# Patient Record
Sex: FEMALE | Race: WHITE | Employment: OTHER | URBAN - METROPOLITAN AREA
[De-identification: names, ages, dates, MRNs, and addresses within clinical notes are randomized per-mention and may not be internally consistent; named-entity substitution may affect disease eponyms.]

---

## 2018-02-27 ENCOUNTER — HOSPITAL ENCOUNTER (INPATIENT)
Age: 68
LOS: 8 days | Discharge: NURSING, REHAB OR LONG-TERM CARE | DRG: 909 | End: 2018-03-07
Attending: EMERGENCY MEDICINE | Admitting: SPECIALIST
Payer: MEDICARE

## 2018-02-27 ENCOUNTER — APPOINTMENT (OUTPATIENT)
Dept: MRI IMAGING | Age: 68
DRG: 909 | End: 2018-02-27
Attending: EMERGENCY MEDICINE
Payer: MEDICARE

## 2018-02-27 DIAGNOSIS — M54.31 BILATERAL SCIATICA: Primary | ICD-10-CM

## 2018-02-27 DIAGNOSIS — M54.42 ACUTE LEFT-SIDED LOW BACK PAIN WITH LEFT-SIDED SCIATICA: ICD-10-CM

## 2018-02-27 DIAGNOSIS — G89.29 CHRONIC BILATERAL LOW BACK PAIN WITH BILATERAL SCIATICA: ICD-10-CM

## 2018-02-27 DIAGNOSIS — L24.A9 WOUND DRAINAGE: ICD-10-CM

## 2018-02-27 DIAGNOSIS — K68.12 PSOAS ABSCESS, LEFT (HCC): ICD-10-CM

## 2018-02-27 DIAGNOSIS — M54.41 CHRONIC BILATERAL LOW BACK PAIN WITH BILATERAL SCIATICA: ICD-10-CM

## 2018-02-27 DIAGNOSIS — M54.32 BILATERAL SCIATICA: Primary | ICD-10-CM

## 2018-02-27 DIAGNOSIS — M54.42 CHRONIC BILATERAL LOW BACK PAIN WITH BILATERAL SCIATICA: ICD-10-CM

## 2018-02-27 DIAGNOSIS — T81.89XS LUMBAR SURGICAL WOUND FLUID COLLECTION, SEQUELA: ICD-10-CM

## 2018-02-27 PROBLEM — M54.50 LUMBAR BACK PAIN: Status: ACTIVE | Noted: 2018-02-27

## 2018-02-27 PROBLEM — T81.89XA LUMBAR SURGICAL WOUND FLUID COLLECTION: Status: ACTIVE | Noted: 2018-02-27

## 2018-02-27 PROBLEM — T81.9XXA POST-OPERATIVE COMPLICATION: Status: ACTIVE | Noted: 2018-02-27

## 2018-02-27 LAB
ALBUMIN SERPL-MCNC: 2.6 G/DL (ref 3.4–5)
ALBUMIN/GLOB SERPL: 0.6 {RATIO} (ref 0.8–1.7)
ALP SERPL-CCNC: 133 U/L (ref 45–117)
ALT SERPL-CCNC: 23 U/L (ref 13–56)
ANION GAP SERPL CALC-SCNC: 6 MMOL/L (ref 3–18)
AST SERPL-CCNC: 17 U/L (ref 15–37)
BASOPHILS # BLD: 0 K/UL (ref 0–0.06)
BASOPHILS NFR BLD: 0 % (ref 0–2)
BILIRUB SERPL-MCNC: 0.9 MG/DL (ref 0.2–1)
BUN SERPL-MCNC: 24 MG/DL (ref 7–18)
BUN/CREAT SERPL: 27 (ref 12–20)
CALCIUM SERPL-MCNC: 10.1 MG/DL (ref 8.5–10.1)
CHLORIDE SERPL-SCNC: 100 MMOL/L (ref 100–108)
CO2 SERPL-SCNC: 34 MMOL/L (ref 21–32)
CREAT SERPL-MCNC: 0.88 MG/DL (ref 0.6–1.3)
CRP SERPL-MCNC: 21.7 MG/DL (ref 0–0.3)
DIFFERENTIAL METHOD BLD: ABNORMAL
EOSINOPHIL # BLD: 0.1 K/UL (ref 0–0.4)
EOSINOPHIL NFR BLD: 1 % (ref 0–5)
ERYTHROCYTE [DISTWIDTH] IN BLOOD BY AUTOMATED COUNT: 14.8 % (ref 11.6–14.5)
ERYTHROCYTE [SEDIMENTATION RATE] IN BLOOD: 11 MM/HR (ref 0–30)
GLOBULIN SER CALC-MCNC: 4.4 G/DL (ref 2–4)
GLUCOSE SERPL-MCNC: 111 MG/DL (ref 74–99)
HCT VFR BLD AUTO: 42.9 % (ref 35–45)
HGB BLD-MCNC: 13.5 G/DL (ref 12–16)
INR PPP: 1 (ref 0.8–1.2)
LACTATE SERPL-SCNC: 1.9 MMOL/L (ref 0.4–2)
LYMPHOCYTES # BLD: 2.8 K/UL (ref 0.9–3.6)
LYMPHOCYTES NFR BLD: 24 % (ref 21–52)
MAGNESIUM SERPL-MCNC: 2.3 MG/DL (ref 1.6–2.6)
MCH RBC QN AUTO: 26.4 PG (ref 24–34)
MCHC RBC AUTO-ENTMCNC: 31.5 G/DL (ref 31–37)
MCV RBC AUTO: 83.8 FL (ref 74–97)
MONOCYTES # BLD: 0.8 K/UL (ref 0.05–1.2)
MONOCYTES NFR BLD: 7 % (ref 3–10)
NEUTS BAND NFR BLD MANUAL: 1 %
NEUTS SEG # BLD: 7.8 K/UL (ref 1.8–8)
NEUTS SEG NFR BLD: 67 % (ref 40–73)
PLATELET # BLD AUTO: 241 K/UL (ref 135–420)
PMV BLD AUTO: 9.3 FL (ref 9.2–11.8)
POTASSIUM SERPL-SCNC: 3.6 MMOL/L (ref 3.5–5.5)
PROT SERPL-MCNC: 7 G/DL (ref 6.4–8.2)
PROTHROMBIN TIME: 13 SEC (ref 11.5–15.2)
RBC # BLD AUTO: 5.12 M/UL (ref 4.2–5.3)
RBC MORPH BLD: ABNORMAL
SODIUM SERPL-SCNC: 140 MMOL/L (ref 136–145)
WBC # BLD AUTO: 11.7 K/UL (ref 4.6–13.2)

## 2018-02-27 PROCEDURE — 87070 CULTURE OTHR SPECIMN AEROBIC: CPT | Performed by: SPECIALIST

## 2018-02-27 PROCEDURE — 87040 BLOOD CULTURE FOR BACTERIA: CPT | Performed by: EMERGENCY MEDICINE

## 2018-02-27 PROCEDURE — 85652 RBC SED RATE AUTOMATED: CPT | Performed by: EMERGENCY MEDICINE

## 2018-02-27 PROCEDURE — 74011250637 HC RX REV CODE- 250/637: Performed by: EMERGENCY MEDICINE

## 2018-02-27 PROCEDURE — 99284 EMERGENCY DEPT VISIT MOD MDM: CPT

## 2018-02-27 PROCEDURE — 96361 HYDRATE IV INFUSION ADD-ON: CPT

## 2018-02-27 PROCEDURE — 83605 ASSAY OF LACTIC ACID: CPT | Performed by: EMERGENCY MEDICINE

## 2018-02-27 PROCEDURE — 72158 MRI LUMBAR SPINE W/O & W/DYE: CPT

## 2018-02-27 PROCEDURE — 74011250636 HC RX REV CODE- 250/636: Performed by: EMERGENCY MEDICINE

## 2018-02-27 PROCEDURE — 74011250637 HC RX REV CODE- 250/637: Performed by: SPECIALIST

## 2018-02-27 PROCEDURE — 85610 PROTHROMBIN TIME: CPT | Performed by: EMERGENCY MEDICINE

## 2018-02-27 PROCEDURE — 85025 COMPLETE CBC W/AUTO DIFF WBC: CPT | Performed by: EMERGENCY MEDICINE

## 2018-02-27 PROCEDURE — 74011250636 HC RX REV CODE- 250/636: Performed by: SPECIALIST

## 2018-02-27 PROCEDURE — 87077 CULTURE AEROBIC IDENTIFY: CPT | Performed by: SPECIALIST

## 2018-02-27 PROCEDURE — 86140 C-REACTIVE PROTEIN: CPT | Performed by: EMERGENCY MEDICINE

## 2018-02-27 PROCEDURE — 80053 COMPREHEN METABOLIC PANEL: CPT | Performed by: EMERGENCY MEDICINE

## 2018-02-27 PROCEDURE — A9577 INJ MULTIHANCE: HCPCS | Performed by: EMERGENCY MEDICINE

## 2018-02-27 PROCEDURE — 65270000029 HC RM PRIVATE

## 2018-02-27 PROCEDURE — 83735 ASSAY OF MAGNESIUM: CPT | Performed by: EMERGENCY MEDICINE

## 2018-02-27 PROCEDURE — 96374 THER/PROPH/DIAG INJ IV PUSH: CPT

## 2018-02-27 PROCEDURE — 74011000250 HC RX REV CODE- 250: Performed by: EMERGENCY MEDICINE

## 2018-02-27 PROCEDURE — 96375 TX/PRO/DX INJ NEW DRUG ADDON: CPT

## 2018-02-27 RX ORDER — MORPHINE SULFATE 20 MG/1
12 CAPSULE, EXTENDED RELEASE ORAL EVERY 12 HOURS
COMMUNITY
End: 2018-03-07

## 2018-02-27 RX ORDER — HYDROCHLOROTHIAZIDE 12.5 MG/1
12.5 CAPSULE ORAL AS NEEDED
COMMUNITY
End: 2021-01-15 | Stop reason: CLARIF

## 2018-02-27 RX ORDER — ONDANSETRON 2 MG/ML
4 INJECTION INTRAMUSCULAR; INTRAVENOUS
Status: DISCONTINUED | OUTPATIENT
Start: 2018-02-27 | End: 2018-03-07 | Stop reason: HOSPADM

## 2018-02-27 RX ORDER — CYCLOBENZAPRINE HCL 10 MG
10 TABLET ORAL
COMMUNITY
End: 2018-03-07

## 2018-02-27 RX ORDER — SODIUM CHLORIDE 0.9 % (FLUSH) 0.9 %
5-10 SYRINGE (ML) INJECTION EVERY 8 HOURS
Status: DISCONTINUED | OUTPATIENT
Start: 2018-02-27 | End: 2018-03-07 | Stop reason: HOSPADM

## 2018-02-27 RX ORDER — NAPROXEN 500 MG/1
500 TABLET ORAL 2 TIMES DAILY WITH MEALS
COMMUNITY
End: 2018-05-07

## 2018-02-27 RX ORDER — DIPHENHYDRAMINE HCL 25 MG
25 CAPSULE ORAL
Status: DISCONTINUED | OUTPATIENT
Start: 2018-02-27 | End: 2018-03-07 | Stop reason: HOSPADM

## 2018-02-27 RX ORDER — HYDROCODONE BITARTRATE AND ACETAMINOPHEN 7.5; 325 MG/1; MG/1
1 TABLET ORAL
Status: DISCONTINUED | OUTPATIENT
Start: 2018-02-27 | End: 2018-03-07 | Stop reason: HOSPADM

## 2018-02-27 RX ORDER — DOCUSATE SODIUM 100 MG/1
100 CAPSULE, LIQUID FILLED ORAL 2 TIMES DAILY
Status: DISCONTINUED | OUTPATIENT
Start: 2018-02-27 | End: 2018-03-07 | Stop reason: HOSPADM

## 2018-02-27 RX ORDER — ENOXAPARIN SODIUM 100 MG/ML
40 INJECTION SUBCUTANEOUS EVERY 24 HOURS
Status: DISCONTINUED | OUTPATIENT
Start: 2018-02-27 | End: 2018-03-07 | Stop reason: HOSPADM

## 2018-02-27 RX ORDER — MORPHINE SULFATE 4 MG/ML
4 INJECTION INTRAVENOUS
Status: COMPLETED | OUTPATIENT
Start: 2018-02-27 | End: 2018-02-27

## 2018-02-27 RX ORDER — ONDANSETRON 2 MG/ML
4 INJECTION INTRAMUSCULAR; INTRAVENOUS
Status: COMPLETED | OUTPATIENT
Start: 2018-02-27 | End: 2018-02-27

## 2018-02-27 RX ORDER — SODIUM CHLORIDE 0.9 % (FLUSH) 0.9 %
5-10 SYRINGE (ML) INJECTION AS NEEDED
Status: DISCONTINUED | OUTPATIENT
Start: 2018-02-27 | End: 2018-03-07 | Stop reason: HOSPADM

## 2018-02-27 RX ORDER — ZOLPIDEM TARTRATE 5 MG/1
5 TABLET ORAL
Status: DISCONTINUED | OUTPATIENT
Start: 2018-02-27 | End: 2018-03-07 | Stop reason: HOSPADM

## 2018-02-27 RX ORDER — SODIUM CHLORIDE, SODIUM LACTATE, POTASSIUM CHLORIDE, CALCIUM CHLORIDE 600; 310; 30; 20 MG/100ML; MG/100ML; MG/100ML; MG/100ML
75 INJECTION, SOLUTION INTRAVENOUS CONTINUOUS
Status: DISCONTINUED | OUTPATIENT
Start: 2018-02-27 | End: 2018-03-07 | Stop reason: HOSPADM

## 2018-02-27 RX ORDER — DEXAMETHASONE SODIUM PHOSPHATE 4 MG/ML
4 INJECTION, SOLUTION INTRA-ARTICULAR; INTRALESIONAL; INTRAMUSCULAR; INTRAVENOUS; SOFT TISSUE EVERY 8 HOURS
Status: COMPLETED | OUTPATIENT
Start: 2018-02-27 | End: 2018-02-28

## 2018-02-27 RX ORDER — DIAZEPAM 5 MG/1
5 TABLET ORAL ONCE
Status: COMPLETED | OUTPATIENT
Start: 2018-02-27 | End: 2018-02-27

## 2018-02-27 RX ORDER — MORPHINE SULFATE 4 MG/ML
8 INJECTION INTRAVENOUS
Status: DISCONTINUED | OUTPATIENT
Start: 2018-02-27 | End: 2018-02-27

## 2018-02-27 RX ORDER — NALOXONE HYDROCHLORIDE 0.4 MG/ML
0.4 INJECTION, SOLUTION INTRAMUSCULAR; INTRAVENOUS; SUBCUTANEOUS AS NEEDED
Status: DISCONTINUED | OUTPATIENT
Start: 2018-02-27 | End: 2018-03-07 | Stop reason: HOSPADM

## 2018-02-27 RX ORDER — OXYCODONE AND ACETAMINOPHEN 10; 325 MG/1; MG/1
1 TABLET ORAL
COMMUNITY
End: 2018-03-07

## 2018-02-27 RX ADMIN — HYDROMORPHONE HYDROCHLORIDE: 10 INJECTION, SOLUTION INTRAMUSCULAR; INTRAVENOUS; SUBCUTANEOUS at 18:35

## 2018-02-27 RX ADMIN — MORPHINE SULFATE 4 MG: 4 INJECTION INTRAVENOUS at 13:41

## 2018-02-27 RX ADMIN — Medication 5 ML: at 22:00

## 2018-02-27 RX ADMIN — ENOXAPARIN SODIUM 40 MG: 40 INJECTION SUBCUTANEOUS at 20:01

## 2018-02-27 RX ADMIN — SODIUM CHLORIDE, SODIUM LACTATE, POTASSIUM CHLORIDE, AND CALCIUM CHLORIDE 75 ML/HR: 600; 310; 30; 20 INJECTION, SOLUTION INTRAVENOUS at 18:32

## 2018-02-27 RX ADMIN — ONDANSETRON 4 MG: 2 INJECTION INTRAMUSCULAR; INTRAVENOUS at 13:25

## 2018-02-27 RX ADMIN — DOCUSATE SODIUM 100 MG: 100 CAPSULE, LIQUID FILLED ORAL at 20:20

## 2018-02-27 RX ADMIN — CEFTRIAXONE 1 G: 1 INJECTION, POWDER, FOR SOLUTION INTRAMUSCULAR; INTRAVENOUS at 16:30

## 2018-02-27 RX ADMIN — DIAZEPAM 5 MG: 5 TABLET ORAL at 13:25

## 2018-02-27 RX ADMIN — GADOBENATE DIMEGLUMINE 20 ML: 529 INJECTION, SOLUTION INTRAVENOUS at 14:21

## 2018-02-27 RX ADMIN — DEXAMETHASONE SODIUM PHOSPHATE 4 MG: 4 INJECTION, SOLUTION INTRAMUSCULAR; INTRAVENOUS at 19:59

## 2018-02-27 RX ADMIN — SODIUM CHLORIDE 1000 ML: 900 INJECTION, SOLUTION INTRAVENOUS at 13:23

## 2018-02-27 NOTE — ED TRIAGE NOTES
PT transferred to ER from home for c/o of lower back and bilateral leg pain/weakness that began Sunday evening. Pt denies recent injury, s/p lumbar laminectomy surgery approx 1 month ago. Pt reports this pain is similar to the pain she experienced previous to lower back surgery but is more intense. Pt took 25 mg of delayed release morphine PO approx 1 hr PTA.

## 2018-02-27 NOTE — ED NOTES
Sepsis Screening completed    (  )Patient meets SIRS criteria. (xx)Patient does not meet SIRS criteria.       SIRS Criteria is achieved when two or more of the following are present   Temperature < 96.8°F (36°C) or > 100.9°F (38.3°C)   Heart Rate > 90 beats per minute   Respiratory Rate > 20 breaths per minute   WBC count > 12,000 or <4,000 or > 10% bands

## 2018-02-27 NOTE — ED NOTES
TRANSFER - OUT REPORT:    Verbal report given to Maryhelen Favre RN*(name) on Fang Dickens  being transferred to [facility](unit) for routine progression of care       Report consisted of patients Situation, Background, Assessment and   Recommendations(SBAR). Information from the following report(s) SBAR, Kardex, ED Summary, Procedure Summary, MAR and Recent Results was reviewed with the receiving nurse. Lines:   Peripheral IV 02/27/18 Right Antecubital (Active)   Dressing Status Clean, dry, & intact 2/27/2018 12:49 PM   Dressing Type Transparent 2/27/2018 12:49 PM   Hub Color/Line Status Pink;Flushed 2/27/2018 12:49 PM   Action Taken Blood drawn 2/27/2018 12:49 PM        Opportunity for questions and clarification was provided.       Patient transported with:   Recorded Future

## 2018-02-27 NOTE — Clinical Note
Status[de-identified] Inpatient [101] Type of Bed: Surgical [18] Inpatient Hospitalization Certified Necessary for the Following Reasons: 3. Patient receiving treatment that can only be provided in an inpatient setting (further clarification in H&P documentation) Admitting Diagnosis: Lumbar back pain [4908402] Admitting Diagnosis: Post-operative complication [2669807] Admitting Diagnosis[facility] [[id number]] Admitting Physician: Mckenna Jimenez Attending Physician: Mckenna Jimenez Estimated Length of Stay: 5-7 Midnights Discharge Plan[de-identified] Extended Care Facility (e.g. Adult Home, Nursing Home, etc.)

## 2018-02-27 NOTE — ED PROVIDER NOTES
EMERGENCY DEPARTMENT HISTORY AND PHYSICAL EXAM    Date: 2/27/2018  Patient Name: Romi Hancock    History of Presenting Illness     Chief Complaint   Patient presents with    Leg Pain    Back Pain         History Provided By: Patient    Chief Complaint: Back pain  Duration: 1 Months  Timing:  Progressive  Location: Lower back  Severity: 5 out of 10  Modifying Factors: Draining of the fluid around the surgical site did not alleviate pain  Associated Symptoms:  weakness, constipation, and leg pain. Additional History (Context):   12:45 PM  Romi Hancock is a 74 y.o. female with PSHX of lumbar laminectomy by Dr. Stephanie Roman MD who presents to the emergency department C/O progressive 5/10 low back x~ 1 month. Associated sxs include constipation, muscle spasms and leg pain. Pt states that she has had three back surgeries in the past. After the first one there was no relief, but after the second one there was \"instant relief. \" Afterwards, however, pt reports episodes of pain in the back and legs to the point where she would be unable to walk up stairs or move. Recounts episodes of being unable to enter her home, get off the couch, or use the restroom without assistance. Reports that Dr. Jasmeet casey suspected a spinal tear and found fluid in and around the surgical site on the back. States that Dr. Jasmeet casey drained the fluid earlier this month, gave her a drain, and sent her to rehab. Since then, the drain has been removed but episodes have continued without alleviation. Currently, pt states that the only way she is comfortable is flatly on the right side. Notes that she can't use her legs due to pain. Pt denies urinary incontinence, difficulty urinating, stool incontinence, perineal paresthesias, and any other sxs or complaints. PCP: Lance Lujan MD        Past History     Past Medical History:  History reviewed. No pertinent past medical history.     Past Surgical History:  Past Surgical History:   Procedure Laterality Date    HX CHOLECYSTECTOMY      HX GYN      HX ORTHOPAEDIC  1996    c5-6 fusion     HX ORTHOPAEDIC  01/2018    lumbar laminectomy        Family History:  History reviewed. No pertinent family history. Social History:  Social History   Substance Use Topics    Smoking status: Former Smoker    Smokeless tobacco: Never Used    Alcohol use No       Allergies:  No Known Allergies      Review of Systems   Review of Systems   HENT: Positive for congestion. Gastrointestinal: Positive for constipation. Genitourinary: Negative for difficulty urinating. Musculoskeletal: Positive for back pain and myalgias (legs and buttocks biltaerally). Neurological: Positive for tremors and weakness (legs bilaterally ). All other systems reviewed and are negative. Physical Exam     Vitals:    02/27/18 1219 02/27/18 1330 02/27/18 1446 02/27/18 1634   BP: 102/83 128/50 112/66 128/72   Pulse: (!) 115 (!) 103 100 97   Resp: 16 19 18 17   Temp: 98 °F (36.7 °C)      SpO2: 100% 99% 91% 97%   Weight: 105.7 kg (233 lb)      Height: 5' 4\" (1.626 m)        Physical Exam   Nursing note and vitals reviewed. Constitutional: Alert. Well appearing, no acute distress  Head: Normocephalic, Atraumatic  Eyes: Pupils are equal, round, and reactive to light, EOMI  ENT: Moist mucous membranes, oropharynx clear. Neck: Supple, non-tender  Cardiovascular: Regular rate and rhythm, no murmurs, rubs, or gallops  Chest: Normal work of breathing and chest excursion bilaterally. No reproducible chest tenderness. Lungs: Clear to ausculation bilaterally. Abdomen: Soft, non tender, non distended, normoactive bowel sounds  Back: No evidence of trauma or deformity. No CVA Tenderness. Healing surgical incision over the midline lumbar spine. There is a small opening at the inferior portion with expressible thin purulent drainage that is foul smelling. Non tender over the spine. Bilateral gluteal muscle tenderness. Positive straight leg raises bilaterally with minimal elevation off the bed. Extremities: No evidence of trauma or deformity, no LE edema  Skin: Warm and dry  Neuro: Alert and appropriate, facial movement symmetric, normal speech, strength and sensation full and symmetric bilaterally  Psychiatric: Normal mood and affect      Diagnostic Study Results     Labs -     Recent Results (from the past 12 hour(s))   CBC WITH AUTOMATED DIFF    Collection Time: 02/27/18 12:44 PM   Result Value Ref Range    WBC 11.7 4.6 - 13.2 K/uL    RBC 5.12 4.20 - 5.30 M/uL    HGB 13.5 12.0 - 16.0 g/dL    HCT 42.9 35.0 - 45.0 %    MCV 83.8 74.0 - 97.0 FL    MCH 26.4 24.0 - 34.0 PG    MCHC 31.5 31.0 - 37.0 g/dL    RDW 14.8 (H) 11.6 - 14.5 %    PLATELET 455 397 - 587 K/uL    MPV 9.3 9.2 - 11.8 FL    NEUTROPHILS 67 40 - 73 %    BAND NEUTROPHILS 1 %    LYMPHOCYTES 24 21 - 52 %    MONOCYTES 7 3 - 10 %    EOSINOPHILS 1 0 - 5 %    BASOPHILS 0 0 - 2 %    ABS. NEUTROPHILS 7.8 1.8 - 8.0 K/UL    ABS. LYMPHOCYTES 2.8 0.9 - 3.6 K/UL    ABS. MONOCYTES 0.8 0.05 - 1.2 K/UL    ABS. EOSINOPHILS 0.1 0.0 - 0.4 K/UL    ABS.  BASOPHILS 0.0 0.0 - 0.06 K/UL    RBC COMMENTS NORMOCYTIC, NORMOCHROMIC      DF MANUAL     PROTHROMBIN TIME + INR    Collection Time: 02/27/18 12:44 PM   Result Value Ref Range    Prothrombin time 13.0 11.5 - 15.2 sec    INR 1.0 0.8 - 1.2     MAGNESIUM    Collection Time: 02/27/18 12:44 PM   Result Value Ref Range    Magnesium 2.3 1.6 - 2.6 mg/dL   METABOLIC PANEL, COMPREHENSIVE    Collection Time: 02/27/18 12:44 PM   Result Value Ref Range    Sodium 140 136 - 145 mmol/L    Potassium 3.6 3.5 - 5.5 mmol/L    Chloride 100 100 - 108 mmol/L    CO2 34 (H) 21 - 32 mmol/L    Anion gap 6 3.0 - 18 mmol/L    Glucose 111 (H) 74 - 99 mg/dL    BUN 24 (H) 7.0 - 18 MG/DL    Creatinine 0.88 0.6 - 1.3 MG/DL    BUN/Creatinine ratio 27 (H) 12 - 20      GFR est AA >60 >60 ml/min/1.73m2    GFR est non-AA >60 >60 ml/min/1.73m2    Calcium 10.1 8.5 - 10.1 MG/DL Bilirubin, total 0.9 0.2 - 1.0 MG/DL    ALT (SGPT) 23 13 - 56 U/L    AST (SGOT) 17 15 - 37 U/L    Alk. phosphatase 133 (H) 45 - 117 U/L    Protein, total 7.0 6.4 - 8.2 g/dL    Albumin 2.6 (L) 3.4 - 5.0 g/dL    Globulin 4.4 (H) 2.0 - 4.0 g/dL    A-G Ratio 0.6 (L) 0.8 - 1.7     SED RATE (ESR)    Collection Time: 02/27/18 12:44 PM   Result Value Ref Range    Sed rate, automated 11 0 - 30 mm/hr   LACTIC ACID    Collection Time: 02/27/18 12:49 PM   Result Value Ref Range    Lactic acid 1.9 0.4 - 2.0 MMOL/L       Radiologic Studies -   MRI LUMB SPINE W WO CONT   Final Result   IMPRESSION:     1. Postsurgical changes from prior left-sided L4-5 and L5-S1 laminotomies. Complex fluid collections along the laminotomy bed seen within the subcutaneous  fat with peripheral enhancement, nonspecific but potentially representing  abscess given reported history of purulent drainage from the surgical incision. Peripherally enhancing postoperative hematomas or seromas can have a similar  appearance. No prior comparison studies.     2. Extensive abnormal fluid signal with peripheral enhancement involving L4-5  and L5-S1 disc spaces including prominent endplate marrow signal changes and  enhancement as well as cortical signal loss towards the left, suspicious for  discitis/osteomyelitis especially given history. Prominent postoperative changes  with underlying extensive inflammatory degenerative changes is a consideration  but felt to be less likely, especially given the extent of the relative disc  fluid signal as well as the adjacent left sided psoas intramuscular fluid  collection which is very suspicious for psoas abscess.     3. Moderate to severe central and lateral recess stenosis at the L4-5 level with  distorted thecal sac. Moderate central and lateral recess stenosis at the L3-L4  level. Thecal sac is essentially tapered into nerve roots at L5-S1 without  central stenosis.     4. No definite epidural abscess.  Posterior midline L5-S1 disc extrusion versus  extension of discitis into the spinal canal, without evidence of central  stenosis. CT Results  (Last 48 hours)    None        CXR Results  (Last 48 hours)    None          Medications given in the ED-  Medications   sodium chloride 0.9 % bolus infusion 1,000 mL (0 mL IntraVENous IV Completed 2/27/18 1610)   ondansetron (ZOFRAN) injection 4 mg (4 mg IntraVENous Given 2/27/18 1325)   diazePAM (VALIUM) tablet 5 mg (5 mg Oral Given 2/27/18 1325)   morphine 4 mg (4 mg IntraVENous Given 2/27/18 1341)   Gadobenate Dimeglumine (MULTIHANCE) 529 mg/mL (0.1mmol/0.2mL) contrast injection 20 mL (20 mL IntraVENous Given 2/27/18 1421)   cefTRIAXone (ROCEPHIN) 1 g in sterile water (preservative free) 10 mL IV syringe (1 g IntraVENous Given 2/27/18 1630)         Medical Decision Making   I am the first provider for this patient. I reviewed the vital signs, available nursing notes, past medical history, past surgical history, family history and social history. Vital Signs-Reviewed the patient's vital signs. Pulse Oximetry Analysis - 100% on room air     Records Reviewed: Nursing Notes    Provider Notes (Medical Decision Making):     Discussion: 73 y/o female presents with back pain and bilateral sciatica in the setting of multiple recent spine surgeies by Dr. Jaqueline Lawler. Her vital signs are stable and vital signs are reassuring for no acute process. However she does have some significant expressible wound drainage on exam. MRI scan shows fluid collection near the surgical site as well in the left psoas muscle with some inflammatory changes consistent with possible discitis or osteomyelitis. There results were discussed with Dr. Jaqueline Lawler who agrees with plan for admission to his service, IV antibiotics, continued symptomatic management. He will evaluate the pt for any further treatment or procedures. Procedures:  Procedures    ED Course:   12:45 PM Initial assessment performed.  The patients presenting problems have been discussed, and they are in agreement with the care plan formulated and outlined with them. I have encouraged them to ask questions as they arise throughout their visit. 4:00 PM Discussed patient's history, exam, and available diagnostics results with Dr. Aleida Fregoso MD, orthopedic surgeon, who requests getting a good wound culture started and administering IV Ceftriaxone. States that he will admit the pt to his service for management. Diagnosis and Disposition       Core Measures:  For Hospitalized Patients:    1. Hospitalization Decision Time:  The decision to hospitalize the patient was made by Lois Mckeon MD at 3:22 PM on 2/27/2018    2. Aspirin: Aspirin was not given because the patient did not present with a stroke at the time of their Emergency Department evaluation    4:00 PM  Patient is being admitted to the hospital by Dr. Aleida Fregoso. The results of their tests and reasons for their admission have been discussed with them and/or available family. They convey agreement and understanding for the need to be admitted and for their admission diagnosis. CONDITIONS ON ADMISSION:  Sepsis is not present at the time of admission. Deep Vein Thrombosis is not present at the time of admission. Thrombosis is not present at the time of admission. Urinary Tract Infection is not present at the time of admission. Pneumonia is not present at the time of admission. MRSA is not present at the time of admission. Wound infection is present at the time of admission. Pressure Ulcer is not present at the time of admission. CLINICAL IMPRESSION:    1. Bilateral sciatica    2. Chronic bilateral low back pain with bilateral sciatica    3. Wound drainage      _______________________________    Attestations:   This note is prepared by Eh Bailey, acting as Scribe for Lois Mckeon MD.    Lois Mckeon MD:  The scribe's documentation has been prepared under my direction and personally reviewed by me in its entirety.   I confirm that the note above accurately reflects all work, treatment, procedures, and medical decision making performed by me.  _______________________________

## 2018-02-28 LAB
BASOPHILS # BLD: 0 K/UL (ref 0–0.06)
BASOPHILS NFR BLD: 0 % (ref 0–2)
DIFFERENTIAL METHOD BLD: ABNORMAL
EOSINOPHIL # BLD: 0 K/UL (ref 0–0.4)
EOSINOPHIL NFR BLD: 0 % (ref 0–5)
ERYTHROCYTE [DISTWIDTH] IN BLOOD BY AUTOMATED COUNT: 14.8 % (ref 11.6–14.5)
HCT VFR BLD AUTO: 38.3 % (ref 35–45)
HGB BLD-MCNC: 11.7 G/DL (ref 12–16)
LYMPHOCYTES # BLD: 1.3 K/UL (ref 0.9–3.6)
LYMPHOCYTES NFR BLD: 13 % (ref 21–52)
MCH RBC QN AUTO: 25.9 PG (ref 24–34)
MCHC RBC AUTO-ENTMCNC: 30.5 G/DL (ref 31–37)
MCV RBC AUTO: 84.9 FL (ref 74–97)
MONOCYTES # BLD: 0.3 K/UL (ref 0.05–1.2)
MONOCYTES NFR BLD: 3 % (ref 3–10)
NEUTS SEG # BLD: 8.6 K/UL (ref 1.8–8)
NEUTS SEG NFR BLD: 84 % (ref 40–73)
PLATELET # BLD AUTO: 257 K/UL (ref 135–420)
PMV BLD AUTO: 9.3 FL (ref 9.2–11.8)
RBC # BLD AUTO: 4.51 M/UL (ref 4.2–5.3)
WBC # BLD AUTO: 10.3 K/UL (ref 4.6–13.2)

## 2018-02-28 PROCEDURE — 74011000250 HC RX REV CODE- 250: Performed by: SPECIALIST

## 2018-02-28 PROCEDURE — 97161 PT EVAL LOW COMPLEX 20 MIN: CPT

## 2018-02-28 PROCEDURE — 65270000029 HC RM PRIVATE

## 2018-02-28 PROCEDURE — 74011250636 HC RX REV CODE- 250/636: Performed by: SPECIALIST

## 2018-02-28 PROCEDURE — 85025 COMPLETE CBC W/AUTO DIFF WBC: CPT | Performed by: SPECIALIST

## 2018-02-28 PROCEDURE — 74011250637 HC RX REV CODE- 250/637: Performed by: SPECIALIST

## 2018-02-28 PROCEDURE — 36415 COLL VENOUS BLD VENIPUNCTURE: CPT | Performed by: SPECIALIST

## 2018-02-28 RX ADMIN — DOCUSATE SODIUM 100 MG: 100 CAPSULE, LIQUID FILLED ORAL at 20:23

## 2018-02-28 RX ADMIN — SODIUM CHLORIDE, SODIUM LACTATE, POTASSIUM CHLORIDE, AND CALCIUM CHLORIDE 75 ML/HR: 600; 310; 30; 20 INJECTION, SOLUTION INTRAVENOUS at 08:52

## 2018-02-28 RX ADMIN — SODIUM CHLORIDE, SODIUM LACTATE, POTASSIUM CHLORIDE, AND CALCIUM CHLORIDE 75 ML/HR: 600; 310; 30; 20 INJECTION, SOLUTION INTRAVENOUS at 22:49

## 2018-02-28 RX ADMIN — DOCUSATE SODIUM 100 MG: 100 CAPSULE, LIQUID FILLED ORAL at 08:52

## 2018-02-28 RX ADMIN — ENOXAPARIN SODIUM 40 MG: 40 INJECTION SUBCUTANEOUS at 17:40

## 2018-02-28 RX ADMIN — DEXAMETHASONE SODIUM PHOSPHATE 4 MG: 4 INJECTION, SOLUTION INTRAMUSCULAR; INTRAVENOUS at 04:10

## 2018-02-28 RX ADMIN — WATER 1 G: 1 INJECTION INTRAMUSCULAR; INTRAVENOUS; SUBCUTANEOUS at 13:52

## 2018-02-28 RX ADMIN — Medication 10 ML: at 23:09

## 2018-02-28 RX ADMIN — DEXAMETHASONE SODIUM PHOSPHATE 4 MG: 4 INJECTION, SOLUTION INTRAMUSCULAR; INTRAVENOUS at 11:29

## 2018-02-28 NOTE — PROGRESS NOTES
1830-  Received pt via ED. Use slid board to transfer from [facility] to bed. Pt stated she is only in pain when she moves. Pain stated 7/10. Lower Lumbar has small opening with drainage and AASHISH. Family at side. Position for comfort apply TEDs and SCDs bilaterally. PCA dilaudid infusing- see MAR. [place] to room call bell at side. MD will round tomorrow. Friends at side. TV on.    2030- assist pt onto bedpan. Pt voided 300 cc of dark urine with foul odor. Replace bed pads and apply ABD open under wound opening. Assist with cleaning and position on left side with pillows for comfort. Call bell at side. Family at side. Pain 5/10 stated by pt.

## 2018-02-28 NOTE — PROGRESS NOTES
Chart reviewed, spoke with RN, met with pt at bedside. MD will see pt later today to determine treatment course. Pt admitted through ED, lives with room mate, has DME and was being seen by [facility]. Pt unsure if she will want [facility] on discharge, CM left [facility] list with pt and gave pt number to [employer] company to possibly rent hospital bed for home. CM will follow for needs at discharge. Care Management Interventions  PCP Verified by CM: Yes  Transition of Care Consult (CM Consult): Discharge Planning  Discharge Durable Medical Equipment: No  Physical Therapy Consult: Yes  Current Support Network: Other (lives with roommate)  Confirm Follow Up Transport: Friends  Plan discussed with Pt/Family/Caregiver: Yes  Freedom of Choice Offered:  Yes

## 2018-02-28 NOTE — H&P
[facility] [place]  HISTORY AND PHYSICAL      Name:LAZARA AMBRIZ [facility]  MR#: [id number]  : 1950  ACCOUNT #: [de-identified]   ADMIT DATE: 2018    HISTORY OF PRESENT ILLNESS:  Patient admitted with leakage from the lumbar wound. This lady had a lumbar diskectomy in 2017 and repeat diskectomy in 2018. She developed a leak from the wound, was readmitted and had exploration of the wound. It was strictly fluid and the culture was sterile, no growth, so antibiotics were not continued for that reason. She did well recently, but recently developed severe pain, unable to get out of the bed, was brought to [facility] emergency room. She had an MRI scan on the day of this admission that revealed a large fluid collection that goes down into the psoas muscle, but not an epidural abscess. We admitted her for that reason. Cultures were taken in the emergency room and they are pending. She has been started on antibiotics prophylactically. PAST HISTORY:  This lady has excellent health. She has had minor things in the past.     MEDICATIONS:  She takes no chronic longstanding medications. REVIEW OF SYSTEMS:  Otherwise not really contributory. PHYSICAL EXAMINATION:  GENERAL:  Reveals an alert lady. HEAD, EYES, EARS, NOSE AND THROAT: Negative. NECK:  No mass or bruits. CHEST:  Clear. HEART:  NSR without murmur, rub or gallop. ABDOMEN:  Not remarkable. GENITALIA:  Not examined. NEUROLOGIC:  Detailed neurologic examination:  Mental status, cranial nerves, motor, sensory, and reflex exam is normal.  SKIN:  Lumbar wound is open. There is a small area and draining. ASSESSMENT:  Persistent wound drainage, development of psoas muscle fluid-filled collection postoperatively, the latter being of unclarified etiology, not present on prior studies.     PLAN:  The patient is being admitted for preoperative evaluation, cultures, will start prophylactic antibiotics and will have infectious disease see if necessary. As noted, the previous wound culture in the operating room was sterile. All the above has been fully discussed with the patient and family and we will proceed with this outline of treatment management. We will consult infectious disease as necessary and the hospitalist as necessary.       MD NEREYDA Wilson/HN  D: 02/28/2018 13:22     T: 02/28/2018 13:47  JOB #: [id number]

## 2018-02-28 NOTE — PROGRESS NOTES
PT note: consult received. No H&P in chart. Will notify nurse Wanda and f/u as appropriate. Thank you.   Pedrito Lacey, PT

## 2018-02-28 NOTE — PROGRESS NOTES
Problem: Mobility Impaired (Adult and Pediatric)  Goal: *Acute Goals and Plan of Care (Insert Text)  Physical Therapy Goals  Initiated 2/28/2018  to be met within 2-3 days  STG's:  1. Bed mobility:  Supine to/from sit with S in prep for ADL activity. 2. Transfers:  Sit to/from stand with S in prep for gait. 3. Standing/Ambulation Balance:  Good with LRAD for safe transfers and gait. LTG's  1. Ambulation:  Ambulate 150 ft.with S with LRAD for home mobility at discharge. 2. Patient Education:  S/Independent with HEP for home performance accurately at discharge. 3. Step Negotiation: Ascend/Descend 3-5 steps with CGA with HR for home navigation as indicated. Outcome: Progressing Towards Goal  physical Therapy EVALUATION    Patient: Anna Cullen [de-identified]74 y.o. female)  Date: 2/28/2018  Primary Diagnosis: Lumbar back pain  Post-operative complication  Psoas abscess, left (HCC)  Lumbar surgical wound fluid collection  Precautions:Fall    ASSESSMENT :  Based on the objective data described below, the patient presents with decreased independence in functional mobility with regard to transfers, and gait. Pt found seated EOB on PT arrival in NAD. Reports pain 2-3/10 and much improved since the am.  Pt with PCA in place. Nurse Daiana in to dress lumbar spine incision prior to gt training. Able to participate in GT with RW/CGA using slow reciprocal gt pattern, mildly antalgic. Pt with good balance; increased base of support. No c/o headache, nausea or light headedness. Pt returned to room and left up in chair at bedside with all needs in reach, and nurse Daiana notified. Patient will benefit from skilled intervention to address the above impairments. Recommend HHPT for follow up physical therapy upon discharge to reach maximal level of independence/safety with functional mobility.      Pt Education: pt educated in safety/technique during functional mobility tasks       Patients rehabilitation potential is considered to be Good  Factors which may influence rehabilitation potential include:   []         None noted  []         Mental ability/status  [x]         Medical condition  []         Home/family situation and support systems  []         Safety awareness  []         Pain tolerance/management  []         Other:      PLAN :  Recommendations and Planned Interventions:  [x]           Bed Mobility Training             []    Neuromuscular Re-Education  [x]           Transfer Training                   []    Orthotic/Prosthetic Training  [x]           Gait Training                          []    Modalities  []           Therapeutic Exercises          []    Edema Management/Control  [x]           Therapeutic Activities            [x]    Patient and Family Training/Education  []           Other (comment):    Frequency/Duration: Patient will be followed by physical therapy 1-2 times per day to address goals. Discharge Recommendations: Home Health vs None  Further Equipment Recommendations for Discharge: N/A     SUBJECTIVE:   Patient stated Oh, I'm much better than this morning. I couldn't have done this then.     OBJECTIVE DATA SUMMARY:   History reviewed. No pertinent past medical history.   Past Surgical History:   Procedure Laterality Date    HX CHOLECYSTECTOMY      HX GYN      HX ORTHOPAEDIC  1996    c5-6 fusion     HX ORTHOPAEDIC  01/2018    lumbar laminectomy      Barriers to Learning/Limitations: None  Compensate with: N/A  Prior Level of Function/Home Situation: ambulates with RW PTA  Home Situation  Home Environment: Private residence  # Steps to Enter: 3  Rails to Enter: Yes  Hand Rails : Right  One/Two Story Residence: Two story  # of Interior Steps: 16  Interior Rails: Left (except for 6 steps midway)  Living Alone: No  Support Systems: Other (comments) (roommate)  Patient Expects to be Discharged to[de-identified] Private residence  Current DME Used/Available at Home: Hemal Scarce, straight, Walker, rolling  Critical Behavior:  Neurologic State: Alert; Appropriate for age  Orientation Level: Oriented X4  Cognition: Follows commands; Appropriate safety awareness; Appropriate for age attention/concentration; Appropriate decision making  Safety/Judgement: Awareness of environment; Fall prevention  Psychosocial  Patient Behaviors: Calm; Cooperative  Purposeful Interaction: Yes  Pt Identified Daily Priority: Clinical issues (comment)  Caritas Process: Nurture loving kindness;Establish trust;Teaching/learning; Attend basic human needs;Create healing environment  Caring Interventions: Reassure  Reassure: Therapeutic listening; Informing; Acceptance  Skin Condition/Temp: Warm;Dry  Skin Integrity: Incision (comment) (nurse in to dress prior to Gait training)  Skin Integumentary  Skin Color: Appropriate for ethnicity  Skin Condition/Temp: Warm;Dry  Skin Integrity: Incision (comment) (nurse in to dress prior to Gait training)  Turgor: Non-tenting  Hair Growth: Present  Varicosities: Absent  Strength:    Strength: Within functional limits  Tone & Sensation:   Tone: Normal  Sensation: Intact  Range Of Motion:  AROM: Within functional limits  Functional Mobility:  Bed Mobility: pt seated EOB on PT arrival  Transfers:  Sit to Stand: Contact guard assistance  Stand to Sit: Contact guard assistance  Bed to Chair: Contact guard assistance  Balance:   Sitting: Intact  Standing: Intact; With support  Ambulation/Gait Training:  Distance (ft): 40 Feet (ft)  Assistive Device: Gait belt;Walker, rolling (GB pectorally placed)  Ambulation - Level of Assistance: Contact guard assistance  Gait Description (WDL): Exceptions to WDL  Gait Abnormalities: Antalgic (mild)  Base of Support: Widened  Speed/Donna: Pace decreased (<100 feet/min)  Step Length: Right shortened;Left shortened  Interventions: Safety awareness training  Pain:  Pain Scale 1: Numeric (0 - 10)  Pain Intensity 1: 3  Pain Location 1: Back;Buttocks; Foot  Pain Orientation 1: Posterior;Right;Left  Pain Description 1: Aching  Pain Intervention(s) 1: Encouraged PCA  Activity Tolerance:   Fair+  Please refer to the flowsheet for vital signs taken during this treatment. After treatment:   [x]         Patient left in no apparent distress sitting up in chair  []         Patient left in no apparent distress in bed  [x]         Call bell left within reach  [x]         Nursing notified-Daiana  []         Caregiver present  []         Bed alarm activated    COMMUNICATION/EDUCATION:   [x]         Fall prevention education was provided and the patient/caregiver indicated understanding. [x]         Patient/family have participated as able in goal setting and plan of care. [x]         Patient/family agree to work toward stated goals and plan of care. []         Patient understands intent and goals of therapy, but is neutral about his/her participation. []         Patient is unable to participate in goal setting and plan of care. Eval Complexity: History: HIGH Complexity :3+ comorbidities / personal factors will impact the outcome/ POC Exam:LOW Complexity : 1-2 Standardized tests and measures addressing body structure, function, activity limitation and / or participation in recreation  Presentation: MEDIUM Complexity : Evolving with changing characteristics  Clinical Decision Making:Low Complexity amb >30ft Overall Complexity:LOW     G-Codes (GP)  Mobility  [id number] Current  CI= 1-19%   Goal  CI= 1-19%  The severity rating is based on the functional mobility assessment.     Thank you for this referral.  Melissa Alonzo, PT   Time Calculation: 20 mins

## 2018-02-28 NOTE — PROGRESS NOTES
INITIAL NUTRITION ASSESSMENT     RECOMMENDATIONS/PLAN:   Continue w/ POC  Monitor labs/lytes, PO intakes, skin integrity, wt, fluid status, BM  REASON FOR ASSESSMENT:     []  RN Referral:    [x] MST score >/=2  Malnutrition Screening Tool (MST):  Recently Lost Weight Without Trying: Yes  If Yes, How Much Weight Loss: 14 - 23 lbs  Eating Poorly Due to Decreased Appetite: No  MST Score: 2      NUTRITION ASSESSMENT:   Client History: 74 yrs old Female admitted with leg and back pain. PMHx: no pertinent past medical hx   Cultural/[employer] Food Preferences: None Identified    FOOD/NUTRITION HISTORY  Diet History: unable to obtain at this time nurse in room w/ pt when nutrition went by    Food Allergies:  [x] NKFA   Pertinent PTA Medications:  [x] Reviewed     NUTRITION INTAKE   Diet Order:  Regular     Average PO Intake:       Patient Vitals for the past 100 hrs:   % Diet Eaten   02/28/18 0613 0 %   02/28/18 0414 0 %   02/27/18 2322 0 %   02/27/18 1954 75 %      Pertinent Medications:  [x] Reviewed; Electrolyte Replacement Protocol: []K  []Mg  []PO4    Insulin:  [] SSI  [] Pre-meal   []  Basal   [] Drip  [] None  Pt expected to meet estimated nutrient needs through next review:          [x]  Yes     [] No;  ANTHROPOMETRICS  Height: 5' 4\" (162.6 cm)       Weight: 105.7 kg (233 lb)    BMI: 40 kg/m^2  -  morbidly obese (Greater than or = to 40% BMI)        Weight change: pt was 240# on 2/15/18 currently pt is 233# which is not significant                                Comparison to Reference Standards:  IBW: 120 lbs      %IBW: 194%      AdjBW: 67.4 kg    NUTRITION-FOCUSED PHYSICAL ASSESSMENT  Skin: No PU.      GI: No BM noted     BIOCHEMICAL DATA & MEDICAL TESTS  Pertinent Labs:  [x] Reviewed;      NUTRITION PRESCRIPTION  Calories: 1803 kcal/day based on [place] x 1.2  Protein:  g/day based on 0.8-1.0 g/kg  CHO: 225 g/day based on 50% of total energy  Fluid: 1803ml/day based on 1 kcal/ml      NUTRITION DIAGNOSES:   1. At risk for inadequate oral intake related to increase pain as evidence by MD note. NUTRITION INTERVENTIONS:   INTERVENTIONS:        GOALS:  1. Other:Continue w/ POC 1. Encourage PO intake >50% at most meals by next review 5days             LEARNING NEEDS (Diet, Supplementation, Food/Nutrient-Drug Interaction):   [x] None Identified  [] Inpatient education provided/documented    [] Identified and patient:  [] Declined     [] Was not appropriate/indicated  NUTRITION MONITORING /EVALUATION:   Follow PO intake  Monitor wt  Monitor renal labs, electrolytes, fluid status  Monitor for additional supplement needs    [] Participated in Interdisciplinary Rounds  [x] Interdisciplinary Care Plan Reviewed/Documented  DISCHARGE NUTRITION RECOMMENDATIONS ADDRESSED:     [x] Yes- recommended Regular diet     NUTRITION RISK:     [x]  At risk                     []  Not currently at risk     Will follow-up per policy.   Nu Ramsey, [address]

## 2018-02-28 NOTE — ROUTINE PROCESS
Bedside and Verbal shift change report given to SUKHI Moody RN (oncoming nurse) by Alida House RN (offgoing nurse). Report included the following information SBAR, Kardex, Intake/Output and MAR.

## 2018-02-28 NOTE — PROGRESS NOTES
Occupational Therapy Screening:  Services are indicated. Evaluate and Treat Order is requested. [facility] based on results obtained during the nursing admission assessment. The patients chart was reviewed and the patient is appropriate for a skilled therapy evaluation. No H&P in chart. Pt had lumbar lami 1 month ago with back pain and Dr. Melody Adams following. Please order a consult for occupational therapy if you are in agreement and would like an evaluation to be completed. Thank you.   Roro Sears, OTR/L

## 2018-02-28 NOTE — PROGRESS NOTES
1661 - Patient in bed at this time. A/O x 4. IV to right AC  intact and patent. SCDs and TEDs to bilateral legs. ABD place to back, but not secured, with serosanguinous drainage noted. . No numbness/tingling. Pedal and radial pulses palpable. Lungs CTA. Bowel sounds active to all quadrants. Pain 3/10.       1130-patient resting quietly in bed with SCDs and TEDs in place. No complaints. 1426-Hospitalist consult called to hospitalist service, 's name given. 1545-Patient up to bedside commode. Large BM.    1610-Recalled Hospitalist service and spoke with Dr. María Elena Culp. He is unable to assist with wound infection issues, but happy to assist in any other way needed. 1620-Spoke with Dr. Dean Held concerning Hospitalist unable to assist with this d/t issue better for Infectious Disease. New order to consult Infectious disease. Spoke with on-call to IF and informed that consult needs to be called in tomorrow, Jessica palmer made aware. Shift summary-Up to bedside commode with one assist. Ambulated in room with PT. Sitting in chair at bedside. Loose dressing placed to back d/t yellowish drainage. Voiding without difficulty. PCA pump in place and controlling pain. Tolerating meals.

## 2018-02-28 NOTE — PROGRESS NOTES
Gram stain small number bacteria never febrile. Will consult hospitalist. And continue rocephin for now. All discussed with patient in detail.

## 2018-02-28 NOTE — ROUTINE PROCESS
Bedside and Verbal shift change report given to Raul Finley RN by Agata Bennett. Report included the following information SBAR, Kardex, OR Summary, Intake/Output and MAR.

## 2018-02-28 NOTE — PROGRESS NOTES
1950 - Bedside report received from Sarina Gross LPN. Patient in bed. Pain 2/10.     2000 - Patient in bed at this time. IV to R AC  intact and patent. Sequential compression device bilaterally. No  Dressing to back, some scant amt of serous drge noted. Pads changed, ABD pads applied without tape.+ CMS. Pt A & O x 4. LS clear, on RA. Abdomen soft, NT and ND. + BS to all 4 quadrants. Denies nausea. Pain 2/10. On PCA Dilaudid. Call light within reach. 2020-Pt reports that she has not voided since yesterday night. Assisted to bedpan, voided 300 ccs of dark cloudy urine. 0614-CHG wipes done. Will leave pt in bed, is non-ambulatory. Pt had uneventful shift. Uses a bed pan, has been non-ambulatory. Pain remained well-controlled with medication. No issues/concerns at this time.  Call bell within reach

## 2018-02-28 NOTE — PROGRESS NOTES
Bedside, Verbal and Written shift change report given to Bruno Peña RN (oncoming nurse) by ISI Chirinos LPN (offgoing nurse). Report included the following information SBAR, Kardex, Procedure Summary, Intake/Output and MAR.

## 2018-03-01 LAB
ANION GAP SERPL CALC-SCNC: 7 MMOL/L (ref 3–18)
BASOPHILS # BLD: 0 K/UL (ref 0–0.06)
BASOPHILS NFR BLD: 0 % (ref 0–2)
BUN SERPL-MCNC: 20 MG/DL (ref 7–18)
BUN/CREAT SERPL: 30 (ref 12–20)
CALCIUM SERPL-MCNC: 9.1 MG/DL (ref 8.5–10.1)
CHLORIDE SERPL-SCNC: 104 MMOL/L (ref 100–108)
CO2 SERPL-SCNC: 30 MMOL/L (ref 21–32)
CREAT SERPL-MCNC: 0.67 MG/DL (ref 0.6–1.3)
DIFFERENTIAL METHOD BLD: ABNORMAL
EOSINOPHIL # BLD: 0 K/UL (ref 0–0.4)
EOSINOPHIL NFR BLD: 0 % (ref 0–5)
ERYTHROCYTE [DISTWIDTH] IN BLOOD BY AUTOMATED COUNT: 14.5 % (ref 11.6–14.5)
GLUCOSE SERPL-MCNC: 124 MG/DL (ref 74–99)
HCT VFR BLD AUTO: 37.6 % (ref 35–45)
HGB BLD-MCNC: 11.7 G/DL (ref 12–16)
LYMPHOCYTES # BLD: 2.1 K/UL (ref 0.9–3.6)
LYMPHOCYTES NFR BLD: 15 % (ref 21–52)
MCH RBC QN AUTO: 26 PG (ref 24–34)
MCHC RBC AUTO-ENTMCNC: 31.1 G/DL (ref 31–37)
MCV RBC AUTO: 83.6 FL (ref 74–97)
MONOCYTES # BLD: 0.8 K/UL (ref 0.05–1.2)
MONOCYTES NFR BLD: 6 % (ref 3–10)
NEUTS SEG # BLD: 11 K/UL (ref 1.8–8)
NEUTS SEG NFR BLD: 79 % (ref 40–73)
PLATELET # BLD AUTO: 303 K/UL (ref 135–420)
PMV BLD AUTO: 9.2 FL (ref 9.2–11.8)
POTASSIUM SERPL-SCNC: 3.8 MMOL/L (ref 3.5–5.5)
RBC # BLD AUTO: 4.5 M/UL (ref 4.2–5.3)
SODIUM SERPL-SCNC: 141 MMOL/L (ref 136–145)
WBC # BLD AUTO: 13.9 K/UL (ref 4.6–13.2)

## 2018-03-01 PROCEDURE — 74011250637 HC RX REV CODE- 250/637: Performed by: SPECIALIST

## 2018-03-01 PROCEDURE — 65270000029 HC RM PRIVATE

## 2018-03-01 PROCEDURE — 85025 COMPLETE CBC W/AUTO DIFF WBC: CPT | Performed by: PHYSICIAN ASSISTANT

## 2018-03-01 PROCEDURE — 97166 OT EVAL MOD COMPLEX 45 MIN: CPT

## 2018-03-01 PROCEDURE — 36415 COLL VENOUS BLD VENIPUNCTURE: CPT | Performed by: PHYSICIAN ASSISTANT

## 2018-03-01 PROCEDURE — 97116 GAIT TRAINING THERAPY: CPT

## 2018-03-01 PROCEDURE — 74011000250 HC RX REV CODE- 250: Performed by: SPECIALIST

## 2018-03-01 PROCEDURE — 97535 SELF CARE MNGMENT TRAINING: CPT

## 2018-03-01 PROCEDURE — 74011000258 HC RX REV CODE- 258: Performed by: PHYSICIAN ASSISTANT

## 2018-03-01 PROCEDURE — 97530 THERAPEUTIC ACTIVITIES: CPT

## 2018-03-01 PROCEDURE — 80048 BASIC METABOLIC PNL TOTAL CA: CPT | Performed by: PHYSICIAN ASSISTANT

## 2018-03-01 PROCEDURE — 74011250636 HC RX REV CODE- 250/636: Performed by: PHYSICIAN ASSISTANT

## 2018-03-01 PROCEDURE — 74011250636 HC RX REV CODE- 250/636: Performed by: SPECIALIST

## 2018-03-01 RX ORDER — HYDROCODONE BITARTRATE AND ACETAMINOPHEN 10; 325 MG/1; MG/1
1 TABLET ORAL
Status: DISCONTINUED | OUTPATIENT
Start: 2018-03-01 | End: 2018-03-07 | Stop reason: HOSPADM

## 2018-03-01 RX ORDER — CYCLOBENZAPRINE HCL 10 MG
10 TABLET ORAL
Status: DISCONTINUED | OUTPATIENT
Start: 2018-03-01 | End: 2018-03-07 | Stop reason: HOSPADM

## 2018-03-01 RX ADMIN — CYCLOBENZAPRINE HYDROCHLORIDE 10 MG: 10 TABLET, FILM COATED ORAL at 20:20

## 2018-03-01 RX ADMIN — ENOXAPARIN SODIUM 40 MG: 40 INJECTION SUBCUTANEOUS at 17:45

## 2018-03-01 RX ADMIN — HYDROCODONE BITARTRATE AND ACETAMINOPHEN 1 TABLET: 7.5; 325 TABLET ORAL at 13:31

## 2018-03-01 RX ADMIN — HYDROCODONE BITARTRATE AND ACETAMINOPHEN 1 TABLET: 7.5; 325 TABLET ORAL at 17:45

## 2018-03-01 RX ADMIN — HYDROCODONE BITARTRATE AND ACETAMINOPHEN 1 TABLET: 10; 325 TABLET ORAL at 22:12

## 2018-03-01 RX ADMIN — PIPERACILLIN SODIUM,TAZOBACTAM SODIUM 3.38 G: 3; .375 INJECTION, POWDER, FOR SOLUTION INTRAVENOUS at 16:01

## 2018-03-01 RX ADMIN — DOCUSATE SODIUM 100 MG: 100 CAPSULE, LIQUID FILLED ORAL at 20:25

## 2018-03-01 RX ADMIN — VANCOMYCIN HYDROCHLORIDE 2000 MG: 10 INJECTION, POWDER, LYOPHILIZED, FOR SOLUTION INTRAVENOUS at 16:29

## 2018-03-01 RX ADMIN — WATER 1 G: 1 INJECTION INTRAMUSCULAR; INTRAVENOUS; SUBCUTANEOUS at 02:52

## 2018-03-01 RX ADMIN — PIPERACILLIN SODIUM,TAZOBACTAM SODIUM 3.38 G: 3; .375 INJECTION, POWDER, FOR SOLUTION INTRAVENOUS at 20:25

## 2018-03-01 RX ADMIN — DOCUSATE SODIUM 100 MG: 100 CAPSULE, LIQUID FILLED ORAL at 09:01

## 2018-03-01 NOTE — PROGRESS NOTES
[address] [place] [address] care of pt at this time. Assessment complete. Pt alert and oriented x 4 sitting up in chair. Denies SOB and chest pain. Pt lungs clear bilaterally. Cap refill  less than 3 seconds. Pt denies numbness and tingling to all extremities. Stated pain 6/10. Pt has 20 G IV to RAC. Pt has 4x4  Dressing to lower back CDI . SCD's in place. Plan to consult ID. Pt encouraged to continue use of IS. Pt verbalized understanding. Ice pack applied. Call light and possessions within reach. Bed in low position. Will continue to monitor. 1407  In touch potronis in pt room. Nurse and pt speaking with Dr Ana Maria Thomason  Telephone order with readback for norco 7.5-10mg PRN q4h. 10mg of flexiril PRN and pt is NPO after midnight.

## 2018-03-01 NOTE — PROGRESS NOTES
19:30 Assessment completed. Lungs are clear bilat. but slightly in the bases. Lower back dsg remains C/D/I. + CMS & + PP. Continues to have occ. numbness in L ft. Sitting up in the chair using tablet. Voiding per BR w/o difficulty. 23:45 Bedside and Verbal shift change report given to [address] [place] [address] (oncoming nurse) by Apple Mary RN (offgoing nurse). Report included the following information SBAR.

## 2018-03-01 NOTE — PROGRESS NOTES
Problem: Self Care Deficits Care Plan (Adult)  Goal: *Acute Goals and Plan of Care (Insert Text)  Short Term Goals 3/1/18:  Cyndie Garciajac OTR/L  In 3 days:   1. Pt will perform LB dressing using AE as needed with modified independence in order to improve independence with ADLs. 2.  Pt will perform supine to sit EOB with modified independence using log roll method to improve mobility skills and prepare for OOB transfers. 3.  Pt will perform toilet transfers and toileting routine with modified independence using AD/DME as needed in order to increase independence with ADLs. 4.  Pt will stand at sink 5 minutes without LOB in order to perform grooming tasks with modified independence. 5.  Pt will safely retrieve ADL items using RW with modified independence to improve mobility skills. Outcome: Progressing Towards Goal  Occupational Therapy EVALUATION    Patient: Fang Dickens [de-identified]74 y.o. female)  Date: 3/1/2018  Primary Diagnosis: Lumbar back pain  Post-operative complication  Psoas abscess, left (HCC)  Lumbar surgical wound fluid collection        Precautions:   Back, Spinal, Fall    ASSESSMENT :  Based on the objective data described below, the patient presents with decreased functional strength, decreased functional balance, decreased overall activity tolerance limiting independence with ADLs. Pt needed mod A with supine to sit EOB, CGA with toilet transfers/routine using grab bar, and CGA with ambulation to/from bathroom. Pt declined LB dressing tasks due to fatigue but reports she has all AE at home (except reacher was broken). Pt issued new reacher to assist with LB dressing and picking items off floor. Pt lives alone but reports roommate and adult children in area will be able to assist as needed. Pt would benefit from continued OT services to maximize function.     Education:log roll; spinal prec; safety with mobility; compensatory dressing tech    Patient will benefit from skilled intervention to address the above impairments. Patients rehabilitation potential is considered to be Good  Factors which may influence rehabilitation potential include:   []             None noted  []             Mental ability/status  []             Medical condition  []             Home/family situation and support systems  []             Safety awareness  [x]             Pain tolerance/management  []             Other:      PLAN :  Recommendations and Planned Interventions:  [x]               Self Care Training                  [x]        Therapeutic Activities  [x]               Functional Mobility Training    []        Cognitive Retraining  [x]               Therapeutic Exercises           [x]        Endurance Activities  [x]               Balance Training                   [x]        Neuromuscular Re-Education  []               Visual/Perceptual Training     [x]   Home Safety Training  [x]               Patient Education                 []        Family Training/Education  []               Other (comment):    Frequency/Duration: Patient will be followed by occupational therapy daily to address goals. Discharge Recommendations: Home Health  Further Equipment Recommendations for Discharge: rolling walker     SUBJECTIVE:   Patient stated I am so sorry I am so emotional today.     OBJECTIVE DATA SUMMARY:   History reviewed. No pertinent past medical history. Past Surgical History:   Procedure Laterality Date    HX CHOLECYSTECTOMY      HX GYN      HX ORTHOPAEDIC  1996    c5-6 fusion     HX ORTHOPAEDIC  01/2018    lumbar laminectomy      Barriers to Learning/Limitations: yes;  emotional  Compensate with: visual, verbal, tactile, kinesthetic cues/model    G-Codes (GP)  Self Care   Current  CK= 40-59%   Goal  CI= 1-19%  The severity rating is based on the professional judgement & direct observation of Level of Assistance required for Functional Mobility and ADLs.     Eval Complexity: History: MEDIUM Complexity : Expanded review of history including physical, cognitive and psychosocial  history ; Examination: MEDIUM Complexity : 3-5 performance deficits relating to physical, cognitive , or psychosocial skils that result in activity limitations and / or participation restrictions; Decision Making:MEDIUM Complexity : Patient may present with comorbidities that affect occupational performnce. Miniml to moderate modification of tasks or assistance (eg, physical or verbal ) with assesment(s) is necessary to enable patient to complete evaluation     Prior Level of Function/Home Situation: Pt lives with roommate. Pt has been dealing with low back pain and decreased mobility since initial surgery in October 2017. Pt reports she rarely goes upstairs to bedroom and has been living on main level with 1/2 bath (performing sponge bathing). Pt has driven once since October 2017. Pt denies falls. Home Situation  Home Environment: Private residence  # Steps to Enter: 3  Rails to Enter: Yes  Hand Rails : Right  One/Two Story Residence: Two story  # of Interior Steps: 16  Interior Rails: Left (except for 6 steps midway)  Living Alone: No  Support Systems: Other (comments) (roommate)  Patient Expects to be Discharged to[de-identified] Private residence  Current DME Used/Available at Home: Shower chair, Cane, straight, Walker, rolling  Tub or Shower Type: Tub/Shower combination  Cognitive/Behavioral Status:  Neurologic State: Alert; Appropriate for age  Orientation Level: Oriented X4  Cognition: Appropriate decision making  Safety/Judgement: Awareness of environment  Skin: site of incision/drainage low back  Edema: bilateral LEs  Vision/Perceptual:    Corrective Lenses: Glasses  Coordination:  Coordination: Within functional limits  Fine Motor Skills-Upper: Left Intact; Right Intact    Gross Motor Skills-Upper: Left Intact; Right Intact  Balance:  Sitting: Intact  Standing: Intact; With support  Strength:  Strength:  Within functional limits (bilateral UEs)  Tone & Sensation:  Tone: Normal  Sensation: Impaired (decreased sensation bilateral feet)  Range of Motion:  AROM: Within functional limits (bilateral UEs)  Functional Mobility and Transfers for ADLs:  Bed Mobility:  Rolling: Moderate assistance  Supine to Sit: Moderate assistance  Scooting: Moderate assistance  Transfers:  Sit to Stand: Contact guard assistance  Bed to Chair: Contact guard assistance   Toilet Transfer : Contact guard assistance     Bathroom Mobility: Contact guard assistance  ADL Assessment:   Upper Body Dressing: Independent  Lower Body Dressing: Maximum assistance  Toileting: Minimum assistance  ADL Intervention:  Grooming  Grooming Assistance: Contact guard assistance  Washing Face: Contact guard assistance  Washing Hands: Contact guard assistance  Brushing Teeth: Contact guard assistance    Toileting  Toileting Assistance: Supervision/set up  Bladder Hygiene: Modified independent  Clothing Management: Contact guard assistance  Adaptive Equipment: Walker    Cognitive Retraining  Safety/Judgement: Awareness of environment    Pain:  Pre-treatment: 4/10  Post-treatment: 4/10; pain increased to 8/10 with supine to sit but subsided with relaxation tech and repositioning  Activity Tolerance:   Fair; pt fatigued easily; unable to participate in LB dressing tasks this session  Please refer to the flowsheet for vital signs taken during this treatment. After treatment:   [x] Patient left in no apparent distress sitting up in chair  [] Patient left in no apparent distress in bed  [x] Call bell left within reach  [x] Nursing notified  [] Caregiver present  [] Bed alarm activated    COMMUNICATION/EDUCATION:   [] Home safety education was provided and the patient/caregiver indicated understanding. [x] Patient/family have participated as able in goal setting and plan of care. [x] Patient/family agree to work toward stated goals and plan of care.   [] Patient understands intent and goals of therapy, but is neutral about his/her participation. [] Patient is unable to participate in goal setting and plan of care.     Thank you for this referral.  Tyler Mckinley, ASHLEY  Time Calculation: 40 mins

## 2018-03-01 NOTE — PROGRESS NOTES
2345 - Assumed care of patient at this time. Patient is alert and oriented x 4. Patient denies chest pain, shortness of breath, or numbness or tingling in the upper or lower extremities. 4x4 dressing on the posterior lower back is clean, dry and intact. Sequential compression device and GHAZALA hose in place on the patient bilaterally. 20g IV in the right upper arm is patent and infusing LR @ 75mL/hr. Patient currently experiencing 4/10 pain. Bed is in lowest position with the wheels locked. Siderails x 3 are up. Call bell within reach. Patient is currently resting in bed in no apparent distress. Will continue to monitor. 0015 - Head to toe assessment performed at this time. Patient has no complaints of chest pain or shortness of breath. Denies any numbness or tingling in extremities with the exception of intermittent numbness in the left foot. Lungs are clear to auscultation. Bowel sounds are present in all 4 quadrants. Patient educated how to  actively manage their pain. Patient encouraged to use the incentive spirometer. Patient educated on the side effects of medications. Explained the importance of ambulation. Patient verbalized understanding. Patient left in bed with call light within reach, bed in lowest position with wheels locked, and siderails x 3 up. Will continue to monitor. 0030 - Patient ambulated to the restroom and voided 200 cc of clear, bismark urine. Patient then returned to bed.

## 2018-03-01 NOTE — PROGRESS NOTES
Pain much improved . afebrile cultures pending. Awaiting infectious disease opinion. Will plan surgical drainage of psoas muscle collection tomorrow. All fully discussed with patient.

## 2018-03-01 NOTE — PROGRESS NOTES
Pharmacy Dosing Services:   Consult for Vancomycin Dosing by Pharmacy by Alma LOMAS  Consult provided for this 74y.o. year old female , for indication of Psoas abscess [place] discitis/osteo  Day of Therapy: 1    Ht Readings from Last 1 Encounters:   02/27/18 162.6 cm (64\")        Wt Readings from Last 1 Encounters:   02/27/18 105.7 kg (233 lb)        Serum Creatinine Lab Results   Component Value Date/Time    Creatinine 0.67 03/01/2018 08:22 AM      Creatinine Clearance Estimated Creatinine Clearance: 99.2 mL/min (based on Cr of 0.67). BUN Lab Results   Component Value Date/Time    BUN 20 (H) 03/01/2018 08:22 AM      WBC Lab Results   Component Value Date/Time    WBC 13.9 (H) 03/01/2018 08:22 AM      H/H Lab Results   Component Value Date/Time    HGB 11.7 (L) 03/01/2018 08:22 AM      Platelets Lab Results   Component Value Date/Time    PLATELET 981 64/81/3520 08:22 AM      Temp 97.9 °F (36.6 °C)     Start Vancomycin therapy with dose of  2000 (mg) at 1400 on 3/1/18, every 18 hours. Dose calculated to approximate a therapeutic trough of 15-20 mcg/mL. Pharmacy to follow daily and will make changes to dose and/or frequency based on clinical status.     Pharmacist Ronnie Recinos, ADAMSD

## 2018-03-01 NOTE — PROGRESS NOTES
Problem: Mobility Impaired (Adult and Pediatric)  Goal: *Acute Goals and Plan of Care (Insert Text)  Physical Therapy Goals  Initiated 2/28/2018  to be met within 2-3 days  STG's:  1. Bed mobility:  Supine to/from sit with S in prep for ADL activity. 2. Transfers:  Sit to/from stand with S in prep for gait. 3. Standing/Ambulation Balance:  Good with LRAD for safe transfers and gait. LTG's  1. Ambulation:  Ambulate 150 ft.with S with LRAD for home mobility at discharge. 2. Patient Education:  S/Independent with HEP for home performance accurately at discharge. 3. Step Negotiation: Ascend/Descend 3-5 steps with CGA with HR for home navigation as indicated. Outcome: Resolved/Met Date Met: 03/01/18  physical Therapy TREATMENT/DISCHARGE    Patient: Romi Hancock (2 y.o. female)  Date: 3/1/2018  Diagnosis: Lumbar back pain  Post-operative complication  Psoas abscess, left (HCC)  Lumbar surgical wound fluid collection  REPAIR CSF LEAK <principal problem not specified>  Procedure(s) (LRB):  LUMBAR INCISION AND DRAINAGE (PT IN ROOM #203) (N/A)    Precautions: Back, Spinal, Fall  Chart, physical therapy assessment, plan of care and goals were reviewed. ASSESSMENT:Patient is cleared for discharge from PT and has met all goals. Patient reported 4/10 pain level on entry. Patient is S with bed mobility and at S level for sit<>supine with extra time required for all and  SBA sit<>stand with multiple attempts, pt stating that this is her normal performance. Pt pause frequently during activity due to lumbar muscle spasm. PT ambulated 175' with RW and slow antalgic pattern with good balance . Up/down 4 steps with right rail and CGA. PT left up in chair needs in reach. Nurse Kim aware of pt status. Recommend HHPT at discharge. Pt has RW for safe home ambulation.     Progression toward goals:  [x]      Goals met  []      Improving appropriately and progressing toward goals  []      Improving slowly and progressing toward goals  []      Not making progress toward goals and plan of care will be adjusted     PLAN:  Patient will be discharged from physical therapy at this time. Rationale for discharge:  [x] Goals Achieved  [] [address]  [] Patient not participating in therapy  [] Other:  Discharge Recommendations:  Home Health  Further Equipment Recommendations for Discharge:  N/A     SUBJECTIVE:   Patient stated I am OK.     OBJECTIVE DATA SUMMARY:   Critical Behavior:  Neurologic State: Alert, Appropriate for age  Orientation Level: Oriented X4  Cognition: Appropriate decision making  Safety/Judgement: Awareness of environment  Functional Mobility Training:  Bed Mobility:  Rolling: Additional time;Supervision (vc)  Supine to Sit: Additional time;Supervision  Scooting: Supervision; Additional time  Transfers:  Sit to Stand: Stand-by assistance; Additional time  Stand to Sit: Supervision  Bed to Chair: Contact guard assistance  Balance:  Sitting: Intact  Standing: Intact; With support  Ambulation/Gait Training:  Distance (ft): 175 Feet (ft)  Assistive Device: Walker, rolling;Gait belt  Ambulation - Level of Assistance: Supervision  Gait Abnormalities: Antalgic;Decreased step clearance  Base of Support: Widened  Speed/Donna: Slow  Step Length: Right shortened;Left shortened  Interventions: Safety awareness training;Verbal cues    Stairs:  Number of Stairs Trained: 4  Stairs - Level of Assistance: Contact guard assistance   Rail Use: Right  (cane on L for down)      Pain:  Pain Scale 1: Numeric (0 - 10)  Pain Intensity 1: 5  Pain Location 1: Back  Pain Orientation 1: Lower  Pain Description 1: Aching  Pain Intervention(s) 1: Encouraged PCA  Activity Tolerance:   fair  Please refer to the flowsheet for vital signs taken during this treatment.   After treatment:   [x] Patient left in no apparent distress sitting up in chair  [] Patient left in no apparent distress in bed  [x] Call bell left within reach  [x] Nursing notified  [] Caregiver present  [] Bed alarm activated  Mobility  D/C  CI= 1-19%. The severity rating is based on the Level of Assistance required for Functional Mobility and ADLs.       Mazie Boeck, PT   Time Calculation: 23 mins

## 2018-03-01 NOTE — PROGRESS NOTES
Chart reviewed, met with pt at bedside. Pt waiting on ID consult, procedure tomorrow. Pt will need PICC, if discharge on IV abx, CM following. Discussed possible options- home IV abx vs [facility] depending on abx and additional needs/level of functioning after procedure. Pt may remain until Monday pending final abx, and coordinating discharge plan. Care Management Interventions  PCP Verified by CM: Yes  Transition of Care Consult (CM Consult): Discharge Planning  Discharge Durable Medical Equipment: No  Physical Therapy Consult: Yes  Current Support Network:  Other (lives with roommate)  Confirm Follow Up Transport: Friends  Plan discussed with Pt/Family/Caregiver: Yes  Freedom of Choice Offered: Yes  Discharge Location  Discharge Placement: Home with infusion therapy (vs [facility])

## 2018-03-01 NOTE — ROUTINE PROCESS
Bedside and Verbal shift change report given to Ely Singh RN (oncoming nurse) by Michelle Cervantes RN (offgoing nurse). Report included the following information SBAR, Procedure Summary, Intake/Output and MAR.

## 2018-03-01 NOTE — CONSULTS
Medicine Consult    Patient:  Chelsie Anderson 74 y.o. female  Asked to evaluate patient by Dr Magdalena Corona  Primary Care Provider:  Jarrett Robison MD  Date of Admission:  2/27/2018  Reason for Consult: Preliminary Growth on Wound Culture        Assessment/Plan     Patient Active Problem List   Diagnosis Code    Lumbar back pain M54.5    Psoas abscess, left ([facility] [address] K68.12    Post-operative complication N23. 9XXA    Lumbar surgical wound fluid collection T81.89XA       PLAN:    1. Preliminary Positive Wound Culture  2. Hx of Laminectomy by Dr Magdalena Corona  3. Psoas Abscess  4. Possible Discitis/Osteomyelitis    1. Preliminary gram stain shows few gram positive cocci in pairs; few gram positive rods; and rare gram negative rods. Will change Rocephin to IV Vanc and await final culture results. Will also place consult to ID specialist Dr Latoya Jordan. Await further recommendations from ID.  2. Dr Magdalena Corona following. Previous surgeries which resulted in relief. She states the most recent surgery initially showed improvement in her discomfort, but eventually the pain became unbearable, which prompted her visit to the ED. 3. As above, will initiate IV Vanc and consult ID.  4. Will initiate Vancomycin IV, consult pharmacy to dose. Consult ID. DVT prophy per primary team.    Discussed case with Dr Latoya Jordan resulting in the following recommendations:  - Add Zosyn to current vancomycin regimen with close renal monitoring.  - ID recommends surgical drainage of psoas abscess as well as any spinal collections on MRI as noted. Send all drainage specimens for gram stain, bacterial, and fungal cultures. Recommend drainage as abx may not penetrate well. - Will also need long term IV abx, approximately 6 weeks. Thank you for allowing us to participate in this patients care. HPI:   CC:  Chelsie Anderson is a 74 y.o. female with past medical history significant for previous laminectomies, most recently January 2018.  Otherwise, her medical history is unremarkable. She recently experienced some back pain with radiating leg pain. The patient was seen in the ED and admitted by Dr Sherri White. The would had some purulent drainage, and a specimen was sent for culture. MRI was obtained which shows suspicion for discitis/osteomyelitis and psoas abscess. History reviewed. No pertinent past medical history. Past Surgical History:   Procedure Laterality Date    HX CHOLECYSTECTOMY      HX GYN      HX ORTHOPAEDIC  1996    c5-6 fusion     HX ORTHOPAEDIC  01/2018    lumbar laminectomy       Social History   Substance Use Topics    Smoking status: Former Smoker    Smokeless tobacco: Never Used    Alcohol use No     History reviewed. No pertinent family history. No current facility-administered medications on file prior to encounter. No current outpatient prescriptions on file prior to encounter. No Known Allergies        Review of Systems  Constitutional: No fever, chills, diaphoresis, malaise, fatigue or weight gain/loss or falls  Skin: no itching or rashes  HEENT: no ear discomfort, hearing loss, tinnitus, epistaxis or sore throat  EYES: no blurry vision, double vision or photophobia  CARDIOVASCULAR: no claudication, cp, palpitations, orthopnea, pnd or LE edema  PULMONARY: no cough, wheeze, shortness of breath or sputum production  GI: no nausea, vomiting, diarrhea, abdominal pain, melena, hematemesis or brbpr  : no dysuria, hematuria  MUSCULOSKELETAL: +back pain, +leg weakness  ENDOCRINE: no heat/cold intolerance, polyuria or polydipsia  HEME: no easy bruising or bleeding  NEURO: no unilateral weakness, numbness, tingling or seizures      Physical Exam:      Visit Vitals    /69 (BP 1 Location: Left arm, BP Patient Position: At rest)    Pulse 87    Temp 97.3 °F (36.3 °C)    Resp 17    Ht 5' 4\" (1.626 m)    Wt 105.7 kg (233 lb)    SpO2 92%    BMI 39.99 kg/m2     Body mass index is 39.99 kg/(m^2).     Physical Exam:  GEN: Overweight female, sitting in chair in no acute distress  HEENT: atraumatic, nose normal,oropharynx clear, MMM  NECK: supple, trachea midline, no supraclavicular or submandibular adenopathy noted  EYES: conjuctiva normal, lids with out lesions, PERRL  HEART: RRR with out m/r/g, pmi nondisplaced, pulses 2+ distally  LUNGS: equal chest wall expansion, cta bl with out wheezes/rales or rhonchi  AB: soft, +BS, nt/nd no organomegaly  NEURO: alert, awake and oriented x3, gait not assessed, cranial nerves intact, strength 5/5 bl UE and LE, sensation intact, reflexes nonpathological  SKIN: dry, intact, warm. Lumbar wound has small open area with some drainage. Laboratory Studies:    Recent Results (from the past 24 hour(s))   CBC WITH AUTOMATED DIFF    Collection Time: 03/01/18  8:22 AM   Result Value Ref Range    WBC 13.9 (H) 4.6 - 13.2 K/uL    RBC 4.50 4. 20 - 5.30 M/uL    HGB 11.7 (L) 12.0 - 16.0 g/dL    HCT 37.6 35.0 - 45.0 %    MCV 83.6 74.0 - 97.0 FL    MCH 26.0 24.0 - 34.0 PG    MCHC 31.1 31.0 - 37.0 g/dL    RDW 14.5 11.6 - 14.5 %    PLATELET 701 622 - 673 K/uL    MPV 9.2 9.2 - 11.8 FL    NEUTROPHILS 79 (H) 40 - 73 %    LYMPHOCYTES 15 (L) 21 - 52 %    MONOCYTES 6 3 - 10 %    EOSINOPHILS 0 0 - 5 %    BASOPHILS 0 0 - 2 %    ABS. NEUTROPHILS 11.0 (H) 1.8 - 8.0 K/UL    ABS. LYMPHOCYTES 2.1 0.9 - 3.6 K/UL    ABS. MONOCYTES 0.8 0.05 - 1.2 K/UL    ABS. EOSINOPHILS 0.0 0.0 - 0.4 K/UL    ABS.  BASOPHILS 0.0 0.0 - 0.06 K/UL    DF AUTOMATED     METABOLIC PANEL, BASIC    Collection Time: 03/01/18  8:22 AM   Result Value Ref Range    Sodium 141 136 - 145 mmol/L    Potassium 3.8 3.5 - 5.5 mmol/L    Chloride 104 100 - 108 mmol/L    CO2 30 21 - 32 mmol/L    Anion gap 7 3.0 - 18 mmol/L    Glucose 124 (H) 74 - 99 mg/dL    BUN 20 (H) 7.0 - 18 MG/DL    Creatinine 0.67 0.6 - 1.3 MG/DL    BUN/Creatinine ratio 30 (H) 12 - 20      GFR est AA >60 >60 ml/min/1.73m2    GFR est non-AA >60 >60 ml/min/1.73m2    Calcium 9.1 8.5 - 10.1 MG/DL

## 2018-03-01 NOTE — ROUTINE PROCESS
Bedside and Verbal shift change report given to K. [place] RN (oncoming nurse) by Eleonora Douglas RN (offgoing nurse). Report included the following information SBAR, Kardex, Intake/Output and MAR.

## 2018-03-02 ENCOUNTER — ANESTHESIA (OUTPATIENT)
Dept: SURGERY | Age: 68
DRG: 909 | End: 2018-03-02
Payer: MEDICARE

## 2018-03-02 ENCOUNTER — ANESTHESIA EVENT (OUTPATIENT)
Dept: SURGERY | Age: 68
DRG: 909 | End: 2018-03-02
Payer: MEDICARE

## 2018-03-02 LAB
ANION GAP SERPL CALC-SCNC: 5 MMOL/L (ref 3–18)
BASOPHILS # BLD: 0 K/UL (ref 0–0.06)
BASOPHILS NFR BLD: 0 % (ref 0–2)
BUN SERPL-MCNC: 23 MG/DL (ref 7–18)
BUN/CREAT SERPL: 24 (ref 12–20)
CALCIUM SERPL-MCNC: 8.5 MG/DL (ref 8.5–10.1)
CHLORIDE SERPL-SCNC: 103 MMOL/L (ref 100–108)
CO2 SERPL-SCNC: 33 MMOL/L (ref 21–32)
CREAT SERPL-MCNC: 0.96 MG/DL (ref 0.6–1.3)
DIFFERENTIAL METHOD BLD: ABNORMAL
EOSINOPHIL # BLD: 0.3 K/UL (ref 0–0.4)
EOSINOPHIL NFR BLD: 4 % (ref 0–5)
ERYTHROCYTE [DISTWIDTH] IN BLOOD BY AUTOMATED COUNT: 14.6 % (ref 11.6–14.5)
GLUCOSE SERPL-MCNC: 99 MG/DL (ref 74–99)
HCT VFR BLD AUTO: 33.7 % (ref 35–45)
HGB BLD-MCNC: 10.3 G/DL (ref 12–16)
LYMPHOCYTES # BLD: 2.5 K/UL (ref 0.9–3.6)
LYMPHOCYTES NFR BLD: 30 % (ref 21–52)
MCH RBC QN AUTO: 25.9 PG (ref 24–34)
MCHC RBC AUTO-ENTMCNC: 30.6 G/DL (ref 31–37)
MCV RBC AUTO: 84.7 FL (ref 74–97)
MONOCYTES # BLD: 0.8 K/UL (ref 0.05–1.2)
MONOCYTES NFR BLD: 10 % (ref 3–10)
NEUTS SEG # BLD: 4.7 K/UL (ref 1.8–8)
NEUTS SEG NFR BLD: 56 % (ref 40–73)
PLATELET # BLD AUTO: 264 K/UL (ref 135–420)
PMV BLD AUTO: 9.3 FL (ref 9.2–11.8)
POTASSIUM SERPL-SCNC: 3.9 MMOL/L (ref 3.5–5.5)
RBC # BLD AUTO: 3.98 M/UL (ref 4.2–5.3)
SODIUM SERPL-SCNC: 141 MMOL/L (ref 136–145)
WBC # BLD AUTO: 8.4 K/UL (ref 4.6–13.2)

## 2018-03-02 PROCEDURE — 87070 CULTURE OTHR SPECIMN AEROBIC: CPT | Performed by: SPECIALIST

## 2018-03-02 PROCEDURE — 74011250636 HC RX REV CODE- 250/636: Performed by: ANESTHESIOLOGY

## 2018-03-02 PROCEDURE — 77030003028 HC SUT VCRL J&J -A: Performed by: SPECIALIST

## 2018-03-02 PROCEDURE — 76010000131 HC OR TIME 2 TO 2.5 HR: Performed by: SPECIALIST

## 2018-03-02 PROCEDURE — 65270000029 HC RM PRIVATE

## 2018-03-02 PROCEDURE — 77030014007 HC SPNG HEMSTAT J&J -B: Performed by: SPECIALIST

## 2018-03-02 PROCEDURE — 74011250636 HC RX REV CODE- 250/636: Performed by: PHYSICIAN ASSISTANT

## 2018-03-02 PROCEDURE — 74011000258 HC RX REV CODE- 258: Performed by: PHYSICIAN ASSISTANT

## 2018-03-02 PROCEDURE — 77030012406 HC DRN WND PENRS BARD -A: Performed by: SPECIALIST

## 2018-03-02 PROCEDURE — 74011000250 HC RX REV CODE- 250: Performed by: SPECIALIST

## 2018-03-02 PROCEDURE — 77030017009 HC DRN WNDE BARD -E: Performed by: SPECIALIST

## 2018-03-02 PROCEDURE — 77030011640 HC PAD GRND REM COVD -A: Performed by: SPECIALIST

## 2018-03-02 PROCEDURE — 77030010507 HC ADH SKN DERMBND J&J -B: Performed by: SPECIALIST

## 2018-03-02 PROCEDURE — 74011250636 HC RX REV CODE- 250/636

## 2018-03-02 PROCEDURE — 77030003029 HC SUT VCRL J&J -B: Performed by: SPECIALIST

## 2018-03-02 PROCEDURE — 77030018836 HC SOL IRR NACL ICUM -A: Performed by: SPECIALIST

## 2018-03-02 PROCEDURE — 74011000250 HC RX REV CODE- 250

## 2018-03-02 PROCEDURE — 74011250637 HC RX REV CODE- 250/637: Performed by: SPECIALIST

## 2018-03-02 PROCEDURE — 85025 COMPLETE CBC W/AUTO DIFF WBC: CPT | Performed by: PHYSICIAN ASSISTANT

## 2018-03-02 PROCEDURE — 76060000035 HC ANESTHESIA 2 TO 2.5 HR: Performed by: SPECIALIST

## 2018-03-02 PROCEDURE — 00JU0ZZ INSPECTION OF SPINAL CANAL, OPEN APPROACH: ICD-10-PCS | Performed by: SPECIALIST

## 2018-03-02 PROCEDURE — 77030003666 HC NDL SPINAL BD -A: Performed by: SPECIALIST

## 2018-03-02 PROCEDURE — 77030008683 HC TU ET CUF COVD -A: Performed by: ANESTHESIOLOGY

## 2018-03-02 PROCEDURE — 77030014005 HC SPNG HEMSTAT GEL CARD -B: Performed by: SPECIALIST

## 2018-03-02 PROCEDURE — 76210000016 HC OR PH I REC 1 TO 1.5 HR: Performed by: SPECIALIST

## 2018-03-02 PROCEDURE — 77030002933 HC SUT MCRYL J&J -A: Performed by: SPECIALIST

## 2018-03-02 PROCEDURE — 77030002916 HC SUT ETHLN J&J -A: Performed by: SPECIALIST

## 2018-03-02 PROCEDURE — 74011000272 HC RX REV CODE- 272: Performed by: SPECIALIST

## 2018-03-02 PROCEDURE — 77030031139 HC SUT VCRL2 J&J -A: Performed by: SPECIALIST

## 2018-03-02 PROCEDURE — 36415 COLL VENOUS BLD VENIPUNCTURE: CPT | Performed by: PHYSICIAN ASSISTANT

## 2018-03-02 PROCEDURE — 74011250636 HC RX REV CODE- 250/636: Performed by: SPECIALIST

## 2018-03-02 PROCEDURE — 87075 CULTR BACTERIA EXCEPT BLOOD: CPT | Performed by: SPECIALIST

## 2018-03-02 PROCEDURE — 80048 BASIC METABOLIC PNL TOTAL CA: CPT | Performed by: PHYSICIAN ASSISTANT

## 2018-03-02 RX ORDER — FENTANYL CITRATE 50 UG/ML
50 INJECTION, SOLUTION INTRAMUSCULAR; INTRAVENOUS AS NEEDED
Status: DISCONTINUED | OUTPATIENT
Start: 2018-03-02 | End: 2018-03-02 | Stop reason: HOSPADM

## 2018-03-02 RX ORDER — FLUMAZENIL 0.1 MG/ML
0.2 INJECTION INTRAVENOUS
Status: DISCONTINUED | OUTPATIENT
Start: 2018-03-02 | End: 2018-03-02 | Stop reason: HOSPADM

## 2018-03-02 RX ORDER — FENTANYL CITRATE 50 UG/ML
INJECTION, SOLUTION INTRAMUSCULAR; INTRAVENOUS AS NEEDED
Status: DISCONTINUED | OUTPATIENT
Start: 2018-03-02 | End: 2018-03-02 | Stop reason: HOSPADM

## 2018-03-02 RX ORDER — SODIUM CHLORIDE, SODIUM LACTATE, POTASSIUM CHLORIDE, CALCIUM CHLORIDE 600; 310; 30; 20 MG/100ML; MG/100ML; MG/100ML; MG/100ML
125 INJECTION, SOLUTION INTRAVENOUS CONTINUOUS
Status: DISCONTINUED | OUTPATIENT
Start: 2018-03-02 | End: 2018-03-07 | Stop reason: HOSPADM

## 2018-03-02 RX ORDER — MAGNESIUM SULFATE 100 %
4 CRYSTALS MISCELLANEOUS AS NEEDED
Status: DISCONTINUED | OUTPATIENT
Start: 2018-03-02 | End: 2018-03-02 | Stop reason: HOSPADM

## 2018-03-02 RX ORDER — HYDROMORPHONE HYDROCHLORIDE 2 MG/ML
0.5 INJECTION, SOLUTION INTRAMUSCULAR; INTRAVENOUS; SUBCUTANEOUS
Status: DISCONTINUED | OUTPATIENT
Start: 2018-03-02 | End: 2018-03-02 | Stop reason: HOSPADM

## 2018-03-02 RX ORDER — DEXTROSE 50 % IN WATER (D50W) INTRAVENOUS SYRINGE
25-50 AS NEEDED
Status: DISCONTINUED | OUTPATIENT
Start: 2018-03-02 | End: 2018-03-02 | Stop reason: HOSPADM

## 2018-03-02 RX ORDER — GLYCOPYRROLATE 0.2 MG/ML
INJECTION INTRAMUSCULAR; INTRAVENOUS AS NEEDED
Status: DISCONTINUED | OUTPATIENT
Start: 2018-03-02 | End: 2018-03-02 | Stop reason: HOSPADM

## 2018-03-02 RX ORDER — ONDANSETRON 2 MG/ML
INJECTION INTRAMUSCULAR; INTRAVENOUS AS NEEDED
Status: DISCONTINUED | OUTPATIENT
Start: 2018-03-02 | End: 2018-03-02 | Stop reason: HOSPADM

## 2018-03-02 RX ORDER — SODIUM CHLORIDE 0.9 % (FLUSH) 0.9 %
5-10 SYRINGE (ML) INJECTION AS NEEDED
Status: DISCONTINUED | OUTPATIENT
Start: 2018-03-02 | End: 2018-03-07 | Stop reason: HOSPADM

## 2018-03-02 RX ORDER — NEOSTIGMINE METHYLSULFATE 5 MG/5 ML
SYRINGE (ML) INTRAVENOUS AS NEEDED
Status: DISCONTINUED | OUTPATIENT
Start: 2018-03-02 | End: 2018-03-02 | Stop reason: HOSPADM

## 2018-03-02 RX ORDER — NALOXONE HYDROCHLORIDE 0.4 MG/ML
0.1 INJECTION, SOLUTION INTRAMUSCULAR; INTRAVENOUS; SUBCUTANEOUS AS NEEDED
Status: DISCONTINUED | OUTPATIENT
Start: 2018-03-02 | End: 2018-03-02 | Stop reason: HOSPADM

## 2018-03-02 RX ORDER — MIDAZOLAM HYDROCHLORIDE 1 MG/ML
INJECTION, SOLUTION INTRAMUSCULAR; INTRAVENOUS AS NEEDED
Status: DISCONTINUED | OUTPATIENT
Start: 2018-03-02 | End: 2018-03-02 | Stop reason: HOSPADM

## 2018-03-02 RX ORDER — SODIUM CHLORIDE 0.9 % (FLUSH) 0.9 %
5-10 SYRINGE (ML) INJECTION EVERY 8 HOURS
Status: DISCONTINUED | OUTPATIENT
Start: 2018-03-02 | End: 2018-03-07 | Stop reason: HOSPADM

## 2018-03-02 RX ORDER — ROCURONIUM BROMIDE 10 MG/ML
INJECTION, SOLUTION INTRAVENOUS AS NEEDED
Status: DISCONTINUED | OUTPATIENT
Start: 2018-03-02 | End: 2018-03-02 | Stop reason: HOSPADM

## 2018-03-02 RX ORDER — PROPOFOL 10 MG/ML
INJECTION, EMULSION INTRAVENOUS AS NEEDED
Status: DISCONTINUED | OUTPATIENT
Start: 2018-03-02 | End: 2018-03-02 | Stop reason: HOSPADM

## 2018-03-02 RX ORDER — SODIUM CHLORIDE 0.9 % (FLUSH) 0.9 %
5-10 SYRINGE (ML) INJECTION AS NEEDED
Status: DISCONTINUED | OUTPATIENT
Start: 2018-03-02 | End: 2018-03-02 | Stop reason: HOSPADM

## 2018-03-02 RX ORDER — LIDOCAINE HYDROCHLORIDE 20 MG/ML
INJECTION, SOLUTION EPIDURAL; INFILTRATION; INTRACAUDAL; PERINEURAL AS NEEDED
Status: DISCONTINUED | OUTPATIENT
Start: 2018-03-02 | End: 2018-03-02 | Stop reason: HOSPADM

## 2018-03-02 RX ORDER — ACETAMINOPHEN 10 MG/ML
1000 INJECTION, SOLUTION INTRAVENOUS ONCE
Status: COMPLETED | OUTPATIENT
Start: 2018-03-02 | End: 2018-03-02

## 2018-03-02 RX ADMIN — LIDOCAINE HYDROCHLORIDE 60 MG: 20 INJECTION, SOLUTION EPIDURAL; INFILTRATION; INTRACAUDAL; PERINEURAL at 14:31

## 2018-03-02 RX ADMIN — PIPERACILLIN SODIUM,TAZOBACTAM SODIUM 3.38 G: 3; .375 INJECTION, POWDER, FOR SOLUTION INTRAVENOUS at 12:40

## 2018-03-02 RX ADMIN — SODIUM CHLORIDE, SODIUM LACTATE, POTASSIUM CHLORIDE, AND CALCIUM CHLORIDE 75 ML/HR: 600; 310; 30; 20 INJECTION, SOLUTION INTRAVENOUS at 09:16

## 2018-03-02 RX ADMIN — CYCLOBENZAPRINE HYDROCHLORIDE 10 MG: 10 TABLET, FILM COATED ORAL at 21:20

## 2018-03-02 RX ADMIN — SODIUM CHLORIDE, SODIUM LACTATE, POTASSIUM CHLORIDE, AND CALCIUM CHLORIDE: 600; 310; 30; 20 INJECTION, SOLUTION INTRAVENOUS at 16:18

## 2018-03-02 RX ADMIN — HYDROCODONE BITARTRATE AND ACETAMINOPHEN 1 TABLET: 10; 325 TABLET ORAL at 11:59

## 2018-03-02 RX ADMIN — Medication: at 17:01

## 2018-03-02 RX ADMIN — CYCLOBENZAPRINE HYDROCHLORIDE 10 MG: 10 TABLET, FILM COATED ORAL at 07:01

## 2018-03-02 RX ADMIN — PIPERACILLIN SODIUM,TAZOBACTAM SODIUM 3.38 G: 3; .375 INJECTION, POWDER, FOR SOLUTION INTRAVENOUS at 21:21

## 2018-03-02 RX ADMIN — HYDROCODONE BITARTRATE AND ACETAMINOPHEN 1 TABLET: 10; 325 TABLET ORAL at 02:56

## 2018-03-02 RX ADMIN — DOCUSATE SODIUM 100 MG: 100 CAPSULE, LIQUID FILLED ORAL at 21:20

## 2018-03-02 RX ADMIN — FENTANYL CITRATE 50 MCG: 50 INJECTION, SOLUTION INTRAMUSCULAR; INTRAVENOUS at 14:31

## 2018-03-02 RX ADMIN — Medication 10 ML: at 23:16

## 2018-03-02 RX ADMIN — FENTANYL CITRATE 50 MCG: 50 INJECTION, SOLUTION INTRAMUSCULAR; INTRAVENOUS at 15:12

## 2018-03-02 RX ADMIN — ONDANSETRON 4 MG: 2 INJECTION INTRAMUSCULAR; INTRAVENOUS at 15:12

## 2018-03-02 RX ADMIN — GLYCOPYRROLATE 0.4 MG: 0.2 INJECTION INTRAMUSCULAR; INTRAVENOUS at 16:21

## 2018-03-02 RX ADMIN — ACETAMINOPHEN 1000 MG: 10 INJECTION, SOLUTION INTRAVENOUS at 15:13

## 2018-03-02 RX ADMIN — HYDROMORPHONE HYDROCHLORIDE 0.5 MG: 2 INJECTION, SOLUTION INTRAMUSCULAR; INTRAVENOUS; SUBCUTANEOUS at 16:51

## 2018-03-02 RX ADMIN — HYDROCODONE BITARTRATE AND ACETAMINOPHEN 1 TABLET: 10; 325 TABLET ORAL at 07:01

## 2018-03-02 RX ADMIN — SODIUM CHLORIDE, SODIUM LACTATE, POTASSIUM CHLORIDE, AND CALCIUM CHLORIDE 125 ML/HR: 600; 310; 30; 20 INJECTION, SOLUTION INTRAVENOUS at 14:13

## 2018-03-02 RX ADMIN — Medication 3 MG: at 16:21

## 2018-03-02 RX ADMIN — MIDAZOLAM HYDROCHLORIDE 2 MG: 1 INJECTION, SOLUTION INTRAMUSCULAR; INTRAVENOUS at 14:24

## 2018-03-02 RX ADMIN — PROPOFOL 150 MG: 10 INJECTION, EMULSION INTRAVENOUS at 14:31

## 2018-03-02 RX ADMIN — FENTANYL CITRATE 50 MCG: 50 INJECTION, SOLUTION INTRAMUSCULAR; INTRAVENOUS at 16:06

## 2018-03-02 RX ADMIN — DOCUSATE SODIUM 100 MG: 100 CAPSULE, LIQUID FILLED ORAL at 09:16

## 2018-03-02 RX ADMIN — ROCURONIUM BROMIDE 10 MG: 10 INJECTION, SOLUTION INTRAVENOUS at 15:12

## 2018-03-02 RX ADMIN — FENTANYL CITRATE 50 MCG: 50 INJECTION, SOLUTION INTRAMUSCULAR; INTRAVENOUS at 15:51

## 2018-03-02 RX ADMIN — PIPERACILLIN SODIUM,TAZOBACTAM SODIUM 3.38 G: 3; .375 INJECTION, POWDER, FOR SOLUTION INTRAVENOUS at 02:58

## 2018-03-02 RX ADMIN — VANCOMYCIN HYDROCHLORIDE 2000 MG: 10 INJECTION, POWDER, LYOPHILIZED, FOR SOLUTION INTRAVENOUS at 09:16

## 2018-03-02 RX ADMIN — FENTANYL CITRATE 50 MCG: 50 INJECTION, SOLUTION INTRAMUSCULAR; INTRAVENOUS at 14:55

## 2018-03-02 RX ADMIN — GLYCOPYRROLATE 0.2 MG: 0.2 INJECTION INTRAMUSCULAR; INTRAVENOUS at 14:24

## 2018-03-02 RX ADMIN — ROCURONIUM BROMIDE 40 MG: 10 INJECTION, SOLUTION INTRAVENOUS at 14:31

## 2018-03-02 NOTE — ANESTHESIA PREPROCEDURE EVALUATION
Anesthetic History   No history of anesthetic complications            Review of Systems / Medical History  Patient summary reviewed, nursing notes reviewed and pertinent labs reviewed    Pulmonary  Within defined limits                 Neuro/Psych   Within defined limits           Cardiovascular  Within defined limits                Exercise tolerance: >4 METS     GI/Hepatic/Renal  Within defined limits              Endo/Other        Morbid obesity     Other Findings            Physical Exam    Airway  Mallampati: III  TM Distance: 4 - 6 cm  Neck ROM: normal range of motion   Mouth opening: Normal     Cardiovascular    Rhythm: regular  Rate: normal         Dental    Dentition: Loose teeth and Poor dentition     Pulmonary  Breath sounds clear to auscultation               Abdominal  GI exam deferred       Other Findings            Anesthetic Plan    ASA: 3  Anesthesia type: general          Induction: Intravenous  Anesthetic plan and risks discussed with: Patient      Risk of damage to teeth in poor condition discussed, pt states she plans on having those teeth removed once infection is controlled.

## 2018-03-02 NOTE — ROUTINE PROCESS
Bedside and Verbal shift change report given to Rasheed Ward RN (oncoming nurse) by Jon Inman RN (offgoing nurse). Report included the following information SBAR, Kardex, MAR and Recent Results.

## 2018-03-02 NOTE — ROUTINE PROCESS
TRANSFER - IN REPORT:    Verbal report received from Tao GONZALEZ RN(name) on Darius  being received from PACU (unit) for routine post - op      Report consisted of patients Situation, Background, Assessment and   Recommendations(SBAR). Information from the following report(s) SBAR, Procedure Summary, Intake/Output and MAR was reviewed with the receiving nurse. Opportunity for questions and clarification was provided. Assessment completed upon patients arrival to unit and care assumed.

## 2018-03-02 NOTE — PERIOP NOTES
TRANSFER - OUT REPORT:    Verbal report given to Raul Finley RN(name) on Darius  being transferred to [facility](unit) for routine progression of care       Report consisted of patients Situation, Background, Assessment and   Recommendations(SBAR). Information from the following report(s) SBAR, Kardex, Procedure Summary, MAR and Recent Results was reviewed with the receiving nurse. Lines:   Peripheral IV 03/02/18 Left Antecubital (Active)   Site Assessment Clean, dry, & intact 3/2/2018  4:36 PM   Phlebitis Assessment 0 3/2/2018  4:36 PM   Infiltration Assessment 0 3/2/2018  4:36 PM   Dressing Status Clean, dry, & intact 3/2/2018  4:36 PM   Dressing Type Tape;Transparent 3/2/2018  4:36 PM   Hub Color/Line Status Blue; Infusing 3/2/2018  4:36 PM   Action Taken Open ports on tubing capped 3/2/2018 11:00 AM   Alcohol Cap Used Yes 3/2/2018 11:00 AM        Opportunity for questions and clarification was provided.       Patient transported with:   O2 @ 2 liters  Registered Nurse  [facility]

## 2018-03-02 NOTE — PROGRESS NOTES
19:55  Assessment completed. Lungs are clear bilat. But are decreased in the bases. Mepilex dsg on back remains C/D/I with + CMS & + PP. Continues to have occ tingling in L ft. Voiding w/o difficulty in the BR. Sitting up in the chair @ bedside using tablet. 22:50 Shift assessment completed. See nsg flow sheet for details. 02:55  Reassessed with 0 changes noted. Mepilex dsg on back remains C/D/I with + CMS & + PP. Resting quietly in bed with eyes closed between cares x for voiding per BR w/o difficulty. 07:55 Bedside and Verbal shift change report given to Lorie Gardner RN (oncoming nurse) by Kush Sheldon RN (offgoing nurse). Report included the following information SBAR.

## 2018-03-02 NOTE — PROGRESS NOTES
Infectious Disease Telemedicine brief follow up note    Chart reviewed     Noted plans for Psoas drainge today  Await cultures   Continue Vancomycin and Zosyn IV     Telemedicine ID will resume care on 3/5/18    Zehra Freed DO  2:30 PM

## 2018-03-02 NOTE — ROUTINE PROCESS
TRANSFER - OUT REPORT:    Verbal report given to [employer]. RN(name) on [facility]  being transferred to pre op(unit) for ordered procedure       Report consisted of patients Situation, Background, Assessment and   Recommendations(SBAR). Information from the following report(s) SBAR, Kardex, Intake/Output, MAR and Alarm Parameters  was reviewed with the receiving nurse. Lines:   Peripheral IV 02/27/18 Right Antecubital (Active)   Site Assessment Clean, dry, & intact 3/1/2018  7:52 AM   Phlebitis Assessment 0 3/1/2018  7:52 AM   Infiltration Assessment 0 3/1/2018  7:52 AM   Dressing Status Clean, dry, & intact 3/1/2018  7:52 AM   Dressing Type Tape;Transparent 3/1/2018  7:52 AM   Hub Color/Line Status Green; Infusing 3/1/2018  7:52 AM   Action Taken Open ports on tubing capped 3/1/2018  7:52 AM   Alcohol Cap Used Yes 3/1/2018  7:52 AM       Peripheral IV 03/02/18 Left Antecubital (Active)        Opportunity for questions and clarification was provided.       Patient transported with:   Registered Nurse

## 2018-03-02 NOTE — PERIOP NOTES
TRANSFER - IN REPORT:    Verbal report received from ORN and CRNA(name) on Darius  being received from OR(unit) for routine progression of care      Report consisted of patients Situation, Background, Assessment and   Recommendations(SBAR). Information from the following report(s) SBAR, Kardex, OR Summary, Procedure Summary, MAR and Recent Results was reviewed with the receiving nurse. Opportunity for questions and clarification was provided. Assessment completed upon patients arrival to unit and care assumed.

## 2018-03-02 NOTE — PROGRESS NOTES
Problem: Falls - Risk of  Goal: *Absence of Falls  Document Tomy Fall Risk and appropriate interventions in the flowsheet.    Outcome: Progressing Towards Goal  Fall Risk Interventions:  Mobility Interventions: Patient to call before getting OOB, Utilize walker, cane, or other assitive device    Mentation Interventions: Adequate sleep, hydration, pain control    Medication Interventions: Assess postural VS orthostatic hypotension, Patient to call before getting OOB, Teach patient to arise slowly    Elimination Interventions: Call light in reach, Patient to call for help with toileting needs

## 2018-03-02 NOTE — PROGRESS NOTES
Hospitalist Progress Note    Patient: Khang Padilla MRN: [id number]  CSN: [id number]    YOB: 1950  Age: 74 y.o. Sex: female    DOA: 2/27/2018 LOS:  LOS: 3 days          Chief Complaint:    Consult for Medical Management    Assessment/Plan     1. Positive Wound Culture  2. Hx of Laminectomy by Dr Sherri White  3. Psoas Abscess  4. Possible Discitis/Osteomyelitis    1. Wound culture showing Diphtheroids and Staphylococcus spp. Per ID recommendations patient was placed on Vancomycin and Zosyn. Initial blood culture result remains negative to date. 2. Followed by Dr Sherri White.  3. Patient to undergo drainage of abscess today by Dr Sherri White. ID recommendations are to remove all fluid as seen on MRI and send for gram stain, bacterial, and fungal cultures. 4. Again, recommendations per ID. Will follow and adjust as necessary. Final choice and duration of abx based on course and findings per ID. DVT Prophy per primary team.    At this time, medicine will sign off. We will remain available if necessary. Further plans to be arranged by Dr Sherri White through the recommendations of ID. Patient Active Problem List   Diagnosis Code    Lumbar back pain M54.5    Psoas abscess, left ([facility] K68.12    Post-operative complication V07. 9XXA    Lumbar surgical wound fluid collection T81.89XA       Subjective:    Patient states she is experiencing pain in her back that radiates to her legs.      Review of systems:    Constitutional: denies fevers, chills, myalgias  Respiratory: denies SOB, cough  Cardiovascular: denies chest pain, palpitations  Gastrointestinal: denies nausea, vomiting, diarrhea      Vital signs/Intake and Output:  Visit Vitals    /70 (BP 1 Location: Left arm, BP Patient Position: At rest)    Pulse 85    Temp 98 °F (36.7 °C)    Resp 17    Ht 5' 4\" (1.626 m)    Wt 105.7 kg (233 lb)    SpO2 99%    BMI 39.99 kg/m2     Current Shift:     Last three shifts:  02/28 1901 - 03/02 0700  In: 5904 [P.O.:1300; I.V.:4609]  Out: 500 [Urine:500]    Exam:    General: Obese, alert, NAD, OX3  Head/Neck: NCAT, supple, No masses, No lymphadenopathy  CVS:Regular rate and rhythm, no M/R/G, S1/S2 heard, no thrill  Lungs:Clear to auscultation bilaterally, no wheezes, rhonchi, or rales  Abdomen: Soft, Nontender, No distention, Normal Bowel sounds, No hepatomegaly  Extremities: No C/C/E, pulses palpable 2+  Skin:normal texture and turgor, no rashes, no lesions. Lunbar wound with small opening  Neuro:grossly normal , follows commands  Psych:appropriate                Labs: Results:       Chemistry Recent Labs      03/02/18 0432 03/01/18 0822 02/27/18   1244   GLU  99  124*  111*   NA  141  141  140   K  3.9  3.8  3.6   CL  103  104  100   CO2  33*  30  34*   BUN  23*  20*  24*   CREA  0.96  0.67  0.88   CA  8.5  9.1  10.1   AGAP  5  7  6   BUCR  24*  30*  27*   AP   --    --   133*   TP   --    --   7.0   ALB   --    --   2.6*   GLOB   --    --   4.4*   AGRAT   --    --   0.6*      CBC w/Diff Recent Labs      03/02/18 0432 03/01/18 0822 02/28/18   0356   WBC  8.4  13.9*  10.3   RBC  3.98*  4.50  4.51   HGB  10.3*  11.7*  11.7*   HCT  33.7*  37.6  38.3   PLT  264  303  257   GRANS  56  79*  84*   LYMPH  30  15*  13*   EOS  4  0  0      Cardiac Enzymes No results for input(s): CPK, CKND1, AYUSH in the last 72 hours. No lab exists for component: CKRMB, TROIP   Coagulation Recent Labs      02/27/18   1244   PTP  13.0   INR  1.0       Lipid Panel No results found for: CHOL, CHOLPOCT, CHOLX, CHLST, CHOLV, 868501, HDL, LDL, LDLC, DLDLP, 690840, VLDLC, VLDL, TGLX, TRIGL, TRIGP, TGLPOCT, CHHD, CHHDX   BNP No results for input(s): BNPP in the last 72 hours.    Liver Enzymes Recent Labs      02/27/18   1244   TP  7.0   ALB  2.6*   AP  133*   SGOT  17      Thyroid Studies No results found for: T4, T3U, TSH, TSHEXT     Procedures/imaging: see electronic medical records for all procedures/Xrays and details which were not copied into this note but were reviewed prior to creation of [address] Brand[address]alante, PA-C

## 2018-03-02 NOTE — PROGRESS NOTES
Surgical drainage of psoas fluid collection scheduled for  later today, discussed again with patient.

## 2018-03-02 NOTE — PROGRESS NOTES
0800: Received report from Monika Caicedo RN. Assumed pt care at this time. 6718: Assessment completed. Patient is A&O 4. Patient is calm and cooperative. Family at bedside. Patient PERRLA, oral mucosa pink and moist, strong  on both upper extremities. Lung sound clear, not appear distress. Bowel sounds active. Pedal pulses are palpable, no complain of any numbness or tingling. IV site dry and dressing intact. Mepilex dressing to lower back is clean and dry, Ice is applied. SCD and Donn hose are applied. Pain is 8/10.  bed is in lower position, call bell and personal items are within reach. Pain regimen and activities are educated, educated pt to call when getting up to prevent fall. Pt verbalized understanding. 1000:Pre op check list is completed. 1048: Pt's IV on to the right AC was leaking and dislodged. Scot inserted 22G to left [facility].     1118: Paged Dr Suzanne Rodriguez at this time and have an order for type and cross and Dr Suzanne Rodriguez said that pt did not need it. 1346: Pt was transferred by the preop nurse.

## 2018-03-02 NOTE — PROGRESS NOTES
Pharmacy Dosing Services:  Vancomycin     Pt has been receiving Vancomycin for indication of possible discitis / osteomyelitis. Day of therapy: 2    Scr = 0.96  (increased from 0.67 on 3/1/18)  CrCl = 69.3 ml/min   WBC = 8.4       Due to increased Scr, will obtain a Random Vancomycin  level prior to next dose. Vancomycin Random level is scheduled for 3/3/18 at 02:00. Goal therapeutic level:  15 - 20     Pharmacy to follow daily and will make changes to dose and/or frequency based on clinical status.   Mehnaz Jesus, PharmD   [phone]

## 2018-03-02 NOTE — ANESTHESIA POSTPROCEDURE EVALUATION
Post-Anesthesia Evaluation and Assessment    Cardiovascular Function/Vital Signs  Visit Vitals    /75    Pulse 73    Temp 36.3 °C (97.3 °F)    Resp 13    Ht 5' 4\" (1.626 m)    Wt 105.7 kg (233 lb)    SpO2 98%    BMI 39.99 kg/m2       Patient is status post Procedure(s) with comments:  LUMBAR INCISION AND DRAINAGE (PT IN ROOM #203) WITH C-ARM,  - WITH C-ARM. Nausea/Vomiting: Controlled. Postoperative hydration reviewed and adequate. Pain:  Pain Scale 1: FLACC (03/02/18 1745)  Pain Intensity 1: 0 (03/02/18 1745)   Managed. Neurological Status:   Neuro (WDL): Within Defined Limits (03/02/18 1745)   At baseline. Mental Status and Level of Consciousness: Arousable. Pulmonary Status:   O2 Device: Nasal cannula (03/02/18 1700)   Adequate oxygenation and airway patent. Complications related to anesthesia: None    Post-anesthesia assessment completed. No concerns. Patient has met all discharge requirements.     Signed By: Anupam Thomas CRNA    March 2, 2018

## 2018-03-02 NOTE — CONSULTS
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 She reports that she has had steroid injection to her back once in 9/2017 and this was not helpful in alleviating her back pain. Has had myelograms in past as well . She reports that her surgeries were don eat \"[facility]\" by Dr Josue Au. I do not have access to the operative reports at this time or the operative findings. She recalls being on 3 days of IV antibiotics and then PO antibiotics before her last discharge in Jan 2017 to rehab facility per her. She was not able to recall the names of any of the antibiotics. She reports that from surgery in Jan, she had a wound vac/drain in place and after it was removed, she had a small open wound at that site. She says that this open wound started putting out discharge. She reports that her clothes would get wet with discharge. She characterizes it as blood tinged, yellow and then slimy later on. She did not notice an odor but says that the nurses have noticed an odor to it. She complains of pain , numbness and lower extremity edema. Denied any fever, chills , night sweats or urinary/bowel incontinence. Says she is constipated from pain medications. She denied any other hardware in her body. Had a Tib/fib fracture in past and had hardware placed and removed subsequently later on.       During this admission, she has had MRI of her lumbar spine and noted to have \" Complex fluid collections along the laminotomy bed\", \" Extensive abnormal fluid signal with peripheral enhancement involving L4-5 and L5-S1 disc spaces\", \" left sided psoas intramuscular fluid  collection which is very suspicious for psoas abscess\". Blood cultures have been obtained and wound cultures obtained as well. She has been on IV Ceftriaxone 2/27-3/1 and Dexamethasone 2/27-2/28 per medication encounter history. Today, antibiotics was switched to IV Vancomycin. I am consulted today regarding further recommendations for management of psoas abscess, post op site infection and lumbar osteomyelitis. 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 No pertinent family history.  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, retired /paramedic   · Tobacco:denied   · Alcohol:denied   · Illicit Drugs:denied   · Sexual History: did not address today   · Travel History  · Exposures:   · Outdoor/Hiking: denied  · Animal/Pet: + Dog and + cat ( denied bites/licks to wounds)   · Construction: denied  · Hot Tub: denied   · Brackish/stagnant exposure: denied      Allergies:  No Known Allergies      Review of Systems:     Gen: Negative for chills, fevers, weight loss, weight gain   HEENT: Negative for headache, vision changes, ear ache or discharge, tingling,  nasal discharge, swelling, lumps in neck, sores on tongue   CV:  Negative for chest pain, dyspnea on exertion, leg edema   Lungs: Negative for shortness of breath, cough, wheezing   Abdomen: Negative for abdominal pain, nausea, vomiting, diarrhea, + constipation   Genitourinary: Negative for genital pain or genital discharge     Neuro: Negative for headache, numbness, tingling, extremity weakness,  syncope, seizures    Skin: Negative for rash, sores/open wounds   Musculoskeletal:+ back wound, discharge , numbness , pain and edema lower extremities    Endocrine: Negative for high or low blood sugars, heat or cold intolerance    Psych: Negative for manic behavior     Medications:    Current Facility-Administered Medications:     Vancomycin Dosing Per Pharmacy Protocol, 1 Each, Other, Rx Dosing/Monitoring, Ashly Naylor PA-C    vancomycin (VANCOCIN) 2000 mg in dextrose 5% 500 mL infusion, 2,000 mg, IntraVENous, Q18H, Ashly Naylor PA-C, 2,000 mg at 03/01/18 1629    piperacillin-tazobactam (ZOSYN) 3.375 g in 0.9% sodium chloride (MBP/ADV) 100 mL MBP, 3.375 g, IntraVENous, Q6H, Ashly Naylor PA-C, 3.375 g at 03/01/18 2025    cyclobenzaprine (FLEXERIL) tablet 10 mg, 10 mg, Oral, TID PRN, Townsend Paget, MD, 10 mg at 03/01/18 2020    HYDROcodone-acetaminophen (NORCO)  mg tablet 1 Tab, 1 Tab, Oral, Q4H PRN, Townsend Paget, MD    sodium chloride (NS) flush 5-10 mL, 5-10 mL, IntraVENous, Q8H, Townsend Paget, MD, 10 mL at 02/28/18 2309    sodium chloride (NS) flush 5-10 mL, 5-10 mL, IntraVENous, PRN, Townsend Paget, MD    naloxone [facility]) injection 0.4 mg, 0.4 mg, IntraVENous, PRN, Townsend Paget, MD    zolpidem [facility]) tablet 5 mg, 5 mg, Oral, QHS PRN, Townsend Paget, MD    lactated Ringers infusion, 75 mL/hr, IntraVENous, CONTINUOUS, Townsend Paget, MD, Last Rate: 75 mL/hr at 02/28/18 2249, 75 mL/hr at 02/28/18 2249    HYDROcodone-acetaminophen (NORCO) 7.5-325 mg per tablet 1 Tab, 1 Tab, Oral, Q4H PRN, Townsend Paget, MD, 1 Tab at 03/01/18 3658    ondansetron (ZOFRAN) injection 4 mg, 4 mg, IntraVENous, Q4H PRN, Townsend Paget, MD    diphenhydrAMINE (BENADRYL) capsule 25 mg, 25 mg, Oral, Q4H PRN, Jade Hernandez MD    docusate sodium (COLACE) capsule 100 mg, 100 mg, Oral, BID, Jade Hernandez MD, 100 mg at 03/01/18 2025    enoxaparin (LOVENOX) injection 40 mg, 40 mg, SubCUTAneous, Q24H, Jade Hernandez MD, 40 mg at 03/01/18 1746    sodium chloride (NS) flush 5-10 mL, 5-10 mL, IntraVENous, Q8H, Jade Hernandez MD, Stopped at 02/28/18 0600    sodium chloride (NS) flush 5-10 mL, 5-10 mL, IntraVENous, PRN, Jade Hernandez MD        Physical Exam:    Vitals: Patient Vitals for the past 24 hrs:   Temp Pulse Resp BP SpO2   03/01/18 1923 97.5 °F (36.4 °C) 93 17 145/82 97 %   03/01/18 1128 97.9 °F (36.6 °C) (!) 101 17 128/72 98 %   03/01/18 0702 97.3 °F (36.3 °C) 87 17 141/69 92 %   03/01/18 0220 97.8 °F (36.6 °C) 72 16 139/71 100 %   02/28/18 2259 97.9 °F (36.6 °C) 100 16 143/83 99 %   · NAD , sitting in chair   · Back wound visualized with the help of RN in the room, incision site noted, no erythema noted, RN notes slight warmth to touch, + dressing with serosanguinous discharge noted   · Heart and lung exam cannot be done at this time ( electronic stereoscope not available at this time)  · Skin no rash on visualized areas      Labs:   Recent Results (from the past 24 hour(s))   CBC WITH AUTOMATED DIFF    Collection Time: 03/01/18  8:22 AM   Result Value Ref Range    WBC 13.9 (H) 4.6 - 13.2 K/uL    RBC 4.50 4. 20 - 5.30 M/uL    HGB 11.7 (L) 12.0 - 16.0 g/dL    HCT 37.6 35.0 - 45.0 %    MCV 83.6 74.0 - 97.0 FL    MCH 26.0 24.0 - 34.0 PG    MCHC 31.1 31.0 - 37.0 g/dL    RDW 14.5 11.6 - 14.5 %    PLATELET 067 210 - 180 K/uL    MPV 9.2 9.2 - 11.8 FL    NEUTROPHILS 79 (H) 40 - 73 %    LYMPHOCYTES 15 (L) 21 - 52 %    MONOCYTES 6 3 - 10 %    EOSINOPHILS 0 0 - 5 %    BASOPHILS 0 0 - 2 %    ABS. NEUTROPHILS 11.0 (H) 1.8 - 8.0 K/UL    ABS. LYMPHOCYTES 2.1 0.9 - 3.6 K/UL    ABS. MONOCYTES 0.8 0.05 - 1.2 K/UL    ABS. EOSINOPHILS 0.0 0.0 - 0.4 K/UL    ABS.  BASOPHILS 0.0 0.0 - 0.06 K/UL    DF AUTOMATED     METABOLIC PANEL, BASIC    Collection Time: 03/01/18  8:22 AM   Result Value Ref Range    Sodium 141 136 - 145 mmol/L    Potassium 3.8 3.5 - 5.5 mmol/L    Chloride 104 100 - 108 mmol/L    CO2 30 21 - 32 mmol/L    Anion gap 7 3.0 - 18 mmol/L    Glucose 124 (H) 74 - 99 mg/dL    BUN 20 (H) 7.0 - 18 MG/DL    Creatinine 0.67 0.6 - 1.3 MG/DL    BUN/Creatinine ratio 30 (H) 12 - 20      GFR est AA >60 >60 ml/min/1.73m2    GFR est non-AA >60 >60 ml/min/1.73m2    Calcium 9.1 8.5 - 10.1 MG/DL       Microbiology Data:       Blood: 2/27/18  Results      Component Value Flag Ref Range Units Status     Special Requests: NO SPECIAL REQUESTS     Preliminary     Culture result: NO GROWTH 2 DAYS     Preliminary           Wound Drainage      Component Results      Component Value Flag Ref Range Units Status     Special Requests: NO SPECIAL REQUESTS     Preliminary     GRAM STAIN MANY WBC'S     Preliminary     GRAM STAIN      Preliminary     FEW GRAM POSITIVE COCCI IN PAIRS     GRAM STAIN      Preliminary     FEW GRAM POSITIVE RODS     GRAM STAIN      Preliminary     RARE GRAM NEGATIVE RODS     Culture result:  (A)    Preliminary     MODERATE DIPHTHEROIDS (TWO MORPHOTYPES)     Culture result:  (A)    Preliminary     FEW STAPHYLOCOCCUS SPECIES, COAGULASE NEGATIVE (TWO MORPHOTYPES)             Pathology Results:    Imaging:   MRI lumbar spine   Findings:      Straightening of the normal lordosis is present which is nonspecific but may be  due to muscle spasm or positioning. Levoconvex curvature is present with its  apex at the L4 level. There are postsurgical changes in the lower lumbar spine  with evidence of left-sided laminotomy is at L4-5 and L5-S1 levels. Foci of  metallic artifact are present along the residual left L4-5 posterior elements  and at the level of prior left L5-S1 facet joint and S1 pedicle, perhaps  representing surgical clips.  No definite surgical fusion hardware metallic  artifact is present.     There is heterogeneous signal probably T2 hyperintense and T1 precontrast  hypointense/isointense with thickened peripheral enhancement seen along the  laminotomy bed as well as additional fluid collection within the subcutaneous  fat at L4 and L5 levels, well seen on sagittal images 9 and 10 and axial images  7-18. This subcutaneous fluid collection is immediately beneath the surgical  incision site and appears to extend to the cutaneous surface. This fluid  collection is cutaneous surface. Maximal 7.0 cm superior by 3.3 cm AP by 1.4 cm  width. The laminotomy bed fluid collections do appear to have thin linear direct  communication with this larger subcutaneous fluid collection. The laminotomy  fluid collections exert mild mass effect on the left aspect of the spinal canal  with lateral recess narrowing, but do not demonstrate aleksandr intraspinal canal  extension. No definite epidural fluid collection is seen. There is a nonspecific  enhancement heterogeneous signal along the laminotomy bed extending towards the  disc space, which may represent granulation tissue.     Extensive T2/FLAIR hyperintensity and peripheral enhancement is seen involving  the L4-5 and L5-S1 disc spaces. There is abnormal marrow signal, T1 hypointense  and STIR hyperintense along the L4-5 and L5-S1 endplates, asymmetric to the left  including loss of normal hypointense endplate cortical marrow signal, again  greater on the left. Paravertebral inflammatory stranding and enhancement is  present. There is a small discrete peripherally enhancing fluid collection  within the left psoas muscle well seen on sagittal image 3 and axial images 12  and 13. Estimated size is 15 x 11 x 9 mm. There is additional mildly increased  T2 signal and enhancement within the medial aspect of this left psoas muscle  without additional discrete fluid collection.     No abnormal disc signal or enhancement is seen at more cephalad levels.   Mild  endplate reactive marrow signal changes are present more superiorly particularly  at L3-L4 level towards the the left. The conus medullaris is located at the  T12-L1 level and has a normal appearance and signal. No abnormal distal cord or  conus enhancement is seen.     L1/2 level: There is no central or foraminal stenosis.     L2/3 level: Disc bulge is present. Superimposed right central/subarticular disc  protrusion is present compressing the thecal sac. Moderate central stenosis is  present with estimated midline AP diameter of thecal sac measuring 6 mm. Lateral  recess stenosis is present, greater on the right, with contact of descending L3  nerve roots. Mild bilateral foraminal stenosis is present.     L3/4 level: Diffuse disc bulge is present flattening the ventral thecal sac. Facet arthropathy is present. Dorsal epidural fat also with mass effect on  thecal sac. Moderate central and lateral recess stenosis is present with  estimated midline AP diameter of thecal sac measuring 6 mm. Loss of normal CSF  signal seen at this level. Descending L4 nerve roots are contacted. Moderate  bilateral foraminal stenosis is seen greater on the left.     L4/5 level: This level is a prior surgical level. Prominent epidural enhancement  is seen relatively circumferentially particularly along the posterior margin of  the disc and along the lateral epidural spaces, including surrounding the  descending L5 nerve roots. Thecal sac is distorted morphology. Moderate to  severe central stenosis is present with estimated midline AP diameter of thecal  sac measuring 5 mm. Moderate foraminal stenosis is seen bilaterally.     L5/S1 level: This level is a prior surgical level. Thecal sac is rapidly tapered  at this level is essentially into individual nerve roots. Sagittal postcontrast  images 9-11 suggest peripherally enhancing inferiorly extending disc extrusion  and/or discitis.  Moderate bilateral foraminal stenosis is seen.     IMPRESSION  IMPRESSION:     1. Postsurgical changes from prior left-sided L4-5 and L5-S1 laminotomies. Complex fluid collections along the laminotomy bed seen within the subcutaneous  fat with peripheral enhancement, nonspecific but potentially representing  abscess given reported history of purulent drainage from the surgical incision. Peripherally enhancing postoperative hematomas or seromas can have a similar  appearance. No prior comparison studies.     2. Extensive abnormal fluid signal with peripheral enhancement involving L4-5  and L5-S1 disc spaces including prominent endplate marrow signal changes and  enhancement as well as cortical signal loss towards the left, suspicious for  discitis/osteomyelitis especially given history. Prominent postoperative changes  with underlying extensive inflammatory degenerative changes is a consideration  but felt to be less likely, especially given the extent of the relative disc  fluid signal as well as the adjacent left sided psoas intramuscular fluid  collection which is very suspicious for psoas abscess.     3. Moderate to severe central and lateral recess stenosis at the L4-5 level with  distorted thecal sac. Moderate central and lateral recess stenosis at the L3-L4  level. Thecal sac is essentially tapered into nerve roots at L5-S1 without  central stenosis.     4. No definite epidural abscess. Posterior midline L5-S1 disc extrusion versus  extension of discitis into the spinal canal, without evidence of central  stenosis. 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   She reports that she has had steroid injection to her back once in 9/2017 and this was not helpful in alleviating her back pain. Has had myelograms in past as well . She reports that her surgeries were don eat \"[facility]\" by Dr Jo-Ann Jefferson. I do not have access to the operative reports at this time or the operative findings. She recalls being on 3 days of IV antibiotics and then PO antibiotics before her last discharge in Jan 2017 to rehab facility per her. She was not able to recall the names of any of the antibiotics. She reports that from surgery in Jan, she had a wound vac/drain in place and after it was removed, she had a small open wound at that site. She says that this open wound started putting out discharge. She reports that her clothes would get wet with discharge. She characterizes it as blood tinged, yellow and then slimy later on. She did not notice an odor but says that the nurses have noticed an odor to it. She complains of pain , numbness and lower extremity edema. Denied any fever, chills , night sweats or urinary/bowel incontinence. Says she is constipated from pain medications. She denied any other hardware in her body. Had a Tib/fib fracture in past and had hardware placed and removed subsequently later on. During this admission, she has had MRI of her lumbar spine and noted to have \" Complex fluid collections along the laminotomy bed\", \" Extensive abnormal fluid signal with peripheral enhancement involving L4-5 and L5-S1 disc spaces\", \" left sided psoas intramuscular fluid  collection which is very suspicious for psoas abscess\". Blood cultures have been obtained and wound cultures obtained as well. She has been on IV Ceftriaxone 2/27-3/1 and Dexamethasone 2/27-2/28 per medication encounter history. Today, antibiotics was switched to IV Vancomycin. I am consulted today regarding further recommendations for management of psoas abscess, post op site infection and lumbar osteomyelitis.       1) Post op site lumbar infection S/P Laminectomy, Lumbar L4-5, S1 fluid collections and concerns for osteomyelitis, Psoas abscess    Recommendations:  Recommend drainage of fluid collections noted on MRI including Psoas abscess and fluid collections in lumbar spine. Please send fluid for gram stain, bacterial and fungal cultures  Given the large burden of infection and fluid collections, antibiotics alone without  adequate source control is usually not effective   Agree with Vancomycin IV for MRSA and other susceptible gram positive organisms   Recommend adding Zosyn IV for gram negative and anaerobic organisms for now   Given she has been on IV and then PO antibiotics in past ( David? And unclear which antibiotics), cultures may be suppressed  Once drainage of all collections done for source control of infection, will likely need 6 weeks IV antibiotics  Final choice and duration to be determined based on her course and findings   Leukocytosis ( WBC increased today to 13.9). Was on steroids Dexamethasone recently and noted. If febrile, please repeat blood cultures as risk for seeding blood is high   With Vancomycin and Zosyn risk for renal toxicity is higher compared to monotherapy. Will need close renal monitoring   Vancomycin IV to be dosed by pharmacy for trough goal of 15-20  IV Zosyn 3.375 IV Q 6 hours for now. If any evidence of Pseudomonas, needs dosage adjusted up  Side effects of antibiotics including GI, renal and risk for CDI explained to patient today   While on IV antibiotics will need weekly monitoring for CBC with Diff, BMP, CK, and ESR. 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 All her questions were answered to the best of my ability. Most history obtained from interviewing patient and reviewing chart.   Exam is limited within the scope and limitations of telemedicine at this time. The above recommendations were discussed with Mr Anel Chau earlier today. Thank for the opportunity to participate in the care of this patient. Please contact with questions or concerns.          Verlan Lanes, DO  8:54 PM

## 2018-03-03 LAB
ANION GAP SERPL CALC-SCNC: 3 MMOL/L (ref 3–18)
BACTERIA SPEC CULT: ABNORMAL
BASOPHILS # BLD: 0 K/UL (ref 0–0.06)
BASOPHILS NFR BLD: 0 % (ref 0–2)
BUN SERPL-MCNC: 11 MG/DL (ref 7–18)
BUN/CREAT SERPL: 15 (ref 12–20)
CALCIUM SERPL-MCNC: 8.7 MG/DL (ref 8.5–10.1)
CHLORIDE SERPL-SCNC: 106 MMOL/L (ref 100–108)
CO2 SERPL-SCNC: 35 MMOL/L (ref 21–32)
CREAT SERPL-MCNC: 0.74 MG/DL (ref 0.6–1.3)
DIFFERENTIAL METHOD BLD: ABNORMAL
EOSINOPHIL # BLD: 0.5 K/UL (ref 0–0.4)
EOSINOPHIL NFR BLD: 6 % (ref 0–5)
ERYTHROCYTE [DISTWIDTH] IN BLOOD BY AUTOMATED COUNT: 14.8 % (ref 11.6–14.5)
GLUCOSE SERPL-MCNC: 89 MG/DL (ref 74–99)
GRAM STN SPEC: ABNORMAL
HCT VFR BLD AUTO: 32.5 % (ref 35–45)
HGB BLD-MCNC: 10 G/DL (ref 12–16)
LYMPHOCYTES # BLD: 2.1 K/UL (ref 0.9–3.6)
LYMPHOCYTES NFR BLD: 26 % (ref 21–52)
MCH RBC QN AUTO: 25.8 PG (ref 24–34)
MCHC RBC AUTO-ENTMCNC: 30.8 G/DL (ref 31–37)
MCV RBC AUTO: 84 FL (ref 74–97)
MONOCYTES # BLD: 0.9 K/UL (ref 0.05–1.2)
MONOCYTES NFR BLD: 11 % (ref 3–10)
NEUTS SEG # BLD: 4.6 K/UL (ref 1.8–8)
NEUTS SEG NFR BLD: 57 % (ref 40–73)
PLATELET # BLD AUTO: 255 K/UL (ref 135–420)
PMV BLD AUTO: 8.8 FL (ref 9.2–11.8)
POTASSIUM SERPL-SCNC: 4.3 MMOL/L (ref 3.5–5.5)
RBC # BLD AUTO: 3.87 M/UL (ref 4.2–5.3)
SERVICE CMNT-IMP: ABNORMAL
SODIUM SERPL-SCNC: 144 MMOL/L (ref 136–145)
VANCOMYCIN SERPL-MCNC: 12.7 UG/ML (ref 5–40)
WBC # BLD AUTO: 8.1 K/UL (ref 4.6–13.2)

## 2018-03-03 PROCEDURE — 74011250637 HC RX REV CODE- 250/637: Performed by: SPECIALIST

## 2018-03-03 PROCEDURE — 80048 BASIC METABOLIC PNL TOTAL CA: CPT | Performed by: PHYSICIAN ASSISTANT

## 2018-03-03 PROCEDURE — 65270000029 HC RM PRIVATE

## 2018-03-03 PROCEDURE — 74011250636 HC RX REV CODE- 250/636: Performed by: SPECIALIST

## 2018-03-03 PROCEDURE — 97166 OT EVAL MOD COMPLEX 45 MIN: CPT

## 2018-03-03 PROCEDURE — 74011250636 HC RX REV CODE- 250/636: Performed by: PHYSICIAN ASSISTANT

## 2018-03-03 PROCEDURE — 97161 PT EVAL LOW COMPLEX 20 MIN: CPT

## 2018-03-03 PROCEDURE — 97535 SELF CARE MNGMENT TRAINING: CPT

## 2018-03-03 PROCEDURE — 85025 COMPLETE CBC W/AUTO DIFF WBC: CPT | Performed by: PHYSICIAN ASSISTANT

## 2018-03-03 PROCEDURE — 74011000258 HC RX REV CODE- 258: Performed by: PHYSICIAN ASSISTANT

## 2018-03-03 PROCEDURE — 80202 ASSAY OF VANCOMYCIN: CPT | Performed by: PHYSICIAN ASSISTANT

## 2018-03-03 PROCEDURE — 36415 COLL VENOUS BLD VENIPUNCTURE: CPT | Performed by: PHYSICIAN ASSISTANT

## 2018-03-03 PROCEDURE — 97530 THERAPEUTIC ACTIVITIES: CPT

## 2018-03-03 PROCEDURE — 97116 GAIT TRAINING THERAPY: CPT

## 2018-03-03 RX ADMIN — VANCOMYCIN HYDROCHLORIDE 2000 MG: 10 INJECTION, POWDER, LYOPHILIZED, FOR SOLUTION INTRAVENOUS at 21:06

## 2018-03-03 RX ADMIN — PIPERACILLIN SODIUM,TAZOBACTAM SODIUM 3.38 G: 3; .375 INJECTION, POWDER, FOR SOLUTION INTRAVENOUS at 14:37

## 2018-03-03 RX ADMIN — Medication 10 ML: at 05:25

## 2018-03-03 RX ADMIN — VANCOMYCIN HYDROCHLORIDE 2000 MG: 10 INJECTION, POWDER, LYOPHILIZED, FOR SOLUTION INTRAVENOUS at 04:25

## 2018-03-03 RX ADMIN — CYCLOBENZAPRINE HYDROCHLORIDE 10 MG: 10 TABLET, FILM COATED ORAL at 14:35

## 2018-03-03 RX ADMIN — PIPERACILLIN SODIUM,TAZOBACTAM SODIUM 3.38 G: 3; .375 INJECTION, POWDER, FOR SOLUTION INTRAVENOUS at 20:14

## 2018-03-03 RX ADMIN — CYCLOBENZAPRINE HYDROCHLORIDE 10 MG: 10 TABLET, FILM COATED ORAL at 04:00

## 2018-03-03 RX ADMIN — DOCUSATE SODIUM 100 MG: 100 CAPSULE, LIQUID FILLED ORAL at 09:19

## 2018-03-03 RX ADMIN — Medication 10 ML: at 14:42

## 2018-03-03 RX ADMIN — PIPERACILLIN SODIUM,TAZOBACTAM SODIUM 3.38 G: 3; .375 INJECTION, POWDER, FOR SOLUTION INTRAVENOUS at 03:53

## 2018-03-03 RX ADMIN — ENOXAPARIN SODIUM 40 MG: 40 INJECTION SUBCUTANEOUS at 18:14

## 2018-03-03 RX ADMIN — Medication 10 ML: at 22:00

## 2018-03-03 RX ADMIN — PIPERACILLIN SODIUM,TAZOBACTAM SODIUM 3.38 G: 3; .375 INJECTION, POWDER, FOR SOLUTION INTRAVENOUS at 10:24

## 2018-03-03 RX ADMIN — DOCUSATE SODIUM 100 MG: 100 CAPSULE, LIQUID FILLED ORAL at 20:15

## 2018-03-03 NOTE — PROGRESS NOTES
Problem: Self Care Deficits Care Plan (Adult)  Goal: *Acute Goals and Plan of Care (Insert Text)  Short Term Goals Updated on 3/03/18: In 3 days:   1. Pt will perform LB dressing using AE as needed with minimal-moderate assistance in order to improve independence with ADLs. 2.  Pt will perform supine to sit EOB with CGA using log roll method to improve mobility skills and prepare for OOB transfers. 3.  Pt will perform toilet transfers and toileting routine with minimal-moderate assistance using AD/DME as needed in order to increase independence with ADLs. 4.  Pt will stand at bedside for 3 minutes without LOB in order to perform grooming tasks with Minimal A for stand balance. Occupational Therapy EVALUATION    Patient: Angelita Bermudez [de-identified]74 y.o. female)  Date: 3/3/2018  Primary Diagnosis: Lumbar back pain  Post-operative complication  Psoas abscess, left (HCC)  Lumbar surgical wound fluid collection  REPAIR CSF LEAK  Procedure(s) (LRB):  LUMBAR INCISION AND DRAINAGE (PT IN ROOM #203) WITH C-ARM,  (N/A) 1 Day Post-Op   Precautions:   Fall, Back    ASSESSMENT :  Based on the objective data described below, the patient presents with decreased ability to perform functional mobility, bed mobility, LE dressing and functional transfers. Pt limited by pain, limited endurance, decreased strength which is affecting  activity tolerance to complete ADLs and functional mobility/transfers. Pt was seen with PT to maximize safety and performance in therapy. Pt was agreeable and willing to participate. Pt required mod-max A for bed mobility performing log roll. Pt required frequent rest breaks secondary to pain. Pt required Mod-Max x2 for sit/stand transfer and Max A for SPT from EOB to chair. Pt was only able to tolerate sitting in chair for approx 4 minutes secondary to pain. Pt did wash her face with setup. Pt transferred back to bed to side-lying on L side with all needs met.  Pt would benefit from Rehab secondary to having no physical assistance at home. Patient will benefit from skilled intervention to address the above impairments. Patients rehabilitation potential is considered to be Good  Factors which may influence rehabilitation potential include:   []             None noted  []             Mental ability/status  []             Medical condition  []             Home/family situation and support systems  []             Safety awareness  [x]             Pain tolerance/management  []             Other:      PLAN :  Recommendations and Planned Interventions:  [x]               Self Care Training                  [x]        Therapeutic Activities  [x]               Functional Mobility Training    []        Cognitive Retraining  [x]               Therapeutic Exercises           [x]        Endurance Activities  []               Balance Training                   []        Neuromuscular Re-Education  []               Visual/Perceptual Training     [x]   Home Safety Training  [x]               Patient Education                 [x]        Family Training/Education  []               Other (comment):    Frequency/Duration: Patient will be followed by occupational therapy 3-5x/wk for 1week   to address goals. Discharge Recommendations: Rehab  Further Equipment Recommendations for Discharge: Pt does have AE (reacher, shoe horn, and sock aide at home)     SUBJECTIVE:   Patient stated I'm hurting.     OBJECTIVE DATA SUMMARY:   History reviewed. No pertinent past medical history. Past Surgical History:   Procedure Laterality Date    HX CHOLECYSTECTOMY      HX GYN      HX ORTHOPAEDIC  1996    c5-6 fusion     HX ORTHOPAEDIC  01/2018    lumbar laminectomy      Barriers to Learning/Limitations: None  Compensate with: visual, verbal, tactile, kinesthetic cues/model  Prior Level of Function/Home Situation: Pt was Mod I prior with ADLs/IADLs.   Home Situation  Home Environment: Private residence  # Steps to Enter: 3  Rails to Enter: Yes  Hand Rails : Right  Wheelchair Ramp: No  One/Two Story Residence: Two story  # of Interior Steps: [address]: Left (except for 6 steps midway)  Lift Chair Available: No  Living Alone: No  Support Systems: Friends \ neighbors  Patient Expects to be Discharged to[de-identified] Rehabilitation facility  Current DME Used/Available at Home: [place], straight, Walker, rolling  Tub or Shower Type: Tub/Shower combination  Cognitive/Behavioral Status:  Neurologic State: Alert; Appropriate for age  Orientation Level: Appropriate for age;Oriented X4  Cognition: Appropriate decision making; Appropriate for age attention/concentration; Appropriate safety awareness; Follows commands  Safety/Judgement: Awareness of environment  Skin: BUE skin intact  Coordination:  Coordination: Generally decreased, functional  Fine Motor Skills-Upper: Left Intact; Right Intact    Gross Motor Skills-Upper: Left Intact; Right Intact  Balance:  Sitting: Intact  Standing: Impaired; With support  Standing - Static: Fair  Standing - Dynamic : Poor  Strength:(B) UE   Strength: Generally decreased, functional   Tone & Sensation:(B) UE  Tone: Normal  Sensation: Impaired  Range of Motion:(B) UE  AROM: Generally decreased, functional  PROM: Generally decreased, functional   Functional Mobility and Transfers for ADLs:  Bed Mobility:  Rolling: Moderate assistance;Assist x2  Supine to Sit: Moderate assistance  Sit to Supine: Moderate assistance;Maximum assistance  Scooting: Moderate assistance  Transfers:  Sit to Stand: Moderate assistance;Maximum assistance;Assist x2   Bed to Chair: Maximum assistance   ADL Assessment/Intervention:   Lower Body Dressing: Maximum assistance; Total assistance  Toileting: Maximum assistance; Total assistance    Cognitive Retraining  Safety/Judgement: Awareness of environment  Pain:  Pain Scale 1: Numeric (0 - 10)  Pain Intensity 1: 5  Pain Location 1: Back  Pain Orientation 1: Lower  Pain Description 1: Aching  Pain Intervention(s) 1: Encouraged PCA;Nurse notified  Activity Tolerance:   Pt tolerated tx/eval without appearing to be in distress. Please refer to the flowsheet for vital signs taken during this treatment. After treatment:   [] Patient left in no apparent distress sitting up in chair  [] Patient left sitting on EOB  [x] Patient left in no apparent distress in bed  [] Patient declined to be OOB at this time due to   [x] Call bell left within reach  [x] Nursing notified()  [] Caregiver present  [] Bed alarm activated  [] CPM placed on  knee  COMMUNICATION/EDUCATION:   [x] Home safety education was provided and the patient/caregiver indicated understanding. [x] Patient/family have participated as able in goal setting and plan of care. [x] Patient/family agree to work toward stated goals and plan of care. [] Patient understands intent and goals of therapy, but is neutral about his/her participation. [] Patient is unable to participate in goal setting and plan of care. Thank you for this referral.  Brandt Coates, OT  Time Calculation: 31 mins     G-Codes (GP)  Self Care   Current  CM= 80-99%   Goal  CL= 60-79%    The severity rating is based on the Level of Assistance required for Functional Mobility and ADLs.

## 2018-03-03 NOTE — OP NOTES
[address] REPORT    Name:[facility]  MR#: [id number]  : 1950  ACCOUNT #: [de-identified]   DATE OF SERVICE: 2018    PREOPERATIVE DIAGNOSIS:  Status post lumbar micro disk surgery with recurrent fluid collection and possible psoas abscess. POSTOPERATIVE DIAGNOSIS:  Status post lumbar micro disk surgery with recurrent fluid collection and possible psoas abscess. PROCEDURE PERFORMED:  Lumbar wound re-exploration, thorough irrigation and exploration of all areas of importance with cultures and sensitivity with no definite abscess being found. SURGEON:  Prema Chapa MD    ESTIMATED BLOOD LOSS:      SPECIMENS REMOVED: none      HISTORY:  The patient brought to surgery with a paraspinal muscle fluid collection and going in the psoas muscle, not in the epidural space, but the paraspinal area. This was felt to be possibly an abscess, but she never had any fever and never had an elevated white count and wondered about it, but cultures taken in the emergency room from wound drainage showed some growth. She is on vancomycin. Details otherwise in the record. Before surgery, I had a full discussion with the patient all the above. I told her it was very unusual to have any kind of closed infection without fever or white count elevation, but we had to drain this and reculture to be sure. She understood all the above. She understood the possible complications including the possibility of neurological complications or spread of infection. OPERATION:  The patient placed on the operating table in the prone position for laminectomy after satisfactory induction of general endotracheal anesthesia. The back was prepped and draped as a sterile field. The patient was already on antibiotics. After all the appropriate preparations were performed, the wound was opened and I went down in and found fluid. I did not find anything that looked really like infection.   I went down with the operating microscope down into the spine area. I put a probe down into the disk space at both levels. No purulence came out. I ran my probes and fingers down the paraspinal area down to psoas area. I could never find any definite infected looking fluid. It all appeared to be clear. No spinal fluid was seen. A Valsalva was done and we confirmed that there was no spinal leak. After it was all said and done, I spent a good deal of time on the microscope and used all the experience I have, I could not find a definite abscess, nothing clearly to drain. I pulsated the wound out thoroughly with 1000 mL of irrigant, achieved good hemostasis. Passed a Hemovac drain through a separate stab wound out of the decompressive area, sutured the drain to the skin. The wound was closed carefully with multiple areas of Vicryl in the deep fascial layer, muscle layer 3-0 subcutaneous suture of Monocryl, a running 3-0 nylon in the skin. After the wound was closed, the drain worked nicely, standard dressing applied, the patient transferred to the PACU, having tolerated the procedure well.       MD Humble Munroe / Derrick.Pilar  D: 03/02/2018 16:15     T: 03/02/2018 20:14  JOB #: [id number]

## 2018-03-03 NOTE — PROGRESS NOTES
2302 - Report received from Petra Michele RN. Assumed care of patient at this time. 2345 - Patient in bed at this time. A/O x 4. IV to left AC intact and patent. Teds and SCDs bilaterally. 4x4 and tape dressing to back CDI. Denies numbness/tingling. Pedal pulses palpable. Lungs CTA. Bowel sounds active to all quadrants. Pain 6/10.      0400 - Patient complaining of muscle spasms. PRN flexeril administered. 0600 - Patient refusing to get up at this time stating her back hurts. Encouraged PCA. Patient stated that the flexeril did help some. 2319 - Bedside and Verbal shift change report given to Dereje Sigala RN by Michael Valdez RN. Report included the following information SBAR, Kardex, OR Summary, Intake/Output and MAR.

## 2018-03-03 NOTE — PROGRESS NOTES
Hospitalist Progress Note    Patient: Chelsie Anderson MRN: [id number]  CSN: [id number]    YOB: 1950  Age: 74 y.o. Sex: female    DOA: 2/27/2018 LOS:  LOS: 4 days              IMPRESSION and Plan:    Chelsie Anderson is a 74 y.o. female with   Patient Active Problem List    Diagnosis Date Noted    Lumbar back pain 02/27/2018    Psoas abscess, left ([address] 02/27/2018    Post-operative complication 25/48/6441    Lumbar surgical wound fluid collection 02/27/2018     Active Problems:    Lumbar back pain (2/27/2018)      Psoas abscess, left (HCC) (2/27/2018)      Post-operative complication (5/66/1869)      Lumbar surgical wound fluid collection (2/27/2018)    1. Positive Wound Culture  2. Hx of Laminectomy by Dr Magdalena Corona  3. Psoas Abscess  4. Possible Discitis/Osteomyelitis       Continue abx per ID recommendation  Bp/hr stable  Labs as ordred        Patient's condition is         Recommend to continue hospitalization. Discussed with patient. Chief Complaints:   Chief Complaint   Patient presents with    Leg Pain    Back Pain     SUBJECTIVE:  Pt is seen and examined. Chart reviewed    No CP or SOB  No Fever, chills, Nausea, vomitting. Review of systems:    General: No fevers or chills. Cardiovascular: No chest pain or pressure. No palpitations. Pulmonary:no Shortness of breath.    Gastrointestinal: No nausea, vomiting    PE:  Patient Vitals for the past 24 hrs:   BP Temp Pulse Resp SpO2   03/03/18 1522 143/67 97.9 °F (36.6 °C) (!) 137 18 -   03/03/18 1130 129/46 97.9 °F (36.6 °C) (!) 118 16 92 %   03/03/18 0734 147/51 98.2 °F (36.8 °C) (!) 117 16 100 %   03/03/18 0239 132/66 98 °F (36.7 °C) (!) 102 16 90 %   03/02/18 2302 120/54 98.5 °F (36.9 °C) 95 15 91 %   03/02/18 2127 133/57 98.9 °F (37.2 °C) 95 12 99 %   03/02/18 2027 123/65 98.9 °F (37.2 °C) 94 12 97 %   03/02/18 1927 142/50 97.9 °F (36.6 °C) 94 12 97 %   03/02/18 1827 136/64 97.6 °F (36.4 °C) 91 12 100 %   03/02/18 1800 131/70 - 75 12 98 %   03/02/18 1750 128/76 - 75 15 99 %   03/02/18 1745 129/75 - 73 13 98 %   03/02/18 1740 148/67 - 72 13 100 %   03/02/18 1735 160/75 - 84 14 100 %   03/02/18 1730 (!) 164/91 - 88 18 100 %   03/02/18 1725 153/83 - 96 18 100 %   03/02/18 1720 155/87 - 90 17 98 %   03/02/18 1715 142/85 - 97 20 100 %   03/02/18 1710 132/60 - 88 20 100 %   03/02/18 1705 132/65 - 81 19 100 %   03/02/18 1700 (!) 124/92 - 71 13 100 %   03/02/18 1655 151/50 - 84 15 100 %   03/02/18 1650 144/65 - 96 20 100 %   03/02/18 1645 152/75 - 88 19 100 %   03/02/18 1640 151/74 - 94 16 100 %   03/02/18 1636 130/81 97.3 °F (36.3 °C) 99 16 100 %       Intake/Output Summary (Last 24 hours) at 03/03/18 1600  Last data filed at 03/03/18 1458   Gross per 24 hour   Intake           2370.2 ml   Output             2500 ml   Net           -129.8 ml     Patient Vitals for the past 120 hrs:   Weight   02/27/18 1219 105.7 kg (233 lb)           HEENT: [place], EOMI; oral mucosa well prefused; Conjunctiva not injected  Neck: No JVD, Negative carotid bruits, normal pulses; No thyromegaly  Resp: CTA bilaterally; No wheezes or rales  CV: RRR s1s2 No murmur; No rubs; PMI not displaced  Abd: Positive Bowel Sounds, Soft, Nontender  Ext: No clubbing; No cyanosis; No edema  Neuro: Alert and oriented; Nonfocal  Skin: Warm, Dry, Intact  Pulses: 2+ DP/PT/Rad      Intake and Output:  Current Shift:  03/03 0701 - 03/03 1900  In: 1570.2 [I.V.:1570.2]  Out: 1100 [Urine:1100]  Last three shifts:  03/01 1901 - 03/03 0700  In: 5358 [P.O.:500; I.V.:3267]  Out: 1700 [Urine:1650]    Lab/Data Reviewed:  No results found for this or any previous visit (from the past 8 hour(s)).   Medications:  Current Facility-Administered Medications   Medication Dose Route Frequency    lactated Ringers infusion  125 mL/hr IntraVENous CONTINUOUS    sodium chloride (NS) flush 5-10 mL  5-10 mL IntraVENous Q8H    sodium chloride (NS) flush 5-10 mL  5-10 mL IntraVENous PRN    HYDROmorphone (PF) (DILAUDID) 30 mg / 30 mL PCA   IntraVENous TITRATE    piperacillin-tazobactam (ZOSYN) 3.375 g in 0.9% sodium chloride (MBP/ADV) 100 mL MBP  3.375 g IntraVENous Q6H    Vancomycin Dosing Per Pharmacy Protocol  1 Each Other Rx Dosing/Monitoring    vancomycin (VANCOCIN) 2000 mg in dextrose 5% 500 mL infusion  2,000 mg IntraVENous Q18H    cyclobenzaprine (FLEXERIL) tablet 10 mg  10 mg Oral TID PRN    HYDROcodone-acetaminophen (NORCO)  mg tablet 1 Tab  1 Tab Oral Q4H PRN    sodium chloride (NS) flush 5-10 mL  5-10 mL IntraVENous Q8H    sodium chloride (NS) flush 5-10 mL  5-10 mL IntraVENous PRN    naloxone (NARCAN) injection 0.4 mg  0.4 mg IntraVENous PRN    zolpidem (AMBIEN) tablet 5 mg  5 mg Oral QHS PRN    lactated Ringers infusion  75 mL/hr IntraVENous CONTINUOUS    HYDROcodone-acetaminophen (NORCO) 7.5-325 mg per tablet 1 Tab  1 Tab Oral Q4H PRN    ondansetron (ZOFRAN) injection 4 mg  4 mg IntraVENous Q4H PRN    diphenhydrAMINE (BENADRYL) capsule 25 mg  25 mg Oral Q4H PRN    docusate sodium (COLACE) capsule 100 mg  100 mg Oral BID    enoxaparin (LOVENOX) injection 40 mg  40 mg SubCUTAneous Q24H    sodium chloride (NS) flush 5-10 mL  5-10 mL IntraVENous Q8H    sodium chloride (NS) flush 5-10 mL  5-10 mL IntraVENous PRN       Recent Results (from the past 24 hour(s))   CBC WITH AUTOMATED DIFF    Collection Time: 03/03/18  2:00 AM   Result Value Ref Range    WBC 8.1 4.6 - 13.2 K/uL    RBC 3.87 (L) 4.20 - 5.30 M/uL    HGB 10.0 (L) 12.0 - 16.0 g/dL    HCT 32.5 (L) 35.0 - 45.0 %    MCV 84.0 74.0 - 97.0 FL    MCH 25.8 24.0 - 34.0 PG    MCHC 30.8 (L) 31.0 - 37.0 g/dL    RDW 14.8 (H) 11.6 - 14.5 %    PLATELET 171 308 - 497 K/uL    MPV 8.8 (L) 9.2 - 11.8 FL    NEUTROPHILS 57 40 - 73 %    LYMPHOCYTES 26 21 - 52 %    MONOCYTES 11 (H) 3 - 10 %    EOSINOPHILS 6 (H) 0 - 5 %    BASOPHILS 0 0 - 2 %    ABS. NEUTROPHILS 4.6 1.8 - 8.0 K/UL    ABS. LYMPHOCYTES 2.1 0.9 - 3.6 K/UL    ABS.  MONOCYTES 0.9 0.05 - 1.2 K/UL    ABS. EOSINOPHILS 0.5 (H) 0.0 - 0.4 K/UL    ABS.  BASOPHILS 0.0 0.0 - 0.06 K/UL    DF AUTOMATED     METABOLIC PANEL, BASIC    Collection Time: 03/03/18  2:00 AM   Result Value Ref Range    Sodium 144 136 - 145 mmol/L    Potassium 4.3 3.5 - 5.5 mmol/L    Chloride 106 100 - 108 mmol/L    CO2 35 (H) 21 - 32 mmol/L    Anion gap 3 3.0 - 18 mmol/L    Glucose 89 74 - 99 mg/dL    BUN 11 7.0 - 18 MG/DL    Creatinine 0.74 0.6 - 1.3 MG/DL    BUN/Creatinine ratio 15 12 - 20      GFR est AA >60 >60 ml/min/1.73m2    GFR est non-AA >60 >60 ml/min/1.73m2    Calcium 8.7 8.5 - 10.1 MG/DL   VANCOMYCIN, RANDOM    Collection Time: 03/03/18  2:00 AM   Result Value Ref Range    Vancomycin, random 12.7 5.0 - 40.0 UG/ML       Procedures/imaging: see electronic medical records for all procedures/Xrays and details which were not copied into this note but were reviewed prior to creation of Ruth Gooden MD   3/3/2018, 4:00 PM

## 2018-03-03 NOTE — PROGRESS NOTES
Problem: Falls - Risk of  Goal: *Absence of Falls  Document Tomy Fall Risk and appropriate interventions in the flowsheet.    Outcome: Progressing Towards Goal  Fall Risk Interventions:  Mobility Interventions: Patient to call before getting OOB    Mentation Interventions: Adequate sleep, hydration, pain control, Increase mobility, Toileting rounds    Medication Interventions: Evaluate medications/consider consulting pharmacy, Patient to call before getting OOB    Elimination Interventions: Patient to call for help with toileting needs

## 2018-03-03 NOTE — PROGRESS NOTES
Problem: Mobility Impaired (Adult and Pediatric)  Goal: *Acute Goals and Plan of Care (Insert Text)  Physical Therapy Goals  Initiated 3/3/2018 and to be accomplished within 7 day(s)  1. Patient will move from supine to sit and sit to supine  in bed with supervision/set-up. 2.  Patient will transfer from bed to chair and chair to bed with supervision/set-up using the least restrictive device. 3.  Patient will perform sit to stand with supervision/set-up. 4.  Patient will ambulate with supervision/set-up for >150 feet with the least restrictive device. 5.  Patient will ascend/descend a full flight of stairs with handrail(s) with minimal assistance/contact guard assist for safe home entry. Outcome: Progressing Towards Goal  physical Therapy TREATMENT    Patient: Brook Collier [de-identified]74 y.o. female)  Date: 3/3/2018  Diagnosis: Lumbar back pain  Post-operative complication  Psoas abscess, left (HCC)  Lumbar surgical wound fluid collection  REPAIR CSF LEAK <principal problem not specified>  Procedure(s) (LRB):  LUMBAR INCISION AND DRAINAGE (PT IN ROOM #203) WITH C-ARM,  (N/A) 1 Day Post-Op  Precautions: Fall, Back   Chart, physical therapy assessment, plan of care and goals were reviewed. ASSESSMENT:  Pt continues to require increased assistance for bed mobility and functional transfers with moderate verbal and tactile cuing for correct log roll technique. Pt required ModA x 2/ Max A for sit-stand transfer with RW use and slight knee buckling. Left pt in a chair as requested with Waqas Jimenez RN in the room. Recommend rehab at time of discharge. Educated pt on the importance of mobilization and sitting endurance thus educated pt to attempt to sit up in a chair for at least 45 min; pt verbalized understanding.     Progression toward goals:  []      Improving appropriately and progressing toward goals  [x]      Improving slowly and progressing toward goals  []      Not making progress toward goals and plan of care will be adjusted     PLAN:  Patient continues to benefit from skilled intervention to address the above impairments. Continue treatment per established plan of care. Discharge Recommendations:  Rehab  Further Equipment Recommendations for Discharge:  rolling walker     SUBJECTIVE:   Patient stated I am still having really bad muscle spasms.     OBJECTIVE DATA SUMMARY:   Critical Behavior:  Neurologic State: Alert, Appropriate for age  Orientation Level: Appropriate for age, Oriented X4  Cognition: Appropriate for age attention/concentration, Appropriate decision making, Appropriate safety awareness, Follows commands  Safety/Judgement: Awareness of environment, Fall prevention, Good awareness of safety precautions, Insight into deficits  Functional Mobility Training:  Bed Mobility:  Rolling: Moderate assistance  Supine to Sit: Maximum assistance  Sit to Supine: Moderate assistance;Maximum assistance  Scooting: Moderate assistance   Transfers:  Sit to Stand: Moderate assistance;Assist x2  Stand to Sit: Moderate assistance;Assist x2  Bed to Chair: Maximum assistance   Balance:  Sitting: Intact  Standing: Impaired; With support  Standing - Static: Fair  Standing - Dynamic : Fair  Ambulation/Gait Training:  Distance (ft): 3 Feet (ft)  Assistive Device: Walker, rolling;Gait belt  Ambulation - Level of Assistance: Moderate assistance;Assist x2   Gait Description (WDL): Exceptions to WDL  Gait Abnormalities: Antalgic;Decreased step clearance;Shuffling gait   Base of Support: Widened  Stance: Weight shift  Speed/Donna: Slow;Shuffled  Step Length: Right shortened;Left shortened  Swing Pattern: Right asymmetrical;Left asymmetrical   Interventions: Visual/Demos; Verbal cues; Tactile cues; Safety awareness training   Therapeutic Exercises:   B ankle pumps x 10 reps  Pain:  Pain Scale 1: Numeric (0 - 10)  Pain Intensity 1: 7  Pain Location 1: Back  Pain Orientation 1: Lower  Pain Description 1: Aching  Pain Intervention(s) 1: Encouraged PCA;Nurse notified  Activity Tolerance:   Fair  Please refer to the flowsheet for vital signs taken during this treatment.   After treatment:   [x] Patient left in no apparent distress sitting up in chair  [] Patient left in no apparent distress in bed  [x] Call bell left within reach  [x] Nursing notified  [] Caregiver present  [] Bed alarm activated     Yoko Weber PT, DPT      Time Calculation: 25 mins

## 2018-03-03 NOTE — PROGRESS NOTES
Problem: Mobility Impaired (Adult and Pediatric)  Goal: *Acute Goals and Plan of Care (Insert Text)  Physical Therapy Goals  Initiated 3/3/2018 and to be accomplished within 7 day(s)  1. Patient will move from supine to sit and sit to supine  in bed with supervision/set-up. 2.  Patient will transfer from bed to chair and chair to bed with supervision/set-up using the least restrictive device. 3.  Patient will perform sit to stand with supervision/set-up. 4.  Patient will ambulate with supervision/set-up for >150 feet with the least restrictive device. 5.  Patient will ascend/descend a full flight of stairs with handrail(s) with minimal assistance/contact guard assist for safe home entry. physical Therapy EVALUATION    Patient: Keven Vickers [de-identified]74 y.o. female)  Date: 3/3/2018  Primary Diagnosis: Lumbar back pain  Post-operative complication  Psoas abscess, left (HCC)  Lumbar surgical wound fluid collection  REPAIR CSF LEAK  Procedure(s) (LRB):  LUMBAR INCISION AND DRAINAGE (PT IN ROOM #203) WITH C-ARM,  (N/A) 1 Day Post-Op   Precautions:   Fall, Back    ASSESSMENT :  Based on the objective data described below, Patient present to PT with decreased functional mobility with regard to bed mobility, transfers, gait, balance, weakness, and overall tolerance for activity. Pt with c/o 9/10 pain at start and end of PT session. Pt required increased assistance with all mobility and max verbal cuing for proper log roll technique. Pt required Max A/ModA x 2 for sit-stand transfer with bed raised to higher level and multiple standing attempts. During gait training pt ambulated a total of 3 feet from bed-chair with Max A. Pt only tolerated sitting up in a chair for 4 minutes before having increased muscle spasms requiring a return to bed with MaxA/ModA x 2. Pt with slight LE buckling during gait training thus requiring increased assistance.  Left pt in L sidelying position with all needs met, call bell within reach, SCDs applied and nursing staff in the room. Recommend rehab at time of discharge. Patient will benefit from skilled intervention to address the above impairments. Patients rehabilitation potential is considered to be Good  Factors which may influence rehabilitation potential include:   []         None noted  []         Mental ability/status  [x]         Medical condition  []         Home/family situation and support systems  []         Safety awareness  [x]         Pain tolerance/management  []         Other:      PLAN :  Recommendations and Planned Interventions:  [x]           Bed Mobility Training             [x]    Neuromuscular Re-Education  [x]           Transfer Training                   []    Orthotic/Prosthetic Training  [x]           Gait Training                          []    Modalities  [x]           Therapeutic Exercises          []    Edema Management/Control  [x]           Therapeutic Activities            [x]    Patient and Family Training/Education  []           Other (comment):    Frequency/Duration: Patient will be followed by physical therapy 1-2 times per day/4-7 days per week to address goals. Discharge Recommendations: Rehab  Further Equipment Recommendations for Discharge: rolling walker     SUBJECTIVE:   Patient stated I just have such bad muscle spasms.     OBJECTIVE DATA SUMMARY:   History reviewed. No pertinent past medical history. Past Surgical History:   Procedure Laterality Date    HX CHOLECYSTECTOMY      HX GYN      HX ORTHOPAEDIC  1996    c5-6 fusion     HX ORTHOPAEDIC  01/2018    lumbar laminectomy      Barriers to Learning/Limitations: None  Compensate with: visual, verbal, tactile, kinesthetic cues/model  Prior Level of Function/Home Situation: Pt stated she was in a rehab facility and required assistance with mobility.   Home Situation  Home Environment: Private residence  # Steps to Enter: 3  Rails to Enter: Yes  Hand Rails : Right  Wheelchair Ramp: No  One/Two Story Residence: Two story  # of Interior Steps: 16  Interior Rails: Left (except for 6 steps midway)  Lift Chair Available: No  Living Alone: No  Support Systems: Friends \ neighbors  Patient Expects to be Discharged to[de-identified] Rehabilitation facility  Current DME Used/Available at Home: Mandaen Pounds, straight, Walker, rolling  Tub or Shower Type: Tub/Shower combination  Critical Behavior:  Neurologic State: Alert; Appropriate for age  Orientation Level: Appropriate for age;Oriented X4  Cognition: Appropriate decision making; Appropriate for age attention/concentration; Appropriate safety awareness; Follows commands  Safety/Judgement: Awareness of environment; Fall prevention;Good awareness of safety precautions; Insight into deficits  Psychosocial  Patient Behaviors: Calm; Cooperative; Tearful  Purposeful Interaction: Yes  Pt Identified Daily Priority: Clinical issues (comment)  Car[facility] Process: Nurture loving kindness;Establish trust;Teaching/learning; Attend basic human needs;Create healing environment  Caring Interventions: Reassure  Reassure: Therapeutic listening; Informing; Acceptance;Caring rounds  Skin Condition/Temp: Dry;Warm   Skin Integrity: Incision (comment)  Skin Integumentary  Skin Color: Appropriate for ethnicity  Skin Condition/Temp: Dry;Warm  Skin Integrity: Incision (comment)  Turgor: Non-tenting  Hair Growth: Present  Varicosities: Absent   Strength:    Strength: Generally decreased, functional   Tone & Sensation:   Tone: Normal   Sensation: Impaired   Range Of Motion:  AROM: Generally decreased, functional   PROM: Within functional limits   Functional Mobility:  Bed Mobility:  Rolling: Moderate assistance;Assist x2  Supine to Sit: Moderate assistance;Assist x2  Sit to Supine: Maximum assistance  Scooting: Maximum assistance  Transfers:  Sit to Stand: Moderate assistance;Assist x2  Stand to Sit: Moderate assistance  Balance:   Sitting: Intact  Standing: Impaired; With support  Standing - Static: Fair  Standing - Dynamic : Poor  Ambulation/Gait Training:  Distance (ft): 3 Feet (ft)  Assistive Device: Walker, rolling;Gait belt  Ambulation - Level of Assistance: Moderate assistance;Assist x2   Gait Description (WDL): Exceptions to WDL  Gait Abnormalities: Antalgic;Decreased step clearance;Shuffling gait   Base of Support: Widened  Stance: Weight shift  Speed/Donna: Slow;Shuffled  Step Length: Right shortened;Left shortened  Swing Pattern: Right asymmetrical;Left asymmetrical   Interventions: Visual/Demos; Verbal cues; Tactile cues; Safety awareness training   Pain:  Pain Scale 1: Numeric (0 - 10)  Pain Intensity 1: 9  Pain Location 1: Back  Pain Orientation 1: Mid;Lower  Pain Description 1: Aching;Tightness;Cramping  Pain Intervention(s) 1: Encouraged PCA;Nurse notified  Activity Tolerance:   Poor  Please refer to the flowsheet for vital signs taken during this treatment. After treatment:   []         Patient left in no apparent distress sitting up in chair  [x]         Patient left in no apparent distress in bed  [x]         Call bell left within reach  [x]         Nursing notified  []         Caregiver present  []         Bed alarm activated    COMMUNICATION/EDUCATION:   [x]         Fall prevention education was provided and the patient/caregiver indicated understanding. [x]         Patient/family have participated as able in goal setting and plan of care. [x]         Patient/family agree to work toward stated goals and plan of care. []         Patient understands intent and goals of therapy, but is neutral about his/her participation. []         Patient is unable to participate in goal setting and plan of care.     Thank you for this referral.  Candis Weber PT, DPT       Time Calculation: 25 mins

## 2018-03-03 NOTE — PROGRESS NOTES
1841: Assessment completed. Patient is A&O 4. Patient is calm and cooperative. Family at bedside. Patient PERRLA, oral mucosa pink and moist, strong  on both upper extremities. Lung sound clear, not appear distress. Bowel sounds active. Pedal pulses are palpable, no complain of any numbness or tingling. IV site dry and dressing intact. ABD and tape dressing to lower back  is clean and dry, Ice is applied. SCD and Donn hose are applied. Pain is 6/10.  bed is in lower position, call bell and personal items are within reach. Hemovac is draining, charging serosanguinous fluid. Pain regimen and activities are educated, educated how to use the PCA pump.  educated pt to call when getting up to prevent fall. Pt verbalized understanding. 1910: Paged Dr Lita Gleason at this time to clarify the activity order and diet order. 1912: Dr Lita Gleason ordered regular diet and said that pt can be up ab limb. D/c the NPO and bedrest order at this time. 2030: Talked with Dr Kar Gonzalez about the Lovenox , he said stop today's dosage. Pt[facility]. Verified by Robson SPENCE RN. On March 1st, 1745.

## 2018-03-03 NOTE — PROGRESS NOTES
Bedside shift change report given to King Diaz RN (oncoming nurse) by Vidya Russ RN (offgoing nurse). Report included the following information SBAR, Intake/Output and Recent Results.

## 2018-03-03 NOTE — ROUTINE PROCESS
Bedside and Verbal shift change report given to Ana MASTERSON RN (oncoming nurse) by Maya Patel. Nahum Acosta (offgoing nurse). Report included the following information SBAR, Intake/Output, Med Rec Status and Alarm Parameters .

## 2018-03-03 NOTE — PROGRESS NOTES
Pharmacy Dosing Services:  Vancomycin      Pt has been receiving Vancomycin for indication of possible discitis / osteomyelitis. Day of therapy: 3    Scr = 0.74    CrCl = 89.9 ml/min   WBC = 8.1      Vancomycin Random Level = 12.7 (3/3/18 at  02:00) nearing the therapeutic goal 15 - 20   Level drawn after 2 doses to assess drug level based on changing renal function. Current dose is appropriate. Continue current dosing:  Vancomycin 2 grams IV every 18 hours. Pharmacy to follow daily and will make changes to dose and/or frequency based on clinical status.   Brennon Client, PharmD   [phone]

## 2018-03-03 NOTE — PROGRESS NOTES
0830  Assumed care at this time. Assessment completed in flowsheet. Pt is alert and oriented x 4. Denies SOB and chest pain. Pt lungs clear bilaterally. Capillary refill less than 3 seconds. Pt denies numbness and tingling to all extremities. Stated pain  8/10. Pt has 18G IV to the LT wrist, and 22G to the LT [facility]. Pt has ABD pad dressing, with tape. SCDs and TEDs applied to bilaterally. Pt encouraged to continue use of IS, Pt verbalized understanding. Ice pack applied. Call light and possessions within reach. Bed is in the lowest position. Will continue to monitor. 1435  Pt is sitting up in the chair. Pt states that she is in 10/10 pain. Nevertheless, she will sit up awhile. PT and nurse explained that it's important to keep mobile to help prevent the back from stiffening up and help with circulation and blood clots.          1810  Pt returned to bed, call bell within reach

## 2018-03-04 LAB
ANION GAP SERPL CALC-SCNC: 8 MMOL/L (ref 3–18)
BASOPHILS # BLD: 0 K/UL (ref 0–0.06)
BASOPHILS NFR BLD: 0 % (ref 0–2)
BUN SERPL-MCNC: 6 MG/DL (ref 7–18)
BUN/CREAT SERPL: 9 (ref 12–20)
CALCIUM SERPL-MCNC: 8.7 MG/DL (ref 8.5–10.1)
CHLORIDE SERPL-SCNC: 105 MMOL/L (ref 100–108)
CO2 SERPL-SCNC: 31 MMOL/L (ref 21–32)
CREAT SERPL-MCNC: 0.66 MG/DL (ref 0.6–1.3)
DIFFERENTIAL METHOD BLD: ABNORMAL
EOSINOPHIL # BLD: 0.5 K/UL (ref 0–0.4)
EOSINOPHIL NFR BLD: 6 % (ref 0–5)
ERYTHROCYTE [DISTWIDTH] IN BLOOD BY AUTOMATED COUNT: 15.1 % (ref 11.6–14.5)
GLUCOSE SERPL-MCNC: 92 MG/DL (ref 74–99)
HCT VFR BLD AUTO: 30.9 % (ref 35–45)
HGB BLD-MCNC: 9.6 G/DL (ref 12–16)
LYMPHOCYTES # BLD: 2.3 K/UL (ref 0.9–3.6)
LYMPHOCYTES NFR BLD: 27 % (ref 21–52)
MCH RBC QN AUTO: 25.8 PG (ref 24–34)
MCHC RBC AUTO-ENTMCNC: 31.1 G/DL (ref 31–37)
MCV RBC AUTO: 83.1 FL (ref 74–97)
MONOCYTES # BLD: 0.9 K/UL (ref 0.05–1.2)
MONOCYTES NFR BLD: 11 % (ref 3–10)
NEUTS SEG # BLD: 4.7 K/UL (ref 1.8–8)
NEUTS SEG NFR BLD: 56 % (ref 40–73)
PLATELET # BLD AUTO: 255 K/UL (ref 135–420)
PMV BLD AUTO: 8.8 FL (ref 9.2–11.8)
POTASSIUM SERPL-SCNC: 3.4 MMOL/L (ref 3.5–5.5)
RBC # BLD AUTO: 3.72 M/UL (ref 4.2–5.3)
SODIUM SERPL-SCNC: 144 MMOL/L (ref 136–145)
WBC # BLD AUTO: 8.5 K/UL (ref 4.6–13.2)

## 2018-03-04 PROCEDURE — 74011000258 HC RX REV CODE- 258: Performed by: PHYSICIAN ASSISTANT

## 2018-03-04 PROCEDURE — 65270000029 HC RM PRIVATE

## 2018-03-04 PROCEDURE — 74011250637 HC RX REV CODE- 250/637: Performed by: SPECIALIST

## 2018-03-04 PROCEDURE — 97116 GAIT TRAINING THERAPY: CPT

## 2018-03-04 PROCEDURE — 97530 THERAPEUTIC ACTIVITIES: CPT

## 2018-03-04 PROCEDURE — 74011250636 HC RX REV CODE- 250/636: Performed by: PHYSICIAN ASSISTANT

## 2018-03-04 PROCEDURE — 85025 COMPLETE CBC W/AUTO DIFF WBC: CPT | Performed by: PHYSICIAN ASSISTANT

## 2018-03-04 PROCEDURE — 36415 COLL VENOUS BLD VENIPUNCTURE: CPT | Performed by: PHYSICIAN ASSISTANT

## 2018-03-04 PROCEDURE — 74011250636 HC RX REV CODE- 250/636: Performed by: SPECIALIST

## 2018-03-04 PROCEDURE — 80048 BASIC METABOLIC PNL TOTAL CA: CPT | Performed by: PHYSICIAN ASSISTANT

## 2018-03-04 RX ADMIN — ENOXAPARIN SODIUM 40 MG: 40 INJECTION SUBCUTANEOUS at 17:27

## 2018-03-04 RX ADMIN — Medication: at 08:32

## 2018-03-04 RX ADMIN — PIPERACILLIN SODIUM,TAZOBACTAM SODIUM 3.38 G: 3; .375 INJECTION, POWDER, FOR SOLUTION INTRAVENOUS at 09:00

## 2018-03-04 RX ADMIN — VANCOMYCIN HYDROCHLORIDE 2000 MG: 10 INJECTION, POWDER, LYOPHILIZED, FOR SOLUTION INTRAVENOUS at 15:26

## 2018-03-04 RX ADMIN — DOCUSATE SODIUM 100 MG: 100 CAPSULE, LIQUID FILLED ORAL at 20:23

## 2018-03-04 RX ADMIN — CYCLOBENZAPRINE HYDROCHLORIDE 10 MG: 10 TABLET, FILM COATED ORAL at 06:08

## 2018-03-04 RX ADMIN — PIPERACILLIN SODIUM,TAZOBACTAM SODIUM 3.38 G: 3; .375 INJECTION, POWDER, FOR SOLUTION INTRAVENOUS at 20:27

## 2018-03-04 RX ADMIN — Medication 10 ML: at 06:13

## 2018-03-04 RX ADMIN — DOCUSATE SODIUM 100 MG: 100 CAPSULE, LIQUID FILLED ORAL at 09:14

## 2018-03-04 RX ADMIN — PIPERACILLIN SODIUM,TAZOBACTAM SODIUM 3.38 G: 3; .375 INJECTION, POWDER, FOR SOLUTION INTRAVENOUS at 03:50

## 2018-03-04 RX ADMIN — PIPERACILLIN SODIUM,TAZOBACTAM SODIUM 3.38 G: 3; .375 INJECTION, POWDER, FOR SOLUTION INTRAVENOUS at 16:00

## 2018-03-04 NOTE — PROGRESS NOTES
1 - report received from Shanelle Mora RN. Assumed care of patient at this time. 2015 - Patient in bed  at this time. A/O x 4. IV to left AC intact and patent. Teds and SCDs bilaterally. 4X4 and tape dressing to back CDI. Denies numbness/tingling. Pedal pulses palpable. Lungs CTA. Bowel sounds active to all quadrants. Pain 4/10.

## 2018-03-04 NOTE — ROUTINE PROCESS
Bedside and Verbal shift change report given to SUKHI Cyr RN (oncoming nurse) by Danna Ket. Shona Ormond RN (offgoing nurse). Report included the following information SBAR, Kardex, Intake/Output and MAR.

## 2018-03-04 NOTE — PROGRESS NOTES
12:44 AM  Offered PT CHG wipes. She refused at this time stated she is to tired maybe later. I requested her to use her call light to call when she is ready. PT stated she will do that.  Jc Morgan CNA

## 2018-03-04 NOTE — PROGRESS NOTES
Hospitalist Progress Note    Patient: Isael Swift MRN: [id number]  CSN: [id number]    YOB: 1950  Age: 74 y.o. Sex: female    DOA: 2/27/2018 LOS:  LOS: 5 days              IMPRESSION and Plan:    Isael Swift is a 74 y.o. female with   Patient Active Problem List    Diagnosis Date Noted    Lumbar back pain 02/27/2018    Psoas abscess, left ([address] 02/27/2018    Post-operative complication 76/67/9317    Lumbar surgical wound fluid collection 02/27/2018     Active Problems:    Lumbar back pain (2/27/2018)      Psoas abscess, left (HCC) (2/27/2018)      Post-operative complication (1/28/1467)      Lumbar surgical wound fluid collection (2/27/2018)    1. Positive Wound Culture  2. Hx of Laminectomy by Dr Jase Sellers  3. Psoas Abscess  4. Possible Discitis/Osteomyelitis       Continue abx per ID recommendation  Bp/hr stable  Labs as ordred  Pain management per ortho  H/h is stable      Patient's condition is stable      Recommend to continue hospitalization. Discussed with patient. Chief Complaints:   Chief Complaint   Patient presents with    Leg Pain    Back Pain     SUBJECTIVE:  Pt is seen and examined. Chart reviewed    No CP or SOB  No Fever, chills, Nausea, vomitting. Review of systems:    General: No fevers or chills. Cardiovascular: No chest pain or pressure. No palpitations. Pulmonary:no Shortness of breath.    Gastrointestinal: No nausea, vomiting    PE:  Patient Vitals for the past 24 hrs:   BP Temp Pulse Resp SpO2   03/04/18 1116 139/78 99.1 °F (37.3 °C) (!) 123 18 100 %   03/04/18 0726 120/60 98 °F (36.7 °C) (!) 110 18 95 %   03/04/18 0316 118/65 98 °F (36.7 °C) (!) 110 16 96 %   03/03/18 2254 114/66 98.7 °F (37.1 °C) (!) 106 16 96 %   03/03/18 1929 127/66 98.5 °F (36.9 °C) (!) 114 18 91 %   03/03/18 1522 143/67 97.9 °F (36.6 °C) 90 18 -   03/03/18 1130 129/46 97.9 °F (36.6 °C) (!) 118 16 92 %       Intake/Output Summary (Last 24 hours) at 03/04/18 1120  Last data filed at 03/04/18 1116   Gross per 24 hour   Intake              480 ml   Output             2200 ml   Net            -1720 ml     Patient Vitals for the past 120 hrs:   Weight   02/27/18 1219 105.7 kg (233 lb)           HEENT: Perrla, EOMI; oral mucosa well prefused; Conjunctiva not injected  Neck: No JVD, Negative carotid bruits, normal pulses; No thyromegaly  Resp: CTA bilaterally; No wheezes or rales  CV: RRR s1s2 No murmur; No rubs; PMI not displaced  Abd: Positive Bowel Sounds, Soft, Nontender  Ext: No clubbing; No cyanosis; No edema  Neuro: Alert and oriented; Nonfocal  Skin: Warm, Dry, Intact  Pulses: 2+ DP/PT/Rad      Intake and Output:  Current Shift:  03/04 0701 - 03/04 1900  In: -   Out: 300 [Urine:250; Drains:50]  Last three shifts:  03/02 1901 - 03/04 0700  In: 2050.2 [P.O.:480; I.V.:1570.2]  Out: 3800 [Urine:3800]    Lab/Data Reviewed:  Recent Results (from the past 8 hour(s))   CBC WITH AUTOMATED DIFF    Collection Time: 03/04/18  5:03 AM   Result Value Ref Range    WBC 8.5 4.6 - 13.2 K/uL    RBC 3.72 (L) 4.20 - 5.30 M/uL    HGB 9.6 (L) 12.0 - 16.0 g/dL    HCT 30.9 (L) 35.0 - 45.0 %    MCV 83.1 74.0 - 97.0 FL    MCH 25.8 24.0 - 34.0 PG    MCHC 31.1 31.0 - 37.0 g/dL    RDW 15.1 (H) 11.6 - 14.5 %    PLATELET 393 716 - 082 K/uL    MPV 8.8 (L) 9.2 - 11.8 FL    NEUTROPHILS 56 40 - 73 %    LYMPHOCYTES 27 21 - 52 %    MONOCYTES 11 (H) 3 - 10 %    EOSINOPHILS 6 (H) 0 - 5 %    BASOPHILS 0 0 - 2 %    ABS. NEUTROPHILS 4.7 1.8 - 8.0 K/UL    ABS. LYMPHOCYTES 2.3 0.9 - 3.6 K/UL    ABS. MONOCYTES 0.9 0.05 - 1.2 K/UL    ABS. EOSINOPHILS 0.5 (H) 0.0 - 0.4 K/UL    ABS.  BASOPHILS 0.0 0.0 - 0.06 K/UL    DF AUTOMATED     METABOLIC PANEL, BASIC    Collection Time: 03/04/18  5:03 AM   Result Value Ref Range    Sodium 144 136 - 145 mmol/L    Potassium 3.4 (L) 3.5 - 5.5 mmol/L    Chloride 105 100 - 108 mmol/L    CO2 31 21 - 32 mmol/L    Anion gap 8 3.0 - 18 mmol/L    Glucose 92 74 - 99 mg/dL    BUN 6 (L) 7.0 - 18 MG/DL Creatinine 0.66 0.6 - 1.3 MG/DL    BUN/Creatinine ratio 9 (L) 12 - 20      GFR est AA >60 >60 ml/min/1.73m2    GFR est non-AA >60 >60 ml/min/1.73m2    Calcium 8.7 8.5 - 10.1 MG/DL     Medications:  Current Facility-Administered Medications   Medication Dose Route Frequency    lactated Ringers infusion  125 mL/hr IntraVENous CONTINUOUS    sodium chloride (NS) flush 5-10 mL  5-10 mL IntraVENous Q8H    sodium chloride (NS) flush 5-10 mL  5-10 mL IntraVENous PRN    HYDROmorphone (PF) (DILAUDID) 30 mg / 30 mL PCA   IntraVENous TITRATE    piperacillin-tazobactam (ZOSYN) 3.375 g in 0.9% sodium chloride (MBP/ADV) 100 mL MBP  3.375 g IntraVENous Q6H    Vancomycin Dosing Per Pharmacy Protocol  1 Each Other Rx Dosing/Monitoring    vancomycin (VANCOCIN) 2000 mg in dextrose 5% 500 mL infusion  2,000 mg IntraVENous Q18H    cyclobenzaprine (FLEXERIL) tablet 10 mg  10 mg Oral TID PRN    HYDROcodone-acetaminophen (NORCO)  mg tablet 1 Tab  1 Tab Oral Q4H PRN    sodium chloride (NS) flush 5-10 mL  5-10 mL IntraVENous Q8H    sodium chloride (NS) flush 5-10 mL  5-10 mL IntraVENous PRN    naloxone (NARCAN) injection 0.4 mg  0.4 mg IntraVENous PRN    zolpidem (AMBIEN) tablet 5 mg  5 mg Oral QHS PRN    lactated Ringers infusion  75 mL/hr IntraVENous CONTINUOUS    HYDROcodone-acetaminophen (NORCO) 7.5-325 mg per tablet 1 Tab  1 Tab Oral Q4H PRN    ondansetron (ZOFRAN) injection 4 mg  4 mg IntraVENous Q4H PRN    diphenhydrAMINE (BENADRYL) capsule 25 mg  25 mg Oral Q4H PRN    docusate sodium (COLACE) capsule 100 mg  100 mg Oral BID    enoxaparin (LOVENOX) injection 40 mg  40 mg SubCUTAneous Q24H    sodium chloride (NS) flush 5-10 mL  5-10 mL IntraVENous Q8H    sodium chloride (NS) flush 5-10 mL  5-10 mL IntraVENous PRN       Recent Results (from the past 24 hour(s))   CBC WITH AUTOMATED DIFF    Collection Time: 03/04/18  5:03 AM   Result Value Ref Range    WBC 8.5 4.6 - 13.2 K/uL    RBC 3.72 (L) 4.20 - 5.30 M/uL HGB 9.6 (L) 12.0 - 16.0 g/dL    HCT 30.9 (L) 35.0 - 45.0 %    MCV 83.1 74.0 - 97.0 FL    MCH 25.8 24.0 - 34.0 PG    MCHC 31.1 31.0 - 37.0 g/dL    RDW 15.1 (H) 11.6 - 14.5 %    PLATELET 052 095 - 082 K/uL    MPV 8.8 (L) 9.2 - 11.8 FL    NEUTROPHILS 56 40 - 73 %    LYMPHOCYTES 27 21 - 52 %    MONOCYTES 11 (H) 3 - 10 %    EOSINOPHILS 6 (H) 0 - 5 %    BASOPHILS 0 0 - 2 %    ABS. NEUTROPHILS 4.7 1.8 - 8.0 K/UL    ABS. LYMPHOCYTES 2.3 0.9 - 3.6 K/UL    ABS. MONOCYTES 0.9 0.05 - 1.2 K/UL    ABS. EOSINOPHILS 0.5 (H) 0.0 - 0.4 K/UL    ABS.  BASOPHILS 0.0 0.0 - 0.06 K/UL    DF AUTOMATED     METABOLIC PANEL, BASIC    Collection Time: 03/04/18  5:03 AM   Result Value Ref Range    Sodium 144 136 - 145 mmol/L    Potassium 3.4 (L) 3.5 - 5.5 mmol/L    Chloride 105 100 - 108 mmol/L    CO2 31 21 - 32 mmol/L    Anion gap 8 3.0 - 18 mmol/L    Glucose 92 74 - 99 mg/dL    BUN 6 (L) 7.0 - 18 MG/DL    Creatinine 0.66 0.6 - 1.3 MG/DL    BUN/Creatinine ratio 9 (L) 12 - 20      GFR est AA >60 >60 ml/min/1.73m2    GFR est non-AA >60 >60 ml/min/1.73m2    Calcium 8.7 8.5 - 10.1 MG/DL       Procedures/imaging: see electronic medical records for all procedures/Xrays and details which were not copied into this note but were reviewed prior to creation of Robbie Richardson MD   3/4/2018, 4:00 PM

## 2018-03-04 NOTE — PROGRESS NOTES
0838-Patient up to chair with walker and assist x 2, much encouragement needed. 8771 - Patient in chair at this time. A/O x 4. IV to left AC  intact and patent. TEDs and SCDs to bilateral legs. 4x4 dressing to back CDI. some numbness/tingling, legs feel like they are waking up/ per patient. Radial and pedal pulses palpable. Lungs CTA. Bowel sounds active to all quadrants. Pain 5/10. Shift summary-patient continues to be up in chair. IVF infusing. PCA pump in place and controlling pain. Tolerating meals. Transfers with 2 assists to bedside commode and to bed.

## 2018-03-04 NOTE — PROGRESS NOTES
1930  Bedside shift change report given to Suki Marsh RN (oncoming nurse) by Mely Tee RN (offgoing nurse). Report included the following information SBAR, Intake/Output and Recent Results.

## 2018-03-04 NOTE — PROGRESS NOTES
Problem: Mobility Impaired (Adult and Pediatric)  Goal: *Acute Goals and Plan of Care (Insert Text)  Physical Therapy Goals  Initiated 3/3/2018 and to be accomplished within 7 day(s)  1. Patient will move from supine to sit and sit to supine  in bed with supervision/set-up. 2.  Patient will transfer from bed to chair and chair to bed with supervision/set-up using the least restrictive device. 3.  Patient will perform sit to stand with supervision/set-up. 4.  Patient will ambulate with supervision/set-up for >150 feet with the least restrictive device. 5.  Patient will ascend/descend a full flight of stairs with handrail(s) with minimal assistance/contact guard assist for safe home entry. physical Therapy TREATMENT    Patient: Romi Hancock (59 y.o. female)  Date: 3/4/2018  Diagnosis: Lumbar back pain  Post-operative complication  Psoas abscess, left (HCC)  Lumbar surgical wound fluid collection  REPAIR CSF LEAK <principal problem not specified>  Procedure(s) (LRB):  LUMBAR INCISION AND DRAINAGE (PT IN ROOM #203) WITH C-ARM,  (N/A) 2 Days Post-Op  Precautions: Fall, Back   Chart, physical therapy assessment, plan of care and goals were reviewed. ASSESSMENT:  Pt's pain and muscle spasms are continuing to limit pt's participation with PT. Pt was able to perform sit-stand transfer with Mod A and stand-sit transfer with Ruslan with increased time required. During gait training pt ambulated a total fo 8 feet with RW/GB with an antalgic/step-to gait pattern. Left pt sitting up in a chair with all needs met, call bell within reach and nursing staff in the room. Placed ice packs at pt's LB which pt stated helped alleviate some of the pain. Continue to recommend rehab at time of discharge.   Progression toward goals:  []      Improving appropriately and progressing toward goals  [x]      Improving slowly and progressing toward goals  []      Not making progress toward goals and plan of care will be adjusted PLAN:  Patient continues to benefit from skilled intervention to address the above impairments. Continue treatment per established plan of care. Discharge Recommendations:  Rehab  Further Equipment Recommendations for Discharge:  rolling walker     SUBJECTIVE:   Patient stated I am still having the muscle spasms and my legs are still feeling weak and numb.     OBJECTIVE DATA SUMMARY:   Critical Behavior:  Neurologic State: Alert, Appropriate for age  Orientation Level: Appropriate for age, Oriented X4  Cognition: Appropriate decision making, Appropriate for age attention/concentration, Appropriate safety awareness, Follows commands  Safety/Judgement: Awareness of environment, Fall prevention, Good awareness of safety precautions, Insight into deficits  Functional Mobility Training:  Transfers:  Sit to Stand: Moderate assistance; Additional time  Stand to Sit: Minimum assistance; Additional time   Balance:  Sitting: Intact  Standing: Impaired; With support  Standing - Static: Fair  Standing - Dynamic : Fair  Ambulation/Gait Training:  Distance (ft): 8 Feet (ft)  Assistive Device: Walker, rolling;Gait belt  Ambulation - Level of Assistance: Minimal assistance   Gait Abnormalities: Antalgic;Decreased step clearance   Base of Support: Widened  Stance: Weight shift  Speed/Donna: Slow;Shuffled  Step Length: Right shortened;Left shortened  Swing Pattern: Right asymmetrical;Left asymmetrical   Therapeutic Exercises:   HEP included ankle pumps and LAQ x 5 reps  Pain:  Pain Scale 1: Numeric (0 - 10)  Pain Intensity 1: 7  Pain Location 1: Back  Pain Orientation 1: Lower  Pain Description 1: Aching  Pain Intervention(s) 1: Encouraged PCA; Cold pack  Activity Tolerance:   Fair-  Please refer to the flowsheet for vital signs taken during this treatment.   After treatment:   [x] Patient left in no apparent distress sitting up in chair  [] Patient left in no apparent distress in bed  [x] Call bell left within reach  [x] Nursing notified  [] Caregiver present  [] Bed alarm activated      Aneta Weber PT, DPT     Time Calculation: 29 mins

## 2018-03-04 NOTE — PROGRESS NOTES
Problem: Mobility Impaired (Adult and Pediatric)  Goal: *Acute Goals and Plan of Care (Insert Text)  Physical Therapy Goals  Initiated 3/3/2018 and to be accomplished within 7 day(s)  1. Patient will move from supine to sit and sit to supine  in bed with supervision/set-up. 2.  Patient will transfer from bed to chair and chair to bed with supervision/set-up using the least restrictive device. 3.  Patient will perform sit to stand with supervision/set-up. 4.  Patient will ambulate with supervision/set-up for >150 feet with the least restrictive device. 5.  Patient will ascend/descend a full flight of stairs with handrail(s) with minimal assistance/contact guard assist for safe home entry. Pt refused PT due to:    []  Nausea/vomiting  [x]  Eating  []  Pain  []  Pt lethargic  []  Off Unit  Will f/u later as schedule allows. Thank you.     Brandon Weber PT, DPT

## 2018-03-04 NOTE — PROGRESS NOTES
Problem: Falls - Risk of  Goal: *Absence of Falls  Document Tomy Fall Risk and appropriate interventions in the flowsheet.    Outcome: Progressing Towards Goal  Fall Risk Interventions:  Mobility Interventions: Patient to call before getting OOB    Mentation Interventions: Adequate sleep, hydration, pain control    Medication Interventions: Patient to call before getting OOB    Elimination Interventions: Call light in reach

## 2018-03-05 LAB
ANION GAP SERPL CALC-SCNC: 7 MMOL/L (ref 3–18)
BACTERIA SPEC CULT: ABNORMAL
BACTERIA SPEC CULT: NORMAL
BASOPHILS # BLD: 0 K/UL (ref 0–0.06)
BASOPHILS NFR BLD: 1 % (ref 0–2)
BUN SERPL-MCNC: 9 MG/DL (ref 7–18)
BUN/CREAT SERPL: 13 (ref 12–20)
CALCIUM SERPL-MCNC: 8.9 MG/DL (ref 8.5–10.1)
CHLORIDE SERPL-SCNC: 102 MMOL/L (ref 100–108)
CO2 SERPL-SCNC: 31 MMOL/L (ref 21–32)
CREAT SERPL-MCNC: 0.69 MG/DL (ref 0.6–1.3)
DATE LAST DOSE: ABNORMAL
DIFFERENTIAL METHOD BLD: ABNORMAL
EOSINOPHIL # BLD: 0.6 K/UL (ref 0–0.4)
EOSINOPHIL NFR BLD: 7 % (ref 0–5)
ERYTHROCYTE [DISTWIDTH] IN BLOOD BY AUTOMATED COUNT: 14.9 % (ref 11.6–14.5)
GLUCOSE SERPL-MCNC: 103 MG/DL (ref 74–99)
GRAM STN SPEC: ABNORMAL
GRAM STN SPEC: ABNORMAL
HCT VFR BLD AUTO: 29.9 % (ref 35–45)
HGB BLD-MCNC: 9.3 G/DL (ref 12–16)
LYMPHOCYTES # BLD: 2.5 K/UL (ref 0.9–3.6)
LYMPHOCYTES NFR BLD: 29 % (ref 21–52)
MCH RBC QN AUTO: 25.8 PG (ref 24–34)
MCHC RBC AUTO-ENTMCNC: 31.1 G/DL (ref 31–37)
MCV RBC AUTO: 82.8 FL (ref 74–97)
MONOCYTES # BLD: 1 K/UL (ref 0.05–1.2)
MONOCYTES NFR BLD: 11 % (ref 3–10)
NEUTS SEG # BLD: 4.4 K/UL (ref 1.8–8)
NEUTS SEG NFR BLD: 52 % (ref 40–73)
PLATELET # BLD AUTO: 283 K/UL (ref 135–420)
PMV BLD AUTO: 9 FL (ref 9.2–11.8)
POTASSIUM SERPL-SCNC: 3.7 MMOL/L (ref 3.5–5.5)
RBC # BLD AUTO: 3.61 M/UL (ref 4.2–5.3)
REPORTED DOSE,DOSE: ABNORMAL UNITS
REPORTED DOSE/TIME,TMG: 1526
SERVICE CMNT-IMP: ABNORMAL
SERVICE CMNT-IMP: NORMAL
SODIUM SERPL-SCNC: 140 MMOL/L (ref 136–145)
VANCOMYCIN TROUGH SERPL-MCNC: 5.9 UG/ML (ref 10–20)
WBC # BLD AUTO: 8.4 K/UL (ref 4.6–13.2)

## 2018-03-05 PROCEDURE — 80048 BASIC METABOLIC PNL TOTAL CA: CPT | Performed by: PHYSICIAN ASSISTANT

## 2018-03-05 PROCEDURE — 97116 GAIT TRAINING THERAPY: CPT

## 2018-03-05 PROCEDURE — 74011250636 HC RX REV CODE- 250/636: Performed by: SPECIALIST

## 2018-03-05 PROCEDURE — 85025 COMPLETE CBC W/AUTO DIFF WBC: CPT | Performed by: PHYSICIAN ASSISTANT

## 2018-03-05 PROCEDURE — 80202 ASSAY OF VANCOMYCIN: CPT | Performed by: INTERNAL MEDICINE

## 2018-03-05 PROCEDURE — 77030027138 HC INCENT SPIROMETER -A

## 2018-03-05 PROCEDURE — 65270000029 HC RM PRIVATE

## 2018-03-05 PROCEDURE — 74011000258 HC RX REV CODE- 258: Performed by: PHYSICIAN ASSISTANT

## 2018-03-05 PROCEDURE — 74011250637 HC RX REV CODE- 250/637: Performed by: SPECIALIST

## 2018-03-05 PROCEDURE — 97530 THERAPEUTIC ACTIVITIES: CPT

## 2018-03-05 PROCEDURE — 36415 COLL VENOUS BLD VENIPUNCTURE: CPT | Performed by: PHYSICIAN ASSISTANT

## 2018-03-05 PROCEDURE — 74011250636 HC RX REV CODE- 250/636: Performed by: PHYSICIAN ASSISTANT

## 2018-03-05 PROCEDURE — 77030011256 HC DRSG MEPILEX <16IN NO BORD MOLN -A

## 2018-03-05 RX ORDER — VANCOMYCIN HYDROCHLORIDE
1250 EVERY 8 HOURS
Status: COMPLETED | OUTPATIENT
Start: 2018-03-05 | End: 2018-03-07

## 2018-03-05 RX ORDER — MORPHINE SULFATE 30 MG/1
30 TABLET, FILM COATED, EXTENDED RELEASE ORAL 2 TIMES DAILY
Status: DISCONTINUED | OUTPATIENT
Start: 2018-03-05 | End: 2018-03-07 | Stop reason: HOSPADM

## 2018-03-05 RX ADMIN — MORPHINE SULFATE 30 MG: 30 TABLET, EXTENDED RELEASE ORAL at 21:13

## 2018-03-05 RX ADMIN — Medication 10 ML: at 13:15

## 2018-03-05 RX ADMIN — CYCLOBENZAPRINE HYDROCHLORIDE 10 MG: 10 TABLET, FILM COATED ORAL at 05:36

## 2018-03-05 RX ADMIN — PIPERACILLIN SODIUM,TAZOBACTAM SODIUM 3.38 G: 3; .375 INJECTION, POWDER, FOR SOLUTION INTRAVENOUS at 14:41

## 2018-03-05 RX ADMIN — MORPHINE SULFATE 30 MG: 30 TABLET, EXTENDED RELEASE ORAL at 08:30

## 2018-03-05 RX ADMIN — PIPERACILLIN SODIUM,TAZOBACTAM SODIUM 3.38 G: 3; .375 INJECTION, POWDER, FOR SOLUTION INTRAVENOUS at 08:30

## 2018-03-05 RX ADMIN — PIPERACILLIN SODIUM,TAZOBACTAM SODIUM 3.38 G: 3; .375 INJECTION, POWDER, FOR SOLUTION INTRAVENOUS at 04:00

## 2018-03-05 RX ADMIN — CYCLOBENZAPRINE HYDROCHLORIDE 10 MG: 10 TABLET, FILM COATED ORAL at 21:14

## 2018-03-05 RX ADMIN — HYDROCODONE BITARTRATE AND ACETAMINOPHEN 1 TABLET: 10; 325 TABLET ORAL at 07:36

## 2018-03-05 RX ADMIN — ENOXAPARIN SODIUM 40 MG: 40 INJECTION SUBCUTANEOUS at 17:02

## 2018-03-05 RX ADMIN — VANCOMYCIN HYDROCHLORIDE 2000 MG: 10 INJECTION, POWDER, LYOPHILIZED, FOR SOLUTION INTRAVENOUS at 10:27

## 2018-03-05 RX ADMIN — HYDROCODONE BITARTRATE AND ACETAMINOPHEN 1 TABLET: 10; 325 TABLET ORAL at 17:05

## 2018-03-05 RX ADMIN — VANCOMYCIN HYDROCHLORIDE 1250 MG: 10 INJECTION, POWDER, LYOPHILIZED, FOR SOLUTION INTRAVENOUS at 18:35

## 2018-03-05 RX ADMIN — DOCUSATE SODIUM 100 MG: 100 CAPSULE, LIQUID FILLED ORAL at 08:30

## 2018-03-05 RX ADMIN — DOCUSATE SODIUM 100 MG: 100 CAPSULE, LIQUID FILLED ORAL at 21:14

## 2018-03-05 RX ADMIN — HYDROCODONE BITARTRATE AND ACETAMINOPHEN 1 TABLET: 10; 325 TABLET ORAL at 12:04

## 2018-03-05 RX ADMIN — CYCLOBENZAPRINE HYDROCHLORIDE 10 MG: 10 TABLET, FILM COATED ORAL at 13:27

## 2018-03-05 RX ADMIN — Medication 10 ML: at 21:14

## 2018-03-05 RX ADMIN — PIPERACILLIN SODIUM,TAZOBACTAM SODIUM 3.38 G: 3; .375 INJECTION, POWDER, FOR SOLUTION INTRAVENOUS at 21:14

## 2018-03-05 NOTE — PROGRESS NOTES
0737  Pt is awake, alert, oriented x 4. Lying in bed. Pt received Norco 5/325 mg po fro pain level 5/10. Hemovac drain with only 10 ml of serosanguinous drainage. Gauze and tape dressing to back is starting to peel off.  8:15 AM   Hemovac drain removed. Crofton and tegaderm dressing was applied. Gauze dressing to incision site removed. Incision site is reddened but no drainage was noted. Sutures are intact. Mepilex dressing was applied. Lungs are clear. Abdomen soft, obese with hypoactive BS. Pt has bruising to left lower arm from old IV site. 8:38 AM  Pt was seen by Dr Melody Adams this morning. Pt was medicated with scheduled dose of Ms contin 30 mg for cramping pain on hips and legs with level 7/10. Vanco trough was done this morning. 9:44 AM   Pt has  difficulty getting out of bed. Pt ambulated to BR to void. Pt then ambulated and sat on chair for breakfast.  Ice pack applied to her buttocks. Pt eating  breakfast.   10:32 AM   Pt was uncomfortable on chair. Assisted back in bed. Lying on her side  And ice packs applied on her buttocks. Pt is more comfortable with pain level og 5/10. Vanco trough level 5.9. Pharmacist aware. With order to hang present dose and will adjust dose interval.  12:07 PM   Pt resting in bed. Pain level to legs and buttocks 4/10. Norco 10/325 mg po given. 1:28 PM  Pt given flexeril 10 mg po for spasm to buttocks and legs. Eating lunch. 1500   Pt was seen by PT. Pt ambulated in room and BR to void then sat on chair. 1530   Pt's /92 . Pt just got back to bed. CESILIA Jordan saw pt and aware of VS and MEWS score of 3. No new order at this time. 5:06 PM   Pt medicated with Norco 10/325 mg po for pain level 7/10.  5:57 PM CESILIA Sood wants Dr Melody Adams to call ID specialist.  Dr Melody Adams paged. Dr Melody Adams as given ID's Name and Phone number to call.

## 2018-03-05 NOTE — ROUTINE PROCESS
Bedside and Verbal shift change report given to ISI Bradford RN (oncoming nurse) by Romana Garrison. Gila Crawford RN (offgoing nurse). Report included the following information SBAR, Kardex, Intake/Output, MAR and Recent Results.

## 2018-03-05 NOTE — PROGRESS NOTES
Problem: Mobility Impaired (Adult and Pediatric)  Goal: *Acute Goals and Plan of Care (Insert Text)  Physical Therapy Goals  Initiated 3/3/2018 and to be accomplished within 7 day(s)  1. Patient will move from supine to sit and sit to supine  in bed with supervision/set-up. 2.  Patient will transfer from bed to chair and chair to bed with supervision/set-up using the least restrictive device. 3.  Patient will perform sit to stand with supervision/set-up. 4.  Patient will ambulate with supervision/set-up for >150 feet with the least restrictive device. 5.  Patient will ascend/descend a full flight of stairs with handrail(s) with minimal assistance/contact guard assist for safe home entry. Outcome: Progressing Towards Goal  physical Therapy TREATMENT    Patient: Katie Portillo [de-identified]74 y.o. female)  Date: 3/5/2018  Diagnosis: Lumbar back pain  Post-operative complication  Psoas abscess, left (HCC)  Lumbar surgical wound fluid collection  REPAIR CSF LEAK <principal problem not specified>  Procedure(s) (LRB):  LUMBAR INCISION AND DRAINAGE (PT IN ROOM #203) WITH C-ARM,  (N/A) 3 Days Post-Op  Precautions: Fall, Back  Chart, physical therapy assessment, plan of care and goals were reviewed. ASSESSMENT:  Pt rating pain in back 8/10 on numerical pain scale. Pt required mod A for supine to sit, sit to stand min/CGA. Pt required additional time for all mobility. Pt able to participate in gt training w/ RW, CGA. Back spasms/pain are pt limiting factor at this time. Pt able to void on commode but required A for hygiene. Pt left sitting up in chair w/ all needs within reach, ice packs to back. Nurse Andrez Gunter aware.   Progression toward goals:  []      Improving appropriately and progressing toward goals  [x]      Improving slowly and progressing toward goals  []      Not making progress toward goals and plan of care will be adjusted     PLAN:  Patient continues to benefit from skilled intervention to address the above impairments. Continue treatment per established plan of care. Discharge Recommendations:  Rehab  Further Equipment Recommendations for Discharge:  N/A     SUBJECTIVE:   Patient stated I need to use the bathroom.     OBJECTIVE DATA SUMMARY:   Critical Behavior:  Neurologic State: Alert, Appropriate for age  Orientation Level: Oriented X4  Cognition: Appropriate decision making, Appropriate for age attention/concentration, Appropriate safety awareness, Follows commands  Safety/Judgement: Awareness of environment, Fall prevention, Good awareness of safety precautions, Insight into deficits  Functional Mobility Training:  Bed Mobility:  Rolling: Additional time;Minimum assistance (vc)  Supine to Sit: Moderate assistance; Additional time (vc)  Scooting: Moderate assistance; Additional time (vc)  Transfers:  Sit to Stand: Contact guard assistance;Minimum assistance; Additional time (vc)  Stand to Sit: Contact guard assistance (vc)  Balance:  Sitting: Intact  Standing: Intact; With support  Standing - Static: Fair  Standing - Dynamic : Fair   Range Of Motion:  Ambulation/Gait Training:  Distance (ft): 50 Feet (ft)  Assistive Device: Walker, rolling;Gait belt  Ambulation - Level of Assistance: Contact guard assistance; Additional time (vc)  Gait Abnormalities: Antalgic;Decreased step clearance  Base of Support: Widened  Stance: Weight shift;Time  Speed/Donna: Slow  Step Length: Left shortened;Right shortened  Swing Pattern: Right asymmetrical;Left asymmetrical  Interventions: Safety awareness training; Tactile cues; Verbal cues  Neuro Re-Education:  Therapeutic Exercises:   Pain:  Pain Scale 1: Numeric (0 - 10)  Pain Intensity 1: 8  Pain Location 1: Buttocks;Leg  Pain Orientation 1: Right;Left  Pain Description 1: Cramping  Pain Intervention(s) 1: Medication (see MAR)  Activity Tolerance:   Fair   Please refer to the flowsheet for vital signs taken during this treatment.   After treatment:   [x] Patient left in no apparent distress sitting up in chair  [] Patient left in no apparent distress in bed  [x] Call bell left within reach  [x] Nursing notified  [] Caregiver present  [] Bed alarm activated      Sheeba Hylton, PT   Time Calculation: 23 mins

## 2018-03-05 NOTE — PROGRESS NOTES
Spoke with pt about discharge plan. Noted PT recommendations for rehab, [facility] offered, pt chose [facility] for follow up; referral placed with CMS. CM will follow to finalize plan. Awaiting final ID recommendations. Care Management Interventions  PCP Verified by CM: Yes  Transition of Care Consult (CM Consult): SNF  Partner SNF: No  Reason Why Partner SNF Not Chosen: Positive previous encounter ([facility])  Discharge Durable Medical Equipment: No  Physical Therapy Consult: Yes  Current Support Network:  Other (lives with roommate)  Confirm Follow Up Transport: Friends  Plan discussed with Pt/Family/Caregiver: Yes  Freedom of Choice Offered: Yes  Discharge Location  Discharge Placement: Skilled nursing facility

## 2018-03-05 NOTE — PROGRESS NOTES
Problem: Falls - Risk of  Goal: *Absence of Falls  Document Tomy Fall Risk and appropriate interventions in the flowsheet.    Outcome: Progressing Towards Goal  Fall Risk Interventions:  Mobility Interventions: Patient to call before getting OOB    Mentation Interventions: Adequate sleep, hydration, pain control, Update white board, Increase mobility    Medication Interventions: Patient to call before getting OOB    Elimination Interventions: Call light in reach

## 2018-03-05 NOTE — PROGRESS NOTES
Problem: Falls - Risk of  Goal: *Absence of Falls  Document Tomy Fall Risk and appropriate interventions in the flowsheet.    Outcome: Progressing Towards Goal  Fall Risk Interventions:  Mobility Interventions: Patient to call before getting OOB, Utilize walker, cane, or other assitive device    Mentation Interventions: Adequate sleep, hydration, pain control, Update white board, Increase mobility    Medication Interventions: Patient to call before getting OOB, Teach patient to arise slowly    Elimination Interventions: Call light in reach

## 2018-03-05 NOTE — PROGRESS NOTES
Hospitalist Progress Note    Patient: Parker Mercedes MRN: [id number]  CSN: [id number]    YOB: 1950  Age: 74 y.o. Sex: female    DOA: 2/27/2018 LOS:  LOS: 6 days          Chief Complaint:    Consult for Medical Management    Assessment/Plan     1. Positive Wound Culture  2. Hx of Laminectomy by Dr Leatha Garcia  3. Psoas Abscess  4. Possible Discitis/Osteomyelitis    1. Second wound culture positive for diphtheroids as well. Did not demonstrate the staph or strep species. Discussed with Dr Norma Perez, infectious diseases, who would like to speak with Dr Leatha Garcia, as well as see the incision site. Will have wound care team upload pictures of incision into her chart. Dr Norma Perez also asked to order ESR and CRP, which have both been ordered. 2. Routine postoperative management per Dr Leatha Garcia.  3. S/P drainage by Dr Leatha Garcia. Continue management per primary team.   4. As above, Dr Norma Perez would like to speak with Dr Leatha Garcia prior to giving further recommendations. Patient has been tachycardic since the postoperative phase. She is maintaining oxygen saturations in the upper 90's. No complaints of CP/SOB. WBC count has normalized, chemistry is unremarkable. Will monitor for resolution of tachycardia and follow. Patient Active Problem List   Diagnosis Code    Lumbar back pain M54.5    [facility] K68.12    Post-operative complication K96. 9XXA    Lumbar surgical wound fluid collection T81.89XA       Subjective:    Some significant back pain and spasms today.     Review of systems:    Constitutional: denies fevers, chills, myalgias  Respiratory: denies SOB, cough  Cardiovascular: denies chest pain, palpitations  Gastrointestinal: denies nausea, vomiting, diarrhea      Vital signs/Intake and Output:  Visit Vitals    BP (!) 160/92 (BP 1 Location: Right arm, BP Patient Position: Sitting)    Pulse (!) 117    Temp 98 °F (36.7 °C)    Resp 18    Ht 5' 4\" (1.626 m)    Wt 105.7 kg (233 lb)    SpO2 99%    BMI 39.99 kg/m2     Current Shift:  03/05 0701 - 03/05 1900  In: 0976 [P.O.:500; I.V.:723]  Out: 610 [Urine:600; Drains:10]  Last three shifts:  03/03 1901 - 03/05 0700  In: 3060 [P.O.:810; I.V.:2250]  Out: 1700 [Urine:1650; Drains:50]    Exam:    General: Obese female, alert, NAD, OX3, appears uncomfortable  Head/Neck: NCAT, supple, No masses, No lymphadenopathy  CVS:tachycardia, no M/R/G, S1/S2 heard, no thrill  Lungs:Clear to auscultation bilaterally, no wheezes, rhonchi, or rales  Abdomen: Soft, Nontender, No distention, Normal Bowel sounds, No hepatomegaly  Extremities: No C/C/E, pulses palpable 2+  Skin:normal texture and turgor, no rashes, no lesions  Neuro:grossly normal , follows commands  Psych:appropriate                Labs: Results:       Chemistry Recent Labs      03/05/18   0543  03/04/18   0503  03/03/18   0200   GLU  103*  92  89   NA  140  144  144   K  3.7  3.4*  4.3   CL  102  105  106   CO2  31  31  35*   BUN  9  6*  11   CREA  0.69  0.66  0.74   CA  8.9  8.7  8.7   AGAP  7  8  3   BUCR  13  9*  15      CBC w/Diff Recent Labs      03/05/18   0543  03/04/18   0503  03/03/18   0200   WBC  8.4  8.5  8.1   RBC  3.61*  3.72*  3.87*   HGB  9.3*  9.6*  10.0*   HCT  29.9*  30.9*  32.5*   PLT  283  255  255   GRANS  52  56  57   LYMPH  29  27  26   EOS  7*  6*  6*      Cardiac Enzymes No results for input(s): CPK, CKND1, AYUSH in the last 72 hours. No lab exists for component: CKRMB, TROIP   Coagulation No results for input(s): PTP, INR, APTT in the last 72 hours. No lab exists for component: INREXT    Lipid Panel No results found for: CHOL, CHOLPOCT, CHOLX, CHLST, CHOLV, 429963, HDL, LDL, LDLC, DLDLP, 725015, VLDLC, VLDL, TGLX, TRIGL, TRIGP, TGLPOCT, CHHD, CHHDX   BNP No results for input(s): BNPP in the last 72 hours. Liver Enzymes No results for input(s): TP, ALB, TBIL, AP, SGOT, GPT in the last 72 hours.     No lab exists for component: DBIL   Thyroid Studies No results found for: T4, T3U, TSH, TSHEXT     Procedures/imaging: see electronic medical records for all procedures/Xrays and details which were not copied into this note but were reviewed prior to creation of [address]

## 2018-03-05 NOTE — PROGRESS NOTES
Afebrile. Drain removed as there was minimal drainage. Surgical wound cultures reveal no growth. Will continue present meds and P.T. And await I.D. Recommendation.

## 2018-03-05 NOTE — PROGRESS NOTES
80 - Received report from Elidia Atkinson RN. Assumed care of patient at this time. 0045 - Patient in bed at this time. A/O x 4. IV to left AC intact and patent. Teds and SCDs bilaterally. 4x4 and tape dressing to back CDI. Denies numbness/tingling. Pedal pulses palpable. Lungs CTA. Bowel sounds active to all quadrants. Pain 3/10.      0536 - Patient complaining of muscle spasms. PRN flexeril administered. Paty Castillo paged regarding elevated BP and order to d/c PCA pump.     0600 - Dr. Gregg Castillo paged through answering service. 0594 - Received call back from Dr. Gregg Castillo. Informed him of patient's vital signs and pain. Received order to discontinue PCA and for MS Contin 30 mg BID. Also, asked whether it was ok to remove hemovac. He stated that as long as there was minimal drainage, it could be removed. 1202 - Pain 5/10. PRN norco pain medication administered at this time. Patient has been educated on side effects. Side effect education sheets have been provided. 0740 - Bedside and Verbal shift change report given to Gisela Del Valle RN by Austyn Quinonez RN. Report included the following information SBAR, Kardex, OR Summary, Intake/Output and MAR.

## 2018-03-05 NOTE — PROGRESS NOTES
Problem: Mobility Impaired (Adult and Pediatric)  Goal: *Acute Goals and Plan of Care (Insert Text)  Physical Therapy Goals  Initiated 3/3/2018 and to be accomplished within 7 day(s)  1. Patient will move from supine to sit and sit to supine  in bed with supervision/set-up. 2.  Patient will transfer from bed to chair and chair to bed with supervision/set-up using the least restrictive device. 3.  Patient will perform sit to stand with supervision/set-up. 4.  Patient will ambulate with supervision/set-up for >150 feet with the least restrictive device. 5.  Patient will ascend/descend a full flight of stairs with handrail(s) with minimal assistance/contact guard assist for safe home entry. Pt refused PT attempt this morning. \"I have already gotten up and sat in the chair today. I am not getting up now, maybe later in the afternoon before dinner. \"  Pt reports her pain out of bed was 10/10 on numerical pain scale and laying in bed now is 7/10. Will attempt later per pt request and as pt schedule allows.

## 2018-03-05 NOTE — PROGRESS NOTES
Pharmacy Dosing Services: Vancomycin    Consult for Vancomycin Dosing by Pharmacy by Dexter Lemos provided for this 74y.o. year old female , for indication of Psoas abscess Cori Makos discitis/osteo  Day of Therapy 05    Ht Readings from Last 1 Encounters:   02/27/18 162.6 cm (64\")        Wt Readings from Last 1 Encounters:   02/27/18 105.7 kg (233 lb)        Previous Regimen Vancomycin 2 gm IV q18h   Last Level 5.9 mcg/ml @ 08:51 [id number]   Other Current Antibiotics Zosyn 3.375 gm IV q6   Significant Cultures Pending   Serum Creatinine Lab Results   Component Value Date/Time    Creatinine 0.69 03/05/2018 05:43 AM      Creatinine Clearance Estimated Creatinine Clearance: 96.4 mL/min (based on Cr of 0.69). BUN Lab Results   Component Value Date/Time    BUN 9 03/05/2018 05:43 AM      WBC Lab Results   Component Value Date/Time    WBC 8.4 03/05/2018 05:43 AM      H/H Lab Results   Component Value Date/Time    HGB 9.3 (L) 03/05/2018 05:43 AM      Platelets Lab Results   Component Value Date/Time    PLATELET 241 43/89/1658 05:43 AM      Temp 98 °F (36.7 °C)     Estimated PK Parameters:  ---------------------------  New rate constant (giorgi): 0.096 hr-1  New half-life: 7.22 Hours  New Vd from levels: 73.99  Liters  (0.7 L/kg)    Vancomycin dosage adjustment to: 1250 mg IV q8h    Dose calculated to approximate a therapeutic trough of 15-20 mcg/mL. Pharmacy to follow daily and will make changes to dose and/or frequency based on clinical status. Alexia Alexis, Pharm. D.   Clinical Pharmacist  [phone]

## 2018-03-05 NOTE — ROUTINE PROCESS
Bedside and Verbal shift change report given to CESAR Miles RN (oncoming nurse) by Sherri Wiggins RN (offgoing nurse). Report included the following information SBAR, Kardex, Intake/Output and MAR.

## 2018-03-05 NOTE — PROGRESS NOTES
Problem: Falls - Risk of  Goal: *Absence of Falls  Document Tomy Fall Risk and appropriate interventions in the flowsheet.    Outcome: Progressing Towards Goal  Fall Risk Interventions:  Mobility Interventions: Patient to call before getting OOB    Mentation Interventions: Door open when patient unattended    Medication Interventions: Patient to call before getting OOB    Elimination Interventions: Call light in reach

## 2018-03-05 NOTE — PROGRESS NOTES
NUTRITION FOLLOW-UP    RECOMMENDATIONS/PLAN:   - Continue w/ POC  Monitor labs/lytes, PO intakes, skin integrity, wt, fluid status, BM    NUTRITION ASSESSMENT:   Client Update: 74 yrs old Female with lumbar back pain, [place] abscess, post-op complication, lumbar surgical wound fluid collection. FOOD/NUTRITION INTAKE   Diet Order:  Regular   Supplements: n/a  Food Allergies: NKFA  Average PO Intake:      Patient Vitals for the past 100 hrs:   % Diet Eaten   03/04/18 1857 75 %   03/04/18 1404 50 %   03/03/18 1733 25 %   03/01/18 1949 100 %   03/01/18 1332 100 %   03/01/18 0855 100 %      Pertinent Medications:  [x] Reviewed; colace,   Electrolyte Replacement Protocol: []K []Mg []PO4  Insulin:  []SSI  []Pre-meal   []Basal    []Drip  []None  Cultural/Buddhism Food Preferences: None Identified    BIOCHEMICAL DATA & MEDICAL TESTS  Pertinent Labs:  [x] Reviewed; ANTHROPOMETRICS  Height: 5' 4\" (162.6 cm)       Weight: 105.7 kg (233 lb)         BMI: 40 kg/m^2 morbidly obese (Greater than or = to 40% BMI)   Adm Weight: 233 lbs                Weight change: 0lbs  Adjusted Body Weight: 67.4kg     NUTRITION-FOCUSED PHYSICAL ASSESSMENT  Skin: No PU      GI: No BM-day 5    NUTRITION PRESCRIPTION  Calories: 1803 kcal/day based on [place] x 1.2  Protein:  g/day based on 0.8-1.0 g/kg  CHO: 225 g/day based on 50% of total energy  Fluid: 1803ml/day based on 1 kcal/ml         NUTRITION DIAGNOSES:   1. No nutrition problems at this time   NUTRITION INTERVENTIONS:   INTERVENTIONS:                                                                                                                                                                         GOALS:  1. Other:Continue w/ POC 1.  Encourage PO intake >50% at most meals by next review 7days                  LEARNING NEEDS (Diet, Supplementation, Food/Nutrient-Drug Interaction):   [x] None Identified   [] Education provided/documented      Identified and patient: [] Declined [] Was not appropriate/indicated        NUTRITION MONITORING /EVALUATION:   Follow PO intake  Monitor wt  Monitor renal labs, electrolytes, fluid status  Monitor for additional supplement needs     Previous Recommendations Implemented: yes        Previous Goals Met:  yes -      [] Participated in Interdisciplinary Rounds    [x] Interdisciplinary Care Plan Reviewed  DISCHARGE NUTRITION RECOMMENDATIONS ADDRESSED:     [x] Yes- recommended Regular diet     NUTRITION RISK:           [] At risk                        [x] Not currently at risk        Will follow-up per policy.   Kath Reardon, [address] [facility]

## 2018-03-06 ENCOUNTER — APPOINTMENT (OUTPATIENT)
Dept: INTERVENTIONAL RADIOLOGY/VASCULAR | Age: 68
DRG: 909 | End: 2018-03-06
Attending: PHYSICIAN ASSISTANT
Payer: MEDICARE

## 2018-03-06 LAB
ANION GAP SERPL CALC-SCNC: 4 MMOL/L (ref 3–18)
BASOPHILS # BLD: 0 K/UL (ref 0–0.1)
BASOPHILS NFR BLD: 0 % (ref 0–3)
BUN SERPL-MCNC: 6 MG/DL (ref 7–18)
BUN/CREAT SERPL: 8 (ref 12–20)
CALCIUM SERPL-MCNC: 8.5 MG/DL (ref 8.5–10.1)
CHLORIDE SERPL-SCNC: 106 MMOL/L (ref 100–108)
CO2 SERPL-SCNC: 32 MMOL/L (ref 21–32)
CREAT SERPL-MCNC: 0.73 MG/DL (ref 0.6–1.3)
CRP SERPL-MCNC: 13.1 MG/DL (ref 0–0.3)
DIFFERENTIAL METHOD BLD: ABNORMAL
EOSINOPHIL # BLD: 0.4 K/UL (ref 0–0.4)
EOSINOPHIL NFR BLD: 5 % (ref 0–5)
ERYTHROCYTE [DISTWIDTH] IN BLOOD BY AUTOMATED COUNT: 14.6 % (ref 11.6–14.5)
ERYTHROCYTE [SEDIMENTATION RATE] IN BLOOD: 73 MM/HR (ref 0–30)
GLUCOSE SERPL-MCNC: 83 MG/DL (ref 74–99)
HCT VFR BLD AUTO: 28.7 % (ref 35–45)
HGB BLD-MCNC: 9 G/DL (ref 12–16)
LYMPHOCYTES # BLD: 2.5 K/UL (ref 0.8–3.5)
LYMPHOCYTES NFR BLD: 33 % (ref 20–51)
MCH RBC QN AUTO: 26.6 PG (ref 24–34)
MCHC RBC AUTO-ENTMCNC: 31.4 G/DL (ref 31–37)
MCV RBC AUTO: 84.9 FL (ref 74–97)
METAMYELOCYTES NFR BLD MANUAL: 2 %
MONOCYTES # BLD: 0.6 K/UL (ref 0–1)
MONOCYTES NFR BLD: 8 % (ref 2–9)
MYELOCYTES NFR BLD MANUAL: 3 %
NEUTS BAND NFR BLD MANUAL: 3 % (ref 0–5)
NEUTS SEG # BLD: 3.5 K/UL (ref 1.8–8)
NEUTS SEG NFR BLD: 46 % (ref 42–75)
PLATELET # BLD AUTO: 315 K/UL (ref 135–420)
PLATELET COMMENTS,PCOM: ABNORMAL
PMV BLD AUTO: 9.2 FL (ref 9.2–11.8)
POTASSIUM SERPL-SCNC: 3.2 MMOL/L (ref 3.5–5.5)
RBC # BLD AUTO: 3.38 M/UL (ref 4.2–5.3)
RBC MORPH BLD: ABNORMAL
SODIUM SERPL-SCNC: 142 MMOL/L (ref 136–145)
WBC # BLD AUTO: 7.5 K/UL (ref 4.6–13.2)

## 2018-03-06 PROCEDURE — 97535 SELF CARE MNGMENT TRAINING: CPT

## 2018-03-06 PROCEDURE — 74011000258 HC RX REV CODE- 258: Performed by: PHYSICIAN ASSISTANT

## 2018-03-06 PROCEDURE — 74011250636 HC RX REV CODE- 250/636: Performed by: SPECIALIST

## 2018-03-06 PROCEDURE — 85025 COMPLETE CBC W/AUTO DIFF WBC: CPT | Performed by: PHYSICIAN ASSISTANT

## 2018-03-06 PROCEDURE — 36415 COLL VENOUS BLD VENIPUNCTURE: CPT | Performed by: PHYSICIAN ASSISTANT

## 2018-03-06 PROCEDURE — 74011250637 HC RX REV CODE- 250/637: Performed by: SPECIALIST

## 2018-03-06 PROCEDURE — 65270000029 HC RM PRIVATE

## 2018-03-06 PROCEDURE — 97116 GAIT TRAINING THERAPY: CPT

## 2018-03-06 PROCEDURE — 02HV33Z INSERTION OF INFUSION DEVICE INTO SUPERIOR VENA CAVA, PERCUTANEOUS APPROACH: ICD-10-PCS | Performed by: HOSPITALIST

## 2018-03-06 PROCEDURE — 85652 RBC SED RATE AUTOMATED: CPT | Performed by: PHYSICIAN ASSISTANT

## 2018-03-06 PROCEDURE — 80048 BASIC METABOLIC PNL TOTAL CA: CPT | Performed by: PHYSICIAN ASSISTANT

## 2018-03-06 PROCEDURE — 74011000250 HC RX REV CODE- 250: Performed by: RADIOLOGY

## 2018-03-06 PROCEDURE — 74011000258 HC RX REV CODE- 258: Performed by: SPECIALIST

## 2018-03-06 PROCEDURE — 86140 C-REACTIVE PROTEIN: CPT | Performed by: PHYSICIAN ASSISTANT

## 2018-03-06 PROCEDURE — 74011250637 HC RX REV CODE- 250/637: Performed by: PHYSICIAN ASSISTANT

## 2018-03-06 PROCEDURE — 74011250636 HC RX REV CODE- 250/636: Performed by: RADIOLOGY

## 2018-03-06 PROCEDURE — 76937 US GUIDE VASCULAR ACCESS: CPT

## 2018-03-06 PROCEDURE — 97530 THERAPEUTIC ACTIVITIES: CPT

## 2018-03-06 PROCEDURE — 74011250636 HC RX REV CODE- 250/636: Performed by: PHYSICIAN ASSISTANT

## 2018-03-06 RX ORDER — LIDOCAINE HYDROCHLORIDE 10 MG/ML
1-20 INJECTION INFILTRATION; PERINEURAL
Status: COMPLETED | OUTPATIENT
Start: 2018-03-06 | End: 2018-03-06

## 2018-03-06 RX ORDER — HEPARIN SODIUM 200 [USP'U]/100ML
500 INJECTION, SOLUTION INTRAVENOUS
Status: COMPLETED | OUTPATIENT
Start: 2018-03-06 | End: 2018-03-06

## 2018-03-06 RX ORDER — POTASSIUM CHLORIDE 20 MEQ/1
40 TABLET, EXTENDED RELEASE ORAL
Status: COMPLETED | OUTPATIENT
Start: 2018-03-06 | End: 2018-03-06

## 2018-03-06 RX ORDER — HEPARIN SODIUM (PORCINE) LOCK FLUSH IV SOLN 100 UNIT/ML 100 UNIT/ML
500 SOLUTION INTRAVENOUS
Status: COMPLETED | OUTPATIENT
Start: 2018-03-06 | End: 2018-03-06

## 2018-03-06 RX ADMIN — Medication 10 ML: at 05:49

## 2018-03-06 RX ADMIN — DOCUSATE SODIUM 100 MG: 100 CAPSULE, LIQUID FILLED ORAL at 21:06

## 2018-03-06 RX ADMIN — MORPHINE SULFATE 30 MG: 30 TABLET, EXTENDED RELEASE ORAL at 21:06

## 2018-03-06 RX ADMIN — VANCOMYCIN HYDROCHLORIDE 1250 MG: 10 INJECTION, POWDER, LYOPHILIZED, FOR SOLUTION INTRAVENOUS at 21:06

## 2018-03-06 RX ADMIN — POTASSIUM CHLORIDE 40 MEQ: 20 TABLET, EXTENDED RELEASE ORAL at 08:44

## 2018-03-06 RX ADMIN — HEPARIN SODIUM 1000 UNITS: 200 INJECTION, SOLUTION INTRAVENOUS at 13:58

## 2018-03-06 RX ADMIN — ENOXAPARIN SODIUM 40 MG: 40 INJECTION SUBCUTANEOUS at 16:43

## 2018-03-06 RX ADMIN — HYDROCODONE BITARTRATE AND ACETAMINOPHEN 1 TABLET: 10; 325 TABLET ORAL at 15:32

## 2018-03-06 RX ADMIN — CYCLOBENZAPRINE HYDROCHLORIDE 10 MG: 10 TABLET, FILM COATED ORAL at 14:43

## 2018-03-06 RX ADMIN — LIDOCAINE HYDROCHLORIDE 2 ML: 10 INJECTION, SOLUTION INFILTRATION; PERINEURAL at 13:59

## 2018-03-06 RX ADMIN — HEPARIN SODIUM (PORCINE) LOCK FLUSH IV SOLN 100 UNIT/ML 500 UNITS: 100 SOLUTION at 13:59

## 2018-03-06 RX ADMIN — HYDROCODONE BITARTRATE AND ACETAMINOPHEN 1 TABLET: 10; 325 TABLET ORAL at 08:50

## 2018-03-06 RX ADMIN — PIPERACILLIN SODIUM,TAZOBACTAM SODIUM 3.38 G: 3; .375 INJECTION, POWDER, FOR SOLUTION INTRAVENOUS at 03:32

## 2018-03-06 RX ADMIN — MORPHINE SULFATE 30 MG: 30 TABLET, EXTENDED RELEASE ORAL at 08:44

## 2018-03-06 RX ADMIN — VANCOMYCIN HYDROCHLORIDE 1250 MG: 10 INJECTION, POWDER, LYOPHILIZED, FOR SOLUTION INTRAVENOUS at 14:40

## 2018-03-06 RX ADMIN — DOCUSATE SODIUM 100 MG: 100 CAPSULE, LIQUID FILLED ORAL at 08:44

## 2018-03-06 RX ADMIN — HYDROCODONE BITARTRATE AND ACETAMINOPHEN 1 TABLET: 10; 325 TABLET ORAL at 04:17

## 2018-03-06 RX ADMIN — VANCOMYCIN HYDROCHLORIDE 1250 MG: 10 INJECTION, POWDER, LYOPHILIZED, FOR SOLUTION INTRAVENOUS at 01:25

## 2018-03-06 RX ADMIN — CYCLOBENZAPRINE HYDROCHLORIDE 10 MG: 10 TABLET, FILM COATED ORAL at 05:00

## 2018-03-06 RX ADMIN — CEFEPIME HYDROCHLORIDE 2 G: 2 INJECTION, POWDER, FOR SOLUTION INTRAVENOUS at 15:00

## 2018-03-06 NOTE — PROGRESS NOTES
Hospitalist Progress Note    Patient: Trip Kenny MRN: [id number]  CSN: [id number]    YOB: 1950  Age: 74 y.o. Sex: female    DOA: 2/27/2018 LOS:  LOS: 7 days          Chief Complaint:    Consult for Medical Management    Assessment/Plan     1. Positive Wound Culture  2. Hx of Laminectomy by Dr Lalito Bangura  3. Psoas Abscess  4. Possible Discitis/Osteomyelitis    1. Patient to undergo PICC placement today. Dr Skylar Gutierrez has made final recommendations from an ID standpoint. Primary team to write for antibiotics upon discharge. 2. Routine postoperative management per Dr Lalito Bangura.  3. S/P Drainage by Dr Lalito Bangura. No AF or fungal cultures sent. ID has made final recommendations to include Vancomycin 1250mg q12h and Cefepime 2g q12h both for four weeks, maintaining a vanc trough of 15.   4. As above. Tachycardia remains. No complaints - no cp/sob. Oxygen saturation remains normal. BP is ok. No WBC count. Electrolytes are unremarkable. Encouraged use of continued incentive spirometry 10 times per hour. Patient Active Problem List   Diagnosis Code    Lumbar back pain M54.5    [facility] K68.12    Post-operative complication M59. 9XXA    Lumbar surgical wound fluid collection T81.89XA       Subjective:    Still some spasms and back pain    Review of systems:    Constitutional: denies fevers, chills, myalgias  Respiratory: denies SOB, cough  Cardiovascular: denies chest pain, palpitations  Gastrointestinal: denies nausea, vomiting, diarrhea      Vital signs/Intake and Output:  Visit Vitals    /49    Pulse (!) 109    Temp 98.5 °F (36.9 °C)    Resp 18    Ht 5' 4\" (1.626 m)    Wt 105.7 kg (233 lb)    SpO2 93%    BMI 39.99 kg/m2     Current Shift:     Last three shifts:  03/04 1901 - 03/06 0700  In: 0368 [P.O.:750;  I.V.:723]  Out: 1210 [Urine:1200; Drains:10]    Exam:    General: Obese female, alert, NAD, OX3  Head/Neck: NCAT, supple, No masses, No lymphadenopathy  CVS:Tachycardia, no M/R/G, S1/S2 heard, no thrill  Lungs:Clear to auscultation bilaterally, no wheezes, rhonchi, or rales  Abdomen: Soft, Nontender, No distention, Normal Bowel sounds, No hepatomegaly  Extremities: No C/C/E, pulses palpable 2+  Skin:normal texture and turgor, no rashes, no lesions  Neuro:grossly normal , follows commands  Psych:appropriate                Labs: Results:       Chemistry Recent Labs      03/06/18   0110 03/05/18   0543  03/04/18   0503   GLU  83  103*  92   NA  142  140  144   K  3.2*  3.7  3.4*   CL  106  102  105   CO2  32  31  31   BUN  6*  9  6*   CREA  0.73  0.69  0.66   CA  8.5  8.9  8.7   AGAP  4  7  8   BUCR  8*  13  9*      CBC w/Diff Recent Labs      03/06/18 0110 03/05/18   0543  03/04/18   0503   WBC  7.5  8.4  8.5   RBC  3.38*  3.61*  3.72*   HGB  9.0*  9.3*  9.6*   HCT  28.7*  29.9*  30.9*   PLT  315  283  255   GRANS  46  52  56   LYMPH  33  29  27   EOS  5  7*  6*      Cardiac Enzymes No results for input(s): CPK, CKND1, AYUSH in the last 72 hours. No lab exists for component: CKRMB, TROIP   Coagulation No results for input(s): PTP, INR, APTT in the last 72 hours. No lab exists for component: INREXT    Lipid Panel No results found for: CHOL, CHOLPOCT, CHOLX, CHLST, CHOLV, 260070, HDL, LDL, LDLC, DLDLP, 872677, VLDLC, VLDL, TGLX, TRIGL, TRIGP, TGLPOCT, CHHD, CHHDX   BNP No results for input(s): BNPP in the last 72 hours. Liver Enzymes No results for input(s): TP, ALB, TBIL, AP, SGOT, GPT in the last 72 hours.     No lab exists for component: DBIL   Thyroid Studies No results found for: T4, T3U, TSH, TSHEXT     Procedures/imaging: see electronic medical records for all procedures/Xrays and details which were not copied into this note but were reviewed prior to creation of [address], PAJackC

## 2018-03-06 NOTE — PROGRESS NOTES
Problem: Mobility Impaired (Adult and Pediatric)  Goal: *Acute Goals and Plan of Care (Insert Text)  Physical Therapy Goals  Initiated 3/3/2018 and to be accomplished within 7 day(s)  1. Patient will move from supine to sit and sit to supine  in bed with supervision/set-up. 2.  Patient will transfer from bed to chair and chair to bed with supervision/set-up using the least restrictive device. 3.  Patient will perform sit to stand with supervision/set-up. 4.  Patient will ambulate with supervision/set-up for >150 feet with the least restrictive device. 5.  Patient will ascend/descend a full flight of stairs with handrail(s) with minimal assistance/contact guard assist for safe home entry. physical Therapy TREATMENT    Patient: Helena Fisher (89 y.o. female)  Date: 3/6/2018  Diagnosis: Lumbar back pain  Post-operative complication  Psoas abscess, left (HCC)  Lumbar surgical wound fluid collection  REPAIR CSF LEAK <principal problem not specified>  Procedure(s) (LRB):  LUMBAR INCISION AND DRAINAGE (PT IN ROOM #203) WITH C-ARM,  (N/A) 4 Days Post-Op  Precautions: Fall, Back  Chart, physical therapy assessment, plan of care and goals were reviewed. ASSESSMENT:  Pt sitting in chair upon arrival.  Pt c/o back spasms and pain 8/10 on numerical pain scale limiting mobility tolerance. Pt able to participate w/ transfers and gt training. Pt cont to have antalgic gt pattern w/ intermittent standing rest periods. Pt left L sidelying in bed w/ all needs within reach, ice packs to back and SCDs reapplied. Nurse Daiana aware. Progression toward goals:  []      Improving appropriately and progressing toward goals  [x]      Improving slowly and progressing toward goals  []      Not making progress toward goals and plan of care will be adjusted     PLAN:  Patient continues to benefit from skilled intervention to address the above impairments. Continue treatment per established plan of care.   Discharge Recommendations:  Home Health  Further Equipment Recommendations for Discharge:  N/A     SUBJECTIVE:   Patient stated Bianca Peralta would be fine if I didn't have the muscle spasms.     OBJECTIVE DATA SUMMARY:   Critical Behavior:  Neurologic State: Alert, Appropriate for age  Orientation Level: Oriented X4  Cognition: Appropriate decision making, Appropriate for age attention/concentration, Appropriate safety awareness, Follows commands  Safety/Judgement: Awareness of environment, Fall prevention, Good awareness of safety precautions, Insight into deficits  Functional Mobility Training:  Bed Mobility:  Rolling: Minimum assistance; Additional time (vc)  Sit to Supine: Moderate assistance; Additional time (vc)  Scooting: Minimum assistance (vc)  Transfers:  Sit to Stand: Minimum assistance; Moderate assistance; Additional time (vc)  Stand to Sit: Additional time;Contact guard assistance (vc)  Balance:  Sitting: Intact  Standing: Intact; With support  Standing - Static: Good;Constant support  Standing - Dynamic : Fair   Range Of Motion:  Ambulation/Gait Training:  Distance (ft): 62 Feet (ft)  Assistive Device: Walker, rolling;Gait belt  Ambulation - Level of Assistance: Contact guard assistance; Additional time (vc)  Gait Abnormalities: Antalgic;Decreased step clearance  Base of Support: Widened  Stance: Weight shift;Time  Speed/Donna: Slow  Step Length: Left shortened;Right shortened  Swing Pattern: Left asymmetrical;Right asymmetrical  Interventions: Safety awareness training; Tactile cues; Verbal cues  Neuro Re-Education:  Therapeutic Exercises:   Pain:  Pain Scale 1: Numeric (0 - 10)  Pain Intensity 1: 8  Pain Location 1: Back  Pain Orientation 1: Posterior  Pain Description 1: Sore;Stabbing;Gnawing  Pain Intervention(s) 1: Medication (see MAR)  Activity Tolerance:   Fair   Please refer to the flowsheet for vital signs taken during this treatment.   After treatment:   [] Patient left in no apparent distress sitting up in chair  [x] Patient left in no apparent distress in bed  [x] Call bell left within reach  [x] Nursing notified  [] Caregiver present  [] Bed alarm activated      Shira Living, PT   Time Calculation: 32 mins

## 2018-03-06 NOTE — ROUTINE PROCESS
Bedside and Verbal shift change report given to SUKHI Brown RN (oncoming nurse) by Giovany Gamble. Robert Claude RN (offgoing nurse). Report included the following information SBAR, Kardex, Intake/Output and MAR.

## 2018-03-06 NOTE — PROGRESS NOTES
ID    NAME:  Janessa Lilly                      :   1950                       MRN:   [id number]   Date/Time:  3/5/2018 6:49 PM  Subjective:   Spoke to Dr. Frandy Bradshaw to better understand her history   74 yr obese female underwent microdiscectomy of L-4/L5  And l5/S1 in oct 2017 and a repeat discectomy for ruptured disc. She developed a leak following this and she was re-explored and cultured with no growth in 2018   She was on antibiotics for couple of says  until the culture came back negative. She was not put on any oral antibiotics- she never had fever or chills  . She came to the ED  Thru EMS on  with back pain and leg pain and in the ED a culture was sent from the fluid and MRI Lumbar showed   Postsurgical changes from prior left-sided L4-5 and L5-S1 laminotomies. Complex fluid collections along the laminotomy bed seen within the subcutaneous  fat with peripheral enhancement, nonspecific but potentially representing  abscess given reported history of purulent drainage from the surgical incision. Peripherally enhancing postoperative hematomas or seromas can have a similar  appearance. No prior comparison studies.     2. Extensive abnormal fluid signal with peripheral enhancement involving L4-5  and L5-S1 disc spaces including prominent endplate marrow signal changes and  enhancement as well as cortical signal loss towards the left, suspicious for  discitis/osteomyelitis especially given history. Prominent postoperative changes  with underlying extensive inflammatory degenerative changes is a consideration  but felt to be less likely, especially given the extent of the relative disc  fluid signal as well as the adjacent left sided psoas intramuscular fluid  collection which is very suspicious for psoas abscess.     3. Moderate to severe central and lateral recess stenosis at the L4-5 level with  distorted thecal sac. Moderate central and lateral recess stenosis at the L3-L4  level.  Thecal sac is essentially tapered into nerve roots at L5-S1 without  central stenosis.     4. No definite epidural abscess. Posterior midline L5-S1 disc extrusion versus  extension of discitis into the spinal canal, without evidence of central  stenosis.          did a consult on 3/1 and asked for drainage of the psoas fluid collection and started her on zosyn along with vancomycin  She underwent Lumbar wound re-exploration, thorough irrigation and exploration of all areas of importance with cultures and sensitivity with no definite abscess being found on 3/2/18  ED wound culture- strep viridans, diptheroids and coag neg staph  Surgical culture rare diptheroids   WBC 8.4  Hb 9.3    Cr 0.69    Impression/Recommendation    74 y r female s/o 2 microdiscectomy followed by surgical site leak, re-exploration and now leak with complex fluid collection at the laminotomy bed with culture only growing skin bacteria and strep viridans which could be a contaminant as well. This could be a non infective collection like seroma but because of chronicity bound to treat like an infection  There are no Afb cultures or fungal cultures sent   Need to see a picture of the back wound  Need to treat with vancomycin adjusted to trough of 15 -  send her on vancomycin 1250mg Q 12 + cefepime 2 grams IV q 12  for  4 weeks .   - DC  zosyn -    In spite of antibiotics if leak persist will have to send deep cultures for Afb/fungal  Discussed with  and hospitalist [facility]

## 2018-03-06 NOTE — PROGRESS NOTES
Discussed case with I.D, consultant via phone last night. She recommends full course of antibiotic therapy. Apparently the hospitalists will arrange all details prior to discharge. Discussed fully with patient today.

## 2018-03-06 NOTE — PROGRESS NOTES
6558 - Patient in chair at this time. A/O x 4. IV to left AC  intact and patent. SCDs and TEDs to bilateral legs. Mepilex dressing to back CDI. No numbness/tingling. Pedal and radial pulses palpable. Lungs CTA. Bowel sounds active to all quadrants. Pain 8/10. Pain medication given. 0940-Pain decreased to 6/10.      1430-Patient returned from getting PICC line. 1448-Spoke with Dr. Wellington Quintana concerning recommendations from ID. New orders for Cefepime 2gm IV q 12 hours and discontinue Zosyn. 1532-Percocet given for c/o pain rated 6/10.  1630-Pain decreased to 5/10. Shift summary-Zosyn discontinued and new order for Cefepime 2 gm IV q 12 hours by Dr. Adela Alcantara per request of Infectious Disease. Ambulating to bathroom with walker and one assist. Pain controlled with PRN pain medication. Wound therapy in for wound to back. PICC line, double lumen, placed today. Voiding without difficulty. Tolerating meals.

## 2018-03-06 NOTE — PROGRESS NOTES
20:10 Assessment completed. Lungs are clear bilat. but are decreased in the bases. Back dsg remains C/D/I with + CMS & + PP. + CMS & + PP. Ice packs were refilled & re-applied. Resting quietly in bed. Voiding per BR w/o difficulty. 22:45 Shift assessment completed. See nsg flow sheet for details. 03:00 Reassessed with 0 changes noted. Mepilex dsg on back remains C/D/I with CMS & PP intact. Resting quietly in bed with eyes closed between cares x for voiding w/o difficulty. 04:30 Up in the chair @ bedside with call bell & phone within reach. 07:15 Bedside and Verbal shift change report given to Kinsey Jon RN (oncoming nurse) by Galo Antonio RN (offgoing nurse). Report included the following information SBAR.

## 2018-03-06 NOTE — PROGRESS NOTES
Plan: pt has been accepted to [facility] [address] Heidi pena for admission when medically stable and final abx determined. Pt will need PICC placed for long term abx prior to transitioning to SNF. Please include all hard scripts for controlled substances, med rec and dc summary in packet. Please medicate for pain prior to dc if possible and needed to help offset delay when patient first arrives to facility. Pt will need medical transport to facility for safety. CM continues to follow, will need bed availability verified prior to discharge if pt not ready today or tomorrow. 1300- pt not for discharge today, Chaim GONG aware, will plan for tomorrow if medically stable.

## 2018-03-06 NOTE — PROGRESS NOTES
[address] [place], [place] paged at this time concerning PICC line orders and Antibiotic orders. 115 Erika Tamez with [place], [place]. Informed that patient needs to have an order placed for a PICC line and orders written for antibiotic orders for D/C to rehab. PA will place orders for PICC however instructed to contact surgeon for antibiotic orders. Primary Nurse and CM made aware. 1101 - Contacted angio states patient will be called down within the next hour for PICC placement. 200 - Notified that surgeon needs to place orders for antibiotics. Primary nurse to contact Dr. Casimiro Weber for discharge summary and order changes per ID.

## 2018-03-06 NOTE — PROGRESS NOTES
Problem: Self Care Deficits Care Plan (Adult)  Goal: *Acute Goals and Plan of Care (Insert Text)  Outcome: Progressing Towards Goal  Occupational Therapy TREATMENT    Patient: Tobi Stevens [de-identified]74 y.o. female)  Date: 3/6/2018  Diagnosis: Lumbar back pain  Post-operative complication  Psoas abscess, left (HCC)  Lumbar surgical wound fluid collection  REPAIR CSF LEAK <principal problem not specified>  Procedure(s) (LRB):  LUMBAR INCISION AND DRAINAGE (PT IN ROOM #203) WITH C-ARM,  (N/A) 4 Days Post-Op  Precautions: Fall, Back  Chart, occupational therapy assessment, plan of care, and goals were reviewed. ASSESSMENT:  Pt seen for OT treatment. Pt in bed on arrival.  Pt requesting to use bathroom. Pt requiring min A to roll onto L side using bedrail and with additional time. Mod A to transfer sidelying to sit with additional time. Pt reporting low back pain 8 out of 10 with mobility. Min/Mod A to transfer sit to stand on second attempt. Pt ambulated to bathroom using RW with CGA. CGA for toilet transfer using grab bar for support and to carryout all apects of toileting. Pt CGA for handwashing while standing at sink. Noted pt a little shaky and pt attributing same due to pain. Nurse notified. Pt assisted back to bed at end of session and positioned on L side. Pt declined pillow between knees. Ice pack to lower back. Pt remains limited by decreased activity tolerance, pain and decreased functional balance impacting ADL function and functional mobility. Recommend rehab at d/c. Cont OT  Progression toward goals:  []          Improving appropriately and progressing toward goals  [x]          Improving slowly and progressing toward goals  []          Not making progress toward goals and plan of care will be adjusted     PLAN:  Patient continues to benefit from skilled intervention to address the above impairments. Continue treatment per established plan of care.   Discharge Recommendations:  Rehab  Further Equipment Recommendations for Discharge: TBD     SUBJECTIVE:   Patient stated I need to use the bathroom.     OBJECTIVE DATA SUMMARY:   Cognitive/Behavioral Status:  Neurologic State: Alert, Appropriate for age  Orientation Level: Oriented X4  Cognition: Appropriate decision making, Appropriate for age attention/concentration, Appropriate safety awareness, Follows commands  Safety/Judgement: Awareness of environment, Fall prevention, Good awareness of safety precautions, Insight into deficits  Functional Mobility and Transfers for ADLs:   Bed Mobility:  Rolling: Minimum assistance; Additional time  Supine to Sit: Moderate assistance; Additional time  Sit to Supine: Moderate assistance; Additional time  Scooting: Minimum assistance (vc)   Transfers:  Sit to Stand: Minimum assistance; Moderate assistance; Additional time   Toilet Transfer : Contact guard assistance   Balance:  Sitting: Intact  Standing: Intact; With support  ADL Intervention:  Grooming  Washing Hands: Contact guard assistance standing at sink    Toileting  Toileting Assistance: Contact guard assistance  Bladder Hygiene: Modified independent  Clothing Management: Contact guard assistance  Adaptive Equipment: Grab bars; Walker    Pain:  Pre-treatment: 8/10 low back   Post-treatment: 8/10 low back    Activity Tolerance:    Limited by pain   Please refer to the flowsheet for vital signs taken during this treatment.   After treatment:   []  Patient left in no apparent distress sitting up in chair  []  Patient left in no apparent distress in bed  [x]  Call bell left within reach  [x]  Nursing notified  []  Caregiver present  []  Bed alarm activated    Thank you for this referral.  Cheree Lesches, OTR/L  Time Calculation: 30 mins

## 2018-03-06 NOTE — ROUTINE PROCESS
1910  Bedside and Verbal shift change report given to Candace Unger RN (oncoming nurse) by Ashley Garcia RN (offgoing nurse). Report included the following information SBAR, Kardex and MAR.

## 2018-03-07 VITALS
WEIGHT: 233 LBS | DIASTOLIC BLOOD PRESSURE: 62 MMHG | HEART RATE: 117 BPM | TEMPERATURE: 98 F | HEIGHT: 64 IN | SYSTOLIC BLOOD PRESSURE: 139 MMHG | RESPIRATION RATE: 18 BRPM | OXYGEN SATURATION: 97 % | BODY MASS INDEX: 39.78 KG/M2

## 2018-03-07 LAB
ANION GAP SERPL CALC-SCNC: 5 MMOL/L (ref 3–18)
BACTERIA SPEC CULT: NORMAL
BASOPHILS # BLD: 0 K/UL (ref 0–0.06)
BASOPHILS NFR BLD: 0 % (ref 0–2)
BUN SERPL-MCNC: 7 MG/DL (ref 7–18)
BUN/CREAT SERPL: 10 (ref 12–20)
CALCIUM SERPL-MCNC: 8.2 MG/DL (ref 8.5–10.1)
CHLORIDE SERPL-SCNC: 104 MMOL/L (ref 100–108)
CO2 SERPL-SCNC: 32 MMOL/L (ref 21–32)
CREAT SERPL-MCNC: 0.69 MG/DL (ref 0.6–1.3)
DIFFERENTIAL METHOD BLD: ABNORMAL
EOSINOPHIL # BLD: 0.4 K/UL (ref 0–0.4)
EOSINOPHIL NFR BLD: 6 % (ref 0–5)
ERYTHROCYTE [DISTWIDTH] IN BLOOD BY AUTOMATED COUNT: 15 % (ref 11.6–14.5)
GLUCOSE SERPL-MCNC: 91 MG/DL (ref 74–99)
HCT VFR BLD AUTO: 27.1 % (ref 35–45)
HGB BLD-MCNC: 8.4 G/DL (ref 12–16)
LYMPHOCYTES # BLD: 1.9 K/UL (ref 0.9–3.6)
LYMPHOCYTES NFR BLD: 29 % (ref 21–52)
MAGNESIUM SERPL-MCNC: 1.7 MG/DL (ref 1.6–2.6)
MCH RBC QN AUTO: 25.8 PG (ref 24–34)
MCHC RBC AUTO-ENTMCNC: 31 G/DL (ref 31–37)
MCV RBC AUTO: 83.4 FL (ref 74–97)
MONOCYTES # BLD: 1 K/UL (ref 0.05–1.2)
MONOCYTES NFR BLD: 15 % (ref 3–10)
NEUTS SEG # BLD: 3.3 K/UL (ref 1.8–8)
NEUTS SEG NFR BLD: 50 % (ref 40–73)
PLATELET # BLD AUTO: 296 K/UL (ref 135–420)
PMV BLD AUTO: 8.9 FL (ref 9.2–11.8)
POTASSIUM SERPL-SCNC: 3 MMOL/L (ref 3.5–5.5)
RBC # BLD AUTO: 3.25 M/UL (ref 4.2–5.3)
SERVICE CMNT-IMP: NORMAL
SODIUM SERPL-SCNC: 141 MMOL/L (ref 136–145)
WBC # BLD AUTO: 6.5 K/UL (ref 4.6–13.2)

## 2018-03-07 PROCEDURE — 80048 BASIC METABOLIC PNL TOTAL CA: CPT | Performed by: PHYSICIAN ASSISTANT

## 2018-03-07 PROCEDURE — 97535 SELF CARE MNGMENT TRAINING: CPT

## 2018-03-07 PROCEDURE — 74011250637 HC RX REV CODE- 250/637: Performed by: SPECIALIST

## 2018-03-07 PROCEDURE — 74011000258 HC RX REV CODE- 258: Performed by: SPECIALIST

## 2018-03-07 PROCEDURE — 77030011256 HC DRSG MEPILEX <16IN NO BORD MOLN -A

## 2018-03-07 PROCEDURE — 97530 THERAPEUTIC ACTIVITIES: CPT

## 2018-03-07 PROCEDURE — 97116 GAIT TRAINING THERAPY: CPT

## 2018-03-07 PROCEDURE — 74011250637 HC RX REV CODE- 250/637: Performed by: PHYSICIAN ASSISTANT

## 2018-03-07 PROCEDURE — 74011250636 HC RX REV CODE- 250/636: Performed by: PHYSICIAN ASSISTANT

## 2018-03-07 PROCEDURE — 83735 ASSAY OF MAGNESIUM: CPT | Performed by: PHYSICIAN ASSISTANT

## 2018-03-07 PROCEDURE — 85025 COMPLETE CBC W/AUTO DIFF WBC: CPT | Performed by: PHYSICIAN ASSISTANT

## 2018-03-07 PROCEDURE — 74011250636 HC RX REV CODE- 250/636: Performed by: SPECIALIST

## 2018-03-07 RX ORDER — CYCLOBENZAPRINE HCL 10 MG
10 TABLET ORAL
Qty: 60 TAB | Refills: 1 | Status: SHIPPED | OUTPATIENT
Start: 2018-03-07 | End: 2018-05-07

## 2018-03-07 RX ORDER — MORPHINE SULFATE 30 MG/1
30 TABLET, FILM COATED, EXTENDED RELEASE ORAL 2 TIMES DAILY
Qty: 30 TAB | Refills: 0 | Status: SHIPPED | OUTPATIENT
Start: 2018-03-07 | End: 2018-04-26

## 2018-03-07 RX ORDER — VANCOMYCIN HYDROCHLORIDE
1250 EVERY 12 HOURS
Status: DISCONTINUED | OUTPATIENT
Start: 2018-03-07 | End: 2018-03-07 | Stop reason: HOSPADM

## 2018-03-07 RX ORDER — HYDROCODONE BITARTRATE AND ACETAMINOPHEN 10; 325 MG/1; MG/1
1 TABLET ORAL
Qty: 90 TAB | Refills: 0 | Status: SHIPPED | OUTPATIENT
Start: 2018-03-07 | End: 2018-05-04

## 2018-03-07 RX ORDER — CYCLOBENZAPRINE HCL 10 MG
10 TABLET ORAL
Qty: 60 TAB | Refills: 1 | Status: SHIPPED | OUTPATIENT
Start: 2018-03-07 | End: 2018-03-07

## 2018-03-07 RX ORDER — POTASSIUM CHLORIDE 20 MEQ/1
40 TABLET, EXTENDED RELEASE ORAL
Status: COMPLETED | OUTPATIENT
Start: 2018-03-07 | End: 2018-03-07

## 2018-03-07 RX ADMIN — Medication 10 ML: at 05:47

## 2018-03-07 RX ADMIN — VANCOMYCIN HYDROCHLORIDE 1250 MG: 10 INJECTION, POWDER, LYOPHILIZED, FOR SOLUTION INTRAVENOUS at 05:42

## 2018-03-07 RX ADMIN — CEFEPIME HYDROCHLORIDE 2 G: 2 INJECTION, POWDER, FOR SOLUTION INTRAVENOUS at 03:10

## 2018-03-07 RX ADMIN — POTASSIUM CHLORIDE 40 MEQ: 20 TABLET, EXTENDED RELEASE ORAL at 10:31

## 2018-03-07 RX ADMIN — HYDROCODONE BITARTRATE AND ACETAMINOPHEN 1 TABLET: 10; 325 TABLET ORAL at 11:30

## 2018-03-07 RX ADMIN — CYCLOBENZAPRINE HYDROCHLORIDE 10 MG: 10 TABLET, FILM COATED ORAL at 13:43

## 2018-03-07 RX ADMIN — HYDROCODONE BITARTRATE AND ACETAMINOPHEN 1 TABLET: 10; 325 TABLET ORAL at 01:47

## 2018-03-07 RX ADMIN — HYDROCODONE BITARTRATE AND ACETAMINOPHEN 1 TABLET: 10; 325 TABLET ORAL at 05:42

## 2018-03-07 RX ADMIN — DOCUSATE SODIUM 100 MG: 100 CAPSULE, LIQUID FILLED ORAL at 10:31

## 2018-03-07 RX ADMIN — MORPHINE SULFATE 30 MG: 30 TABLET, EXTENDED RELEASE ORAL at 10:31

## 2018-03-07 RX ADMIN — Medication 10 ML: at 05:46

## 2018-03-07 NOTE — PROGRESS NOTES
1032 - Patient in chair at this time. A/O x 4. Lungs clear, radial pulses present , pedal pulses present , abdomen soft and non-distended. Bowel sounds active, PICC to right arm, patient denies pain/discomfort. TEDs and SCDs applied bilaterally. Skin warm and dry  with  mepilex dressing to lower back CDI. Patient has baseline tingling to bilateral lower extremities. Pain 8/10. SCHEDULED MS CONTIN 30 MG po given per MAR. Bed placed in lowest position, call bell within reach. 1130- Pain 10/10. PRN Norco 10 mg PO pain medication administered at this time. Patient has been educated on side effects. Side effect education sheets have been provided. 1345- Pain 8/10. PRN Norco 10 mg PO pain medication administered at this time. Patient has been educated on side effects. Side effect education sheets have been provided. 1352- Medical transport on unit to transport patient to rehab facility at this time. Bedside and Verbal shift change report given to Kiley Lea, nursing supervisor by David Carvalho RN.  Report included the following information SBAR, Kardex, OR Summary, Intake/Output and MAR

## 2018-03-07 NOTE — PROGRESS NOTES
Tele ID/Remote monitoring  Spoke to patient on 3/7/18 and got the complete history  Microdiscectomy in oct 2017- was fine for a week then   Persistent pain  Repeat myelogram and  MRI in JAn 2018 and had another microdiscectomy  surgery on jan 9th 2018 for ruptured disc  A week later noted discharge -culture was negative- Jan 19th pain was severe and she couldn't move-had a myelogram   /MRI and had a debridement on jan 24th at [facility] and had a drain in for 3 days-( cefaolin was given during surgery)  as the culture was neg no antibiotics was given by Dr. Linda Gordon. She was sent to [facility] rehab for 2 weeks and they put her on oral keflex for 10 days. . She went home on feb 22nd  And on the morning of 27th she woke up with same pain and was admitted to [facility] as follows  MRI 2/28    Extensive abnormal fluid signal with peripheral enhancement involving L4-5  and L5-S1 disc spaces including prominent endplate marrow signal changes and  enhancement as well as cortical signal loss towards the left, suspicious for  discitis/osteomyelitis especially given history. Prominent postoperative changes  with underlying extensive inflammatory degenerative changes is a consideration  but felt to be less likely, especially given the extent of the relative disc  fluid signal as well as the adjacent left sided psoas intramuscular fluid  collection which is very suspicious for psoas abscess    Was taken for debridement 3/2 cultures diptheroids,   2/27 cultures from ED strep viridans , diptheroids, and coag neg staph  . Impression and recommendation  On reviewing the MRI and talking to patient this is discitis with psoas abscess following microdiscectomy surgery X2      Because of Keflex use in the recent past cultures may not be totally yielding  Will cover for gram positive organisms like coag neg staph/staph aureus with VAncomycin and cefepime for gram neg rods.  Anerobes are usually not a problem in discitis but in her case this is post surgery and discharge and risk for contamination with stool- so will add flagyl for atleast 2 weeks  She will need 6 weeks of vanco/cefepime    Discharge ID instructions  Antibiotics  1) vancomycin 1250 mg Q 12 -dose will change depending on trough of 15-17until 4/12/18  Pharmacist in house and in rehab should adjust the dose  2) cefepime 2 grams IV [id number] 4/12/18    3) Metronidazole 500mg IV or PO Q 8 orally ( as long as she can tolerate  ) until 3/21/18    Probiotic 1 a day       Labs  Monitor vanco trough and creatinine twice a week ( Monday /Thursday)  Monitor CBC/ CMP/ESR/CRP once a week every Monday    Follow up  With -neurosurgery  OP ID    at [facility] to arrange for follow up with OP Infectious diseases  and also make sure  the lab results  are sent     Discussed in great detail with patient and the hospitalist and pharmacist  Tele ID will sign off now

## 2018-03-07 NOTE — PROGRESS NOTES
Problem: Mobility Impaired (Adult and Pediatric)  Goal: *Acute Goals and Plan of Care (Insert Text)  Physical Therapy Goals  Initiated 3/3/2018 and to be accomplished within 7 day(s)  1. Patient will move from supine to sit and sit to supine  in bed with supervision/set-up. 2.  Patient will transfer from bed to chair and chair to bed with supervision/set-up using the least restrictive device. 3.  Patient will perform sit to stand with supervision/set-up. 4.  Patient will ambulate with supervision/set-up for >150 feet with the least restrictive device. 5.  Patient will ascend/descend a full flight of stairs with handrail(s) with minimal assistance/contact guard assist for safe home entry. Pt not seen by PT due to:    []  Nausea/vomiting  []  Eating  []  Pain  []  Pt lethargic  [x]  Pt discharged from THE [facility] prior to PT treatment. Will f/u later as schedule allows. Thank you.     Carli Weber PT, DPT

## 2018-03-07 NOTE — ROUTINE PROCESS
Bedside and Verbal shift change report given to MIKE Varela RN (oncoming nurse) by [employer] RN (offgoing nurse). Report included the following information SBAR, Kardex, Intake/Output and MAR.

## 2018-03-07 NOTE — DISCHARGE SUMMARY
Kvng Robins    Name:[facility]  MR#: [id number]  : 1950  ACCOUNT #: [de-identified]   ADMIT DATE: 2018  DISCHARGE DATE:     FINAL DIAGNOSIS:  Recurrent wound hematoma with negative wound cultures and low back pain. OPERATION:  Lumbar reexploration surgery L4-L5, L5-S1 left side with findings of no recurrent disk herniation and cultures taken that subsequently grew no growth. HISTORY:  The patient was admitted with drainage from a wound. She had previous surgery on two occasions and I explored wound because of wound drainage. About four or five weeks before, wound cultures at that time were negative. No antibiotics were given. She healed up and did nicely and then started draining again. A small, about a 2-3 mm, opening on the low back that drained. Cultures in the emergency room here and cultures we took on the floor showed very slight growth of bacteria. She was seen by infectious disease and given intravenous antibiotics. Subsequently, because of persistent drainage, I took her back to surgery, re-explored her, and took wound cultures that grew no growth postoperatively. Minimal white blood cells. This lady never had fever and never had an elevated white count. The wound now looks good. She is walking nicely with physical therapy. Infectious disease recommends a course of intravenous antibiotics. I let them make that decision as the cultures are somewhat iffy at this point in time. At any rate, she is neurologically stable. She is being transferred to rehabilitation center to have outpatient therapy. A PICC line was inserted so that patient IV therapy is recommended and followed by the infectious disease service. I will see her in followup in about three weeks. She knows to call prior to that date with any question or difficulties.     DISPOSITION:  MD NEREYDA Barba/MABEL  D: 2018 10:56 T: 03/07/2018 11:32  JOB #: [id number]

## 2018-03-07 NOTE — PROGRESS NOTES
Problem: Falls - Risk of  Goal: *Absence of Falls  Document Tomy Fall Risk and appropriate interventions in the flowsheet.    Outcome: Progressing Towards Goal  Fall Risk Interventions:  Mobility Interventions: Communicate number of staff needed for ambulation/transfer, Patient to call before getting OOB, Utilize walker, cane, or other assitive device    Mentation Interventions: Adequate sleep, hydration, pain control    Medication Interventions: Patient to call before getting OOB, Teach patient to arise slowly    Elimination Interventions: Call light in reach, Patient to call for help with toileting needs

## 2018-03-07 NOTE — PROGRESS NOTES
1910 - Bedside report received from Faina Mehta RN. Patient in bed. Pain 3/10.     2105 - Patient in bed at this time. D. L PICC to RUE  intact and patent. Sequential compression device bilaterally. Dressing to back CDI. + CMS. Pt A & O x 4. LS clear, on RA. Abdomen soft, NT and ND. + BS to all 4 quadrants. Denies nausea. Pain 3/10. Call light within reach. 0147-Medications given. Potential side effects explained to patient, patient verbalizes understanding, opportunities for questions provided. Patient stable, No apparent distress at this time, bed in locked position, call bell and phone within reach. 0440-Labs drawn and sent. 0700-Pt had refused to get up with the CNA, At this pt was persuaded to get OOB, very difficult with ambulated. Pt had uneventful shift. Uses IS every hour while awake. Pt ambulated with assistance with a walker to [facility], later refused to sit on the [facility], verbalized that it was \"hard\", has been using a bed pan. Pain remained well-controlled with medication. No issues/concerns at this time.  Call bell within reach

## 2018-03-07 NOTE — PROGRESS NOTES
36- CESILIA Carney paged to ask about discharge antibiotics, he stated attending MD was suppose to write antibiotics, will page Dr. Erin Taylor- Dr. Casimiro Weber paged to ask about discharge antibiotic prescriptions, he stated he was told ID MD would handle it and he didn't have to do anything, he was informed patient was already at rehab and she needed prescriptions, he stated he would contact office and have them call hospital to get recommendations of ID MD    18- Dr. Casimiro Weber office called, recommendations given per ID note on 3/7. .. See below    1) vancomycin 1250 mg Q 12 -dose will change depending on trough of 15-17until 4/12/18  Pharmacist in house and in rehab should adjust the dose  2) cefepime 2 grams IV [id number] 4/12/18     3) Metronidazole 500mg IV or PO Q 8 orally ( as long as she can tolerate  ) until 3/21/18     Probiotic 1 a day     Above instructions read to office staff.

## 2018-03-07 NOTE — ROUTINE PROCESS
Bedside and Verbal shift change report given to Saintclair Blazing, RN by Aminata Murphy. Report included the following information SBAR, Kardex, OR Summary, Intake/Output and MAR.

## 2018-03-07 NOTE — PROGRESS NOTES
Problem: Mobility Impaired (Adult and Pediatric)  Goal: *Acute Goals and Plan of Care (Insert Text)  Physical Therapy Goals  Initiated 3/3/2018 and to be accomplished within 7 day(s)  1. Patient will move from supine to sit and sit to supine  in bed with supervision/set-up. 2.  Patient will transfer from bed to chair and chair to bed with supervision/set-up using the least restrictive device. 3.  Patient will perform sit to stand with supervision/set-up. 4.  Patient will ambulate with supervision/set-up for >150 feet with the least restrictive device. 5.  Patient will ascend/descend a full flight of stairs with handrail(s) with minimal assistance/contact guard assist for safe home entry. physical Therapy TREATMENT    Patient: Marshal Aviles (45 y.o. female)  Date: 3/7/2018  Diagnosis: Lumbar back pain  Post-operative complication  Psoas abscess, left (HCC)  Lumbar surgical wound fluid collection  REPAIR CSF LEAK <principal problem not specified>  Procedure(s) (LRB):  LUMBAR INCISION AND DRAINAGE (PT IN ROOM #203) WITH C-ARM,  (N/A) 5 Days Post-Op  Precautions: Fall, Back   Chart, physical therapy assessment, plan of care and goals were reviewed. ASSESSMENT:  Pt c/o 8/10 pain prior to PT session with back and LE spasms. During gait training pt ambulated a total of 25 feet with RW/GB use with a slow/antalgic/shuffling gait pattern. Pt c/o increasing pain and spasms of LEs thus gait training was limited. Left pt in a chair for lunch as requested with all needs met and call bell within reach. Recommend rehab at time of discharge. Progression toward goals:  []      Improving appropriately and progressing toward goals  [x]      Improving slowly and progressing toward goals  []      Not making progress toward goals and plan of care will be adjusted     PLAN:  Patient continues to benefit from skilled intervention to address the above impairments.   Continue treatment per established plan of care.  Discharge Recommendations:  Home Health  Further Equipment Recommendations for Discharge:  rolling walker     SUBJECTIVE:   Patient stated I am feeling .     OBJECTIVE DATA SUMMARY:   Critical Behavior:  Neurologic State: Alert, Appropriate for age  Orientation Level: Appropriate for age, Oriented X4  Cognition: Appropriate decision making, Appropriate for age attention/concentration, Appropriate safety awareness, Follows commands  Safety/Judgement: Awareness of environment, Fall prevention, Good awareness of safety precautions, Insight into deficits  Functional Mobility Training:  Transfers:  Sit to Stand: Contact guard assistance; Additional time  Stand to Sit: Contact guard assistance; Additional time   Balance:  Sitting: Intact  Standing: Intact; With support  Ambulation/Gait Training:  Distance (ft): 25 Feet (ft)  Assistive Device: Walker, rolling;Gait belt  Ambulation - Level of Assistance: Contact guard assistance   Gait Abnormalities: Antalgic;Decreased step clearance   Base of Support: Widened  Stance: Time;Weight shift  Speed/Donna: Slow;Shuffled  Step Length: Right shortened;Left shortened  Swing Pattern: Right asymmetrical;Left asymmetrical   Interventions: Visual/Demos; Verbal cues; Tactile cues; Safety awareness training   Therapeutic Exercises:   HEP included ankle pumps x 10 reps  Pain:  Pain Scale 1: Numeric (0 - 10)  Pain Intensity 1: 10  Pain Location 1: Back  Pain Orientation 1: Lower  Pain Description 1: Aching  Pain Intervention(s) 1: Medication (see MAR)  Activity Tolerance:   Fair  Please refer to the flowsheet for vital signs taken during this treatment.   After treatment:   [] Patient left in no apparent distress sitting up in chair  [x] Patient left in no apparent distress in bed  [x] Call bell left within reach  [x] Nursing notified  [x] Caregiver present  [] Bed alarm activated        Tamela Weber PT, DPT     Time Calculation: 26 mins

## 2018-03-07 NOTE — PROGRESS NOTES
Occupational Therapy    Patient unavailable for OT treatment  due to:  []  Nausea/vomiting  []  Eating  []  Pain  []  Pt lethargic  [x]  Other: Pt on phone with ID MD.  Will f/u later as schedule allows. Thank you.   Deion Mae, OTR/L

## 2018-03-07 NOTE — PROGRESS NOTES
Pharmacy Dosing Services: Vancomycin    Consult for Vancomycin Dosing by Pharmacy by Dexter Lemos provided for this 74y.o. year old female , for indication of Psoas abscess /Possible discitis/osteo  Day of Therapy 07    Ht Readings from Last 1 Encounters:   02/27/18 162.6 cm (64\")        Wt Readings from Last 1 Encounters:   02/27/18 105.7 kg (233 lb)        Previous Regimen Vancomycin 1250 mg IV q8   Last Level See below   Other Current Antibiotics Cefepime 2 gm IV q12   Significant Cultures    Serum Creatinine Lab Results   Component Value Date/Time    Creatinine 0.69 03/07/2018 04:30 AM      Creatinine Clearance Estimated Creatinine Clearance: 96.4 mL/min (based on Cr of 0.69). BUN Lab Results   Component Value Date/Time    BUN 7 03/07/2018 04:30 AM      WBC Lab Results   Component Value Date/Time    WBC 6.5 03/07/2018 04:30 AM      H/H Lab Results   Component Value Date/Time    HGB 8.4 (L) 03/07/2018 04:30 AM      Platelets Lab Results   Component Value Date/Time    PLATELET 302 12/13/2104 04:30 AM      Temp 98 °F (36.7 °C)     Spoke w/ Dr. Marva Yu (ID) via telephone this morning to explain Vancomycin dosing rationale and solidify ABX discharge plan, as patient is expected to potentially DC facility as early as tomorrow (3-8-18). Explained that pharmacy chose to aggressively dose Ms Susan Sanchez for ~24 hours in order to build up a therapeutic vancomycin blood level prior to discharge home (as her last vancomycin Trough=5.9 mcg/ml on 3-5-18 was sub-therapeutic). She has received 5 doses of vancomycin 1250 mg @ q8h dosing and has now been changed to her discharge dosing of 1250 mg IV q12h, to begin @ 17:00 today. A Trough has been ordered for 16:00 today to ensure that a therapeutic level has been achieved. Dose calculated to approximate a therapeutic trough of 15-20 mcg/mL. Pharmacy to follow daily and will make changes to dose and/or frequency based on clinical status.     Kenneth Nickerson, Pharm.D.   Clinical Pharmacist  [phone]

## 2018-03-07 NOTE — PROGRESS NOTES
Hospitalist Progress Note    Patient: Clarence Tsang MRN: [id number]  CSN: [id number]    YOB: 1950  Age: 74 y.o. Sex: female    DOA: 2/27/2018 LOS:  LOS: 8 days          Chief Complaint:    Consult for Medical Management    Assessment/Plan     1. Positive Wound Culture  2. Hx of Laminectomy by Dr Ayse Snyder  3. Psoas Abscess  4. Possible Discitis/Osteomyelitis  5. Hypokalemia    1. PICC placed yesterday. Final recommendations are in the patient's chart from Dr oCmpa Jamil with infectious diseases. 3. S/P drainage by Dr Ayse Snyder. Continue postoperative management per primary team.   4. As above. Final recommendations are in the chart from infectious diseases. 5. Will check magnesium. Give oral replacement. Will monitor while inpatient. Patient Active Problem List   Diagnosis Code    Lumbar back pain M54.5    [facility] K68.12    Post-operative complication M61. 9XXA    Lumbar surgical wound fluid collection T81.89XA       Subjective:    Still some back pain this AM.   Did not sleep well last night.      Review of systems:    Constitutional: denies fevers, chills, myalgias  Respiratory: denies SOB, cough  Cardiovascular: denies chest pain, palpitations  Gastrointestinal: denies nausea, vomiting, diarrhea      Vital signs/Intake and Output:  Visit Vitals    /55    Pulse 95    Temp 98.3 °F (36.8 °C)    Resp 18    Ht 5' 4\" (1.626 m)    Wt 105.7 kg (233 lb)    SpO2 93%    BMI 39.99 kg/m2     Current Shift:     Last three shifts:  03/05 1901 - 03/07 0700  In: 440 [P.O.:440]  Out: 1300 [Urine:1300]    Exam:    General: Obese female, alert, NAD, OX3  Head/Neck: NCAT, supple, No masses, No lymphadenopathy  CVS:Regular rate and rhythm, no M/R/G, S1/S2 heard, no thrill  Lungs:Clear to auscultation bilaterally, no wheezes, rhonchi, or rales  Abdomen: Soft, Nontender, No distention, Normal Bowel sounds, No hepatomegaly  Extremities: No C/C/E, pulses palpable 2+  Skin:normal texture and turgor, no rashes, no lesions  Neuro:grossly normal , follows commands  Psych:appropriate                Labs: Results:       Chemistry Recent Labs      03/07/18 0430 03/06/18 0110 03/05/18   0543   GLU  91  83  103*   NA  141  142  140   K  3.0*  3.2*  3.7   CL  104  106  102   CO2  32  32  31   BUN  7  6*  9   CREA  0.69  0.73  0.69   CA  8.2*  8.5  8.9   AGAP  5  4  7   BUCR  10*  8*  13      CBC w/Diff Recent Labs      03/07/18 0430 03/06/18   0110  03/05/18   0543   WBC  6.5  7.5  8.4   RBC  3.25*  3.38*  3.61*   HGB  8.4*  9.0*  9.3*   HCT  27.1*  28.7*  29.9*   PLT  296  315  283   GRANS  50  46  52   LYMPH  29  33  29   EOS  6*  5  7*      Cardiac Enzymes No results for input(s): CPK, CKND1, AYUSH in the last 72 hours. No lab exists for component: CKRMB, TROIP   Coagulation No results for input(s): PTP, INR, APTT in the last 72 hours. No lab exists for component: INREXT    Lipid Panel No results found for: CHOL, CHOLPOCT, CHOLX, CHLST, CHOLV, 543716, HDL, LDL, LDLC, DLDLP, 586843, VLDLC, VLDL, TGLX, TRIGL, TRIGP, TGLPOCT, CHHD, CHHDX   BNP No results for input(s): BNPP in the last 72 hours. Liver Enzymes No results for input(s): TP, ALB, TBIL, AP, SGOT, GPT in the last 72 hours.     No lab exists for component: DBIL   Thyroid Studies No results found for: T4, T3U, TSH, TSHEXT     Procedures/imaging: see electronic medical records for all procedures/Xrays and details which were not copied into this note but were reviewed prior to creation of [address], PAJackC

## 2018-03-07 NOTE — PROGRESS NOTES
Problem: Mobility Impaired (Adult and Pediatric)  Goal: *Acute Goals and Plan of Care (Insert Text)  Physical Therapy Goals  Initiated 3/3/2018 and to be accomplished within 7 day(s)  1. Patient will move from supine to sit and sit to supine  in bed with supervision/set-up. 2.  Patient will transfer from bed to chair and chair to bed with supervision/set-up using the least restrictive device. 3.  Patient will perform sit to stand with supervision/set-up. 4.  Patient will ambulate with supervision/set-up for >150 feet with the least restrictive device. 5.  Patient will ascend/descend a full flight of stairs with handrail(s) with minimal assistance/contact guard assist for safe home entry. Pt refused PT due to:    []  Nausea/vomiting  []  Eating  []  Pain  []  Pt lethargic  [x]  MD in the room with patient. Will f/u later as schedule allows. Thank you.     Bharathi Weber PT, DPT

## 2018-03-07 NOTE — PROGRESS NOTES
Problem: Mobility Impaired (Adult and Pediatric)  Goal: *Acute Goals and Plan of Care (Insert Text)  Physical Therapy Goals  Initiated 3/3/2018 and to be accomplished within 7 day(s)  1. Patient will move from supine to sit and sit to supine  in bed with supervision/set-up. 2.  Patient will transfer from bed to chair and chair to bed with supervision/set-up using the least restrictive device. 3.  Patient will perform sit to stand with supervision/set-up. 4.  Patient will ambulate with supervision/set-up for >150 feet with the least restrictive device. 5.  Patient will ascend/descend a full flight of stairs with handrail(s) with minimal assistance/contact guard assist for safe home entry. Pt refused PT due to:    []  Nausea/vomiting  []  Eating  []  Pain  []  Pt lethargic  [x]  Pt on the phone with ID physician. Will f/u later as schedule allows. Thank you.     Jose Weber PT, DPT

## 2018-03-07 NOTE — PROGRESS NOTES
Problem: Self Care Deficits Care Plan (Adult)  Goal: *Acute Goals and Plan of Care (Insert Text)  Short Term Goals Updated on 3/03/18: In 3 days:   1. Pt will perform LB dressing using AE as needed with minimal-moderate assistance in order to improve independence with ADLs. 2.  Pt will perform supine to sit EOB with CGA using log roll method to improve mobility skills and prepare for OOB transfers. 3.  Pt will perform toilet transfers and toileting routine with minimal-moderate assistance using AD/DME as needed in order to increase independence with ADLs. 4.  Pt will stand at bedside for 3 minutes without LOB in order to perform grooming tasks with Minimal A for stand balance. Occupational Therapy TREATMENT    Patient: Janessa Lilly (31 y.o. female)  Date: 3/7/2018  Diagnosis: Lumbar back pain  Post-operative complication  Psoas abscess, left (HCC)  Lumbar surgical wound fluid collection  REPAIR CSF LEAK <principal problem not specified>  Procedure(s) (LRB):  LUMBAR INCISION AND DRAINAGE (PT IN ROOM #203) WITH C-ARM,  (N/A) 5 Days Post-Op  Precautions: Fall, Back  Chart, occupational therapy assessment, plan of care, and goals were reviewed. ASSESSMENT:  Pt seen for co-tx with PT. Pt OOB and seated in chair on arrival.  Pt requesting to use bathroom. Pt CGA for sit to stand transfer from chair and for toilet transfers with additional time. Pt completed UB sponge bathing with min A and support for balance while seated on toilet. Total assist for perineal bathing while standing due to pt difficulty reaching perineal areas presently. Provided toilet tongs and verbal explanation for future use of same and pt verbalized understanding. Pt positioned in chair with ice packs and pillow at end of session. Pt remains limited by decreased activity tolerance, pain, functional balance deficits and decreased LB reach impacting ADL function and mobility. Recommend rehab at d/c.   Cont OT  Progression toward goals:  []          Improving appropriately and progressing toward goals  [x]          Improving slowly and progressing toward goals  []          Not making progress toward goals and plan of care will be adjusted     PLAN:  Patient continues to benefit from skilled intervention to address the above impairments. Continue treatment per established plan of care. Discharge Recommendations:  Rehab  Further Equipment Recommendations for Discharge:  N/A     SUBJECTIVE:   Patient stated I can't do it (re: perineal care).     OBJECTIVE DATA SUMMARY:   Cognitive/Behavioral Status:  Neurologic State: Alert, Appropriate for age  Orientation Level: Appropriate for age, Oriented X4  Cognition: Appropriate decision making, Appropriate for age attention/concentration, Appropriate safety awareness  Safety/Judgement: Awareness of environment, Fall prevention, Good awareness of safety precautions, Insight into deficits    Functional Mobility and Transfers for ADLs:    Transfers:  Sit to Stand: Contact guard assistance; Additional time      Toilet Transfer : Contact guard assistance      ADL Intervention:    Upper Body Bathing  Bathing Assistance: Minimum assistance  Position Performed: Other (comment) (seated in bathroom )    Lower Body Bathing  Perineal  : Total assistance (dependent)  Position Performed: Standing    Toileting  Bladder Hygiene: Total assistance (dependent)  Adaptive Equipment: Grab bars; Walker    Activity Tolerance:    fair  Please refer to the flowsheet for vital signs taken during this treatment.   After treatment:   [x]  Patient left in no apparent distress sitting up in chair  []  Patient left in no apparent distress in bed  [x]  Call bell left within reach  []  Nursing notified  []  Caregiver present  []  Bed alarm activated    Thank you for this referral.  Florecita Ojeda OTR/L  Time Calculation: 23 mins

## 2018-03-23 ENCOUNTER — OFFICE VISIT (OUTPATIENT)
Dept: INTERNAL MEDICINE CLINIC | Age: 68
End: 2018-03-23

## 2018-03-23 ENCOUNTER — HOSPITAL ENCOUNTER (OUTPATIENT)
Dept: LAB | Age: 68
Discharge: HOME | End: 2018-03-23
Payer: MEDICARE

## 2018-03-23 VITALS
RESPIRATION RATE: 18 BRPM | HEIGHT: 64 IN | OXYGEN SATURATION: 96 % | WEIGHT: 235 LBS | HEART RATE: 99 BPM | BODY MASS INDEX: 40.12 KG/M2 | TEMPERATURE: 97.4 F | DIASTOLIC BLOOD PRESSURE: 81 MMHG | SYSTOLIC BLOOD PRESSURE: 149 MMHG

## 2018-03-23 DIAGNOSIS — M46.26 OSTEOMYELITIS OF LUMBAR SPINE (HCC): ICD-10-CM

## 2018-03-23 DIAGNOSIS — K68.12 PSOAS ABSCESS (HCC): ICD-10-CM

## 2018-03-23 DIAGNOSIS — D64.9 ANEMIA, UNSPECIFIED TYPE: ICD-10-CM

## 2018-03-23 DIAGNOSIS — K68.12 PSOAS ABSCESS (HCC): Primary | ICD-10-CM

## 2018-03-23 LAB
ALBUMIN SERPL-MCNC: 2.4 G/DL (ref 3.4–5)
ALBUMIN/GLOB SERPL: 0.6 {RATIO} (ref 0.8–1.7)
ALP SERPL-CCNC: 155 U/L (ref 45–117)
ALT SERPL-CCNC: 11 U/L (ref 13–56)
ANION GAP SERPL CALC-SCNC: 8 MMOL/L (ref 3–18)
AST SERPL-CCNC: 17 U/L (ref 15–37)
BASOPHILS # BLD: 0.1 K/UL (ref 0–0.06)
BASOPHILS NFR BLD: 1 % (ref 0–2)
BILIRUB SERPL-MCNC: 0.4 MG/DL (ref 0.2–1)
BUN SERPL-MCNC: 9 MG/DL (ref 7–18)
BUN/CREAT SERPL: 15 (ref 12–20)
CALCIUM SERPL-MCNC: 8.1 MG/DL (ref 8.5–10.1)
CHLORIDE SERPL-SCNC: 101 MMOL/L (ref 100–108)
CO2 SERPL-SCNC: 32 MMOL/L (ref 21–32)
CREAT SERPL-MCNC: 0.62 MG/DL (ref 0.6–1.3)
CRP SERPL-MCNC: 4.1 MG/DL (ref 0–0.3)
DIFFERENTIAL METHOD BLD: ABNORMAL
EOSINOPHIL # BLD: 0.5 K/UL (ref 0–0.4)
EOSINOPHIL NFR BLD: 6 % (ref 0–5)
ERYTHROCYTE [DISTWIDTH] IN BLOOD BY AUTOMATED COUNT: 16.4 % (ref 11.6–14.5)
ERYTHROCYTE [SEDIMENTATION RATE] IN BLOOD: 70 MM/HR (ref 0–30)
GLOBULIN SER CALC-MCNC: 3.9 G/DL (ref 2–4)
GLUCOSE SERPL-MCNC: 87 MG/DL (ref 74–99)
HCT VFR BLD AUTO: 31.2 % (ref 35–45)
HGB BLD-MCNC: 9.7 G/DL (ref 12–16)
IRON SATN MFR SERPL: 19 %
IRON SERPL-MCNC: 30 UG/DL (ref 50–175)
LYMPHOCYTES # BLD: 2.1 K/UL (ref 0.9–3.6)
LYMPHOCYTES NFR BLD: 28 % (ref 21–52)
MCH RBC QN AUTO: 25.9 PG (ref 24–34)
MCHC RBC AUTO-ENTMCNC: 31.1 G/DL (ref 31–37)
MCV RBC AUTO: 83.4 FL (ref 74–97)
MONOCYTES # BLD: 1 K/UL (ref 0.05–1.2)
MONOCYTES NFR BLD: 13 % (ref 3–10)
NEUTS SEG # BLD: 3.9 K/UL (ref 1.8–8)
NEUTS SEG NFR BLD: 52 % (ref 40–73)
PLATELET # BLD AUTO: 397 K/UL (ref 135–420)
PMV BLD AUTO: 10.4 FL (ref 9.2–11.8)
POTASSIUM SERPL-SCNC: 2.6 MMOL/L (ref 3.5–5.5)
PROT SERPL-MCNC: 6.3 G/DL (ref 6.4–8.2)
RBC # BLD AUTO: 3.74 M/UL (ref 4.2–5.3)
RETICS/RBC NFR AUTO: 3.1 % (ref 0.5–2.3)
SODIUM SERPL-SCNC: 141 MMOL/L (ref 136–145)
TIBC SERPL-MCNC: 154 UG/DL (ref 250–450)
WBC # BLD AUTO: 7.6 K/UL (ref 4.6–13.2)

## 2018-03-23 PROCEDURE — 85045 AUTOMATED RETICULOCYTE COUNT: CPT | Performed by: INTERNAL MEDICINE

## 2018-03-23 PROCEDURE — 85652 RBC SED RATE AUTOMATED: CPT | Performed by: INTERNAL MEDICINE

## 2018-03-23 PROCEDURE — 80053 COMPREHEN METABOLIC PANEL: CPT | Performed by: INTERNAL MEDICINE

## 2018-03-23 PROCEDURE — 83540 ASSAY OF IRON: CPT | Performed by: INTERNAL MEDICINE

## 2018-03-23 PROCEDURE — 86140 C-REACTIVE PROTEIN: CPT | Performed by: INTERNAL MEDICINE

## 2018-03-23 PROCEDURE — 85025 COMPLETE CBC W/AUTO DIFF WBC: CPT | Performed by: INTERNAL MEDICINE

## 2018-03-23 NOTE — MR AVS SNAPSHOT
[address] [facility] 
 
 
 [address], [address] [facility] 
[phone] Patient: Romi Hancock MRN: [id number] TXI:[id number] Visit Information Date & Time Provider Department Dept. Phone Encounter #  
 3/23/2018  1:30 PM Amalia Terry MD [facility] [address] [place] [address] [phone] Follow-up Instructions Return in about 3 weeks (around 4/11/2018). Follow-up and Disposition History Your Appointments 4/12/2018 12:00 PM  
Office Visit with Amalia Terry MD  
[facility] [address] [place] [address] [address] [facility]) Appt Note: 3 week follow up  
 [address], [address] Lamont Manners [address] [place]  
  
   
 [address], [address] Upcoming Health Maintenance Date Due Hepatitis C Screening 1950 DTaP/Tdap/Td series (1 - Tdap) 3/16/1973 BREAST CANCER SCRN MAMMOGRAM 3/16/2002 FOBT Q 1 YEAR AGE 50-75 3/16/2002 ZOSTER VACCINE AGE 60> 1/16/2012 GLAUCOMA SCREENING Q2Y 3/16/2017 Bone Densitometry (Dexa) Screening 3/16/2017 Pneumococcal 65+ Low/Medium Risk (1 of 2 - PCV13) 3/16/2017 MEDICARE YEARLY EXAM 3/21/2018 Allergies as of 3/23/2018  Review Complete On: 3/23/2018 By: Amalia Terry MD  
 No Known Allergies Current Immunizations  Never Reviewed No immunizations on file. Not reviewed this visit You Were Diagnosed With   
  
 Codes Comments Psoas abscess ([facility] [address]    -  Primary ICD-10-CM: R97.56 
ICD-9-CM: 567.31 Osteomyelitis of lumbar spine (HCC)     ICD-10-CM: M46.26 
ICD-9-CM: 730.28 Anemia, unspecified type     ICD-10-CM: D64.9 ICD-9-CM: 190. 9 Vitals BP Pulse Temp Resp Height(growth percentile) Weight(growth percentile) 149/81 99 97.4 °F (36.3 °C) 18 5' 4\" (1.626 m) 235 lb (106.6 kg) SpO2 BMI OB Status Smoking Status 96% 40.34 kg/m2 Hysterectomy Former Smoker Vitals History BMI and BSA Data Body Mass Index Body Surface Area  
 40.34 kg/m 2 2.19 m 2 Preferred Pharmacy Pharmacy Name Phone [address], [place] [address] [address] [address] Michelle Orozco [phone] Your Updated Medication List  
  
   
This list is accurate as of 3/23/18  3:43 PM.  Always use your most recent med list.  
  
  
  
  
 cyclobenzaprine 10 mg tablet Commonly known as:  FLEXERIL Take 1 Tab by mouth three (3) times daily (with meals). hydroCHLOROthiazide 12.5 mg capsule Commonly known as:  Gene English Take 12.5 mg by mouth as needed. HYDROcodone-acetaminophen  mg tablet Commonly known as:  Luster Payor Take 1 Tab by mouth every four (4) hours as needed. Max Daily Amount: 6 Tabs. Indications: Pain  
  
 morphine CR 30 mg CR tablet Commonly known as:  MS CONTIN Take 1 Tab by mouth two (2) times a day. Max Daily Amount: 60 mg. Indications: Chronic Pain, Severe Pain  
  
 naproxen 500 mg tablet Commonly known as:  NAPROSYN Take 500 mg by mouth two (2) times daily (with meals). Follow-up Instructions Return in about 3 weeks (around 4/11/2018). To-Do List   
 04/09/2018 Imaging:  MRI LUMB SPINE W WO CONT Introducing [facility]! [employer] introduces [employer] patient portal. Now you can access parts of your medical record, email your doctor's office, and request medication refills online. 1. In your internet browser, go to [email or web address]. "[employer] 2. Click on the First Time User? Click Here link in the Sign In box. You will see the New Member Sign Up page. 3. Enter your [employer] Access Code exactly as it appears below. You will not need to use this code after youve completed the sign-up process. If you do not sign up before the expiration date, you must request a new code.  
 
· [employer] Access Code: [email or web address] 
 Expires: 6/21/2018  3:43 PM 
 
4. Enter the last four digits of your Social Security Number (xxxx) and Date of Birth (mm/dd/yyyy) as indicated and click Submit. You will be taken to the next sign-up page. 5. Create a [employer] ID. This will be your [employer] login ID and cannot be changed, so think of one that is secure and easy to remember. 6. Create a [employer] password. You can change your password at any time. 7. Enter your Password Reset Question and Answer. This can be used at a later time if you forget your password. 8. Enter your e-mail address. You will receive e-mail notification when new information is available in [address] 9. Click Sign Up. You can now view and download portions of your medical record. 10. Click the Download Summary menu link to download a portable copy of your medical information. If you have questions, please visit the Frequently Asked Questions section of the [employer] website. Remember, [employer] is NOT to be used for urgent needs. For medical emergencies, dial 911. Now available from your iPhone and Android! Please provide this summary of care documentation to your next provider. Your primary care clinician is listed as Rene Swanson. If you have any questions after today's visit, please call [phone].

## 2018-03-23 NOTE — PROGRESS NOTES
HPI     Khang Padilla is a 79 y.o. female with relevant past medical history of HTN, morbid obesity, chronic back pain s/p multiple back surgeries including microdiscectomy of L-4/L5  and L5/S1 in oct 2017 and a repeat microdiscectomy for ruptured disc Jan 9/2018. She developed a leak following this and she was re-explored and cultured with no growth. Jan 19th pain was severe and she couldn't move-had a myelogram/MRI and had a debridement on jan 24th at [facility] and had a drain in for 3 days (cefaolin was given during surgery)  as the culture was neg no antibiotics was given by Dr. Chelle Hightower. She was sent to [facility] rehab for 2 weeks and they put her on oral keflex for 10 days. She went home on feb 22nd  And on the morning of 27th she woke up with same pain and presented to the ED on 2/27/18 c/o back pain and leg pain. On 2/27/18 a lumbar MRI was done concerning for complex fluid collections along the laminotomy bed seen within the subcutaneous fat with peripheral enhancement concerning for abscess. Extensive abnormal fluid signal with peripheral enhancement involving L4-5 and L5-S1 disc spaces including prominent endplate marrow signal changes and  enhancement as well as cortical signal loss towards the left, suspicious for discitis/osteomyelitis. S/p lumbar exploration surgery on L4-L5, L5-S1 left side with no recurrent disc herniation, wound re-exploration, thorough irrigation and exploration of all areas of importance with cultures and sensitivity with no definite abscess being found on 3/2/18. Cultures intraoperative with diptheroid growth. ED wound culture grew strep viridans, diptheroids and coag neg staph. Because of Keflex use in the recent past cultures may not be accurate. She was initially started on ceftriaxone form 2/27/18 to 3/1/18  Then changed to vancomycin and zosyn from 3/1/18  Then zosyn discontinued and changed to cefepime 2 gr IV q12 hours on 3/6/18.   ID recommended to complete 6 weeks of vancomycin and cefepime and 2 weeks of metronidazol for risk of contamination with stool. She presents today for infectious diseases follow up of spinal osteomyelitis and psoas abscess treatment as complications s/p laminectomy. Since discharge she has in rehabilitation for continued PT and IV antibiotic therapy. ROS  As above included in HPI. Otherwise 11 point review of systems negative including constitutional, skin, HENT, eyes, respiratory, cardiovascular, gastrointestinal, genitourinary, musculoskeletal, endocrine, hematologic, allergy, and neurologic. Past Medical History  Past Medical History:   Diagnosis Date    Hypertension      Past Surgical History:   Procedure Laterality Date    HX CHOLECYSTECTOMY      HX GYN      HX ORTHOPAEDIC  1996    c5-6 fusion     HX ORTHOPAEDIC  01/2018    lumbar laminectomy         Family History  History reviewed. No pertinent family history. Social History  She  reports that she has quit smoking. She has never used smokeless tobacco.   History   Alcohol Use No       Immunization History    There is no immunization history on file for this patient. Allergies  No Known Allergies    Medications  Current Outpatient Prescriptions   Medication Sig    HYDROcodone-acetaminophen (NORCO)  mg tablet Take 1 Tab by mouth every four (4) hours as needed. Max Daily Amount: 6 Tabs. Indications: Pain    morphine CR (MS CONTIN) 30 mg CR tablet Take 1 Tab by mouth two (2) times a day. Max Daily Amount: 60 mg. Indications: Chronic Pain, Severe Pain    cyclobenzaprine (FLEXERIL) 10 mg tablet Take 1 Tab by mouth three (3) times daily (with meals).  hydroCHLOROthiazide (MICROZIDE) 12.5 mg capsule Take 12.5 mg by mouth as needed.  naproxen (NAPROSYN) 500 mg tablet Take 500 mg by mouth two (2) times daily (with meals). No current facility-administered medications for this visit.           Visit Vitals    /81    Pulse 99    Temp 97.4 °F (36.3 °C)  Resp 18    Ht 5' 4\" (1.626 m)    Wt 235 lb (106.6 kg)    SpO2 96%    BMI 40.34 kg/m2     Body mass index is 40.34 kg/(m^2). Physical Exam      REVIEW OF DATA    Labs  Admission on 02/27/2018, Discharged on 03/07/2018   No results displayed because visit has over 200 results. CT Results (most recent):  No results found for this or any previous visit. XR Results (most recent):  No results found for this or any previous visit. CT   All Micro Results     None           DIAGNOSIS AND PLAN  Patient Active Problem List   Diagnosis Code    Lumbar back pain M54.5    Psoas abscess, left (HCC) K68.12    Post-operative complication X36. 9XXA    Lumbar surgical wound fluid collection T81.89XA     1. Psoas abscess ([address]  2. Lumbar discitis/Osteomyelitis  Continue with antibiotics as prescribed:    1) vancomycin 1250 mg Q 12 -dose will change depending on trough of 15-17until 4/12/18  Pharmacist in house and in rehab should adjust the dose  2) cefepime 2 grams IV [id number] 4/12/18  3) Metronidazole 500mg IV or PO Q 8 orally ( as long as she can tolerate completed 3/21/18  Probiotic 1 a day   Lumbar MRI repeat on week of 4/9/18  Follow up     Follow-up Disposition: Not on File     Elvi Gil MD

## 2018-03-26 NOTE — PROGRESS NOTES
Hypokalemia 2.6, iron: 30. Called patient and talked to rehab nurse Sultana Merida, gave verbal orders recommending to give potassium chloride 40 mEq today, followed by 20 mEq daily for a total of 5 days with repeat BMP on Friday after last doses. Also recommended to start ferrous sulfate 325 mg PO daily. They will fax over repeat BNP to clinic. Also asked to let physician at rehab center be aware and monitor during her stay there.

## 2018-04-09 ENCOUNTER — HOSPITAL ENCOUNTER (OUTPATIENT)
Dept: MRI IMAGING | Age: 68
Discharge: HOME | End: 2018-04-09
Attending: INTERNAL MEDICINE
Payer: MEDICARE

## 2018-04-09 DIAGNOSIS — K68.12 PSOAS ABSCESS (HCC): ICD-10-CM

## 2018-04-09 DIAGNOSIS — M46.26 OSTEOMYELITIS OF LUMBAR SPINE (HCC): ICD-10-CM

## 2018-04-09 PROCEDURE — 74011250636 HC RX REV CODE- 250/636: Performed by: INTERNAL MEDICINE

## 2018-04-09 PROCEDURE — 72158 MRI LUMBAR SPINE W/O & W/DYE: CPT

## 2018-04-09 PROCEDURE — A9577 INJ MULTIHANCE: HCPCS | Performed by: INTERNAL MEDICINE

## 2018-04-09 RX ORDER — HEPARIN SODIUM (PORCINE) LOCK FLUSH IV SOLN 100 UNIT/ML 100 UNIT/ML
500 SOLUTION INTRAVENOUS AS NEEDED
Status: DISCONTINUED | OUTPATIENT
Start: 2018-04-09 | End: 2018-04-13 | Stop reason: HOSPADM

## 2018-04-09 RX ADMIN — GADOBENATE DIMEGLUMINE 20 ML: 529 INJECTION, SOLUTION INTRAVENOUS at 14:48

## 2018-04-09 RX ADMIN — HEPARIN SODIUM (PORCINE) LOCK FLUSH IV SOLN 100 UNIT/ML 500 UNITS: 100 SOLUTION at 14:57

## 2018-04-09 NOTE — PROGRESS NOTES
Flushed and heparinized each lumen to Right arm PICC post MRI exam.  Each lumen flushed easily. 2.5ml Heparin 100U/1ml packed into each lumen.

## 2018-04-12 ENCOUNTER — TELEPHONE (OUTPATIENT)
Dept: INTERNAL MEDICINE CLINIC | Age: 68
End: 2018-04-12

## 2018-04-12 ENCOUNTER — HOSPITAL ENCOUNTER (OUTPATIENT)
Dept: LAB | Age: 68
Discharge: HOME | End: 2018-04-12
Payer: MEDICARE

## 2018-04-12 ENCOUNTER — OFFICE VISIT (OUTPATIENT)
Dept: INTERNAL MEDICINE CLINIC | Age: 68
End: 2018-04-12

## 2018-04-12 VITALS
OXYGEN SATURATION: 98 % | TEMPERATURE: 97.8 F | DIASTOLIC BLOOD PRESSURE: 70 MMHG | RESPIRATION RATE: 17 BRPM | HEART RATE: 115 BPM | HEIGHT: 64 IN | SYSTOLIC BLOOD PRESSURE: 112 MMHG

## 2018-04-12 DIAGNOSIS — M46.26 OSTEOMYELITIS OF LUMBAR SPINE (HCC): ICD-10-CM

## 2018-04-12 DIAGNOSIS — E87.6 HYPOKALEMIA: ICD-10-CM

## 2018-04-12 DIAGNOSIS — D50.9 IRON DEFICIENCY ANEMIA, UNSPECIFIED IRON DEFICIENCY ANEMIA TYPE: ICD-10-CM

## 2018-04-12 DIAGNOSIS — K68.12 PSOAS ABSCESS, LEFT (HCC): Primary | ICD-10-CM

## 2018-04-12 DIAGNOSIS — M46.46 LUMBAR DISCITIS: ICD-10-CM

## 2018-04-12 PROBLEM — E66.01 OBESITY, MORBID (HCC): Status: ACTIVE | Noted: 2018-04-12

## 2018-04-12 LAB
ANION GAP SERPL CALC-SCNC: 7 MMOL/L (ref 3–18)
BASOPHILS # BLD: 0.1 K/UL (ref 0–0.06)
BASOPHILS NFR BLD: 1 % (ref 0–2)
BUN SERPL-MCNC: 9 MG/DL (ref 7–18)
BUN/CREAT SERPL: 12 (ref 12–20)
CALCIUM SERPL-MCNC: 8.1 MG/DL (ref 8.5–10.1)
CHLORIDE SERPL-SCNC: 102 MMOL/L (ref 100–108)
CO2 SERPL-SCNC: 31 MMOL/L (ref 21–32)
CREAT SERPL-MCNC: 0.78 MG/DL (ref 0.6–1.3)
CRP SERPL-MCNC: 6.5 MG/DL (ref 0–0.3)
DIFFERENTIAL METHOD BLD: ABNORMAL
EOSINOPHIL # BLD: 0.4 K/UL (ref 0–0.4)
EOSINOPHIL NFR BLD: 6 % (ref 0–5)
ERYTHROCYTE [DISTWIDTH] IN BLOOD BY AUTOMATED COUNT: 16.3 % (ref 11.6–14.5)
ERYTHROCYTE [SEDIMENTATION RATE] IN BLOOD: 64 MM/HR (ref 0–30)
GLUCOSE SERPL-MCNC: 113 MG/DL (ref 74–99)
HCT VFR BLD AUTO: 29.1 % (ref 35–45)
HGB BLD-MCNC: 9.5 G/DL (ref 12–16)
LYMPHOCYTES # BLD: 1.4 K/UL (ref 0.9–3.6)
LYMPHOCYTES NFR BLD: 21 % (ref 21–52)
MCH RBC QN AUTO: 26 PG (ref 24–34)
MCHC RBC AUTO-ENTMCNC: 32.6 G/DL (ref 31–37)
MCV RBC AUTO: 79.7 FL (ref 74–97)
MONOCYTES # BLD: 0.9 K/UL (ref 0.05–1.2)
MONOCYTES NFR BLD: 13 % (ref 3–10)
NEUTS SEG # BLD: 4.2 K/UL (ref 1.8–8)
NEUTS SEG NFR BLD: 59 % (ref 40–73)
PLATELET # BLD AUTO: 299 K/UL (ref 135–420)
PMV BLD AUTO: 10.7 FL (ref 9.2–11.8)
POTASSIUM SERPL-SCNC: 3.2 MMOL/L (ref 3.5–5.5)
RBC # BLD AUTO: 3.65 M/UL (ref 4.2–5.3)
SODIUM SERPL-SCNC: 140 MMOL/L (ref 136–145)
WBC # BLD AUTO: 7 K/UL (ref 4.6–13.2)

## 2018-04-12 PROCEDURE — 85025 COMPLETE CBC W/AUTO DIFF WBC: CPT | Performed by: INTERNAL MEDICINE

## 2018-04-12 PROCEDURE — 80048 BASIC METABOLIC PNL TOTAL CA: CPT | Performed by: INTERNAL MEDICINE

## 2018-04-12 PROCEDURE — 85652 RBC SED RATE AUTOMATED: CPT | Performed by: INTERNAL MEDICINE

## 2018-04-12 PROCEDURE — 86140 C-REACTIVE PROTEIN: CPT | Performed by: INTERNAL MEDICINE

## 2018-04-12 RX ORDER — LANOLIN ALCOHOL/MO/W.PET/CERES
400 CREAM (GRAM) TOPICAL DAILY
COMMUNITY
End: 2021-01-15 | Stop reason: CLARIF

## 2018-04-12 RX ORDER — POTASSIUM CHLORIDE 1500 MG/1
TABLET, FILM COATED, EXTENDED RELEASE ORAL
COMMUNITY
End: 2018-04-12 | Stop reason: DRUGHIGH

## 2018-04-12 RX ORDER — VANCOMYCIN HCL IN 5 % DEXTROSE 1 G/100 ML
1 PLASTIC BAG, INJECTION (ML) INTRAVENOUS EVERY 12 HOURS
COMMUNITY
End: 2018-04-12 | Stop reason: ALTCHOICE

## 2018-04-12 RX ORDER — SULFAMETHOXAZOLE AND TRIMETHOPRIM 800; 160 MG/1; MG/1
1 TABLET ORAL 2 TIMES DAILY
Qty: 20 TAB | Refills: 0 | Status: SHIPPED | OUTPATIENT
Start: 2018-04-12 | End: 2018-04-22

## 2018-04-12 RX ORDER — POTASSIUM CHLORIDE 750 MG/1
10 TABLET, EXTENDED RELEASE ORAL DAILY
Qty: 7 TAB | Refills: 1 | Status: SHIPPED | OUTPATIENT
Start: 2018-04-12 | End: 2018-05-07

## 2018-04-12 RX ORDER — LANOLIN ALCOHOL/MO/W.PET/CERES
325 CREAM (GRAM) TOPICAL
Qty: 90 TAB | Refills: 3 | Status: SHIPPED | OUTPATIENT
Start: 2018-04-12 | End: 2021-04-02

## 2018-04-12 RX ORDER — FUROSEMIDE 20 MG/1
TABLET ORAL DAILY
COMMUNITY
End: 2021-01-15 | Stop reason: CLARIF

## 2018-04-12 RX ORDER — AMOXICILLIN AND CLAVULANATE POTASSIUM 875; 125 MG/1; MG/1
1 TABLET, FILM COATED ORAL EVERY 12 HOURS
Qty: 28 TAB | Refills: 0 | Status: SHIPPED | OUTPATIENT
Start: 2018-04-12 | End: 2018-04-26

## 2018-04-12 NOTE — PROGRESS NOTES
YULISSA Harrison is a 79 y.o. female with relevant past medical history of HTN, morbid obesity, chronic back pain s/p multiple back surgeries including microdiscectomy of L4/L5  and L5/S1 in 10/2017 and a repeat microdiscectomy for a ruptured disc on 1/9/2018. She developed a leak following this procedure and she was re-explored and cultured with no growth according to inpatient ID notes. On 1/19/18 the pain was so severe and the patient couldn't move, so she had a myelogram/MRI and underwent debridement on 1/24/18 at [id number]  [facility] and had a drain in for 3 days. Apparently cefazolin was given during the surgery and the intraoperative culture was negative, so no antibiotics were given by Dr. Cuca Parsons. She was sent to [facility] rehab for 2 weeks and they put her on oral keflex for 10 days. She went home on 2/22/18 and on the morning of 2/27/18 she woke up with severe back pain again radiated to her leg so she presented to the ED and a lumbar MRI was done concerning for abscesses along the laminotomy bed seen within the subcutaneous fat with peripheral enhancement and extensive abnormal fluid signal with peripheral enhancement involving L4-5 and L5-S1 disc spaces including prominent endplate marrow signal changes and enhancement as well as cortical signal loss towards the left, suspicious for discitis/osteomyelitis. On 3/2/18 she underwent lumbar exploration surgery, I&D, on L4-L5, L5-S1 left side, and was not found to have recurrent disc herniation or definite abscess according to notes. Intraoperative cultures grew rare diptheroids only. ED wound culture grew strep viridans, diptheroids and coag neg staph. Prior to the procedure the patient received ceftriaxone from 2/27/18 to 3/1/18  Then changed to vancomycin and zosyn on 3/1/18. Then the Zosyn was discontinued and changed to cefepime 2 gr IV q12 hours on 3/6/18.   ID recommended to complete 6 weeks of vancomycin and cefepime to be completed on 4/13/18, and 2 weeks of metronidazol (completed 3/21/8) for risk of contamination with stool. The patient reports feeling well, minimal intermittent pain/cramping like in lower back and no systemic symptoms. She says her potassium was repleted after most recent K levels in 3/23/18 were 2.6 and she was started on ferrous sulfate inr 30, ,  retic 3.1 Hb: 9.7. Since her last visit she had a new MRI done on 4/9/18 describing  1. Although there is increased bone marrow edema at L4-5 and L5-S1, fluid signal within each of the 2 involved discs has essentially resolved, with increasing intradiscal enhancement, most likely granulation tissue. 2. No definite epidural abscess identified. 3. Resolution of previously seen large fluid collection extending from the  surgical incision site down to the laminectomy defects, along with resolution of  previously seen small fluid collection in the deep left psoas muscle. 4. Multilevel disc disease producing various degrees of canal, lateral recess  and foraminal stenosis as described above which involves not only the 2 surgical  levels but also the 2 levels above this, all of which appears unchanged. Please  see above for further description at each level. Her ESR on 3/23/18 was 70 from 73 on 3/6/18 and CRP 4.1 from 13 on the same dates. ROS  As above included in HPI. Otherwise 11 point review of systems negative including constitutional, skin, HENT, eyes, respiratory, cardiovascular, gastrointestinal, genitourinary, musculoskeletal, endocrine, hematologic, allergy, and neurologic. Past Medical History  Past Medical History:   Diagnosis Date    Hypertension      Past Surgical History:   Procedure Laterality Date    HX CHOLECYSTECTOMY      HX GYN      HX ORTHOPAEDIC  1996    c5-6 fusion     HX ORTHOPAEDIC  01/2018    lumbar laminectomy         Family History  No family history on file. Social History  She  reports that she has quit smoking.  She has never used smokeless tobacco.   History   Alcohol Use No       Immunization History    There is no immunization history on file for this patient. Allergies  No Known Allergies    Medications  Current Outpatient Prescriptions   Medication Sig    cefepime (MAXIPIME) 2 gram injection 2 g by IntraVENous route every twelve (12) hours.  Lactobacillus acidophilus (PROBIOTIC) 10 billion cell cap Take  by mouth.  vancomycin in dextrose 5% (VANCOCIN) 1 gram/250 mL soln 1 g by IntraVENous route every twelve (12) hours.  furosemide (LASIX) 20 mg tablet Take  by mouth daily.  potassium chloride SR (K-TAB) 20 mEq tablet Take  by mouth.  magnesium oxide (MAG-OX) 400 mg tablet Take 400 mg by mouth daily.  amoxicillin-clavulanate (AUGMENTIN) 875-125 mg per tablet Take 1 Tab by mouth every twelve (12) hours for 14 days.  trimethoprim-sulfamethoxazole (BACTRIM DS, SEPTRA DS) 160-800 mg per tablet Take 1 Tab by mouth two (2) times a day for 10 days.  ferrous sulfate 325 mg (65 mg iron) tablet Take 1 Tab by mouth three (3) times daily (with meals).  HYDROcodone-acetaminophen (NORCO)  mg tablet Take 1 Tab by mouth every four (4) hours as needed. Max Daily Amount: 6 Tabs. Indications: Pain    morphine CR (MS CONTIN) 30 mg CR tablet Take 1 Tab by mouth two (2) times a day. Max Daily Amount: 60 mg. Indications: Chronic Pain, Severe Pain    cyclobenzaprine (FLEXERIL) 10 mg tablet Take 1 Tab by mouth three (3) times daily (with meals).  hydroCHLOROthiazide (MICROZIDE) 12.5 mg capsule Take 12.5 mg by mouth as needed.  naproxen (NAPROSYN) 500 mg tablet Take 500 mg by mouth two (2) times daily (with meals). No current facility-administered medications for this visit.       Facility-Administered Medications Ordered in Other Visits   Medication Dose Route Frequency    heparin (porcine) 100 unit/mL injection 500 Units  500 Units InterCATHeter PRN         Visit Vitals    /70 (BP 1 Location: Left arm, BP Patient Position: Sitting)    Pulse (!) 115    Temp 97.8 °F (36.6 °C) (Oral)    Resp 17    Ht 5' 4\" (1.626 m)    SpO2 98%     There is no height or weight on file to calculate BMI. Physical Exam      REVIEW OF DATA    Labs  Hospital Outpatient Visit on 03/23/2018   Component Date Value Ref Range Status    Sed rate, automated 03/23/2018 70* 0 - 30 mm/hr Final    C-Reactive protein 03/23/2018 4.1* 0 - 0.3 mg/dL Final    Sodium 03/23/2018 141  136 - 145 mmol/L Final    Potassium 03/23/2018 2.6* 3.5 - 5.5 mmol/L Final    Chloride 03/23/2018 101  100 - 108 mmol/L Final    CO2 03/23/2018 32  21 - 32 mmol/L Final    Anion gap 03/23/2018 8  3.0 - 18 mmol/L Final    Glucose 03/23/2018 87  74 - 99 mg/dL Final    BUN 03/23/2018 9  7.0 - 18 MG/DL Final    Creatinine 03/23/2018 0.62  0.6 - 1.3 MG/DL Final    BUN/Creatinine ratio 03/23/2018 15  12 - 20   Final    GFR est AA 03/23/2018 >60  >60 ml/min/1.73m2 Final    GFR est non-AA 03/23/2018 >60  >60 ml/min/1.73m2 Final    Calcium 03/23/2018 8.1* 8.5 - 10.1 MG/DL Final    Bilirubin, total 03/23/2018 0.4  0.2 - 1.0 MG/DL Final    ALT (SGPT) 03/23/2018 11* 13 - 56 U/L Final    AST (SGOT) 03/23/2018 17  15 - 37 U/L Final    Alk.  phosphatase 03/23/2018 155* 45 - 117 U/L Final    Protein, total 03/23/2018 6.3* 6.4 - 8.2 g/dL Final    Albumin 03/23/2018 2.4* 3.4 - 5.0 g/dL Final    Globulin 03/23/2018 3.9  2.0 - 4.0 g/dL Final    A-G Ratio 03/23/2018 0.6* 0.8 - 1.7   Final    WBC 03/23/2018 7.6  4.6 - 13.2 K/uL Final    RBC 03/23/2018 3.74* 4.20 - 5.30 M/uL Final    HGB 03/23/2018 9.7* 12.0 - 16.0 g/dL Final    HCT 03/23/2018 31.2* 35.0 - 45.0 % Final    MCV 03/23/2018 83.4  74.0 - 97.0 FL Final    MCH 03/23/2018 25.9  24.0 - 34.0 PG Final    MCHC 03/23/2018 31.1  31.0 - 37.0 g/dL Final    RDW 03/23/2018 16.4* 11.6 - 14.5 % Final    PLATELET 80/99/4761 102  135 - 420 K/uL Final    MPV 03/23/2018 10.4  9.2 - 11.8 FL Final    NEUTROPHILS 03/23/2018 52  40 - 73 % Final    LYMPHOCYTES 03/23/2018 28  21 - 52 % Final    MONOCYTES 03/23/2018 13* 3 - 10 % Final    EOSINOPHILS 03/23/2018 6* 0 - 5 % Final    BASOPHILS 03/23/2018 1  0 - 2 % Final    ABS. NEUTROPHILS 03/23/2018 3.9  1.8 - 8.0 K/UL Final    ABS. LYMPHOCYTES 03/23/2018 2.1  0.9 - 3.6 K/UL Final    ABS. MONOCYTES 03/23/2018 1.0  0.05 - 1.2 K/UL Final    ABS. EOSINOPHILS 03/23/2018 0.5* 0.0 - 0.4 K/UL Final    ABS. BASOPHILS 03/23/2018 0.1* 0.0 - 0.06 K/UL Final    DF 03/23/2018 AUTOMATED    Final    Reticulocyte count 03/23/2018 3.1* 0.5 - 2.3 % Final    Iron 03/23/2018 30* 50 - 175 ug/dL Final    TIBC 03/23/2018 154* 250 - 450 ug/dL Final    Iron % saturation 03/23/2018 19  % Final         CT Results (most recent):  No results found for this or any previous visit. XR Results (most recent):  No results found for this or any previous visit. CT   All Micro Results     None           DIAGNOSIS AND PLAN  Patient Active Problem List   Diagnosis Code    Lumbar back pain M54.5    Psoas abscess, left ([facility]) K68.12    Post-operative complication W29. 9XXA    Lumbar surgical wound fluid collection T81.89XA    Obesity, morbid ([address] E66.01     1. Psoas abscess ([address]  2. Lumbar discitis/Osteomyelitis  On 3/2/18 she underwent lumbar exploration surgery, I&D, on L4-L5, L5-S1 left side, and was not found to have recurrent disc herniation or definite abscess according to notes. Intraoperative cultures grew rare diptheroids only. ED wound culture grew strep viridans, diptheroids and coag neg staph. Prior to the procedure the patient received ceftriaxone from 2/27/18 to 3/1/18  Then changed to vancomycin and zosyn on 3/1/18. Then the Zosyn was discontinued and changed to cefepime 2 gr IV q12 hours on 3/6/18.     ID inpatient recommended to complete 6 weeks of vancomycin and cefepime to be completed on 4/13/18, and 2 weeks of metronidazol (completed 3/21/8) for risk of contamination with stool. The patient reports feeling well, minimal intermittent pain/cramping like in lower back and no systemic symptoms. Since her last visit she had a new MRI done on 4/9/18 describing  1. Although there is increased bone marrow edema at L4-5 and L5-S1, fluid signal within each of the 2 involved discs has essentially resolved, with increasing intradiscal enhancement, most likely granulation tissue. 2. No definite epidural abscess identified. 3. Resolution of previously seen large fluid collection extending from the  surgical incision site down to the laminectomy defects, along with resolution of  previously seen small fluid collection in the deep left psoas muscle. 4. Multilevel disc disease producing various degrees of canal, lateral recess  and foraminal stenosis as described above which involves not only the 2 surgical  levels but also the 2 levels above this, all of which appears unchanged. Please  see above for further description at each level. Her ESR on 3/23/18 was 70 from 73 on 3/6/18 and CRP 4.1 from 13 on the same dates. Plan  Repeat ESR and CRP today. If normal okay to stop antibiotics, if trending down but not normal, okay to stop IV antibiotics and change to Amox-clav and TMP/SMX high doses for another 2 weeks and follow up in 2 weeks (patient is going out of town tomorrow and on to a cruise ship)  If worsening ESR/CRP the patient will need to continue IV antibiotics with possible vancomycin and possibly a carbapenem. The patient understands plan and that she may not be able to travel if her labs seem to suggest worsening of her infection. I will also check her CBC and BMP to check on her potassium and anemia. Follow-up Disposition:  Return in about 2 weeks (around 4/26/2018). Elvi Glaser MD

## 2018-04-12 NOTE — MR AVS SNAPSHOT
[facility] 
 
 
 [address], [address] 
[phone] Patient: Meera Tracy MRN: [id number] DCI:5/28/5516 Visit Information Date & Time Provider Department Dept. Phone Encounter #  
 4/12/2018 12:00 PM Chidi Willingham MD [facility] [id number] Follow-up Instructions Return in about 2 weeks (around 4/26/2018). Your Appointments 4/26/2018 10:30 AM  
Office Visit with Chidi Willingham MD  
[facility]) Appt Note: 2 wk f/u  
 [address] [facility], [facility] [address]  
  
   
 [address] [facility] Upcoming Health Maintenance Date Due Hepatitis C Screening 1950 DTaP/Tdap/Td series (1 - Tdap) 3/16/1973 BREAST CANCER SCRN MAMMOGRAM 3/16/2002 FOBT Q 1 YEAR AGE 50-75 3/16/2002 ZOSTER VACCINE AGE 60> 1/16/2012 GLAUCOMA SCREENING Q2Y 3/16/2017 Bone Densitometry (Dexa) Screening 3/16/2017 Pneumococcal 65+ Low/Medium Risk (1 of 2 - PCV13) 3/16/2017 MEDICARE YEARLY EXAM 3/21/2018 Allergies as of 4/12/2018  Review Complete On: 4/12/2018 By: Chidi Willingham MD  
 No Known Allergies Current Immunizations  Never Reviewed No immunizations on file. Not reviewed this visit You Were Diagnosed With   
  
 Codes Comments Psoas abscess, left ([facility] [address]    -  Primary ICD-10-CM: T76.69 
ICD-9-CM: 567.31 Lumbar discitis     ICD-10-CM: M46.46 
ICD-9-CM: 722.93 Osteomyelitis of lumbar spine (HCC)     ICD-10-CM: M46.26 
ICD-9-CM: 730.28 Iron deficiency anemia, unspecified iron deficiency anemia type     ICD-10-CM: D50.9 ICD-9-CM: 280.9 Hypokalemia     ICD-10-CM: E87.6 ICD-9-CM: 276.8 Vitals BP Pulse Temp Resp Height(growth percentile) SpO2 112/70 (BP 1 Location: Left arm, BP Patient Position: Sitting) (!) 115 97.8 °F (36.6 °C) (Oral) 17 5' 4\" (1.626 m) 98% OB Status Smoking Status Hysterectomy Former Smoker Vitals History Preferred Pharmacy Pharmacy Name Phone [address], [place] [phone] [place] [address] Michelle Orozco [phone] Your Updated Medication List  
  
   
This list is accurate as of 4/12/18 12:59 PM.  Always use your most recent med list.  
  
  
  
  
 amoxicillin-clavulanate 875-125 mg per tablet Commonly known as:  AUGMENTIN Take 1 Tab by mouth every twelve (12) hours for 14 days. cefepime 2 gram injection Commonly known as:  MAXIPIME  
2 g by IntraVENous route every twelve (12) hours. cyclobenzaprine 10 mg tablet Commonly known as:  FLEXERIL Take 1 Tab by mouth three (3) times daily (with meals). ferrous sulfate 325 mg (65 mg iron) tablet Take 1 Tab by mouth three (3) times daily (with meals). hydroCHLOROthiazide 12.5 mg capsule Commonly known as:  Mennie Matter Take 12.5 mg by mouth as needed. HYDROcodone-acetaminophen  mg tablet Commonly known as:  [place] Take 1 Tab by mouth every four (4) hours as needed. Max Daily Amount: 6 Tabs. Indications: Pain LASIX 20 mg tablet Generic drug:  furosemide Take  by mouth daily. magnesium oxide 400 mg tablet Commonly known as:  MAG-OX Take 400 mg by mouth daily. morphine CR 30 mg CR tablet Commonly known as:  MS CONTIN Take 1 Tab by mouth two (2) times a day. Max Daily Amount: 60 mg. Indications: Chronic Pain, Severe Pain  
  
 naproxen 500 mg tablet Commonly known as:  NAPROSYN Take 500 mg by mouth two (2) times daily (with meals). potassium chloride SR 20 mEq tablet Commonly known as:  K-TAB Take  by mouth. PROBIOTIC 10 billion cell Cap Generic drug:  Lactobacillus acidophilus Take  by mouth. trimethoprim-sulfamethoxazole 160-800 mg per tablet Commonly known as:  BACTRIM DS, SEPTRA DS Take 1 Tab by mouth two (2) times a day for 10 days. vancomycin in dextrose 5% 1 gram/250 mL Soln Commonly known as:  VANCOCIN  
1 g by IntraVENous route every twelve (12) hours. Prescriptions Printed Refills  
 amoxicillin-clavulanate (AUGMENTIN) 875-125 mg per tablet 0 Sig: Take 1 Tab by mouth every twelve (12) hours for 14 days. Class: Print Route: Oral  
 trimethoprim-sulfamethoxazole (BACTRIM DS, SEPTRA DS) 160-800 mg per tablet 0 Sig: Take 1 Tab by mouth two (2) times a day for 10 days. Class: Print Route: Oral  
 ferrous sulfate 325 mg (65 mg iron) tablet 3 Sig: Take 1 Tab by mouth three (3) times daily (with meals). Class: Print Route: Oral  
  
Follow-up Instructions Return in about 2 weeks (around 4/26/2018). To-Do List   
 04/12/2018 Lab:  C REACTIVE PROTEIN, QT   
  
 04/12/2018 Lab:  CBC WITH AUTOMATED DIFF   
  
 04/12/2018 Lab:  METABOLIC PANEL, BASIC   
  
 04/12/2018 Lab:  SED RATE (ESR) Introducing [facility]! Екатерина Kapadia introduces [employer] patient portal. Now you can access parts of your medical record, email your doctor's office, and request medication refills online. 1. In your internet browser, go to [email or web address] 2. Click on the First Time User? Click Here link in the Sign In box. You will see the New Member Sign Up page. 3. Enter your [employer] Access Code exactly as it appears below. You will not need to use this code after youve completed the sign-up process. If you do not sign up before the expiration date, you must request a new code. · [employer] Access Code: [email or web address] Expires: 6/21/2018  3:43 PM 
 
4. Enter the last four digits of your Social Security Number (xxxx) and Date of Birth (mm/dd/yyyy) as indicated and click Submit.  You will be taken to the next sign-up page. 5. Create a [employer] ID. This will be your [employer] login ID and cannot be changed, so think of one that is secure and easy to remember. 6. Create a [employer] password. You can change your password at any time. 7. Enter your Password Reset Question and Answer. This can be used at a later time if you forget your password. 8. Enter your e-mail address. You will receive e-mail notification when new information is available in [address] 9. Click Sign Up. You can now view and download portions of your medical record. 10. Click the Download Summary menu link to download a portable copy of your medical information. If you have questions, please visit the Frequently Asked Questions section of the [employer] website. Remember, [employer] is NOT to be used for urgent needs. For medical emergencies, dial 911. Now available from your iPhone and Android! Please provide this summary of care documentation to your next provider. Your primary care clinician is listed as Colleen Kate. If you have any questions after today's visit, please call [phone].

## 2018-04-13 NOTE — TELEPHONE ENCOUNTER
Patient called back about partial lab results from earlier today. K: 3.2  The patient states she has been on Klor 10 mEq daily since her last visit. Refill done tonight prior to her trip, to continue taking the same dose daily for the next 7 days. Told to seek immediate medical care in case of muscle aches, chest discomfort, dizziness, nausea, vomiting or diarrhea. Hb stable. CRP pending  ESR 64 from 70 3/23/18    By the time this labs come back and I talk to her, the patient has been dicharged from SNF, the PICC line has been removed and she is on her way home. She plans to stop by the pharmacy for her potassium prescription and tells me she has filled her antibiotics prescribed today.     Rosa Crowe MD

## 2018-04-26 ENCOUNTER — TELEPHONE (OUTPATIENT)
Dept: INTERNAL MEDICINE CLINIC | Age: 68
End: 2018-04-26

## 2018-04-26 ENCOUNTER — APPOINTMENT (OUTPATIENT)
Dept: GENERAL RADIOLOGY | Age: 68
End: 2018-04-26
Attending: EMERGENCY MEDICINE
Payer: MEDICARE

## 2018-04-26 ENCOUNTER — HOSPITAL ENCOUNTER (OUTPATIENT)
Dept: LAB | Age: 68
Discharge: HOME | End: 2018-04-26
Payer: MEDICARE

## 2018-04-26 ENCOUNTER — HOSPITAL ENCOUNTER (EMERGENCY)
Age: 68
Discharge: HOME | End: 2018-04-26
Attending: EMERGENCY MEDICINE
Payer: MEDICARE

## 2018-04-26 ENCOUNTER — APPOINTMENT (OUTPATIENT)
Dept: MRI IMAGING | Age: 68
End: 2018-04-26
Attending: EMERGENCY MEDICINE
Payer: MEDICARE

## 2018-04-26 ENCOUNTER — OFFICE VISIT (OUTPATIENT)
Dept: INTERNAL MEDICINE CLINIC | Age: 68
End: 2018-04-26

## 2018-04-26 VITALS
WEIGHT: 202 LBS | HEART RATE: 100 BPM | DIASTOLIC BLOOD PRESSURE: 74 MMHG | TEMPERATURE: 97.8 F | SYSTOLIC BLOOD PRESSURE: 145 MMHG | HEIGHT: 64 IN | OXYGEN SATURATION: 100 % | RESPIRATION RATE: 29 BRPM | BODY MASS INDEX: 34.49 KG/M2

## 2018-04-26 VITALS
HEART RATE: 108 BPM | DIASTOLIC BLOOD PRESSURE: 86 MMHG | HEIGHT: 64 IN | OXYGEN SATURATION: 100 % | SYSTOLIC BLOOD PRESSURE: 142 MMHG | RESPIRATION RATE: 22 BRPM | TEMPERATURE: 98.2 F

## 2018-04-26 DIAGNOSIS — K68.12 PSOAS ABSCESS, LEFT (HCC): Primary | ICD-10-CM

## 2018-04-26 DIAGNOSIS — M54.42 MIDLINE LOW BACK PAIN WITH LEFT-SIDED SCIATICA, UNSPECIFIED CHRONICITY: Primary | ICD-10-CM

## 2018-04-26 DIAGNOSIS — M54.50 LUMBAR BACK PAIN: ICD-10-CM

## 2018-04-26 DIAGNOSIS — E87.6 HYPOKALEMIA: ICD-10-CM

## 2018-04-26 DIAGNOSIS — E86.0 DEHYDRATION: ICD-10-CM

## 2018-04-26 DIAGNOSIS — R11.2 NAUSEA AND VOMITING, INTRACTABILITY OF VOMITING NOT SPECIFIED, UNSPECIFIED VOMITING TYPE: ICD-10-CM

## 2018-04-26 DIAGNOSIS — M46.46 LUMBAR DISCITIS: ICD-10-CM

## 2018-04-26 DIAGNOSIS — K68.12 PSOAS ABSCESS, LEFT (HCC): ICD-10-CM

## 2018-04-26 DIAGNOSIS — M46.26 OSTEOMYELITIS OF LUMBAR SPINE (HCC): ICD-10-CM

## 2018-04-26 LAB
ALBUMIN SERPL-MCNC: 2.7 G/DL (ref 3.4–5)
ALBUMIN/GLOB SERPL: 0.6 {RATIO} (ref 0.8–1.7)
ALP SERPL-CCNC: 202 U/L (ref 45–117)
ALT SERPL-CCNC: 16 U/L (ref 13–56)
ANION GAP SERPL CALC-SCNC: 8 MMOL/L (ref 3–18)
AST SERPL-CCNC: 25 U/L (ref 15–37)
BASOPHILS # BLD: 0.1 K/UL (ref 0–0.06)
BASOPHILS NFR BLD: 1 % (ref 0–2)
BILIRUB SERPL-MCNC: 0.5 MG/DL (ref 0.2–1)
BUN SERPL-MCNC: 10 MG/DL (ref 7–18)
BUN/CREAT SERPL: 12 (ref 12–20)
CALCIUM SERPL-MCNC: 8.5 MG/DL (ref 8.5–10.1)
CHLORIDE SERPL-SCNC: 101 MMOL/L (ref 100–108)
CO2 SERPL-SCNC: 26 MMOL/L (ref 21–32)
CREAT SERPL-MCNC: 0.81 MG/DL (ref 0.6–1.3)
CRP SERPL-MCNC: 4.3 MG/DL (ref 0–0.3)
DIFFERENTIAL METHOD BLD: ABNORMAL
EOSINOPHIL # BLD: 0.3 K/UL (ref 0–0.4)
EOSINOPHIL NFR BLD: 4 % (ref 0–5)
ERYTHROCYTE [DISTWIDTH] IN BLOOD BY AUTOMATED COUNT: 16.5 % (ref 11.6–14.5)
ERYTHROCYTE [SEDIMENTATION RATE] IN BLOOD: 89 MM/HR (ref 0–30)
GLOBULIN SER CALC-MCNC: 4.6 G/DL (ref 2–4)
GLUCOSE SERPL-MCNC: 73 MG/DL (ref 74–99)
HCT VFR BLD AUTO: 33.7 % (ref 35–45)
HGB BLD-MCNC: 10.8 G/DL (ref 12–16)
LACTATE SERPL-SCNC: 1.8 MMOL/L (ref 0.4–2)
LYMPHOCYTES # BLD: 2 K/UL (ref 0.9–3.6)
LYMPHOCYTES NFR BLD: 24 % (ref 21–52)
MCH RBC QN AUTO: 26.2 PG (ref 24–34)
MCHC RBC AUTO-ENTMCNC: 32 G/DL (ref 31–37)
MCV RBC AUTO: 81.8 FL (ref 74–97)
MONOCYTES # BLD: 0.8 K/UL (ref 0.05–1.2)
MONOCYTES NFR BLD: 10 % (ref 3–10)
NEUTS SEG # BLD: 5.1 K/UL (ref 1.8–8)
NEUTS SEG NFR BLD: 61 % (ref 40–73)
PLATELET # BLD AUTO: 360 K/UL (ref 135–420)
PMV BLD AUTO: 10.9 FL (ref 9.2–11.8)
POTASSIUM SERPL-SCNC: 3.5 MMOL/L (ref 3.5–5.5)
PROT SERPL-MCNC: 7.3 G/DL (ref 6.4–8.2)
RBC # BLD AUTO: 4.12 M/UL (ref 4.2–5.3)
SODIUM SERPL-SCNC: 135 MMOL/L (ref 136–145)
WBC # BLD AUTO: 8.2 K/UL (ref 4.6–13.2)

## 2018-04-26 PROCEDURE — 80053 COMPREHEN METABOLIC PANEL: CPT | Performed by: INTERNAL MEDICINE

## 2018-04-26 PROCEDURE — 85025 COMPLETE CBC W/AUTO DIFF WBC: CPT | Performed by: INTERNAL MEDICINE

## 2018-04-26 PROCEDURE — 93005 ELECTROCARDIOGRAM TRACING: CPT

## 2018-04-26 PROCEDURE — A9577 INJ MULTIHANCE: HCPCS | Performed by: EMERGENCY MEDICINE

## 2018-04-26 PROCEDURE — 96360 HYDRATION IV INFUSION INIT: CPT

## 2018-04-26 PROCEDURE — 87040 BLOOD CULTURE FOR BACTERIA: CPT | Performed by: EMERGENCY MEDICINE

## 2018-04-26 PROCEDURE — 71045 X-RAY EXAM CHEST 1 VIEW: CPT

## 2018-04-26 PROCEDURE — 86140 C-REACTIVE PROTEIN: CPT | Performed by: INTERNAL MEDICINE

## 2018-04-26 PROCEDURE — 96361 HYDRATE IV INFUSION ADD-ON: CPT

## 2018-04-26 PROCEDURE — 83605 ASSAY OF LACTIC ACID: CPT | Performed by: EMERGENCY MEDICINE

## 2018-04-26 PROCEDURE — 74011250637 HC RX REV CODE- 250/637: Performed by: EMERGENCY MEDICINE

## 2018-04-26 PROCEDURE — 72158 MRI LUMBAR SPINE W/O & W/DYE: CPT

## 2018-04-26 PROCEDURE — 99285 EMERGENCY DEPT VISIT HI MDM: CPT

## 2018-04-26 PROCEDURE — 74011250636 HC RX REV CODE- 250/636: Performed by: EMERGENCY MEDICINE

## 2018-04-26 PROCEDURE — 85652 RBC SED RATE AUTOMATED: CPT | Performed by: INTERNAL MEDICINE

## 2018-04-26 RX ORDER — SODIUM CHLORIDE 0.9 % (FLUSH) 0.9 %
5-10 SYRINGE (ML) INJECTION AS NEEDED
Status: DISCONTINUED | OUTPATIENT
Start: 2018-04-26 | End: 2018-04-27 | Stop reason: HOSPADM

## 2018-04-26 RX ORDER — NALOXONE HYDROCHLORIDE 4 MG/.1ML
SPRAY NASAL
Qty: 2 EACH | Refills: 0 | Status: SHIPPED | OUTPATIENT
Start: 2018-04-26 | End: 2018-05-07

## 2018-04-26 RX ORDER — MORPHINE SULFATE 15 MG/1
15 TABLET, FILM COATED, EXTENDED RELEASE ORAL
Status: COMPLETED | OUTPATIENT
Start: 2018-04-26 | End: 2018-04-26

## 2018-04-26 RX ADMIN — MORPHINE SULFATE 15 MG: 15 TABLET, EXTENDED RELEASE ORAL at 20:07

## 2018-04-26 RX ADMIN — GADOBENATE DIMEGLUMINE 15 ML: 529 INJECTION, SOLUTION INTRAVENOUS at 17:32

## 2018-04-26 RX ADMIN — SODIUM CHLORIDE 1641 ML: 900 INJECTION, SOLUTION INTRAVENOUS at 17:47

## 2018-04-26 NOTE — PROGRESS NOTES
YULISSA Beaulieu is a 79 y.o. female with relevant past medical history of HTN, morbid obesity, chronic back pain s/p multiple back surgeries including microdiscectomy of L4/L5  and L5/S1 in 10/2017 and a repeat microdiscectomy for a ruptured disc on 1/9/2018. She developed a leak following this procedure and she was re-explored and cultured with no growth according to inpatient ID notes. On 1/19/18 the pain was so severe and the patient couldn't move, so she had a myelogram/MRI and underwent debridement on 1/24/18 at [facility] and had a drain in for 3 days. Apparently cefazolin was given during the surgery and the intraoperative culture was negative, so no antibiotics were given by Dr. Hari Silva. She was sent to [facility] rehab for 2 weeks and they put her on oral keflex for 10 days. She went home on 2/22/18 and on the morning of 2/27/18 she woke up with severe back pain again radiated to her leg so she presented to the ED and a lumbar MRI was done concerning for abscesses along the laminotomy bed seen within the subcutaneous fat with peripheral enhancement and extensive abnormal fluid signal with peripheral enhancement involving L4-5 and L5-S1 disc spaces including prominent endplate marrow signal changes and enhancement as well as cortical signal loss towards the left, suspicious for discitis/osteomyelitis. On 3/2/18 she underwent lumbar exploration surgery, I&D, on L4-L5, L5-S1 left side, and was not found to have recurrent disc herniation or definite abscess according to notes. Intraoperative cultures grew rare diptheroids only. ED wound culture grew strep viridans, diptheroids and coag neg staph. Prior to the procedure the patient received ceftriaxone from 2/27/18 to 3/1/18  Then changed to vancomycin and zosyn on 3/1/18. Then the Zosyn was discontinued and changed to cefepime 2 gr IV q12 hours on 3/6/18.   ID recommended to complete 6 weeks of vancomycin and cefepime to be completed on 4/13/18, and 2 weeks of metronidazol (completed 3/21/8) for risk of contamination with stool. While on antibiotics she was found to have hypokalemia and ferropenic anemia, and she was treated with potassium chloride on and off and ferrous sulfate daily. On 4/9/18 she has an MRI done describing  1. Although there is increased bone marrow edema at L4-5 and L5-S1, fluid signal within each of the 2 involved discs has essentially resolved, with increasing intradiscal enhancement, most likely granulation tissue. 2. No definite epidural abscess identified. 3. Resolution of previously seen large fluid collection extending from the  surgical incision site down to the laminectomy defects, along with resolution of  previously seen small fluid collection in the deep left psoas muscle. 4. Multilevel disc disease producing various degrees of canal, lateral recess  and foraminal stenosis as described above which involves not only the 2 surgical  levels but also the 2 levels above this, all of which appears unchanged. Please  see above for further description at each level. Her ESR on 4/12 was 64 from 70 (3/23/18) from 73 (3/6/18)  Her CRP on 4/12 1as 6.5 from 4.1 from 13 on the same dates as above. She was discharged from rehab on 4/12, the PICC line was removed. She was given an extra 2 weeks of oral antibiotic TMP/SMX and Augmentin due to persistent elevated inflammatory markers. We would have preferred to continue IV therapy for longer but the patient went on a cruise for 1 week 2 days later after rehab discharge, before awaiting for inflammatory marker results to come back. She reports that after sitting for a prolonged time in the flight she started having severe pain on her lower back, relieved only by laying on her side, worse with forward flexion of trunk and sitting position. The pain was severe even on her long acting morphine and PRN percocet.  She denies any fever, chills, malaise, diaphoresis, but has noted some cold sweats when the pain is most severe. She also reports nausea and vomiting, and multiple times she has vomited her medication, including the oral antibiotics given (TMP/SMX and Augmentin) after completing 6 weeks of IV vanc and cefepime on 4/12/18. She says she has not been able to eat or drink much and feels weak. ROS  As above included in HPI. Otherwise 11 point review of systems negative including constitutional, skin, HENT, eyes, respiratory, cardiovascular, gastrointestinal, genitourinary, musculoskeletal, endocrine, hematologic, allergy, and neurologic. Past Medical History  Past Medical History:   Diagnosis Date    Hypertension      Past Surgical History:   Procedure Laterality Date    HX CHOLECYSTECTOMY      HX GYN      HX ORTHOPAEDIC  1996    c5-6 fusion     HX ORTHOPAEDIC  01/2018    lumbar laminectomy         Family History  No family history on file. Social History  She  reports that she has quit smoking. She has never used smokeless tobacco.   History   Alcohol Use No       Immunization History    There is no immunization history on file for this patient. Allergies  No Known Allergies    Medications  Current Outpatient Prescriptions   Medication Sig    Lactobacillus acidophilus (PROBIOTIC) 10 billion cell cap Take  by mouth.  furosemide (LASIX) 20 mg tablet Take  by mouth daily.  magnesium oxide (MAG-OX) 400 mg tablet Take 400 mg by mouth daily.  amoxicillin-clavulanate (AUGMENTIN) 875-125 mg per tablet Take 1 Tab by mouth every twelve (12) hours for 14 days.  ferrous sulfate 325 mg (65 mg iron) tablet Take 1 Tab by mouth three (3) times daily (with meals).  potassium chloride (KLOR-CON) 10 mEq tablet Take 1 Tab by mouth daily.  HYDROcodone-acetaminophen (NORCO)  mg tablet Take 1 Tab by mouth every four (4) hours as needed. Max Daily Amount: 6 Tabs.  Indications: Pain    cyclobenzaprine (FLEXERIL) 10 mg tablet Take 1 Tab by mouth three (3) times daily (with meals).  hydroCHLOROthiazide (MICROZIDE) 12.5 mg capsule Take 12.5 mg by mouth as needed.  naproxen (NAPROSYN) 500 mg tablet Take 500 mg by mouth two (2) times daily (with meals).  morphine CR (MS CONTIN) 30 mg CR tablet Take 1 Tab by mouth two (2) times a day. Max Daily Amount: 60 mg. Indications: Chronic Pain, Severe Pain     No current facility-administered medications for this visit. Visit Vitals    /86 (BP 1 Location: Left arm, BP Patient Position: Sitting)    Pulse (!) 108    Temp 98.2 °F (36.8 °C) (Oral)    Resp 22    Ht 5' 4\" (1.626 m)    SpO2 100%     There is no height or weight on file to calculate BMI. Physical Exam      REVIEW OF [address] Outpatient Visit on 04/12/2018   Component Date Value Ref Range Status    Sed rate, automated 04/12/2018 64* 0 - 30 mm/hr Final    C-Reactive protein 04/12/2018 6.5* 0 - 0.3 mg/dL Final    WBC 04/12/2018 7.0  4.6 - 13.2 K/uL Final    RBC 04/12/2018 3.65* 4.20 - 5.30 M/uL Final    HGB 04/12/2018 9.5* 12.0 - 16.0 g/dL Final    HCT 04/12/2018 29.1* 35.0 - 45.0 % Final    MCV 04/12/2018 79.7  74.0 - 97.0 FL Final    MCH 04/12/2018 26.0  24.0 - 34.0 PG Final    MCHC 04/12/2018 32.6  31.0 - 37.0 g/dL Final    RDW 04/12/2018 16.3* 11.6 - 14.5 % Final    PLATELET 91/54/5910 841  135 - 420 K/uL Final    MPV 04/12/2018 10.7  9.2 - 11.8 FL Final    NEUTROPHILS 04/12/2018 59  40 - 73 % Final    LYMPHOCYTES 04/12/2018 21  21 - 52 % Final    MONOCYTES 04/12/2018 13* 3 - 10 % Final    EOSINOPHILS 04/12/2018 6* 0 - 5 % Final    BASOPHILS 04/12/2018 1  0 - 2 % Final    ABS. NEUTROPHILS 04/12/2018 4.2  1.8 - 8.0 K/UL Final    ABS. LYMPHOCYTES 04/12/2018 1.4  0.9 - 3.6 K/UL Final    ABS. MONOCYTES 04/12/2018 0.9  0.05 - 1.2 K/UL Final    ABS. EOSINOPHILS 04/12/2018 0.4  0.0 - 0.4 K/UL Final    ABS.  BASOPHILS 04/12/2018 0.1* 0.0 - 0.06 K/UL Final    DF 04/12/2018 AUTOMATED    Final    Sodium 04/12/2018 140  136 - 145 mmol/L Final    Potassium 04/12/2018 3.2* 3.5 - 5.5 mmol/L Final    Chloride 04/12/2018 102  100 - 108 mmol/L Final    CO2 04/12/2018 31  21 - 32 mmol/L Final    Anion gap 04/12/2018 7  3.0 - 18 mmol/L Final    Glucose 04/12/2018 113* 74 - 99 mg/dL Final    BUN 04/12/2018 9  7.0 - 18 MG/DL Final    Creatinine 04/12/2018 0.78  0.6 - 1.3 MG/DL Final    BUN/Creatinine ratio 04/12/2018 12  12 - 20   Final    GFR est AA 04/12/2018 >60  >60 ml/min/1.73m2 Final    GFR est non-AA 04/12/2018 >60  >60 ml/min/1.73m2 Final    Calcium 04/12/2018 8.1* 8.5 - 10.1 MG/DL Final         CT Results (most recent):  No results found for this or any previous visit. XR Results (most recent):  No results found for this or any previous visit. CT   All Micro Results     None           DIAGNOSIS AND PLAN  Patient Active Problem List   Diagnosis Code    Lumbar back pain M54.5    Psoas abscess, left ([facility]) K68.12    Post-operative complication P73. 9XXA    Lumbar surgical wound fluid collection T81.89XA    Obesity, morbid ([address] E66.01     1. Psoas abscess ([address]  2. Lumbar discitis/Osteomyelitis  On 3/2/18 she underwent lumbar exploration surgery, I&D, on L4-L5, L5-S1 left side, and was not found to have recurrent disc herniation or definite abscess according to notes. Intraoperative cultures grew rare diptheroids only. ED wound culture grew strep viridans, diptheroids and coag neg staph. Prior to the procedure the patient received ceftriaxone from 2/27/18 to 3/1/18  Then changed to vancomycin and zosyn on 3/1/18. Then the Zosyn was discontinued and changed to cefepime 2 gr IV q12 hours on 3/6/18. ID inpatient recommended to complete 6 weeks of vancomycin and cefepime completed on 4/12/18, and 2 weeks of metronidazol (completed 3/21/8) for risk of contamination with stool. Followed by TMP/SMX and Augmentin for 2 weeks started on 4/13/18 to complete tomorrow. Last  MRI done on 4/9/18 described  1.  Although there is increased bone marrow edema at L4-5 and L5-S1, fluid signal within each of the 2 involved discs has essentially resolved, with increasing intradiscal enhancement, most likely granulation tissue. 2. No definite epidural abscess identified. 3. Resolution of previously seen large fluid collection extending from the  surgical incision site down to the laminectomy defects, along with resolution of  previously seen small fluid collection in the deep left psoas muscle. 4. Multilevel disc disease producing various degrees of canal, lateral recess  and foraminal stenosis as described above which involves not only the 2 surgical  levels but also the 2 levels above this, all of which appears unchanged. Please  see above for further description at each level. Her ESR on 4/12 was 64 from 70 (3/23/18) from 73 (3/6/18)  Her CRP on 4/12 1as 6.5 from 4.1 from 13 on the same dates as above. The patient is having worsening lower back pain since 4/13/18    3. Dehydration/hypokalemia/PO intolerance/Nausea and vomiting  See plan below    Plan    - Referred to the hospital for IV fluids/rehydration, supportive care (antiemetics), pain control, electrolyte imbalance repletion, suspected with hypokalemia, due to nausea and vomiting and h/o hypokalemia. - Also concern for worsening infection. Recommend repeating a lumbar MRI. (ESR, CRP, CBC and CMP done in clinic)  - May need to re-start IV antibiotics possibly vancomycin and  a carbapenem, depending on imaging results and lab work if consistent with worsening infection, please consult ID in the hospital and consider surgical consultation (ortho vs neurosurgery) depending on imaging findings. - Case discussed with ED NP from [address].       Follow-up Disposition: Not on [address].  Amadou Webster MD

## 2018-04-26 NOTE — ED PROVIDER NOTES
EMERGENCY DEPARTMENT HISTORY AND PHYSICAL EXAM    Date: 4/26/2018  Patient Name: Fabienne Avalos    History of Presenting Illness     Chief Complaint   Patient presents with    Back Pain         History Provided By: Patient    Chief Complaint: Lower back pain  Duration: 4 Days  Timing:  Acute  Location: Lower back  Severity: 10 out of 10  Modifying Factors: Pain is exacerbated when sitting up. Associated Symptoms: Metallic taste in mouth and poor PO intake    Additional History (Context):   4:31 PM  Fabienne Avalos is a 79 y.o. female with PMHx of HTN who presents to the emergency department C/O lower back pain (10/10) radiating down her left leg onset 4 days ago when she ran out of her slow release morphine. . Associated sxs include chills and right hand intermittent weakness. Pain is exacerbated when sitting up. Pt finished IV antibiotics on 4/12/18 and started oral antibiotics. Pt reports she had a lumbar-ectomy on 10/17/17 and 1/9/18. Patient also reports she had issues with infection after the surgeries and states she was told by the infectious disease physician that it may be back. States hand symptoms are similar to her carpal tunnel syndrome. Pt denies fever, numbness, urinary/fecal incontinence, and any other sxs or complaints. Pt denies any pain medication at this time. PCP: Paloma Foote MD    Current Facility-Administered Medications   Medication Dose Route Frequency Provider Last Rate Last Dose    sodium chloride (NS) flush 5-10 mL  5-10 mL IntraVENous PRN Vitaly Villa MD        potassium bicarbonate (KLYTE) tablet 50 mEq  50 mEq Oral NOW Vitaly Villa MD        morphine CR (MS CONTIN) tablet 15 mg  15 mg Oral NOW Vitaly Villa MD         Current Outpatient Prescriptions   Medication Sig Dispense Refill    morphine 15 mg TR12 Take 15 mg by mouth two (2) times a day.  Max Daily Amount: 30 mg. 10 Each 0    naloxone (NARCAN) 4 mg/actuation nasal spray Use 1 spray intranasally into 1 nostril. Use a new Narcan nasal spray for subsequent doses and administer into alternating nostrils. May repeat every 2 to 3 minutes as needed. 2 Each 0    ferrous sulfate 325 mg (65 mg iron) tablet Take 1 Tab by mouth three (3) times daily (with meals). 90 Tab 3    cyclobenzaprine (FLEXERIL) 10 mg tablet Take 1 Tab by mouth three (3) times daily (with meals). 60 Tab 1    Lactobacillus acidophilus (PROBIOTIC) 10 billion cell cap Take  by mouth.  furosemide (LASIX) 20 mg tablet Take  by mouth daily.  magnesium oxide (MAG-OX) 400 mg tablet Take 400 mg by mouth daily.  amoxicillin-clavulanate (AUGMENTIN) 875-125 mg per tablet Take 1 Tab by mouth every twelve (12) hours for 14 days. 28 Tab 0    potassium chloride (KLOR-CON) 10 mEq tablet Take 1 Tab by mouth daily. 7 Tab 1    HYDROcodone-acetaminophen (NORCO)  mg tablet Take 1 Tab by mouth every four (4) hours as needed. Max Daily Amount: 6 Tabs. Indications: Pain 90 Tab 0    hydroCHLOROthiazide (MICROZIDE) 12.5 mg capsule Take 12.5 mg by mouth as needed.  naproxen (NAPROSYN) 500 mg tablet Take 500 mg by mouth two (2) times daily (with meals). Past History     Past Medical History:  Past Medical History:   Diagnosis Date    Hypertension        Past Surgical History:  Past Surgical History:   Procedure Laterality Date    HX CHOLECYSTECTOMY      HX GYN      HX ORTHOPAEDIC  1996    c5-6 fusion     HX ORTHOPAEDIC  01/2018    lumbar laminectomy        Family History:  History reviewed. No pertinent family history. Social History:  Social History   Substance Use Topics    Smoking status: Former Smoker    Smokeless tobacco: Never Used    Alcohol use No       Allergies:  No Known Allergies      Review of Systems   Review of Systems   Constitutional: Negative for chills and fever.    Gastrointestinal:        (-) Fecal incontinence   Genitourinary:        (-) Urinary incontinence   Musculoskeletal: Positive for back pain (lower) and myalgias (left leg radiating form back). Neurological: Positive for weakness (right hand). Negative for numbness. Denies saddle anesthesia, bowel or urinary incontence   All other systems reviewed and are negative. Physical Exam     Vitals:    04/26/18 1502 04/26/18 1845 04/26/18 1945   BP: (!) 141/102 145/83 145/74   Pulse: 88 (!) 104 100   Resp: 22 24 29   Temp: 97.8 °F (36.6 °C)     SpO2: 100% 100% 100%   Weight: 91.6 kg (202 lb)     Height: 5' 4\" (1.626 m)       Physical Exam   Nursing note and vitals reviewed.   Constitutional: Obese appearing, no acute distress  Head: Normocephalic, Atraumatic  Eyes: Pupils are equal, round, and reactive to light, EOMI  Neck: Supple, non-tender  Cardiovascular: Regular rate and rhythm, no murmurs, rubs, or gallops  Chest: Normal work of breathing and chest excursion bilaterally  Lungs: Clear to ausculation bilaterally  Abdomen: Soft, non tender, non distended, normoactive bowel sounds  Back: No evidence of trauma or deformity, tender over lumbar spine diffusely without point tenderness, occasional spasm of paraspinal muscles  Extremities: No evidence of trauma or deformity, no LE edema  Skin: Warm and dry, normal cap refill  Neuro: Alert and appropriate, CN intact, normal speech, strength and sensation full and symmetric bilaterally, normal coordination  Psychiatric: Normal mood and affect    Diagnostic Study Results     Labs -     Recent Results (from the past 12 hour(s))   C REACTIVE PROTEIN, QT    Collection Time: 04/26/18 10:50 AM   Result Value Ref Range    C-Reactive protein 4.3 (H) 0 - 0.3 mg/dL   METABOLIC PANEL, COMPREHENSIVE    Collection Time: 04/26/18 10:50 AM   Result Value Ref Range    Sodium 135 (L) 136 - 145 mmol/L    Potassium 3.5 3.5 - 5.5 mmol/L    Chloride 101 100 - 108 mmol/L    CO2 26 21 - 32 mmol/L    Anion gap 8 3.0 - 18 mmol/L    Glucose 73 (L) 74 - 99 mg/dL    BUN 10 7.0 - 18 MG/DL    Creatinine 0.81 0.6 - 1.3 MG/DL    BUN/Creatinine ratio 12 12 - 20      GFR est AA >60 >60 ml/min/1.73m2    GFR est non-AA >60 >60 ml/min/1.73m2    Calcium 8.5 8.5 - 10.1 MG/DL    Bilirubin, total 0.5 0.2 - 1.0 MG/DL    ALT (SGPT) 16 13 - 56 U/L    AST (SGOT) 25 15 - 37 U/L    Alk. phosphatase 202 (H) 45 - 117 U/L    Protein, total 7.3 6.4 - 8.2 g/dL    Albumin 2.7 (L) 3.4 - 5.0 g/dL    Globulin 4.6 (H) 2.0 - 4.0 g/dL    A-G Ratio 0.6 (L) 0.8 - 1.7     LACTIC ACID    Collection Time: 04/26/18  4:35 PM   Result Value Ref Range    Lactic acid 1.8 0.4 - 2.0 MMOL/L   EKG, 12 LEAD, INITIAL    Collection Time: 04/26/18  5:59 PM   Result Value Ref Range    Ventricular Rate 99 BPM    Atrial Rate 99 BPM    P-R Interval 142 ms    QRS Duration 72 ms    Q-T Interval 344 ms    QTC Calculation (Bezet) 441 ms    Calculated P Axis 60 degrees    Calculated R Axis 18 degrees    Calculated T Axis 40 degrees    Diagnosis       Normal sinus rhythm  Normal ECG  No previous ECGs available         Radiologic Studies -   MRI LUMB SPINE W WO CONT   Final Result   IMPRESSION:     1. Persistent bone marrow edema and abnormal intramedullary enhancement are  again seen throughout L4 and L5 vertebral bodies. There has been complete  interval resolution of intradiscal fluid at L4-L5 and L5-S1 with only minimal  residual intradiscal enhancement, probably granulation tissues/scar. 2. No focal epidural abscess. Resolution of fahad-incisional and deep left psoas  intramuscular abscess seen on prior studies. 3. Persistent high-grade canal and foraminal stenoses, large part of which is  related to circumferential epidural lipomatosis L2-S1. As read by the radiologist.     XR CHEST PORT   Final Result   Impression:  Rotated projection of the chest without superimposed acute radiographic  Abnormality.     As read by the radiologist.     CT Results  (Last 48 hours)    None        CXR Results  (Last 48 hours)               04/26/18 1658  XR CHEST PORT Final result    Impression:  Impression:   Rotated projection of the chest without superimposed acute radiographic   abnormality. Narrative:  Chest, single view       Indication: Sepsis       Comparison: None       Findings:  Portable upright AP view of the chest was obtained. The patient is   rotated on the provided projection foreshortening of the left hemithorax. No   focal pneumonic opacity, pneumothorax, or pleural effusion. Normal cardiac size   and mediastinal contours. No acute osseous abnormality. Medications given in the ED-  Medications   sodium chloride (NS) flush 5-10 mL (not administered)   potassium bicarbonate (KLYTE) tablet 50 mEq (not administered)   morphine CR (MS CONTIN) tablet 15 mg (not administered)   sodium chloride 0.9 % bolus infusion 1,641 mL (1,641 mL IntraVENous New Bag 4/26/18 4570)   Gadobenate Dimeglumine (MULTIHANCE) 529 mg/mL (0.1mmol/0.2mL) contrast injection 15 mL (15 mL IntraVENous Given 4/26/18 8202)         Medical Decision Making   I am the first provider for this patient. I reviewed the vital signs, available nursing notes, past medical history, past surgical history, family history and social history. Vital Signs-Reviewed the patient's vital signs. Pulse Oximetry Analysis - 100% on RA     Cardiac Monitor:  Rate: 88 bpm  Rhythm: NSR    EKG interpretation: (Preliminary)  Rhythm: NSR. Rate: 99 bpm; QRS duration: 72 ms  EKG read by Irvin Joseph MD at 6:06 PM     Records Reviewed: Nursing Notes and Old Medical Records  Pt saw Dr. Erin Brumfield. Tomy Soto MD who sent pt for IV fluids, pain control, and lumbar MRI. Sent ESR and CRP today. CRP parisa from 4.1 on March 23rd, 2018 to 6.5. Procedures:  Procedures    ED Course:   4:31 PM Initial assessment performed. The patients presenting problems have been discussed, and they are in agreement with the care plan formulated and outlined with them. I have encouraged them to ask questions as they arise throughout their visit.     4:46 PM Discussed patient's history, exam, and available diagnostics results with Viola Montaño, Radiology, who recommends MRI with and without contrast.    7:03 PM Discussed patient's history, exam, and available diagnostics results with Clemente Castillo MD, on call physician for  Sunil Smith. Loi Covington MD, who states she will look information on the pt and call back. 7:29 PM Discussed patient with Clemente Castillo MD, on call physician for  Sunil Smith. oLi Covington MD, who states that if the scan does not look worse then it would be reasonable to go home, continue oral antibiotics,  and follow up as an outpatient. Diagnosis and Disposition     Discussion:  79 y.o female with complicated history of discitis/oseto. Sent from ID to evaluate for possible worsening of infection. MRI shows some improvement. Normal white count. Afebrile. CRP slightly increased but ESR decreased. No new neuro symptoms. Hydrated here and potassium repeted. Increase pain likely secondary to running out of long acting morphine. Will provide short course of morphine and discharge with early ID follow up and return precautions. Patient understands and agrees with this plan. DISCHARGE NOTE:  7:44 PM  Ethan Pisano's  results have been reviewed with her. She has been counseled regarding her diagnosis, treatment, and plan. She verbally conveys understanding and agreement of the signs, symptoms, diagnosis, treatment and prognosis and additionally agrees to follow up as discussed. She also agrees with the care-plan and conveys that all of her questions have been answered. I have also provided discharge instructions for her that include: educational information regarding their diagnosis and treatment, and list of reasons why they would want to return to the ED prior to their follow-up appointment, should her condition change. She has been provided with education for proper emergency department utilization.      CLINICAL IMPRESSION:    1. Midline low back pain with left-sided sciatica, unspecified chronicity        PLAN:  1. D/C Home  2. Current Discharge Medication List      START taking these medications    Details   morphine 15 mg TR12 Take 15 mg by mouth two (2) times a day. Max Daily Amount: 30 mg.  Qty: 10 Each, Refills: 0    Associated Diagnoses: Midline low back pain with left-sided sciatica, unspecified chronicity      naloxone (NARCAN) 4 mg/actuation nasal spray Use 1 spray intranasally into 1 nostril. Use a new Narcan nasal spray for subsequent doses and administer into alternating nostrils. May repeat every 2 to 3 minutes as needed. Qty: 2 Each, Refills: 0         STOP taking these medications       morphine CR (MS CONTIN) 30 mg CR tablet Comments:   Reason for Stopping:             3.   Follow-up Information     Follow up With Details Comments Contact Info    Corbin Ferreira MD Schedule an appointment as soon as possible for a visit in 3 days For internal medicine follow up. [address] [place]      [facility] EMERGENCY DEPT Go to As needed, If symptoms worsen [address] [place] [address]  [phone]        _______________________________    Attestations: This note is prepared by Cassandra Braun, acting as Scribe for Chelsie Harris MD.    Chelsie Harris MD:  The scribe's documentation has been prepared under my direction and personally reviewed by me in its entirety.   I confirm that the note above accurately reflects all work, treatment, procedures, and medical decision making performed by me.  _______________________________

## 2018-04-26 NOTE — DISCHARGE INSTRUCTIONS

## 2018-04-26 NOTE — MR AVS SNAPSHOT
[address] [id number] [address] 
[phone] Patient: Jacquelin Barcenas MRN: [id number] EJN:[id number] Visit Information Date & Time Provider Department Dept. Phone Encounter #  
 4/26/2018 10:30 AM Tyler Fairchild MD [facility] [id number] Upcoming Health Maintenance Date Due Hepatitis C Screening 1950 DTaP/Tdap/Td series (1 - Tdap) 3/16/1973 BREAST CANCER SCRN MAMMOGRAM 3/16/2002 FOBT Q 1 YEAR AGE 50-75 3/16/2002 ZOSTER VACCINE AGE 60> 1/16/2012 GLAUCOMA SCREENING Q2Y 3/16/2017 Bone Densitometry (Dexa) Screening 3/16/2017 Pneumococcal 65+ Low/Medium Risk (1 of 2 - PCV13) 3/16/2017 MEDICARE YEARLY EXAM 3/21/2018 Allergies as of 4/26/2018  Review Complete On: 4/26/2018 By: Tyler Fairchild MD  
 No Known Allergies Current Immunizations  Never Reviewed No immunizations on file. Not reviewed this visit You Were Diagnosed With   
  
 Codes Comments Psoas abscess, left ([facility] [address]    -  Primary ICD-10-CM: I15.74 
ICD-9-CM: 567.31 Lumbar discitis     ICD-10-CM: M46.46 
ICD-9-CM: 722.93 Osteomyelitis of lumbar spine (HCC)     ICD-10-CM: M46.26 
ICD-9-CM: 730.28 Lumbar back pain     ICD-10-CM: M54.5 ICD-9-CM: 724.2 Nausea and vomiting, intractability of vomiting not specified, unspecified vomiting type     ICD-10-CM: R11.2 ICD-9-CM: 787.01 Dehydration     ICD-10-CM: E86.0 ICD-9-CM: 276.51 Hypokalemia     ICD-10-CM: E87.6 ICD-9-CM: 276.8 Vitals BP Pulse Temp Resp Height(growth percentile) SpO2  
 142/86 (BP 1 Location: Left arm, BP Patient Position: Sitting) (!) 108 98.2 °F (36.8 °C) (Oral) 22 5' 4\" (1.626 m) 100% OB Status Smoking Status Hysterectomy Former Smoker Vitals History Preferred Pharmacy Pharmacy Name Phone [address] [address] Michelle Orozco [phone] Your Updated Medication List  
  
   
This list is accurate as of 4/26/18 11:09 AM.  Always use your most recent med list.  
  
  
  
  
 amoxicillin-clavulanate 875-125 mg per tablet Commonly known as:  AUGMENTIN Take 1 Tab by mouth every twelve (12) hours for 14 days. cyclobenzaprine 10 mg tablet Commonly known as:  FLEXERIL Take 1 Tab by mouth three (3) times daily (with meals). ferrous sulfate 325 mg (65 mg iron) tablet Take 1 Tab by mouth three (3) times daily (with meals). hydroCHLOROthiazide 12.5 mg capsule Commonly known as:  Laveta Slough Take 12.5 mg by mouth as needed. HYDROcodone-acetaminophen  mg tablet Commonly known as:  Margaux Dieter Take 1 Tab by mouth every four (4) hours as needed. Max Daily Amount: 6 Tabs. Indications: Pain LASIX 20 mg tablet Generic drug:  furosemide Take  by mouth daily. magnesium oxide 400 mg tablet Commonly known as:  MAG-OX Take 400 mg by mouth daily. morphine CR 30 mg CR tablet Commonly known as:  MS CONTIN Take 1 Tab by mouth two (2) times a day. Max Daily Amount: 60 mg. Indications: Chronic Pain, Severe Pain  
  
 naproxen 500 mg tablet Commonly known as:  NAPROSYN Take 500 mg by mouth two (2) times daily (with meals). potassium chloride 10 mEq tablet Commonly known as:  KLOR-CON Take 1 Tab by mouth daily. PROBIOTIC 10 billion cell Cap Generic drug:  Lactobacillus acidophilus Take  by mouth. To-Do List   
 04/26/2018 Lab:  C REACTIVE PROTEIN, QT   
  
 04/26/2018 Lab:  CBC WITH AUTOMATED DIFF   
  
 04/26/2018 Lab:  METABOLIC PANEL, COMPREHENSIVE   
  
 04/26/2018 Lab:  SED RATE (ESR) Introducing [facility]!    
 Randi Warren introduces [employer] patient portal. Now you can access parts of your medical record, email your doctor's office, and request medication refills online. 1. In your internet browser, go to [email or web address] 2. Click on the First Time User? Click Here link in the Sign In box. You will see the New Member Sign Up page. 3. Enter your [employer] Access Code exactly as it appears below. You will not need to use this code after youve completed the sign-up process. If you do not sign up before the expiration date, you must request a new code. · [employer] Access Code: [email or web address] Expires: 7/25/2018  9:54 AM 
 
4. Enter the last four digits of your Social Security Number (xxxx) and Date of Birth (mm/dd/yyyy) as indicated and click Submit. You will be taken to the next sign-up page. 5. Create a [employer] ID. This will be your [employer] login ID and cannot be changed, so think of one that is secure and easy to remember. 6. Create a [employer] password. You can change your password at any time. 7. Enter your Password Reset Question and Answer. This can be used at a later time if you forget your password. 8. Enter your e-mail address. You will receive e-mail notification when new information is available in [address] 9. Click Sign Up. You can now view and download portions of your medical record. 10. Click the Download Summary menu link to download a portable copy of your medical information. If you have questions, please visit the Frequently Asked Questions section of the [employer] website. Remember, [employer] is NOT to be used for urgent needs. For medical emergencies, dial 911. Now available from your iPhone and Android! Please provide this summary of care documentation to your next provider. Your primary care clinician is listed as Chanda Brower. If you have any questions after today's visit, please call [phone].

## 2018-04-26 NOTE — ED TRIAGE NOTES
Patient complaining of lower back pain. States she had a lumbar-ectomy on 10/17/17 and 1/9/18. Patient reports she was on a slow-release morphine pill but ran out a week ago. Patient also reports she had issues with infection after the surgeries and states she was told by infectious disease that it may be back. Patient denies any known fevers. Sepsis Screening completed    (  )Patient meets SIRS criteria. ( x )Patient does not meet SIRS criteria.       SIRS Criteria is achieved when two or more of the following are present   Temperature < 96.8°F (36°C) or > 100.9°F (38.3°C)   Heart Rate > 90 beats per minute   Respiratory Rate > 20 breaths per minute   WBC count > 12,000 or <4,000 or > 10% bands

## 2018-04-27 NOTE — TELEPHONE ENCOUNTER
Received call from [facility] ED physician earlier this evening. Caller discussed that lumbar MRI obtained in ED appeared stable and somewhat improved when compared to prior study on 4/9/2018. It showed:   Persistent bone marrow edema and abnormal intramedullary enhancement throughout L4 and L5 vertebral bodies; however, there was complete resolution of the intradiscal fluid at L4-L5 and L5-S1, no focal epidural abscess evident, and resolution of fahad-incisional and deep left psoas intramuscular abscess. Labs in ED: blood cultures x 2 (prelim negative) and lactic acid 1.8. Other labs drawn in office and were pending at time of call. Discussed that patient appeared clinically stable. Received IV fluids and MS Contin for pain. Discussed that the patient had run out of pain meds, and this appeared to be the reason for her increase in pain given the stable MRI findings. Provided with new script for pain and instructed to follow-up with Dr. Shalom Marcos.

## 2018-04-27 NOTE — ED NOTES
Maury Villalpando was discharged in good and improved condition. The patient's diagnosis, condition and treatment were explained to patient and written aftercare instructions were given. The patient verbalized good understanding. Patient armband removed and both labels and armband were placed in shred bin. Patient left ER ambulatory with steady gait in no acute distress. 2 prescriptions provided. Patient reports pain is currently 6 on numeric scale. Patient provided education about pain medication including dosage and side effects. Patient verbalized understanding. Ride (son) at bedside to provide transportation home.

## 2018-04-29 LAB
ATRIAL RATE: 99 BPM
CALCULATED P AXIS, ECG09: 60 DEGREES
CALCULATED R AXIS, ECG10: 18 DEGREES
CALCULATED T AXIS, ECG11: 40 DEGREES
DIAGNOSIS, 93000: NORMAL
P-R INTERVAL, ECG05: 142 MS
Q-T INTERVAL, ECG07: 344 MS
QRS DURATION, ECG06: 72 MS
QTC CALCULATION (BEZET), ECG08: 441 MS
VENTRICULAR RATE, ECG03: 99 BPM

## 2018-05-02 LAB
BACTERIA SPEC CULT: NORMAL
BACTERIA SPEC CULT: NORMAL
SERVICE CMNT-IMP: NORMAL
SERVICE CMNT-IMP: NORMAL

## 2018-05-04 ENCOUNTER — APPOINTMENT (OUTPATIENT)
Dept: GENERAL RADIOLOGY | Age: 68
DRG: 641 | End: 2018-05-04
Attending: EMERGENCY MEDICINE
Payer: MEDICARE

## 2018-05-04 ENCOUNTER — HOSPITAL ENCOUNTER (INPATIENT)
Age: 68
LOS: 3 days | Discharge: HOME HEALTH | DRG: 641 | End: 2018-05-07
Attending: EMERGENCY MEDICINE | Admitting: INTERNAL MEDICINE
Payer: MEDICARE

## 2018-05-04 DIAGNOSIS — E87.6 HYPOKALEMIA: ICD-10-CM

## 2018-05-04 DIAGNOSIS — E87.20 ACIDOSIS: ICD-10-CM

## 2018-05-04 DIAGNOSIS — E86.0 DEHYDRATION: Primary | ICD-10-CM

## 2018-05-04 PROBLEM — R13.10 DYSPHAGIA: Status: ACTIVE | Noted: 2018-05-04

## 2018-05-04 PROBLEM — R00.0 TACHYARRHYTHMIA: Status: ACTIVE | Noted: 2018-05-04

## 2018-05-04 PROBLEM — E87.8 ELECTROLYTE ABNORMALITY: Status: ACTIVE | Noted: 2018-05-04

## 2018-05-04 LAB
ALBUMIN SERPL-MCNC: 3.1 G/DL (ref 3.4–5)
ALBUMIN/GLOB SERPL: 0.6 {RATIO} (ref 0.8–1.7)
ALP SERPL-CCNC: 194 U/L (ref 45–117)
ALT SERPL-CCNC: 29 U/L (ref 13–56)
ANION GAP SERPL CALC-SCNC: 23 MMOL/L (ref 3–18)
ARTERIAL PATENCY WRIST A: ABNORMAL
AST SERPL-CCNC: 52 U/L (ref 15–37)
BASE DEFICIT BLD-SCNC: 7 MMOL/L
BASOPHILS # BLD: 0.1 K/UL (ref 0–0.06)
BASOPHILS NFR BLD: 1 % (ref 0–2)
BDY SITE: ABNORMAL
BILIRUB SERPL-MCNC: 0.7 MG/DL (ref 0.2–1)
BNP SERPL-MCNC: 151 PG/ML (ref 0–900)
BODY TEMPERATURE: 97.2
BUN SERPL-MCNC: 10 MG/DL (ref 7–18)
BUN/CREAT SERPL: 9 (ref 12–20)
CALCIUM SERPL-MCNC: 9.6 MG/DL (ref 8.5–10.1)
CHLORIDE SERPL-SCNC: 96 MMOL/L (ref 100–108)
CK MB CFR SERPL CALC: ABNORMAL % (ref 0–4)
CK MB SERPL-MCNC: <1 NG/ML (ref 5–25)
CK SERPL-CCNC: 25 U/L (ref 26–192)
CO2 SERPL-SCNC: 18 MMOL/L (ref 21–32)
CREAT SERPL-MCNC: 1.07 MG/DL (ref 0.6–1.3)
DIFFERENTIAL METHOD BLD: ABNORMAL
EOSINOPHIL # BLD: 0.1 K/UL (ref 0–0.4)
EOSINOPHIL NFR BLD: 1 % (ref 0–5)
ERYTHROCYTE [DISTWIDTH] IN BLOOD BY AUTOMATED COUNT: 15.6 % (ref 11.6–14.5)
GAS FLOW.O2 O2 DELIVERY SYS: ABNORMAL L/MIN
GLOBULIN SER CALC-MCNC: 5.5 G/DL (ref 2–4)
GLUCOSE SERPL-MCNC: 94 MG/DL (ref 74–99)
HCO3 BLD-SCNC: 17.4 MMOL/L (ref 22–26)
HCT VFR BLD AUTO: 35.7 % (ref 35–45)
HGB BLD-MCNC: 12 G/DL (ref 12–16)
LACTATE SERPL-SCNC: 1.3 MMOL/L (ref 0.4–2)
LYMPHOCYTES # BLD: 2 K/UL (ref 0.9–3.6)
LYMPHOCYTES NFR BLD: 19 % (ref 21–52)
MAGNESIUM SERPL-MCNC: 1.5 MG/DL (ref 1.6–2.6)
MCH RBC QN AUTO: 26.4 PG (ref 24–34)
MCHC RBC AUTO-ENTMCNC: 33.6 G/DL (ref 31–37)
MCV RBC AUTO: 78.5 FL (ref 74–97)
MONOCYTES # BLD: 0.7 K/UL (ref 0.05–1.2)
MONOCYTES NFR BLD: 7 % (ref 3–10)
NEUTS SEG # BLD: 7.3 K/UL (ref 1.8–8)
NEUTS SEG NFR BLD: 72 % (ref 40–73)
O2/TOTAL GAS SETTING VFR VENT: 21 %
PCO2 BLD: 24.9 MMHG (ref 35–45)
PH BLD: 7.45 [PH] (ref 7.35–7.45)
PLATELET # BLD AUTO: 387 K/UL (ref 135–420)
PMV BLD AUTO: 10.7 FL (ref 9.2–11.8)
PO2 BLD: 98 MMHG (ref 80–100)
POTASSIUM SERPL-SCNC: 3 MMOL/L (ref 3.5–5.5)
PROT SERPL-MCNC: 8.6 G/DL (ref 6.4–8.2)
RBC # BLD AUTO: 4.55 M/UL (ref 4.2–5.3)
SALICYLATES SERPL-MCNC: 3 MG/DL (ref 2.8–20)
SAO2 % BLD: 98 % (ref 92–97)
SERVICE CMNT-IMP: ABNORMAL
SODIUM SERPL-SCNC: 137 MMOL/L (ref 136–145)
SPECIMEN TYPE: ABNORMAL
TROPONIN I SERPL-MCNC: <0.02 NG/ML (ref 0–0.06)
WBC # BLD AUTO: 10.1 K/UL (ref 4.6–13.2)

## 2018-05-04 PROCEDURE — 74011250636 HC RX REV CODE- 250/636: Performed by: EMERGENCY MEDICINE

## 2018-05-04 PROCEDURE — 85025 COMPLETE CBC W/AUTO DIFF WBC: CPT | Performed by: EMERGENCY MEDICINE

## 2018-05-04 PROCEDURE — 74011250637 HC RX REV CODE- 250/637: Performed by: EMERGENCY MEDICINE

## 2018-05-04 PROCEDURE — 93005 ELECTROCARDIOGRAM TRACING: CPT

## 2018-05-04 PROCEDURE — 74011250636 HC RX REV CODE- 250/636: Performed by: INTERNAL MEDICINE

## 2018-05-04 PROCEDURE — 82803 BLOOD GASES ANY COMBINATION: CPT

## 2018-05-04 PROCEDURE — 80053 COMPREHEN METABOLIC PANEL: CPT | Performed by: EMERGENCY MEDICINE

## 2018-05-04 PROCEDURE — 83605 ASSAY OF LACTIC ACID: CPT | Performed by: EMERGENCY MEDICINE

## 2018-05-04 PROCEDURE — 82550 ASSAY OF CK (CPK): CPT | Performed by: EMERGENCY MEDICINE

## 2018-05-04 PROCEDURE — 99285 EMERGENCY DEPT VISIT HI MDM: CPT

## 2018-05-04 PROCEDURE — 83735 ASSAY OF MAGNESIUM: CPT | Performed by: EMERGENCY MEDICINE

## 2018-05-04 PROCEDURE — 65660000000 HC RM CCU STEPDOWN

## 2018-05-04 PROCEDURE — 96361 HYDRATE IV INFUSION ADD-ON: CPT

## 2018-05-04 PROCEDURE — 83880 ASSAY OF NATRIURETIC PEPTIDE: CPT | Performed by: EMERGENCY MEDICINE

## 2018-05-04 PROCEDURE — 74022 RADEX COMPL AQT ABD SERIES: CPT

## 2018-05-04 PROCEDURE — 96375 TX/PRO/DX INJ NEW DRUG ADDON: CPT

## 2018-05-04 PROCEDURE — 36600 WITHDRAWAL OF ARTERIAL BLOOD: CPT

## 2018-05-04 PROCEDURE — 80307 DRUG TEST PRSMV CHEM ANLYZR: CPT | Performed by: EMERGENCY MEDICINE

## 2018-05-04 PROCEDURE — 81003 URINALYSIS AUTO W/O SCOPE: CPT | Performed by: EMERGENCY MEDICINE

## 2018-05-04 PROCEDURE — 96374 THER/PROPH/DIAG INJ IV PUSH: CPT

## 2018-05-04 RX ORDER — ONDANSETRON 2 MG/ML
4 INJECTION INTRAMUSCULAR; INTRAVENOUS
Status: COMPLETED | OUTPATIENT
Start: 2018-05-04 | End: 2018-05-04

## 2018-05-04 RX ORDER — HEPARIN SODIUM 5000 [USP'U]/ML
5000 INJECTION, SOLUTION INTRAVENOUS; SUBCUTANEOUS EVERY 8 HOURS
Status: DISCONTINUED | OUTPATIENT
Start: 2018-05-04 | End: 2018-05-07 | Stop reason: HOSPADM

## 2018-05-04 RX ORDER — ONDANSETRON 2 MG/ML
4 INJECTION INTRAMUSCULAR; INTRAVENOUS
Status: DISCONTINUED | OUTPATIENT
Start: 2018-05-04 | End: 2018-05-07 | Stop reason: HOSPADM

## 2018-05-04 RX ORDER — MAGNESIUM SULFATE HEPTAHYDRATE 40 MG/ML
2 INJECTION, SOLUTION INTRAVENOUS
Status: COMPLETED | OUTPATIENT
Start: 2018-05-04 | End: 2018-05-04

## 2018-05-04 RX ORDER — POTASSIUM CHLORIDE 7.45 MG/ML
10 INJECTION INTRAVENOUS
Status: COMPLETED | OUTPATIENT
Start: 2018-05-04 | End: 2018-05-05

## 2018-05-04 RX ORDER — SODIUM CHLORIDE 9 MG/ML
100 INJECTION, SOLUTION INTRAVENOUS CONTINUOUS
Status: DISCONTINUED | OUTPATIENT
Start: 2018-05-04 | End: 2018-05-07 | Stop reason: HOSPADM

## 2018-05-04 RX ADMIN — POTASSIUM CHLORIDE 10 MEQ: 10 INJECTION, SOLUTION INTRAVENOUS at 22:53

## 2018-05-04 RX ADMIN — HEPARIN SODIUM 5000 UNITS: 5000 INJECTION, SOLUTION INTRAVENOUS; SUBCUTANEOUS at 22:51

## 2018-05-04 RX ADMIN — ONDANSETRON 4 MG: 2 INJECTION INTRAMUSCULAR; INTRAVENOUS at 21:12

## 2018-05-04 RX ADMIN — SODIUM CHLORIDE 1000 ML: 900 INJECTION, SOLUTION INTRAVENOUS at 19:57

## 2018-05-04 RX ADMIN — SODIUM CHLORIDE 150 ML/HR: 900 INJECTION, SOLUTION INTRAVENOUS at 22:51

## 2018-05-04 RX ADMIN — POTASSIUM BICARBONATE 50 MEQ: 25 TABLET, EFFERVESCENT ORAL at 22:42

## 2018-05-04 RX ADMIN — MAGNESIUM SULFATE HEPTAHYDRATE 2 G: 40 INJECTION, SOLUTION INTRAVENOUS at 22:26

## 2018-05-04 NOTE — ED TRIAGE NOTES
C/o nausea, vomiting for approx 6 weeks, seen by GI today, supposed to have upper endoscopy today but GI doctor said she was short of breath and may have a fib so pt was sent to ED for dehydration.

## 2018-05-04 NOTE — ED PROVIDER NOTES
EMERGENCY DEPARTMENT HISTORY AND PHYSICAL EXAM    Date: 5/4/2018  Patient Name: Evelyn Quevedo    History of Presenting Illness     Chief Complaint   Patient presents with    Nausea         History Provided By: Patient and daughter     Chief Complaint: nausea   Duration: 6 Weeks  Timing:  Constant and Waxing and Waning  Severity: Mild  Modifying Factors: worse with eating  Associated Symptoms: SOB worse with movement, recent weight loss and loss of appetite    Additional History (Context):   7:34 PM   Evelyn Quevedo is a 79 y.o. female with FMHx of Father MI ([de-identified]), CVA ([de-identified]), and Cancer, PMHX of Vitamin deficiency (Tereso Albarran Dr.), and C5-C6 ruptured disk debridement 4 months ago who presents to the emergency department C/O constant, waxing and waning nausea onset 6 weeks ago. Associated sxs include SOB worse with minimal movement (10-20 steps walking), recent weight loss with decreased appetite; \"I haven't eaten In 5 weeks, and lost 68 lbs\". The patient states that her recent surgery had an infection which resolved with IV ABx initially, but started again, and warranted a second dose of IV ABx. Pt denies any current pain, vomiting, diarrhea, abdominal pain, blood in stool, urine decrease, etOH use, cigarette use, Drug use, FMHx DM, PMHx MI, CVA, and any other sxs or complaints.      PCP: Rafael Waddell MD    Current Facility-Administered Medications   Medication Dose Route Frequency Provider Last Rate Last Dose    potassium chloride 10 mEq in 100 ml IVPB  10 mEq IntraVENous Q1H Patricia Ortiz  mL/hr at 05/06/18 0547 10 mEq at 05/06/18 0547    nystatin (MYCOSTATIN) 100,000 unit/mL oral suspension 500,000 Units  500,000 Units Oral QID Patricia Ortiz MD   500,000 Units at 05/05/18 2254    metoprolol tartrate (LOPRESSOR) tablet 12.5 mg  12.5 mg Oral BID Chanelle Hansen MD   12.5 mg at 05/05/18 2255    0.9% sodium chloride infusion  100 mL/hr IntraVENous CONTINUOUS Susy Brown MD Bryan 100 mL/hr at 05/06/18 0329 100 mL/hr at 05/06/18 0329    potassium bicarbonate (KLYTE) tablet 50 mEq  50 mEq Oral BID Devon Cheung MD   50 mEq at 05/05/18 0847    ondansetron (ZOFRAN) injection 4 mg  4 mg IntraVENous Q4H PRN Ivy Ruvalcaba MD   4 mg at 05/05/18 7012    heparin (porcine) injection 5,000 Units  5,000 Units SubCUTAneous Q8H Ivy Ruvalcaba MD   5,000 Units at 05/06/18 3533       Past History     Past Medical History:  Past Medical History:   Diagnosis Date    Hypertension        Past Surgical History:  Past Surgical History:   Procedure Laterality Date    HX CHOLECYSTECTOMY      HX GYN      HX ORTHOPAEDIC  1996    c5-6 fusion     HX ORTHOPAEDIC  01/2018    lumbar laminectomy        Family History:  No family history on file. Social History:  Social History   Substance Use Topics    Smoking status: Former Smoker    Smokeless tobacco: Never Used    Alcohol use No       Allergies:  No Known Allergies      Review of Systems   Review of Systems   Constitutional: Positive for appetite change (decreased) and unexpected weight change (loss of 68 lbs in 5 weeks). Respiratory: Positive for shortness of breath. Gastrointestinal: Positive for nausea. Negative for abdominal pain, blood in stool, constipation, diarrhea and vomiting. Genitourinary: Negative for frequency. All other systems reviewed and are negative. Physical Exam     Vitals:    05/05/18 1922 05/05/18 2336 05/06/18 0313 05/06/18 0444   BP: 137/74 140/47 143/70    Pulse: 94 94 91    Resp: 19 20 18    Temp: 98.3 °F (36.8 °C) 98.1 °F (36.7 °C) 98.2 °F (36.8 °C)    SpO2: 100% 99% 100%    Weight:    90 kg (198 lb 6.6 oz)   Height:         Physical Exam   Constitutional: She is oriented to person, place, and time. She appears ill (dehydration). No distress. Overweight       HENT:   Head: Normocephalic and atraumatic.    Right Ear: External ear normal.   Left Ear: External ear normal. Mouth/Throat: Mucous membranes are dry. No oropharyngeal exudate. Eyes: Conjunctivae and EOM are normal. Pupils are equal, round, and reactive to light. No scleral icterus. Pale     Neck: Normal range of motion. Neck supple. No JVD present. No tracheal deviation present. No thyromegaly present. Cardiovascular: Regular rhythm and normal heart sounds. Tachycardia present. Irregular rhythm, not irregularly irregular   Pulmonary/Chest: Effort normal and breath sounds normal. No stridor. No respiratory distress. Abdominal: Soft. Bowel sounds are normal. She exhibits no distension. There is no tenderness. There is no rebound and no guarding. Musculoskeletal: Normal range of motion. She exhibits no edema or tenderness. No soft tissue injuries   Lymphadenopathy:     She has no cervical adenopathy. Neurological: She is alert and oriented to person, place, and time. She has normal reflexes. No cranial nerve deficit. Coordination normal.   Skin: Skin is dry. No rash noted. She is not diaphoretic. No erythema. There is pallor. Poor Turgor  Cap refill 2-4 seconds   Psychiatric: She has a normal mood and affect. Her behavior is normal. Judgment and thought content normal.   Nursing note and vitals reviewed.         Diagnostic Study Results     Labs -     Recent Results (from the past 12 hour(s))   POTASSIUM    Collection Time: 05/05/18  7:35 PM   Result Value Ref Range    Potassium 3.4 (L) 3.5 - 5.5 mmol/L   CBC WITH AUTOMATED DIFF    Collection Time: 05/06/18  3:06 AM   Result Value Ref Range    WBC 7.4 4.6 - 13.2 K/uL    RBC 3.54 (L) 4.20 - 5.30 M/uL    HGB 9.1 (L) 12.0 - 16.0 g/dL    HCT 27.8 (L) 35.0 - 45.0 %    MCV 78.5 74.0 - 97.0 FL    MCH 25.7 24.0 - 34.0 PG    MCHC 32.7 31.0 - 37.0 g/dL    RDW 15.8 (H) 11.6 - 14.5 %    PLATELET 641 384 - 375 K/uL    MPV 10.7 9.2 - 11.8 FL    NEUTROPHILS 49 40 - 73 %    LYMPHOCYTES 35 21 - 52 %    MONOCYTES 9 3 - 10 %    EOSINOPHILS 6 (H) 0 - 5 %    BASOPHILS 1 0 - 2 %    ABS. NEUTROPHILS 3.7 1.8 - 8.0 K/UL    ABS. LYMPHOCYTES 2.6 0.9 - 3.6 K/UL    ABS. MONOCYTES 0.7 0.05 - 1.2 K/UL    ABS. EOSINOPHILS 0.4 0.0 - 0.4 K/UL    ABS. BASOPHILS 0.1 (H) 0.0 - 0.06 K/UL    DF AUTOMATED     METABOLIC PANEL, BASIC    Collection Time: 05/06/18  3:06 AM   Result Value Ref Range    Sodium 140 136 - 145 mmol/L    Potassium 3.0 (L) 3.5 - 5.5 mmol/L    Chloride 105 100 - 108 mmol/L    CO2 21 21 - 32 mmol/L    Anion gap 14 3.0 - 18 mmol/L    Glucose 78 74 - 99 mg/dL    BUN 4 (L) 7.0 - 18 MG/DL    Creatinine 0.76 0.6 - 1.3 MG/DL    BUN/Creatinine ratio 5 (L) 12 - 20      GFR est AA >60 >60 ml/min/1.73m2    GFR est non-AA >60 >60 ml/min/1.73m2    Calcium 8.4 (L) 8.5 - 10.1 MG/DL       Radiologic Studies -   XR ABD ACUTE W 1 V CHEST   Final Result        CT Results  (Last 48 hours)    None        CXR Results  (Last 48 hours)               05/04/18 2018  XR ABD ACUTE W 1 V CHEST Final result    Impression:  IMPRESSION:       1. Chest:  No acute process. 2. Abdomen: Nonobstructive nonspecific bowel gas pattern. Narrative:  EXAM: Acute Abdominal Series       Indication: Abdominal pain, nausea and vomiting for 6 weeks. Technique: Single frontal view of the chest with, upright and supine frontal   views of the abdomen. Comparison Studies: 04/26/2018.   ________________   FINDINGS:   -Chest x-ray:    The cardiomediastinal silhouette is midline and normal in size, for technique. No focal opacity, pneumothorax or pleural effusion is identified. Stable intact   osseous structures.       -Abdomen:   Relative paucity of large and small bowel gas with a mild amount of admixed   material in the low pelvis, potentially rectal vault/distal sigmoid. No   pathologic air-fluid levels. No bowel wall thickening. No free air. Surgical   clips in the right upper quadrant, from prior cholecystectomy. Intact osseous structures with mild levoscoliosis centered at L2.  Lower lumbar degenerative disc disease and spondylosis at L4-5.       ________________             Medications given in the ED-  Medications   0.9% sodium chloride infusion (100 mL/hr IntraVENous New Bag 5/6/18 0329)   potassium bicarbonate (KLYTE) tablet 50 mEq (50 mEq Oral Missed 5/5/18 2100)   ondansetron (ZOFRAN) injection 4 mg (4 mg IntraVENous Given 5/5/18 0903)   heparin (porcine) injection 5,000 Units (5,000 Units SubCUTAneous Given 5/6/18 0547)   nystatin (MYCOSTATIN) 100,000 unit/mL oral suspension 500,000 Units (500,000 Units Oral Given 5/5/18 2254)   metoprolol tartrate (LOPRESSOR) tablet 12.5 mg (12.5 mg Oral Given 5/5/18 2255)   potassium chloride 10 mEq in 100 ml IVPB (10 mEq IntraVENous New Bag 5/6/18 0547)   sodium chloride 0.9 % bolus infusion 1,000 mL (0 mL IntraVENous IV Completed 5/4/18 2244)   ondansetron (ZOFRAN) injection 4 mg (4 mg IntraVENous Given 5/4/18 2112)   magnesium sulfate 2 g/50 ml IVPB (premix or compounded) (2 g IntraVENous New Bag 5/4/18 2226)   potassium chloride 10 mEq in 100 ml IVPB (10 mEq IntraVENous New Bag 5/5/18 0420)   potassium chloride 10 mEq in 100 ml IVPB (10 mEq IntraVENous New Bag 5/5/18 1506)         Medical Decision Making   I am the first provider for this patient. I reviewed the vital signs, available nursing notes, past medical history, past surgical history, family history and social history. Vital Signs-Reviewed the patient's vital signs. Pulse Oximetry Analysis - 97% on room air     Cardiac Monitor:  Rate: 88 bpm  Rhythm: NSR    EKG interpretation: (Preliminary)  7:29 PM   Sinus Tach, Non specific St and T wave abnormality, Abnormal ECG, Rate 120. EKG read by Paul Finley. Luke Yusuf MD  at 7:35 PM     Records Reviewed: Nursing Notes and Old Medical Records    Provider Notes (Medical Decision Making): DDx: severe dehydration clinically, My exam anticipate need to correct electrolyte and fluid deficits as well as renal dysfunction.  Tachycardia is likely from volume depletion and not cardiogenic. Underlying arhythmia also likely, including variable A-Fib. Unlikely MI, CVA, GI infection or malabsorption. Also likely co existent to state if this is primary or secondary. Treat other problems also likely. Low threshold for admission . Procedures:  Procedures    ED Course:   7:34 PM    Initial assessment performed. The patients presenting problems have been discussed, and they are in agreement with the care plan formulated and outlined with them. I have encouraged them to ask questions as they arise throughout their visit. CONSULT NOTE:   9:34 PM  Gabriel Sanches. Shay Wells MD spoke with Kavon De Paz MD    Specialty: Hospitalist   Discussed pt's hx, disposition, and available diagnostic and imaging results  over the telephone. Reviewed care plans. Consulting physician agrees with plans as outlined. Agrees with admission . Will send the pt to tele for fluid correction and Acidosis. Diagnosis and Disposition         Critical Care Time: 0    Core Measures:  For Hospitalized Patients:    1. Hospitalization Decision Time:  The decision to hospitalize the patient was made by Gabriel Sanches. Shay Wells MD at 9:30 on 5/4/2018    2. Aspirin: Aspirin was not given because the patient did not present with a stroke at the time of their Emergency Department evaluation    9:45 PM  Patient is being admitted to the hospital by Kavon De Paz MD (Tele). The results of their tests and reasons for their admission have been discussed with them and/or available family. They convey agreement and understanding for the need to be admitted and for their admission diagnosis. CONDITIONS ON ADMISSION:  Sepsis is not present at the time of admission. Deep Vein Thrombosis is not present at the time of admission. Thrombosis is not present at the time of admission. Urinary Tract Infection is not present at the time of admission. Pneumonia is not present at the time of admission.  MRSA is not present at the time of admission. Wound infection is not present at the time of admission. Pressure Ulcer is not present at the time of admission. CLINICAL IMPRESSION:    1. Dehydration    2. Acidosis    3. Hypokalemia          Attestations: This note is prepared by Nicholas Wilkes, acting as Scribe for Scott. Gabriele Muniz MD.    [employer]. Gabriele Muniz MD:  The scribe's documentation has been prepared under my direction and personally reviewed by me in its entirety.   I confirm that the note above accurately reflects all work, treatment, procedures, and medical decision making performed by me.  _______________________________

## 2018-05-04 NOTE — IP AVS SNAPSHOT
[address] [place] [address] 
[phone] Patient: Renee Elizondo MRN: [id number] ZEINAB:[birth year]4 A check cecil indicates which time of day the medication should be taken. My Medications START taking these medications Instructions Each Dose to Equal  
 Morning Noon Evening Bedtime  
 nystatin 100,000 unit/mL suspension Commonly known as:  MYCOSTATIN Take 5 mL by mouth four (4) times daily for 7 days. swish and spit [id number] Units CONTINUE taking these medications Instructions Each Dose to Equal  
 Morning Noon Evening Bedtime  
 ferrous sulfate 325 mg (65 mg iron) tablet Take 1 Tab by mouth three (3) times daily (with meals). 325 mg  
    
   
   
  
   
  
 hydroCHLOROthiazide 12.5 mg capsule Commonly known as:  Coronado Dyers Take 12.5 mg by mouth as needed. 12.5 mg  
    
   
   
   
  
 LASIX 20 mg tablet Generic drug:  furosemide Take  by mouth daily. magnesium oxide 400 mg tablet Commonly known as:  MAG-OX Take 400 mg by mouth daily. 400 mg PROBIOTIC 10 billion cell Cap Generic drug:  Lactobacillus acidophilus Take  by mouth. STOP taking these medications   
 cyclobenzaprine 10 mg tablet Commonly known as:  FLEXERIL  
   
  
 morphine 15 mg Tr12  
   
  
 naloxone 4 mg/actuation nasal spray Commonly known as:  NARCAN  
   
  
 naproxen 500 mg tablet Commonly known as:  NAPROSYN  
   
  
 potassium chloride 10 mEq tablet Commonly known as:  KLOR-CON Where to Get Your Medications These medications were sent to [address], [place] [address] of [facility] [address] [place], [facility] [address] Phone:  [phone]  
  nystatin 100,000 unit/mL suspension

## 2018-05-04 NOTE — IP AVS SNAPSHOT
[address] [address] 
[phone] Patient: Tristen Heller MRN: [id number] NFK:4[birth year]13/[birth year] About your hospitalization You were admitted on: May 4, 2018 You last received care in the:  [facility] You were discharged on: May 7, 2018 Why you were hospitalized Your primary diagnosis was:  Dehydration Your diagnoses also included: Tachyarrhythmia, Dysphagia, Electrolyte Abnormality, Oral Delta Rein Follow-up Information Follow up With Details Comments Contact Info Keiry Sparrow [address] 
[phone] Discharge Orders None A check cecil indicates which time of day the medication should be taken. My Medications START taking these medications Instructions Each Dose to Equal  
 Morning Noon Evening Bedtime  
 nystatin 100,000 unit/mL suspension Commonly known as:  MYCOSTATIN Take 5 mL by mouth four (4) times daily for 7 days. swish and spit [id number] Units CONTINUE taking these medications Instructions Each Dose to Equal  
 Morning Noon Evening Bedtime  
 ferrous sulfate 325 mg (65 mg iron) tablet Take 1 Tab by mouth three (3) times daily (with meals). 325 mg  
    
   
   
  
   
  
 hydroCHLOROthiazide 12.5 mg capsule Commonly known as:  Sydelle Mervin Take 12.5 mg by mouth as needed. 12.5 mg  
    
   
   
   
  
 LASIX 20 mg tablet Generic drug:  furosemide Take  by mouth daily. magnesium oxide 400 mg tablet Commonly known as:  MAG-OX Take 400 mg by mouth daily. 400 mg PROBIOTIC 10 billion cell Cap Generic drug:  Lactobacillus acidophilus Take  by mouth. STOP taking these medications   
 cyclobenzaprine 10 mg tablet Commonly known as:  FLEXERIL  
   
  
 morphine 15 mg Tr12  
   
  
 naloxone 4 mg/actuation nasal spray Commonly known as:  NARCAN  
   
  
 naproxen 500 mg tablet Commonly known as:  NAPROSYN  
   
  
 potassium chloride 10 mEq tablet Commonly known as:  KLOR-CON Where to Get Your Medications These medications were sent to [address], [address] [facility] [address] [place], [address] [facility] [address] Phone:  [phone]  
  nystatin 100,000 unit/mL suspension Discharge Instructions None Introducing [facility]! [address] introduces Svbtle patient portal. Now you can access parts of your medical record, email your doctor's office, and request medication refills online. 1. In your internet browser, go to [email or web address] 2. Click on the First Time User? Click Here link in the Sign In box. You will see the New Member Sign Up page. 3. Enter your Svbtle Access Code exactly as it appears below. You will not need to use this code after youve completed the sign-up process. If you do not sign up before the expiration date, you must request a new code. · Svbtle Access Code: [email or web address] Expires: 7/25/2018  9:54 AM 
 
4. Enter the last four digits of your Social Security Number (xxxx) and Date of Birth (mm/dd/yyyy) as indicated and click Submit. You will be taken to the next sign-up page. 5. Create a [place] [employer] ID. This will be your Svbtle login ID and cannot be changed, so think of one that is secure and easy to remember. 6. Create a Svbtle password. You can change your password at any time. 7. Enter your Password Reset Question and Answer. This can be used at a later time if you forget your password. 8. Enter your e-mail address. You will receive e-mail notification when new information is available in [address] 9. Click Sign Up. You can now view and download portions of your medical record. 10. Click the Download Summary menu link to download a portable copy of your medical information. If you have questions, please visit the Frequently Asked Questions section of the Memobox website. Remember, Memobox is NOT to be used for urgent needs. For medical emergencies, dial 911. Now available from your iPhone and Android! Introducing Nathan Doshi As a [facility] patient, I wanted to make you aware of our electronic visit tool called Nathan Doshi. Codemedia 24/7 allows you to connect within minutes with a medical provider 24 hours a day, seven days a week via a mobile device or tablet or logging into a secure website from your computer. You can access Nathan Doshi from anywhere in the [place]. A virtual visit might be right for you when you have a simple condition and feel like you just dont want to get out of bed, or cant get away from work for an appointment, when your regular [facility] provider is not available (evenings, weekends or holidays), or when youre out of town and need minor care. Electronic visits cost only $49 and if the Codemedia 24/7 provider determines a prescription is needed to treat your condition, one can be electronically transmitted to a nearby pharmacy*. Please take a moment to enroll today if you have not already done so. The enrollment process is free and takes just a few minutes. To enroll, please download the [facility] 24/7 zheng to your tablet or phone, or visit [email or web address] to enroll on your computer. And, as an [address] [place] [address] patient with a [employer] account, the results of your visits will be scanned into your electronic medical record and your primary care provider will be able to view the scanned results.    
We urge you to continue to see your regular [facility] provider for your ongoing medical care. And while your primary care provider may not be the one available when you seek a Nathan Doshi virtual visit, the peace of mind you get from getting a real diagnosis real time can be priceless. For more information on Nathan Spaindustinfin, view our Frequently Asked Questions (FAQs) at [email or web address]. Sincerely, 
 
Nelli Cardenas MD 
Chief Medical Officer [employer] *:  certain medications cannot be prescribed via Nathan Spainbenny Providers Seen During Your Hospitalization Provider Specialty Primary office phone Kaleb Balderas MD Emergency Medicine [phone] Pankaj Chaudhari MD Internal Medicine [phone] Your Primary Care Physician (PCP) Primary Care Physician Office Phone Office Fax [address] [facility],[address], [facility], [address] [facility] [phone] You are allergic to the following No active allergies Recent Documentation Height Weight Breastfeeding? BMI OB Status Smoking Status 1.626 m 90 kg No 34.06 kg/m2 Hysterectomy Former Smoker Emergency Contacts Name Discharge Info Relation Home Work Mobile Antonia Pisano DISCHARGE CAREGIVER [3] Other Relative [6] [phone] Henri Pisano DISCHARGE CAREGIVER [3] Son [22] 31 [phone] 66 Albina Braun DISCHARGE CAREGIVER [3] Child [2]   [phone] Patient Belongings The following personal items are in your possession at time of discharge: 
  Dental Appliances: None  Visual Aid: Glasses, With patient      Home Medications: None   Jewelry: None  Clothing: Undergarments, Footwear, Pants, Shirt    Other Valuables: Cell Phone, With patient  Personal Items Sent to Safe: none Please provide this summary of care documentation to your next provider. Signatures-by signing, you are acknowledging that this After Visit Summary has been reviewed with you and you have received a copy. Patient Signature:  ____________________________________________________________ Date:  ____________________________________________________________  
  
Lavanda Ferries Provider Signature:  ____________________________________________________________ Date:  ____________________________________________________________

## 2018-05-05 LAB
ALBUMIN SERPL-MCNC: 2.3 G/DL (ref 3.4–5)
ALBUMIN/GLOB SERPL: 0.5 {RATIO} (ref 0.8–1.7)
ALP SERPL-CCNC: 137 U/L (ref 45–117)
ALT SERPL-CCNC: 14 U/L (ref 13–56)
ANION GAP SERPL CALC-SCNC: 13 MMOL/L (ref 3–18)
ANION GAP SERPL CALC-SCNC: 16 MMOL/L (ref 3–18)
APPEARANCE UR: CLEAR
AST SERPL-CCNC: 35 U/L (ref 15–37)
BASOPHILS # BLD: 0.1 K/UL (ref 0–0.06)
BASOPHILS NFR BLD: 1 % (ref 0–2)
BILIRUB SERPL-MCNC: 0.5 MG/DL (ref 0.2–1)
BILIRUB UR QL: NEGATIVE
BUN SERPL-MCNC: 9 MG/DL (ref 7–18)
BUN SERPL-MCNC: 9 MG/DL (ref 7–18)
BUN/CREAT SERPL: 11 (ref 12–20)
BUN/CREAT SERPL: 11 (ref 12–20)
CALCIUM SERPL-MCNC: 8.1 MG/DL (ref 8.5–10.1)
CALCIUM SERPL-MCNC: 8.3 MG/DL (ref 8.5–10.1)
CHLORIDE SERPL-SCNC: 103 MMOL/L (ref 100–108)
CHLORIDE SERPL-SCNC: 103 MMOL/L (ref 100–108)
CK MB CFR SERPL CALC: ABNORMAL % (ref 0–4)
CK MB CFR SERPL CALC: ABNORMAL % (ref 0–4)
CK MB SERPL-MCNC: <1 NG/ML (ref 5–25)
CK MB SERPL-MCNC: <1 NG/ML (ref 5–25)
CK SERPL-CCNC: 20 U/L (ref 26–192)
CK SERPL-CCNC: 22 U/L (ref 26–192)
CO2 SERPL-SCNC: 19 MMOL/L (ref 21–32)
CO2 SERPL-SCNC: 20 MMOL/L (ref 21–32)
COLOR UR: YELLOW
CREAT SERPL-MCNC: 0.81 MG/DL (ref 0.6–1.3)
CREAT SERPL-MCNC: 0.85 MG/DL (ref 0.6–1.3)
DIFFERENTIAL METHOD BLD: ABNORMAL
EOSINOPHIL # BLD: 0.2 K/UL (ref 0–0.4)
EOSINOPHIL NFR BLD: 2 % (ref 0–5)
ERYTHROCYTE [DISTWIDTH] IN BLOOD BY AUTOMATED COUNT: 15.6 % (ref 11.6–14.5)
GLOBULIN SER CALC-MCNC: 4.3 G/DL (ref 2–4)
GLUCOSE SERPL-MCNC: 70 MG/DL (ref 74–99)
GLUCOSE SERPL-MCNC: 91 MG/DL (ref 74–99)
GLUCOSE UR STRIP.AUTO-MCNC: NEGATIVE MG/DL
HCT VFR BLD AUTO: 30.6 % (ref 35–45)
HGB BLD-MCNC: 10 G/DL (ref 12–16)
HGB UR QL STRIP: NEGATIVE
KETONES UR QL STRIP.AUTO: 15 MG/DL
LACTATE SERPL-SCNC: 1 MMOL/L (ref 0.4–2)
LEUKOCYTE ESTERASE UR QL STRIP.AUTO: NEGATIVE
LYMPHOCYTES # BLD: 2.5 K/UL (ref 0.9–3.6)
LYMPHOCYTES NFR BLD: 29 % (ref 21–52)
MAGNESIUM SERPL-MCNC: 1.9 MG/DL (ref 1.6–2.6)
MCH RBC QN AUTO: 25.7 PG (ref 24–34)
MCHC RBC AUTO-ENTMCNC: 32.7 G/DL (ref 31–37)
MCV RBC AUTO: 78.7 FL (ref 74–97)
MONOCYTES # BLD: 0.9 K/UL (ref 0.05–1.2)
MONOCYTES NFR BLD: 10 % (ref 3–10)
NEUTS SEG # BLD: 5 K/UL (ref 1.8–8)
NEUTS SEG NFR BLD: 58 % (ref 40–73)
NITRITE UR QL STRIP.AUTO: NEGATIVE
PH UR STRIP: 5 [PH] (ref 5–8)
PLATELET # BLD AUTO: 302 K/UL (ref 135–420)
PMV BLD AUTO: 10.9 FL (ref 9.2–11.8)
POTASSIUM SERPL-SCNC: 2.8 MMOL/L (ref 3.5–5.5)
POTASSIUM SERPL-SCNC: 3.1 MMOL/L (ref 3.5–5.5)
POTASSIUM SERPL-SCNC: 3.4 MMOL/L (ref 3.5–5.5)
PROT SERPL-MCNC: 6.6 G/DL (ref 6.4–8.2)
PROT UR STRIP-MCNC: NEGATIVE MG/DL
RBC # BLD AUTO: 3.89 M/UL (ref 4.2–5.3)
SODIUM SERPL-SCNC: 136 MMOL/L (ref 136–145)
SODIUM SERPL-SCNC: 138 MMOL/L (ref 136–145)
SP GR UR REFRACTOMETRY: 1.01 (ref 1–1.03)
TROPONIN I SERPL-MCNC: 0.02 NG/ML (ref 0–0.06)
TROPONIN I SERPL-MCNC: <0.02 NG/ML (ref 0–0.06)
UROBILINOGEN UR QL STRIP.AUTO: 0.2 EU/DL (ref 0.2–1)
WBC # BLD AUTO: 8.6 K/UL (ref 4.6–13.2)

## 2018-05-05 PROCEDURE — 83735 ASSAY OF MAGNESIUM: CPT | Performed by: INTERNAL MEDICINE

## 2018-05-05 PROCEDURE — 36415 COLL VENOUS BLD VENIPUNCTURE: CPT | Performed by: INTERNAL MEDICINE

## 2018-05-05 PROCEDURE — 85025 COMPLETE CBC W/AUTO DIFF WBC: CPT | Performed by: INTERNAL MEDICINE

## 2018-05-05 PROCEDURE — 80053 COMPREHEN METABOLIC PANEL: CPT | Performed by: INTERNAL MEDICINE

## 2018-05-05 PROCEDURE — 74011250636 HC RX REV CODE- 250/636: Performed by: EMERGENCY MEDICINE

## 2018-05-05 PROCEDURE — 74011250636 HC RX REV CODE- 250/636: Performed by: INTERNAL MEDICINE

## 2018-05-05 PROCEDURE — 84132 ASSAY OF SERUM POTASSIUM: CPT | Performed by: INTERNAL MEDICINE

## 2018-05-05 PROCEDURE — 74011250637 HC RX REV CODE- 250/637: Performed by: INTERNAL MEDICINE

## 2018-05-05 PROCEDURE — 82550 ASSAY OF CK (CPK): CPT | Performed by: INTERNAL MEDICINE

## 2018-05-05 PROCEDURE — 83605 ASSAY OF LACTIC ACID: CPT | Performed by: INTERNAL MEDICINE

## 2018-05-05 PROCEDURE — 65660000000 HC RM CCU STEPDOWN

## 2018-05-05 PROCEDURE — 74011250637 HC RX REV CODE- 250/637: Performed by: EMERGENCY MEDICINE

## 2018-05-05 RX ORDER — NYSTATIN 100000 [USP'U]/ML
500000 SUSPENSION ORAL 4 TIMES DAILY
Status: DISCONTINUED | OUTPATIENT
Start: 2018-05-05 | End: 2018-05-07 | Stop reason: HOSPADM

## 2018-05-05 RX ORDER — FLUCONAZOLE 2 MG/ML
200 INJECTION, SOLUTION INTRAVENOUS EVERY 24 HOURS
Status: DISCONTINUED | OUTPATIENT
Start: 2018-05-05 | End: 2018-05-05

## 2018-05-05 RX ORDER — METOPROLOL TARTRATE 25 MG/1
12.5 TABLET, FILM COATED ORAL 2 TIMES DAILY
Status: DISCONTINUED | OUTPATIENT
Start: 2018-05-05 | End: 2018-05-07 | Stop reason: HOSPADM

## 2018-05-05 RX ORDER — POTASSIUM CHLORIDE 7.45 MG/ML
10 INJECTION INTRAVENOUS
Status: COMPLETED | OUTPATIENT
Start: 2018-05-05 | End: 2018-05-05

## 2018-05-05 RX ADMIN — NYSTATIN 500000 UNITS: 500000 SUSPENSION ORAL at 22:54

## 2018-05-05 RX ADMIN — ONDANSETRON 4 MG: 2 INJECTION INTRAMUSCULAR; INTRAVENOUS at 09:03

## 2018-05-05 RX ADMIN — POTASSIUM CHLORIDE 10 MEQ: 10 INJECTION, SOLUTION INTRAVENOUS at 09:43

## 2018-05-05 RX ADMIN — POTASSIUM CHLORIDE 10 MEQ: 10 INJECTION, SOLUTION INTRAVENOUS at 04:20

## 2018-05-05 RX ADMIN — NYSTATIN 500000 UNITS: 500000 SUSPENSION ORAL at 08:47

## 2018-05-05 RX ADMIN — POTASSIUM CHLORIDE 10 MEQ: 10 INJECTION, SOLUTION INTRAVENOUS at 03:25

## 2018-05-05 RX ADMIN — POTASSIUM CHLORIDE 10 MEQ: 10 INJECTION, SOLUTION INTRAVENOUS at 12:51

## 2018-05-05 RX ADMIN — POTASSIUM BICARBONATE 50 MEQ: 25 TABLET, EFFERVESCENT ORAL at 08:47

## 2018-05-05 RX ADMIN — POTASSIUM CHLORIDE 10 MEQ: 10 INJECTION, SOLUTION INTRAVENOUS at 11:51

## 2018-05-05 RX ADMIN — HEPARIN SODIUM 5000 UNITS: 5000 INJECTION, SOLUTION INTRAVENOUS; SUBCUTANEOUS at 06:15

## 2018-05-05 RX ADMIN — METOPROLOL TARTRATE 12.5 MG: 25 TABLET, FILM COATED ORAL at 09:44

## 2018-05-05 RX ADMIN — NYSTATIN 500000 UNITS: 500000 SUSPENSION ORAL at 18:02

## 2018-05-05 RX ADMIN — SODIUM CHLORIDE 150 ML/HR: 900 INJECTION, SOLUTION INTRAVENOUS at 06:15

## 2018-05-05 RX ADMIN — HEPARIN SODIUM 5000 UNITS: 5000 INJECTION, SOLUTION INTRAVENOUS; SUBCUTANEOUS at 22:54

## 2018-05-05 RX ADMIN — POTASSIUM CHLORIDE 10 MEQ: 10 INJECTION, SOLUTION INTRAVENOUS at 00:06

## 2018-05-05 RX ADMIN — POTASSIUM CHLORIDE 10 MEQ: 10 INJECTION, SOLUTION INTRAVENOUS at 10:49

## 2018-05-05 RX ADMIN — POTASSIUM CHLORIDE 10 MEQ: 10 INJECTION, SOLUTION INTRAVENOUS at 15:06

## 2018-05-05 RX ADMIN — POTASSIUM CHLORIDE 10 MEQ: 10 INJECTION, SOLUTION INTRAVENOUS at 02:25

## 2018-05-05 RX ADMIN — METOPROLOL TARTRATE 12.5 MG: 25 TABLET, FILM COATED ORAL at 22:55

## 2018-05-05 RX ADMIN — POTASSIUM CHLORIDE 10 MEQ: 10 INJECTION, SOLUTION INTRAVENOUS at 01:21

## 2018-05-05 RX ADMIN — POTASSIUM CHLORIDE 10 MEQ: 10 INJECTION, SOLUTION INTRAVENOUS at 14:00

## 2018-05-05 RX ADMIN — HEPARIN SODIUM 5000 UNITS: 5000 INJECTION, SOLUTION INTRAVENOUS; SUBCUTANEOUS at 15:06

## 2018-05-05 RX ADMIN — NYSTATIN 500000 UNITS: 500000 SUSPENSION ORAL at 12:51

## 2018-05-05 NOTE — PROGRESS NOTES
Hospitalist Progress Note    Patient: Darryle Pam MRN: [id number]  CSN: [id number]    YOB: 1950  Age: 79 y.o. Sex: female    DOA: 5/4/2018 LOS:  LOS: 1 day                Assessment and Plan:    Principal Problem:    Dehydration (5/4/2018)    Active Problems:    Tachyarrhythmia (5/4/2018)      Dysphagia (5/4/2018)      Electrolyte abnormality (5/4/2018)        Active Problems:    1. Dehydration (5/4/2018):  Continue fluids and hold diuretics  . Will decrease fluids      2. Tachyarrhythmia (5/4/2018): with hypertension. Will do low dose beta blocker  To start today. THis will help with blood pressure as will lowering the fluids      3. Dysphagia (5/4/2018): Protonix IV   GI consult     4. Electrolyte abnormality :  re check now and in am   And replace if needed  . I think she had a  Metabolic acidosis from vomiting      5. Thrush: Start Nysatin   Await EGD before Diflucan      DVT/GI Prophylaxis: Hep SQ and Coumadin      Chief complaint:  Sent here by GI for nausea and vomiting and dehydration        Subjective:    Still with nausea but was able to keep down water last night        Review of systems:    General: No fevers or chills. Cardiovascular: No chest pain or pressure. No palpitations. Pulmonary: No shortness of breath. Gastrointestinal: No nausea, vomiting. Objective:    Vital signs/Intake and Output:  Visit Vitals    BP (!) 147/97 (BP 1 Location: Right arm, BP Patient Position: At rest;Lying right side)    Pulse 88    Temp 97.6 °F (36.4 °C)    Resp 20    Ht 5' 4\" (1.626 m)    Wt 85.8 kg (189 lb 3.2 oz)    SpO2 97%    Breastfeeding No    BMI 32.48 kg/m2     Current Shift:     Last three shifts:  05/03 1901 - 05/05 0700  In: 1677.5 [P.O.:120; I.V.:1557.5]  Out: 0     Physical Exam:  General: NAD, AAOx3. Non-toxic. HEENT: NC/AT. PERRLA, EOMI.  MMM. Lungs: Nml inspection. CTA B/L. No wheezing, rales or rhonchi. Heart:  S1S2 RRR,  PMI mid 5th IC space.  No M/RG.  Abdomen: Soft, NT/ND.  BS+. No peritoneal signs. Extremities: No C/C/E. Psych:   Nml affect. Neurologic:  2-12 intact. Strength 5/5 throughout. Sensation symmetrical.          Labs: Results:       Chemistry Recent Labs      05/04/18 1942   GLU  94   NA  137   K  3.0*   CL  96*   CO2  18*   BUN  10   CREA  1.07   CA  9.6   AGAP  23*   BUCR  9*   AP  194*   TP  8.6*   ALB  3.1*   GLOB  5.5*   AGRAT  0.6*      CBC w/Diff Recent Labs      05/05/18 0437 05/04/18 1942   WBC  8.6  10.1   RBC  3.89*  4.55   HGB  10.0*  12.0   HCT  30.6*  35.7   PLT  302  387   GRANS  58  72   LYMPH  29  19*   EOS  2  1      Cardiac Enzymes Recent Labs      05/05/18 0437 05/04/18 1942   CPK  22*  25*   CKND1  CALCULATION NOT PERFORMED WHEN RESULT IS BELOW LINEAR LIMIT  CALCULATION NOT PERFORMED WHEN RESULT IS BELOW LINEAR LIMIT      Coagulation No results for input(s): PTP, INR, APTT in the last 72 hours. No lab exists for component: INREXT    Lipid Panel No results found for: CHOL, CHOLPOCT, CHOLX, CHLST, CHOLV, 174005, HDL, LDL, LDLC, DLDLP, 018459, VLDLC, VLDL, TGLX, TRIGL, TRIGP, TGLPOCT, CHHD, CHHDX   BNP No results for input(s): BNPP in the last 72 hours.    Liver Enzymes Recent Labs      05/04/18 1942   TP  8.6*   ALB  3.1*   AP  194*   SGOT  52*      Thyroid Studies No results found for: T4, T3U, TSH, TSHEXT     Procedures/imaging: see electronic medical records for all procedures/Xrays and details which were not copied into this note but were reviewed prior to creation of Plan

## 2018-05-05 NOTE — PROGRESS NOTES
2327 TRANSFER - IN REPORT:    Verbal report received from Constantino Moreno RN(name) on Darius  being received from ER(unit) for routine progression of care      Report consisted of patients Situation, Background, Assessment and   Recommendations(SBAR). Information from the following report(s) SBAR, Intake/Output and MAR was reviewed with the receiving nurse. Opportunity for questions and clarification was provided. Assessment completed upon patients arrival to unit and care assumed. 2354 Assessment completed and documented in flow sheet. Pt denies any further needs at this time. Pt in NAD with bed in low position, wheels locked and call bell within reach. 0006 Scheduled medications administered as ordered. 0121 Scheduled medications administered as ordered. 0225 Scheduled medications administered as ordered. 3923 Scheduled medications administered as ordered. 4564 Reassessment completed with no changes noted. Bed locked, in lowest position, with call light within reach. 9914 Scheduled medications administered as ordered. Asked  to return at 0730 for BMP r/t K+ replacement still infusing. Spoke with SEVERINO STOVER in the lab and she said she would retime for 0730.    0725 Bedside and Verbal shift change report given to [facility] (oncoming nurse) by Vivien Baez RN   (offgoing nurse). Report included the following information SBAR, Procedure Summary, Intake/Output and MAR.

## 2018-05-05 NOTE — PROGRESS NOTES
Spoke with Dr Sulaiman Dorado who is covering for Aung zapata  He is aware of patient and will see in consult

## 2018-05-05 NOTE — ED NOTES
Spoke to Dr. Karli Morris in r/t pt unable to tolerate oral potassium. Advises that IV potassium will be needed.

## 2018-05-05 NOTE — ED NOTES
Assumed care of pt, resting in position of comfort. Reports nausea and extreme weight loss. States that she was sent to ER for further evaluation of tachycardia.

## 2018-05-05 NOTE — PROGRESS NOTES
0730 Assumed care of pt at this time. Pt in bed with no signs of distress. Pt left with call light within reach and encouraged to call for assistance. 0830 Head to toe assessment completed at this time  Patient is A&OX4. Pt denies N/V chest pain and SOB or distress. Pt is calm and cooperative. Pedal pulses are present. Capillary refill less than 3 seconds. Skin in warm , dry and intact. Lungs clear bilaterally. Abdomen is soft and non-tender. 20 g needle in the RAc and LAC. Pain scale explained to patient. Reasons for taking PRN meds explained to patient. Patient instructed to call for prn when needed. Pain level is 0. Patient was oriented to call bell and bed function. Will continue to monitor    0920 pt has critical lab potassium 2.8 and dr Valery Vu made aware of that she order iv potassium chloride. 1100 pt resting quietly on bed, no complain and concern at this time, call light within reach. Bed at lowest position. 1210 pt ate   lunch tolerated well, no complain of nausea. Shift summary: Patient had uneventful shift. Patient ambulated with assistance to bedside commode. Pain remained well-controlled . Nausea remain controlled with PRN med.   No issues/concerns at this time

## 2018-05-05 NOTE — ROUTINE PROCESS
Bedside shift change report given to Sophia SQUIRESRn (oncoming nurse) by Kylee SOLOMON RN (offgoing nurse). Report included the following information SBAR, Kardex and MAR.

## 2018-05-05 NOTE — PROGRESS NOTES
Problem: Pressure Injury - Risk of  Goal: *Prevention of pressure injury  Document Willie Scale and appropriate interventions in the flowsheet. Outcome: Progressing Towards Goal  Pressure Injury Interventions: Activity Interventions: Increase time out of bed, Pressure redistribution bed/mattress(bed type), PT/OT evaluation    Mobility Interventions: HOB 30 degrees or less, Pressure redistribution bed/mattress (bed type), PT/OT evaluation    Nutrition Interventions: Discuss nutritional consult with provider                    Problem: Falls - Risk of  Goal: *Absence of Falls  Document Tomy Fall Risk and appropriate interventions in the flowsheet.    Outcome: Progressing Towards Goal  Fall Risk Interventions:  Mobility Interventions: Assess mobility with egress test         Medication Interventions: Assess postural VS orthostatic hypotension, Patient to call before getting OOB, Teach patient to arise slowly    Elimination Interventions: Call light in reach, Elevated toilet seat, Toileting schedule/hourly rounds

## 2018-05-05 NOTE — ED NOTES
TRANSFER - OUT REPORT:    Verbal report given to [facility] RN(name) on MADDY  being transferred to telemetry(unit) for routine progression of care       Report consisted of patients Situation, Background, Assessment and   Recommendations(SBAR). Information from the following report(s) SBAR, ED Summary, Intake/Output, MAR, Recent Results and Cardiac Rhythm tachycardic sinus arrythmia was reviewed with the receiving nurse. Lines:   Peripheral IV 05/04/18 Right Antecubital (Active)   Site Assessment Clean, dry, & intact 5/4/2018  7:53 PM   Phlebitis Assessment 0 5/4/2018  7:53 PM   Infiltration Assessment 0 5/4/2018  7:53 PM   Dressing Status Clean, dry, & intact 5/4/2018  7:53 PM       Peripheral IV 05/04/18 Left Antecubital (Active)        Opportunity for questions and clarification was provided.       Patient transported with:   Monitor  Registered Nurse  Tech

## 2018-05-05 NOTE — CONSULTS
Gastroenterology [facility]    Patient: Jonathan Martins MRN: [id number]  SSN: [id number]    YOB: 1950  Age: 79 y.o. Sex: female        Assessment:     1. Anorexia, nausea - seems like the patient has anorexia and nausea more than anything else. Weight loss due to poor PO intake. 2. Recent back infection (post op) with multiple antibiotics. 3. Degen back disease. Plan:     1. No need for endoscopy. 2. Check 0800 Cortisol  3. No need for Diflucan  4. Advance diet (electrolyte solution/ tabs if needed), increase activity. 5. May follow up with Dr. Maricruz Nguyễn after discharge. Subjective:     Reason for consultation: Dysphagia    History: I have been asked to see her for \"dysphagia/ nausea\". She is a good historian. Reports clearly to me that nausea and anorexia with the protracted recovery has been the issue all along and she has lost 60+ lbs since January. No abd pain, dysphagia, odynophagia, vomiting as a major symptom. No h/o ulcer. No NSAIDs. Had seen Dr. Maricruz Nguyễn for an endo but was dry and asked to go to ED and she came in here. No new symptoms. Ate first time today - tolerated. Background history as of 4/26/18:  HTN, morbid obesity, chronic back pain s/p multiple back surgeries including microdiscectomy of L4/L5  and L5/S1 in 10/2017 and a repeat microdiscectomy for a ruptured disc on 1/9/2018. She developed a leak following this procedure and she was re-explored and cultured with no growth according to inpatient ID notes. On 1/19/18 the pain was so severe and the patient couldn't move, so she had a myelogram/MRI and underwent debridement on 1/24/18 at [facility] and had a drain in for 3 days. Apparently cefazolin was given during the surgery and the intraoperative culture was negative, so no antibiotics were given by Dr. Melania eVntura. She was sent to [facility] rehab for 2 weeks and they put her on oral keflex for 10 days.    She went home on 2/22/18 and on the morning of 2/27/18 she woke up with severe back pain again radiated to her leg so she presented to the ED and a lumbar MRI was done concerning for abscesses along the laminotomy bed seen within the subcutaneous fat with peripheral enhancement and extensive abnormal fluid signal with peripheral enhancement involving L4-5 and L5-S1 disc spaces including prominent endplate marrow signal changes and enhancement as well as cortical signal loss towards the left, suspicious for discitis/osteomyelitis.      On 3/2/18 she underwent lumbar exploration surgery, I&D, on L4-L5, L5-S1 left side, and was not found to have recurrent disc herniation or definite abscess according to notes. Intraoperative cultures grew rare diptheroids only. ED wound culture grew strep viridans, diptheroids and coag neg staph.      Prior to the procedure the patient received ceftriaxone from 2/27/18 to 3/1/18  Then changed to vancomycin and zosyn on 3/1/18. Then the Zosyn was discontinued and changed to cefepime 2 gr IV q12 hours on 3/6/18. ID recommended to complete 6 weeks of vancomycin and cefepime to be completed on 4/13/18, and 2 weeks of metronidazol (completed 3/21/8) for risk of contamination with stool.      The patient reports feeling well, minimal intermittent pain/cramping like in lower back and no systemic symptoms. She says her potassium was repleted after most recent K levels in 3/23/18 were 2.6 and she was started on ferrous sulfate inr 30, ,  retic 3.1 Hb: 9.7.     Since her last visit she had a new MRI done on 4/9/18 describing  1. Although there is increased bone marrow edema at L4-5 and L5-S1, fluid signal within each of the 2 involved discs has essentially resolved, with increasing intradiscal enhancement, most likely granulation tissue. 2. No definite epidural abscess identified.   3. Resolution of previously seen large fluid collection extending from the  surgical incision site down to the laminectomy defects, along with resolution of  previously seen small fluid collection in the deep left psoas muscle. 4. Multilevel disc disease producing various degrees of canal, lateral recess  and foraminal stenosis as described above which involves not only the 2 surgical  levels but also the 2 levels above this, all of which appears unchanged. Please  see above for further description at each level.     Her ESR on 3/23/18 was 70 from 73 on 3/6/18 and CRP 4  .1 from 13 on the same dates.   [facility] Problems  Date Reviewed: 4/26/2018          Codes Class Noted POA    * (Principal)Dehydration ICD-10-CM: E86.0  ICD-9-CM: 276.51  5/4/2018 Unknown        Tachyarrhythmia ICD-10-CM: R00.0  ICD-9-CM: 785.0  5/4/2018 Unknown        Dysphagia ICD-10-CM: R13.10  ICD-9-CM: 787.20  5/4/2018 Unknown        Electrolyte abnormality ICD-10-CM: E87.8  ICD-9-CM: 276.9  5/4/2018 Unknown            Past Medical History:   Diagnosis Date    Hypertension      Past Surgical History:   Procedure Laterality Date    HX CHOLECYSTECTOMY      HX GYN      HX ORTHOPAEDIC  1996    c5-6 fusion     HX ORTHOPAEDIC  01/2018    lumbar laminectomy       No family history on file.   Social History   Substance Use Topics    Smoking status: Former Smoker    Smokeless tobacco: Never Used    Alcohol use No      Current Facility-Administered Medications   Medication Dose Route Frequency Provider Last Rate Last Dose    nystatin (MYCOSTATIN) 100,000 unit/mL oral suspension 500,000 Units  500,000 Units Oral QID Eli Garcia MD   500,000 Units at 05/05/18 1251    fluconazole (DIFLUCAN) 200mg/100 mL IVPB (premix)  200 mg IntraVENous Q24H Eli Garcia MD   Stopped at 05/05/18 0846    metoprolol tartrate (LOPRESSOR) tablet 12.5 mg  12.5 mg Oral BID Elda Breaux MD   12.5 mg at 05/05/18 0944    0.9% sodium chloride infusion  100 mL/hr IntraVENous CONTINUOUS Elda Breaux  mL/hr at 05/05/18 0857 100 mL/hr at 05/05/18 0857    potassium bicarbonate (KLYTE) tablet 50 mEq  50 mEq Oral BID Kylee Faria MD   50 mEq at 05/05/18 0847    ondansetron [facility]) injection 4 mg  4 mg IntraVENous Q4H PRN Philipp Ortega MD   4 mg at 05/05/18 0843    heparin (porcine) injection 5,000 Units  5,000 Units SubCUTAneous Q8H Philipp Ortega MD   5,000 Units at 05/05/18 1506        No Known Allergies    Review of Systems:  A comprehensive review of systems was negative except for that written in the History of Present Illness. Objective:     Vitals:    05/05/18 1014 05/05/18 1020 05/05/18 1514 05/05/18 1616   BP: 135/75 155/63 151/63 143/56   Pulse: 93 64 93 63   Resp: 20 20 18 18   Temp: 97.6 °F (36.4 °C) 98 °F (36.7 °C) 97.8 °F (36.6 °C) 98 °F (36.7 °C)   SpO2: 100% 99%  94%   Weight:       Height:            Physical Exam:  GENERAL: Looks well, obese, no distress, A, Ox3  HEENT: no mass, nodes, normal  LUNGS:  CTA  HEART:  Normal S1, S2  ABDOMEN: soft, non-tender, liver spleen are not palpable. There is no ascites that is obvious.   EXTREMITIES: no edema  NEURO: grossly intact      CBC w/Diff Recent Labs      05/05/18   0437  05/04/18 1942   WBC  8.6  10.1   RBC  3.89*  4.55   HGB  10.0*  12.0   HCT  30.6*  35.7   PLT  302  387   GRANS  58  72   LYMPH  29  19*   EOS  2  1        Chemistry Recent Labs      05/05/18   1030  05/05/18   0740  05/05/18   0437  05/04/18 1942   GLU  91  70*   --   94   NA  136  138   --   137   K  3.1*  2.8*   --   3.0*   CL  103  103   --   96*   CO2  20*  19*   --   18*   BUN  9  9   --   10   CREA  0.85  0.81   --   1.07   CA  8.3*  8.1*   --   9.6   MG   --    --   1.9  1.5*   AGAP  13  16   --   23*   BUCR  11*  11*   --   9*   AP   --   137*   --   194*   TP   --   6.6   --   8.6*   ALB   --   2.3*   --   3.1*   GLOB   --   4.3*   --   5.5*   AGRAT   --   0.5*   --   0.6*        Lactic Acid Lactic acid   Date Value Ref Range Status   05/05/2018 1.0 0.4 - 2.0 MMOL/L Final     Recent Labs      05/05/18   0437 05/04/18   2130   LAC  1.0  1.3        Micro  No results for input(s): SDES, CULT in the last 72 hours. No results for input(s): CULT in the last 72 hours. Liver Enzymes Protein, total   Date Value Ref Range Status   05/05/2018 6.6 6.4 - 8.2 g/dL Final     Albumin   Date Value Ref Range Status   05/05/2018 2.3 (L) 3.4 - 5.0 g/dL Final     Globulin   Date Value Ref Range Status   05/05/2018 4.3 (H) 2.0 - 4.0 g/dL Final     A-G Ratio   Date Value Ref Range Status   05/05/2018 0.5 (L) 0.8 - 1.7   Final     AST (SGOT)   Date Value Ref Range Status   05/05/2018 35 15 - 37 U/L Final     Alk. phosphatase   Date Value Ref Range Status   05/05/2018 137 (H) 45 - 117 U/L Final     Recent Labs      05/05/18   0740  05/04/18   1942   TP  6.6  8.6*   ALB  2.3*  3.1*   GLOB  4.3*  5.5*   AGRAT  0.5*  0.6*   SGOT  35  52*   AP  137*  194*          Cardiac Enzymes Lab Results   Component Value Date/Time    CPK 20 (L) 05/05/2018 10:30 AM    CPK 22 (L) 05/05/2018 04:37 AM    CPK 25 (L) 05/04/2018 07:42 PM    CKMB <1.0 05/05/2018 10:30 AM    CKMB <1.0 05/05/2018 04:37 AM    CKMB <1.0 05/04/2018 07:42 PM    CKND1  05/05/2018 10:30 AM     CALCULATION NOT PERFORMED WHEN RESULT IS BELOW LINEAR LIMIT    CKND1  05/05/2018 04:37 AM     CALCULATION NOT PERFORMED WHEN RESULT IS BELOW LINEAR LIMIT    CKND1  05/04/2018 07:42 PM     CALCULATION NOT PERFORMED WHEN RESULT IS BELOW LINEAR LIMIT    TROIQ <0.02 05/05/2018 10:30 AM    TROIQ 0.02 05/05/2018 04:37 AM    TROIQ <0.02 05/04/2018 07:42 PM        BNP No results found for: BNP, BNPP, XBNPT     Coagulation No results for input(s): PTP, INR, APTT in the last 72 hours.     No lab exists for component: INREXT      Thyroid  No results found for: T4, T3U, TSH, TSHEXT       Lipid Panel No results found for: CHOL, CHOLPOCT, CHOLX, CHLST, CHOLV, 228481, HDL, LDL, LDLC, DLDLP, 713957, VLDLC, VLDL, TGLX, TRIGL, TRIGP, TGLPOCT, CHHD, CHHDX     Urinalysis No results found for: COLOR, APPRN, SPGRU, REFSG, HARJEET, PROTU, GLUCU, KETU, BILU, UROU, EDDIE, LEUKU, GLUKE, EPSU, BACTU, WBCU, RBCU, CASTS, UCRY     XR (Most Recent). CXR reviewed by me and compared with previous CXR   Results from Hospital Encounter encounter on 05/04/18   XR ABD ACUTE W 1 V CHEST   Narrative EXAM: Acute Abdominal Series    Indication: Abdominal pain, nausea and vomiting for 6 weeks. Technique: Single frontal view of the chest with, upright and supine frontal  views of the abdomen. Comparison Studies: 04/26/2018.  ________________  FINDINGS:  -Chest x-ray:  The cardiomediastinal silhouette is midline and normal in size, for technique. No focal opacity, pneumothorax or pleural effusion is identified. Stable intact  osseous structures.    -Abdomen:  Relative paucity of large and small bowel gas with a mild amount of admixed  material in the low pelvis, potentially rectal vault/distal sigmoid. No  pathologic air-fluid levels. No bowel wall thickening. No free air. Surgical  clips in the right upper quadrant, from prior cholecystectomy. Intact osseous structures with mild levoscoliosis centered at L2. Lower lumbar  degenerative disc disease and spondylosis at L4-5.    ________________       Impression IMPRESSION:    1. Chest:  No acute process. 2. Abdomen: Nonobstructive nonspecific bowel gas pattern. CT (Most Recent) No results found for this or any previous visit. No previous GI work up available. Never EGD. Signed By: Vishnu Dennison MD, [facility], [address] [place] [address]  www. [email or web address]  [phone]        May 5, 2018, 4:25 PM

## 2018-05-05 NOTE — PROGRESS NOTES
Problem: Falls - Risk of  Goal: *Absence of Falls  Document Tomy Fall Risk and appropriate interventions in the flowsheet.   Outcome: Progressing Towards Goal  Fall Risk Interventions:  Mobility Interventions: Assess mobility with egress test, Communicate number of staff needed for ambulation/transfer, Patient to call before getting OOB, PT Consult for mobility concerns, PT Consult for assist device competence, Strengthening exercises (ROM-active/passive), Utilize walker, cane, or other assitive device         Medication Interventions: Patient to call before getting OOB, Teach patient to arise slowly    Elimination Interventions: Call light in reach, Patient to call for help with toileting needs, Toileting schedule/hourly rounds

## 2018-05-05 NOTE — H&P
History & Physical    Patient: Tristen Heller MRN: [id number]  CSN: [id number]    YOB: 1950  Age: 79 y.o. Sex: female      DOA: 5/4/2018    Chief Complaint:   Chief Complaint   Patient presents with    Nausea          HPI:     Tristen Heller is a 79 y.o.  female who  Was sent to ER from GI  Who was doing her endoscopy; Patient has hx of lumbar surgery and secondary infection with broad abx for six weeks finished antibiotic April 14   While on antibiotic and after has had low appeitie dysphagia of solid and now liquids progressive over last few days associated with weakness and dysphagia progressing from pill to food and liquid  Was prescribed potassiumam and magnesium as out patient but is unable to swallow pills  With out vomitting  Patient has small bouts of diarrhhea a as well no preivous testing done     Past Medical History:   Diagnosis Date    Hypertension        Past Surgical History:   Procedure Laterality Date    HX CHOLECYSTECTOMY      HX GYN      HX ORTHOPAEDIC  1996    c5-6 fusion     HX ORTHOPAEDIC  01/2018    lumbar laminectomy        No family history on file. Social History     Social History    Marital status: SINGLE     Spouse name: N/A    Number of children: N/A    Years of education: N/A     Social History Main Topics    Smoking status: Former Smoker    Smokeless tobacco: Never Used    Alcohol use No    Drug use: No    Sexual activity: Not Asked     Other Topics Concern    None     Social History Narrative       Prior to Admission medications    Medication Sig Start Date End Date Taking? Authorizing Provider   morphine 15 mg TR12 Take 15 mg by mouth two (2) times a day. Max Daily Amount: 30 mg. 4/26/18   Steffanie Dudley MD   naloxone [facility]) 4 mg/actuation nasal spray Use 1 spray intranasally into 1 nostril. Use a new Narcan nasal spray for subsequent doses and administer into alternating nostrils. May repeat every 2 to 3 minutes as needed. 4/26/18   Sudarsahn Trent MD   Lactobacillus acidophilus (PROBIOTIC) 10 billion cell cap Take  by mouth. Historical Provider   furosemide (LASIX) 20 mg tablet Take  by mouth daily. Historical Provider   magnesium oxide (MAG-OX) 400 mg tablet Take 400 mg by mouth daily. Historical Provider   ferrous sulfate 325 mg (65 mg iron) tablet Take 1 Tab by mouth three (3) times daily (with meals). 4/12/18   Merari Ugarte MD   potassium chloride (KLOR-CON) 10 mEq tablet Take 1 Tab by mouth daily. 4/12/18   Merari Ugarte MD   cyclobenzaprine (FLEXERIL) 10 mg tablet Take 1 Tab by mouth three (3) times daily (with meals). 3/7/18   Denny Parnell MD   hydroCHLOROthiazide (MICROZIDE) 12.5 mg capsule Take 12.5 mg by mouth as needed. Tiffany Walsh MD   naproxen (NAPROSYN) 500 mg tablet Take 500 mg by mouth two (2) times daily (with meals). Tiffany Walsh MD       No Known Allergies      Review of Systems  GENERAL: Patient alert, awake and oriented times 3, able to communicate full sentences and not in distress. HEENT: No change in vision, no earache, tinnitus, sore throat or sinus congestion. NECK: No pain or stiffness. PULMONARY: No shortness of breath, cough or wheeze. Cardiovascular: no pnd or orthopnea, no CP  GASTROINTESTINAL: No abdominal pain, nausea, +vomiting+r diarrhea, melena or bright red blood per rectum. GENITOURINARY: No urinary frequency, urgency, hesitancy or dysuria. MUSCULOSKELETAL: No joint or muscle pain, has chronic back pain, no recent trauma. DERMATOLOGIC: No rash, no itching, no lesions. ENDOCRINE: No polyuria, polydipsia, no heat or cold intolerance. +recent change in weight. 30 pounds in six weeks   HEMATOLOGICAL: No anemia or easy bruising or bleeding. NEUROLOGIC: No headache, seizures, numbness, tingling + weakness.        Physical Exam:     Physical Exam:  Visit Vitals    BP (!) 160/93    Pulse 93    Temp 97.2 °F (36.2 °C)    Resp 23    Ht 5' 4\" (1.626 m)    Wt 85.7 kg (189 lb)    SpO2 100%    BMI 32.44 kg/m2      O2 Device: Room air    Temp (24hrs), Av.2 °F (36.2 °C), Min:97.2 °F (36.2 °C), Max:97.2 °F (36.2 °C)             General:  Alert, cooperative, no distress, appears stated age. Head: Normocephalic, without obvious abnormality, atraumatic. Eyes:  Conjunctivae/corneas clear. PERRL, EOMs intact. tonsilar exudate ? Thrush    Nose: Nares normal. No drainage or sinus tenderness. Mucus mebranes dry    Neck: Supple, symmetrical, trachea midline, no adenopathy, thyroid: no enlargement, no carotid bruit and no JVD. Lungs:   Clear to auscultation bilaterally. Heart:  Regular rate and rhythm, S1, S2 normal. Tachycardia and irregular      Abdomen: Soft, non-tender. Bowel sounds normal. Surgical scar    Extremities: Extremities normal, atraumatic, no cyanosis or edema. Pulses: 2+ and symmetric all extremities. Skin:  No rashes or lesions   Neurologic: AAOx3, No focal motor or sensory deficit.        Labs Reviewed:  Recent Results (from the past 24 hour(s))   SALICYLATE    Collection Time: 18  7:24 PM   Result Value Ref Range    Salicylate level 3.0 2.8 - 20 MG/DL   EKG, 12 LEAD, INITIAL    Collection Time: 18  7:35 PM   Result Value Ref Range    Ventricular Rate 120 BPM    Atrial Rate 120 BPM    P-R Interval 134 ms    QRS Duration 76 ms    Q-T Interval 330 ms    QTC Calculation (Bezet) 466 ms    Calculated P Axis 71 degrees    Calculated R Axis 62 degrees    Calculated T Axis 80 degrees    Diagnosis       Sinus tachycardia with premature atrial complexes  Nonspecific ST and T wave abnormality  Abnormal ECG  When compared with ECG of 2018 17:59,  premature atrial complexes are now present  Nonspecific T wave abnormality now evident in Lateral leads     CBC WITH AUTOMATED DIFF    Collection Time: 18  7:42 PM   Result Value Ref Range    WBC 10.1 4.6 - 13.2 K/uL    RBC 4.55 4.20 - 5.30 M/uL    HGB 12.0 12.0 - 16.0 g/dL    HCT 35.7 35.0 - 45.0 %    MCV 78.5 74.0 - 97.0 FL    MCH 26.4 24.0 - 34.0 PG    MCHC 33.6 31.0 - 37.0 g/dL    RDW 15.6 (H) 11.6 - 14.5 %    PLATELET 847 862 - 472 K/uL    MPV 10.7 9.2 - 11.8 FL    NEUTROPHILS 72 40 - 73 %    LYMPHOCYTES 19 (L) 21 - 52 %    MONOCYTES 7 3 - 10 %    EOSINOPHILS 1 0 - 5 %    BASOPHILS 1 0 - 2 %    ABS. NEUTROPHILS 7.3 1.8 - 8.0 K/UL    ABS. LYMPHOCYTES 2.0 0.9 - 3.6 K/UL    ABS. MONOCYTES 0.7 0.05 - 1.2 K/UL    ABS. EOSINOPHILS 0.1 0.0 - 0.4 K/UL    ABS. BASOPHILS 0.1 (H) 0.0 - 0.06 K/UL    DF AUTOMATED     METABOLIC PANEL, COMPREHENSIVE    Collection Time: 05/04/18  7:42 PM   Result Value Ref Range    Sodium 137 136 - 145 mmol/L    Potassium 3.0 (L) 3.5 - 5.5 mmol/L    Chloride 96 (L) 100 - 108 mmol/L    CO2 18 (L) 21 - 32 mmol/L    Anion gap 23 (H) 3.0 - 18 mmol/L    Glucose 94 74 - 99 mg/dL    BUN 10 7.0 - 18 MG/DL    Creatinine 1.07 0.6 - 1.3 MG/DL    BUN/Creatinine ratio 9 (L) 12 - 20      GFR est AA >60 >60 ml/min/1.73m2    GFR est non-AA 51 (L) >60 ml/min/1.73m2    Calcium 9.6 8.5 - 10.1 MG/DL    Bilirubin, total 0.7 0.2 - 1.0 MG/DL    ALT (SGPT) 29 13 - 56 U/L    AST (SGOT) 52 (H) 15 - 37 U/L    Alk.  phosphatase 194 (H) 45 - 117 U/L    Protein, total 8.6 (H) 6.4 - 8.2 g/dL    Albumin 3.1 (L) 3.4 - 5.0 g/dL    Globulin 5.5 (H) 2.0 - 4.0 g/dL    A-G Ratio 0.6 (L) 0.8 - 1.7     CARDIAC PANEL,(CK, CKMB & TROPONIN)    Collection Time: 05/04/18  7:42 PM   Result Value Ref Range    CK 25 (L) 26 - 192 U/L    CK - MB <1.0 <3.6 ng/ml    CK-MB Index  0.0 - 4.0 %     CALCULATION NOT PERFORMED WHEN RESULT IS BELOW LINEAR LIMIT    Troponin-I, Qt. <0.02 0.00 - 0.06 NG/ML   MAGNESIUM    Collection Time: 05/04/18  7:42 PM   Result Value Ref Range    Magnesium 1.5 (L) 1.6 - 2.6 mg/dL   NT-PRO BNP    Collection Time: 05/04/18  7:42 PM   Result Value Ref Range    NT pro- 0 - 900 PG/ML   LACTIC ACID    Collection Time: 05/04/18  9:30 PM   Result Value Ref Range    Lactic acid 1.3 0.4 - 2.0 MMOL/L   POC G3    Collection Time: 05/04/18  9:38 PM   Result Value Ref Range    Device: ROOM AIR      FIO2 (POC) 21 %    pH (POC) 7.449 7.35 - 7.45      pCO2 (POC) 24.9 (L) 35.0 - 45.0 MMHG    pO2 (POC) 98 80 - 100 MMHG    HCO3 (POC) 17.4 (L) 22 - 26 MMOL/L    sO2 (POC) 98 (H) 92 - 97 %    Base deficit (POC) 7 mmol/L    Allens test (POC) N/A      Site RIGHT BRACHIAL      Patient temp. 97.2      Specimen type (POC) ARTERIAL      Performed by Alice Hernandez      All lab results for the last 24 hours reviewed. and EKG    Procedures/imaging: see electronic medical records for all procedures/Xrays and details which were not copied into this note but were reviewed prior to creation of Plan      Assessment/Plan     Active Problems:    Dehydration (5/4/2018)  Aggressive fluids      Tachyarrhythmia (5/4/2018)  Hydration and electrolyte replace  Hold lasix HCTS      Dysphagia (5/4/2018)  Protonix IV   GI consult     Electrolyte abnormality (5/4/2018)     re check in am   And replace     Metabolic acidosis hyperchloremic     ? Thrush  Start Nysatin   Await EGD before Diflucan     DVT/GI Prophylaxis: Hep SQ and Coumadin    Discussed with patient at bedside about hospital admission and my plan care, who understood and agree with my plan care.     Salbador Gama MD  5/4/2018 9:55 PM

## 2018-05-06 LAB
ANION GAP SERPL CALC-SCNC: 14 MMOL/L (ref 3–18)
ATRIAL RATE: 120 BPM
BASOPHILS # BLD: 0.1 K/UL (ref 0–0.06)
BASOPHILS NFR BLD: 1 % (ref 0–2)
BUN SERPL-MCNC: 4 MG/DL (ref 7–18)
BUN/CREAT SERPL: 5 (ref 12–20)
CALCIUM SERPL-MCNC: 8.4 MG/DL (ref 8.5–10.1)
CALCULATED P AXIS, ECG09: 71 DEGREES
CALCULATED R AXIS, ECG10: 62 DEGREES
CALCULATED T AXIS, ECG11: 80 DEGREES
CHLORIDE SERPL-SCNC: 105 MMOL/L (ref 100–108)
CO2 SERPL-SCNC: 21 MMOL/L (ref 21–32)
CREAT SERPL-MCNC: 0.76 MG/DL (ref 0.6–1.3)
DIAGNOSIS, 93000: NORMAL
DIFFERENTIAL METHOD BLD: ABNORMAL
EOSINOPHIL # BLD: 0.4 K/UL (ref 0–0.4)
EOSINOPHIL NFR BLD: 6 % (ref 0–5)
ERYTHROCYTE [DISTWIDTH] IN BLOOD BY AUTOMATED COUNT: 15.8 % (ref 11.6–14.5)
GLUCOSE SERPL-MCNC: 78 MG/DL (ref 74–99)
HCT VFR BLD AUTO: 27.8 % (ref 35–45)
HGB BLD-MCNC: 9.1 G/DL (ref 12–16)
LYMPHOCYTES # BLD: 2.6 K/UL (ref 0.9–3.6)
LYMPHOCYTES NFR BLD: 35 % (ref 21–52)
MCH RBC QN AUTO: 25.7 PG (ref 24–34)
MCHC RBC AUTO-ENTMCNC: 32.7 G/DL (ref 31–37)
MCV RBC AUTO: 78.5 FL (ref 74–97)
MONOCYTES # BLD: 0.7 K/UL (ref 0.05–1.2)
MONOCYTES NFR BLD: 9 % (ref 3–10)
NEUTS SEG # BLD: 3.7 K/UL (ref 1.8–8)
NEUTS SEG NFR BLD: 49 % (ref 40–73)
P-R INTERVAL, ECG05: 134 MS
PLATELET # BLD AUTO: 309 K/UL (ref 135–420)
PMV BLD AUTO: 10.7 FL (ref 9.2–11.8)
POTASSIUM SERPL-SCNC: 3 MMOL/L (ref 3.5–5.5)
Q-T INTERVAL, ECG07: 330 MS
QRS DURATION, ECG06: 76 MS
QTC CALCULATION (BEZET), ECG08: 466 MS
RBC # BLD AUTO: 3.54 M/UL (ref 4.2–5.3)
SODIUM SERPL-SCNC: 140 MMOL/L (ref 136–145)
VENTRICULAR RATE, ECG03: 120 BPM
WBC # BLD AUTO: 7.4 K/UL (ref 4.6–13.2)

## 2018-05-06 PROCEDURE — 80048 BASIC METABOLIC PNL TOTAL CA: CPT | Performed by: INTERNAL MEDICINE

## 2018-05-06 PROCEDURE — 74011250636 HC RX REV CODE- 250/636: Performed by: INTERNAL MEDICINE

## 2018-05-06 PROCEDURE — 85025 COMPLETE CBC W/AUTO DIFF WBC: CPT | Performed by: INTERNAL MEDICINE

## 2018-05-06 PROCEDURE — 74011250637 HC RX REV CODE- 250/637: Performed by: EMERGENCY MEDICINE

## 2018-05-06 PROCEDURE — 36415 COLL VENOUS BLD VENIPUNCTURE: CPT | Performed by: INTERNAL MEDICINE

## 2018-05-06 PROCEDURE — 74011250637 HC RX REV CODE- 250/637: Performed by: INTERNAL MEDICINE

## 2018-05-06 PROCEDURE — 65660000000 HC RM CCU STEPDOWN

## 2018-05-06 RX ORDER — POTASSIUM CHLORIDE 7.45 MG/ML
10 INJECTION INTRAVENOUS
Status: DISPENSED | OUTPATIENT
Start: 2018-05-06 | End: 2018-05-06

## 2018-05-06 RX ADMIN — SODIUM CHLORIDE 100 ML/HR: 900 INJECTION, SOLUTION INTRAVENOUS at 03:29

## 2018-05-06 RX ADMIN — HEPARIN SODIUM 5000 UNITS: 5000 INJECTION, SOLUTION INTRAVENOUS; SUBCUTANEOUS at 15:13

## 2018-05-06 RX ADMIN — NYSTATIN 500000 UNITS: 500000 SUSPENSION ORAL at 08:37

## 2018-05-06 RX ADMIN — POTASSIUM CHLORIDE 10 MEQ: 10 INJECTION, SOLUTION INTRAVENOUS at 05:47

## 2018-05-06 RX ADMIN — POTASSIUM CHLORIDE 10 MEQ: 10 INJECTION, SOLUTION INTRAVENOUS at 08:37

## 2018-05-06 RX ADMIN — NYSTATIN 500000 UNITS: 500000 SUSPENSION ORAL at 12:42

## 2018-05-06 RX ADMIN — NYSTATIN 500000 UNITS: 500000 SUSPENSION ORAL at 17:11

## 2018-05-06 RX ADMIN — HEPARIN SODIUM 5000 UNITS: 5000 INJECTION, SOLUTION INTRAVENOUS; SUBCUTANEOUS at 21:43

## 2018-05-06 RX ADMIN — NYSTATIN 500000 UNITS: 500000 SUSPENSION ORAL at 22:00

## 2018-05-06 RX ADMIN — HEPARIN SODIUM 5000 UNITS: 5000 INJECTION, SOLUTION INTRAVENOUS; SUBCUTANEOUS at 05:47

## 2018-05-06 RX ADMIN — POTASSIUM CHLORIDE 10 MEQ: 10 INJECTION, SOLUTION INTRAVENOUS at 09:58

## 2018-05-06 RX ADMIN — POTASSIUM CHLORIDE 10 MEQ: 10 INJECTION, SOLUTION INTRAVENOUS at 12:42

## 2018-05-06 RX ADMIN — POTASSIUM CHLORIDE 10 MEQ: 10 INJECTION, SOLUTION INTRAVENOUS at 07:08

## 2018-05-06 RX ADMIN — POTASSIUM BICARBONATE 50 MEQ: 25 TABLET, EFFERVESCENT ORAL at 09:58

## 2018-05-06 RX ADMIN — METOPROLOL TARTRATE 12.5 MG: 25 TABLET, FILM COATED ORAL at 21:42

## 2018-05-06 RX ADMIN — POTASSIUM BICARBONATE 50 MEQ: 25 TABLET, EFFERVESCENT ORAL at 21:41

## 2018-05-06 RX ADMIN — METOPROLOL TARTRATE 12.5 MG: 25 TABLET, FILM COATED ORAL at 09:00

## 2018-05-06 RX ADMIN — POTASSIUM CHLORIDE 10 MEQ: 10 INJECTION, SOLUTION INTRAVENOUS at 11:00

## 2018-05-06 NOTE — WOUND CARE
Wound Care Progress Note         New consult placed by Nurse for Unstageable area left heel    Assessment:   Communication: A&O x 4   Continent - No issues noted at this time   Independently repositions     Diet:  - Pt states appetite is poor and has not been eating very much  Patient reports no pain   buttocks and sacral skin intact and without erythema per pt. Declines to turn at this time    Dry, flaky skin noted to  to lower legs and feet. 1. POA - Left heel with a resolving blister noted. Pt states it started as a sore and painful area, then became a blister, she but denies pain at this time. Patient repositions self     Protective dressing applied to area     Wound Recommendations:    Area is dry and stable at this time. Recommend protecting area with mepilex border and keeping pressure off of area. Skin Care & Pressure Relief Recommendations:  Minimize layers of linen/pads under patient to optimize support surface. Turn/reposition approximately every 2 hours and offload heels pillows or Prevalon boots.   Manage incontinence / promote continence; Proshield to buttocks and sacrum daily and as needed with incontinence care    Discussed above plan with patient & Jennifer CURRY    Transition of Care: Plan to follow weekly and per product recommendations while admitted to hospital.

## 2018-05-06 NOTE — PROGRESS NOTES
Hospitalist Progress Note    Patient: Nikolas Medina MRN: [id number]  CSN: [id number]    YOB: 1950  Age: 79 y.o. Sex: female    DOA: 5/4/2018 LOS:  LOS: 2 days                Assessment and Plan:    Principal Problem:    Dehydration (5/4/2018)    Active Problems:    Tachyarrhythmia (5/4/2018)      Dysphagia (5/4/2018)      Electrolyte abnormality (5/4/2018)          1.  Dehydration (5/4/2018):  Continue fluids and hold diuretics  .      2.  Tachyarrhythmia (5/4/2018): with hypertension. Beta blocker IV has helped with both of these problems        3.   Dysphagia (5/4/2018): Protonix IV   He thinks it is more nausea and anorexia from the antibiotics than true dysphagia. Outpatient follow up       4. Electrolyte abnormality :  replacing potassium    . I think she had a  Metabolic acidosis from vomiting       5. Thrush: Start Nysatin             Chief complaint:  Nausea      Subjective:    Feeling better overall  . Able to eat a little yeasterday and had breakfast which was fine until she tried pineapple juice       Review of systems:    General: No fevers or chills. Cardiovascular: No chest pain or pressure. No palpitations. Pulmonary: No shortness of breath. Gastrointestinal: No nausea, vomiting. Objective:    Vital signs/Intake and Output:  Visit Vitals    /71 (BP 1 Location: Right arm, BP Patient Position: At rest;Supine)    Pulse 97    Temp 97.5 °F (36.4 °C)    Resp 18    Ht 5' 4\" (1.626 m)    Wt 90 kg (198 lb 6.6 oz)    SpO2 100%    Breastfeeding No    BMI 34.06 kg/m2     Current Shift:  05/06 0701 - 05/06 1900  In: 120 [P.O.:120]  Out: -   Last three shifts:  05/04 1901 - 05/06 0700  In: 2924.2 [P.O.:680; I.V.:2244.2]  Out: 0     Physical Exam:  General: NAD, AAOx3. Non-toxic. HEENT: NC/AT. PERRLA, EOMI.  MMM. Lungs: Nml inspection. CTA B/L. No wheezing, rales or rhonchi. Heart:  S1S2 RRR,  PMI mid 5th IC space. No M/RG. Abdomen: Soft, NT/ND.  BS+.  No peritoneal signs. Extremities: No C/C/E. Psych:   Nml affect. Neurologic:  2-12 intact. Strength 5/5 throughout. Sensation symmetrical.          Labs: Results:       Chemistry Recent Labs      05/06/18   0306 05/05/18 1935 05/05/18   1030  05/05/18   0740  05/04/18 1942   GLU  78   --   91  70*  94   NA  140   --   136  138  137   K  3.0*  3.4*  3.1*  2.8*  3.0*   CL  105   --   103  103  96*   CO2  21   --   20*  19*  18*   BUN  4*   --   9  9  10   CREA  0.76   --   0.85  0.81  1.07   CA  8.4*   --   8.3*  8.1*  9.6   AGAP  14   --   13  16  23*   BUCR  5*   --   11*  11*  9*   AP   --    --    --   137*  194*   TP   --    --    --   6.6  8.6*   ALB   --    --    --   2.3*  3.1*   GLOB   --    --    --   4.3*  5.5*   AGRAT   --    --    --   0.5*  0.6*      CBC w/Diff Recent Labs      05/06/18 0306 05/05/18   0437  05/04/18 1942   WBC  7.4  8.6  10.1   RBC  3.54*  3.89*  4.55   HGB  9.1*  10.0*  12.0   HCT  27.8*  30.6*  35.7   PLT  309  302  387   GRANS  49  58  72   LYMPH  35  29  19*   EOS  6*  2  1      Cardiac Enzymes Recent Labs      05/05/18   1030  05/05/18 0437   CPK  20*  22*   CKND1  CALCULATION NOT PERFORMED WHEN RESULT IS BELOW LINEAR LIMIT  CALCULATION NOT PERFORMED WHEN RESULT IS BELOW LINEAR LIMIT      Coagulation No results for input(s): PTP, INR, APTT in the last 72 hours. No lab exists for component: INREXT    Lipid Panel No results found for: CHOL, CHOLPOCT, CHOLX, CHLST, CHOLV, 357606, HDL, LDL, LDLC, DLDLP, 765622, VLDLC, VLDL, TGLX, TRIGL, TRIGP, TGLPOCT, CHHD, CHHDX   BNP No results for input(s): BNPP in the last 72 hours.    Liver Enzymes Recent Labs      05/05/18   0740   TP  6.6   ALB  2.3*   AP  137*   SGOT  35      Thyroid Studies No results found for: T4, T3U, TSH, TSHEXT     Procedures/imaging: see electronic medical records for all procedures/Xrays and details which were not copied into this note but were reviewed prior to creation of Plan

## 2018-05-06 NOTE — PROGRESS NOTES

## 2018-05-06 NOTE — PROGRESS NOTES
NUTRITION SCREENING    Recommendations: Replace potassium, continue to monitor lytes, consider [place] Diet    RD ASSESSMENT/PLAN:     Diet:  Full Liquid Height: 5' 4\" (162.6 cm)     Food Allergies: NKFA  Weight: 90 kg (198 lb 6.6 oz)    PO Intake:  Patient Vitals for the past 100 hrs:   % Diet Eaten   05/06/18 1243 10 %   05/06/18 0944 10 %   05/05/18 1831 75 %   05/05/18 1203 100 %   05/05/18 0900 20 %      BMI: 34 kg/m^2 is  obese (30%-39.9% BMI)      PMH: HTN    Current Hospital Problems: Nausea, dysphagia, dehydration, oral thrush    Skin integrity: resolving blister to L heel   Chart reviewed, pt with N/v and c/o dysphagia possibly due to nausea per MD.  Monitor lytes, consider upgrading diet to bland, solid foods. Full nutrition assessment to follow.     REASON FOR ASSESSMENT:   [x]  RN Referral:    [x] MST score >/=2  Malnutrition Screening Tool (MST):  Recently Lost Weight Without Trying: Yes  If Yes, How Much Weight Loss: >33 lbs  Eating Poorly Due to Decreased Appetite: Yes  MST Score: [address], [address]  Pager: [phone]

## 2018-05-06 NOTE — PROGRESS NOTES
1930 Pt received from offgoing nurse without any signs or symptoms of distress. Pt vitals are stable and within normal limits. Pt bed in low position with wheels locked and call bell within reach. 2007 Assessment completed and documented in flow sheet. Pt denies any further needs at this time. Pt in NAD with bed in low position, wheels locked and call bell within reach. 2254 Scheduled medications administered as ordered. 0045 Reassessment completed with no changes noted. Bed locked, in lowest position, with call light within reach.  0329 Reassessment completed with no changes noted. Bed locked, in lowest position, with call light within reach. 0505 spoke with MD concerning pt K+=3.0. New orders for 7 runs of 10meq of K+.  0720 Bedside and Verbal shift change report given to Dorene Navas (oncoming nurse) by Sondra Kenny RN   (offgoing nurse). Report included the following information SBAR, Intake/Output, MAR and Recent Results.

## 2018-05-06 NOTE — ROUTINE PROCESS
Shift summary: Wound care applied a mepilex to patient right heel and asked that we float heels with pillow. Bedside and Verbal shift change report given to Rody Singh (oncoming nurse) by Corazon Naqvi RN (offgoing nurse). Report included the following information SBAR, Kardex, Procedure Summary, Intake/Output, MAR, Accordion, Recent Results and Med Rec Status.

## 2018-05-07 ENCOUNTER — HOME HEALTH ADMISSION (OUTPATIENT)
Dept: HOME HEALTH SERVICES | Facility: HOME HEALTH | Age: 68
End: 2018-05-07

## 2018-05-07 VITALS
BODY MASS INDEX: 33.87 KG/M2 | OXYGEN SATURATION: 97 % | WEIGHT: 198.41 LBS | HEART RATE: 81 BPM | HEIGHT: 64 IN | TEMPERATURE: 97.5 F | DIASTOLIC BLOOD PRESSURE: 82 MMHG | RESPIRATION RATE: 18 BRPM | SYSTOLIC BLOOD PRESSURE: 132 MMHG

## 2018-05-07 PROBLEM — B37.0 ORAL THRUSH: Status: ACTIVE | Noted: 2018-05-07

## 2018-05-07 LAB
ANION GAP SERPL CALC-SCNC: 11 MMOL/L (ref 3–18)
BASOPHILS # BLD: 0 K/UL (ref 0–0.06)
BASOPHILS NFR BLD: 1 % (ref 0–2)
BUN SERPL-MCNC: 2 MG/DL (ref 7–18)
BUN/CREAT SERPL: 3 (ref 12–20)
CALCIUM SERPL-MCNC: 8.3 MG/DL (ref 8.5–10.1)
CHLORIDE SERPL-SCNC: 104 MMOL/L (ref 100–108)
CO2 SERPL-SCNC: 25 MMOL/L (ref 21–32)
CREAT SERPL-MCNC: 0.7 MG/DL (ref 0.6–1.3)
DIFFERENTIAL METHOD BLD: ABNORMAL
EOSINOPHIL # BLD: 0.4 K/UL (ref 0–0.4)
EOSINOPHIL NFR BLD: 6 % (ref 0–5)
ERYTHROCYTE [DISTWIDTH] IN BLOOD BY AUTOMATED COUNT: 15.7 % (ref 11.6–14.5)
GLUCOSE SERPL-MCNC: 87 MG/DL (ref 74–99)
HCT VFR BLD AUTO: 28.3 % (ref 35–45)
HGB BLD-MCNC: 9.2 G/DL (ref 12–16)
LYMPHOCYTES # BLD: 2.2 K/UL (ref 0.9–3.6)
LYMPHOCYTES NFR BLD: 32 % (ref 21–52)
MCH RBC QN AUTO: 25.6 PG (ref 24–34)
MCHC RBC AUTO-ENTMCNC: 32.5 G/DL (ref 31–37)
MCV RBC AUTO: 78.6 FL (ref 74–97)
MONOCYTES # BLD: 0.9 K/UL (ref 0.05–1.2)
MONOCYTES NFR BLD: 14 % (ref 3–10)
NEUTS SEG # BLD: 3.4 K/UL (ref 1.8–8)
NEUTS SEG NFR BLD: 47 % (ref 40–73)
PLATELET # BLD AUTO: 292 K/UL (ref 135–420)
PMV BLD AUTO: 11.3 FL (ref 9.2–11.8)
POTASSIUM SERPL-SCNC: 3.6 MMOL/L (ref 3.5–5.5)
RBC # BLD AUTO: 3.6 M/UL (ref 4.2–5.3)
SODIUM SERPL-SCNC: 140 MMOL/L (ref 136–145)
WBC # BLD AUTO: 7 K/UL (ref 4.6–13.2)

## 2018-05-07 PROCEDURE — 97162 PT EVAL MOD COMPLEX 30 MIN: CPT

## 2018-05-07 PROCEDURE — 36415 COLL VENOUS BLD VENIPUNCTURE: CPT | Performed by: INTERNAL MEDICINE

## 2018-05-07 PROCEDURE — 85025 COMPLETE CBC W/AUTO DIFF WBC: CPT | Performed by: INTERNAL MEDICINE

## 2018-05-07 PROCEDURE — 74011250637 HC RX REV CODE- 250/637: Performed by: INTERNAL MEDICINE

## 2018-05-07 PROCEDURE — 74011250637 HC RX REV CODE- 250/637: Performed by: HOSPITALIST

## 2018-05-07 PROCEDURE — 97116 GAIT TRAINING THERAPY: CPT

## 2018-05-07 PROCEDURE — 92610 EVALUATE SWALLOWING FUNCTION: CPT

## 2018-05-07 PROCEDURE — 74011250637 HC RX REV CODE- 250/637: Performed by: EMERGENCY MEDICINE

## 2018-05-07 PROCEDURE — 80048 BASIC METABOLIC PNL TOTAL CA: CPT | Performed by: INTERNAL MEDICINE

## 2018-05-07 PROCEDURE — 74011250636 HC RX REV CODE- 250/636: Performed by: INTERNAL MEDICINE

## 2018-05-07 PROCEDURE — 97161 PT EVAL LOW COMPLEX 20 MIN: CPT

## 2018-05-07 RX ORDER — ACETAMINOPHEN 325 MG/1
650 TABLET ORAL ONCE
Status: COMPLETED | OUTPATIENT
Start: 2018-05-07 | End: 2018-05-07

## 2018-05-07 RX ORDER — NYSTATIN 100000 [USP'U]/ML
500000 SUSPENSION ORAL 4 TIMES DAILY
Qty: 150 ML | Refills: 0 | Status: SHIPPED | OUTPATIENT
Start: 2018-05-07 | End: 2018-05-14

## 2018-05-07 RX ORDER — ACETAMINOPHEN 325 MG/1
650 TABLET ORAL
Status: DISCONTINUED | OUTPATIENT
Start: 2018-05-07 | End: 2018-05-07 | Stop reason: HOSPADM

## 2018-05-07 RX ADMIN — HEPARIN SODIUM 5000 UNITS: 5000 INJECTION, SOLUTION INTRAVENOUS; SUBCUTANEOUS at 07:16

## 2018-05-07 RX ADMIN — ACETAMINOPHEN 650 MG: 325 TABLET ORAL at 07:12

## 2018-05-07 RX ADMIN — SODIUM CHLORIDE 100 ML/HR: 900 INJECTION, SOLUTION INTRAVENOUS at 01:43

## 2018-05-07 RX ADMIN — METOPROLOL TARTRATE 12.5 MG: 25 TABLET, FILM COATED ORAL at 10:08

## 2018-05-07 RX ADMIN — NYSTATIN 500000 UNITS: 500000 SUSPENSION ORAL at 10:08

## 2018-05-07 RX ADMIN — ONDANSETRON 4 MG: 2 INJECTION INTRAMUSCULAR; INTRAVENOUS at 10:08

## 2018-05-07 RX ADMIN — HEPARIN SODIUM 5000 UNITS: 5000 INJECTION, SOLUTION INTRAVENOUS; SUBCUTANEOUS at 14:38

## 2018-05-07 RX ADMIN — SODIUM CHLORIDE 100 ML/HR: 900 INJECTION, SOLUTION INTRAVENOUS at 12:26

## 2018-05-07 RX ADMIN — POTASSIUM BICARBONATE 50 MEQ: 25 TABLET, EFFERVESCENT ORAL at 10:08

## 2018-05-07 NOTE — PROGRESS NOTES
Problem: Mobility Impaired (Adult and Pediatric)  Goal: *Acute Goals and Plan of Care (Insert Text)  Physical Therapy Goals LT/ST  Initiated 5/7/2018 and to be accomplished within 1-2 day(s)  1. Patient will move from supine <> sit with S in prep for out of bed activity and change of position. 2.  Patient will perform sit<> stand with S/RW in prep for transfers/ambulation. 3.  Patient will transfer from bed <> chair with S/RW for time up in chair for completion of ADL activity. 4.  Patient will ambulate 80 feet with S/RW for improved functional mobility/safe discharge. 5.  Patient will ascend/descend 3-5 stairs with handrail(s) with minimal assistance/contact guard assist for home re-entry as needed. Outcome: Progressing Towards Goal  physical Therapy EVALUATION    Patient: Meera Tracy (41 y.o. female)  Date: 5/7/2018  Primary Diagnosis: Dehydration  Tachyarrhythmia  Dysphagia  Electrolyte abnormality  Precautions:Back    ASSESSMENT :  Based on the objective data described below, the patient is a 78 yo F with h/o recent back surgery and subsequent rehab following same. Pt reports having had 1 session of HHPT since last rehab discharge on April 12, 2018. Pt recently admitted with above diagnoses and now presents with decreased activity tolerance, but able to complete functional mobility tasks with supervision/mod independence with regard to bed mobility, and transfers. Pt initially anxious about ambulating due to h/o pain. Pt with decreased gait quality and required S/RW/GB in place while ambulating 100ft with one standing rest break care home. Donna decreased, step length decreased and gt pattern antalgic. Notified by nurse Sue Cano that pt HR increased to 150's during ambulation (pt first time amb since admission). Pt returned to bedside and HR back down to 90's quickly with seated rest.  Pt educated in pacing self and resting breaks when amb'g at home.   Pt reports L buttock/L thigh pain 1/10 pre and 3/10 post treatment. Pt able to return to bed with mod I and left in Right side lying position with all needs in reach, and nurse Mikey Berry [id number] notified. May benefit from additional visit today for step negotiation trial.  Recommend resume HHPT for follow up physical therapy upon discharge to reach maximal level of independence/safety with functional mobility. Pt Education: pt educated in safety/technique during functional mobility tasks     Patient will benefit from skilled intervention to address the above impairments. Patients rehabilitation potential is considered to be Good  Factors which may influence rehabilitation potential include:   []         None noted  []         Mental ability/status  [x]         Medical condition  []         Home/family situation and support systems  []         Safety awareness  []         Pain tolerance/management  []         Other:      PLAN :  Recommendations and Planned Interventions:  []           Bed Mobility Training             []    Neuromuscular Re-Education  []           Transfer Training                   []    Orthotic/Prosthetic Training  [x]           Gait Training                          []    Modalities  []           Therapeutic Exercises          []    Edema Management/Control  []           Therapeutic Activities            [x]    Patient and Family Training/Education  []           Other (comment):    Frequency/Duration: Patient will be followed by physical therapy 1-2 times per day to address goals. Discharge Recommendations: Home Health  Further Equipment Recommendations for Discharge: ?bedside commode (due to report of fatigue on return to bed after bathroom use)     SUBJECTIVE:   Patient stated I got dehydrated because I had thrush.     OBJECTIVE DATA SUMMARY:     Past Medical History:   Diagnosis Date    Hypertension      Past Surgical History:   Procedure Laterality Date    HX CHOLECYSTECTOMY      HX GYN      HX ORTHOPAEDIC  1996    c5-6 fusion     HX ORTHOPAEDIC  01/2018    lumbar laminectomy      Barriers to Learning/Limitations: yes;  physical  Compensate with: Verbal Cues  Prior Level of Function/Home Situation: as noted above. Ambulating with RW since rehab; had received HHPT 1 time since discharge from rehab. Home Situation  Home Environment: Private residence  # Steps to Enter: 3  Rails to Enter: Yes  Hand Rails : Right  One/Two Story Residence: Two story, live on 1st floor  # of Interior Steps: 13  Height of Each Step (in): 12 inches  Interior Rails: Left  Lift Chair Available: No  Living Alone: No  Support Systems: Other (comments), Child(favio) (roommate (currently out of town))  Patient Expects to be Discharged to[de-identified] Private residence  [facility] at [facility]  Critical Behavior:  Neurologic State: Alert; Appropriate for age  Orientation Level: Oriented X4  Cognition: Follows commands  Safety/Judgement: Awareness of environment; Fall prevention  Psychosocial  Patient Behaviors: Calm; Cooperative  Purposeful Interaction: Yes  Pt Identified Daily Priority: Clinical issues (comment); Communication issues (comment)  Caritas Process: Nurture loving kindness;Establish trust;Attend basic human needs;Create healing environment  Caring Interventions: Reassure; Therapeutic modalities  Reassure: Therapeutic listening;Quiet presence;Caring rounds  Therapeutic Modalities: Humor; Intentional therapeutic touch  Skin Condition/Temp: Warm;Dry  Skin Integrity: Wound (add Wound LDA) (rt heel )  Skin Integumentary  Skin Color: Appropriate for ethnicity  Skin Condition/Temp: Warm;Dry  Skin Integrity: Wound (add Wound LDA) (rt heel )  Turgor: Non-tenting  Hair Growth: Present  Varicosities: Absent  Strength:    Strength: Generally decreased, functional  Tone & Sensation:   Tone: Normal  Sensation: Intact (grossly to LT)  Range Of Motion:  AROM: Generally decreased, functional   Functional Mobility:  Bed Mobility:  Rolling: Modified independent  Supine to Sit: Modified independent  Sit to Supine: Modified independent  Scooting: Modified independent  Transfers:  Sit to Stand: Modified independent  Stand to Sit: Modified independent  Balance:   Sitting: Intact  Standing: Intact; With support (RW)  Ambulation/Gait Training:  Distance (ft): 100 Feet (ft)  Assistive Device: Gait belt;Walker, rolling  Ambulation - Level of Assistance: Supervision; Other (comment) (IV present)  Gait Description (WDL): Exceptions to WDL  Gait Abnormalities: Antalgic;Decreased step clearance  Speed/Donna: Slow;Pace decreased (<100 feet/min)  Step Length: Right shortened;Left shortened  Interventions: Other (comment) (Pacing self)  Pain:  Pain Scale 1: Numeric (0 - 10)  Pain Intensity 1: 3  Pain Location 1: Buttocks (thigh)  Pain Orientation 1: Left  Pain Description 1: Constant;Burning;Aching  Pain Intervention(s) 1: Medication (see MAR)  Activity Tolerance:   Fair   Please refer to the flowsheet for vital signs taken during this treatment. After treatment:   []         Patient left in no apparent distress sitting up in chair  [x]         Patient left in no apparent distress in bed  [x]         Call bell left within reach  [x]         Nursing notified  []         Caregiver present  []         Bed alarm activated    COMMUNICATION/EDUCATION:   [x]         Fall prevention education was provided and the patient/caregiver indicated understanding. [x]         Patient/family have participated as able in goal setting and plan of care. [x]         Patient/family agree to work toward stated goals and plan of care. []         Patient understands intent and goals of therapy, but is neutral about his/her participation. []         Patient is unable to participate in goal setting and plan of care.     Eval Complexity: History: HIGH Complexity :3+ comorbidities / personal factors will impact the outcome/ POC Exam:LOW Complexity : 1-2 Standardized tests and measures addressing body structure, function, activity limitation and / or participation in recreation  Presentation: LOW Complexity : Stable, uncomplicated  Clinical Decision Making:Low Complexity amb >100ft with RW/S; increased HR to 150's  Overall Complexity:MEDIUM    G-Codes (GP)  Mobility  [id number] Current  CI= 1-19%   Goal  CI= 1-19%  The severity rating is based on the functional mobility assessment.     Thank you for this referral.  Eryn Charles, PT   Time Calculation: 35 mins

## 2018-05-07 NOTE — PROGRESS NOTES
D/c plan home today  Met with patient at bedside she informed cm she lives with a room mate however, she did inform cm she has grown chldren that live nearby and assist her with anything she may need. She has agreed to [facility]. Provided with [facility]. She would like to use [facility]. Referral placed and sent by [facility]. No other needs at this time of DME. States one of her children will pick he up when they get off from work. Care Management Interventions  PCP Verified by CM: Yes  Transition of Care Consult (CM Consult): [address]: Yes  Physical Therapy Consult: Yes  Current Support Network:  Other (lives with room mate however, she reports she has adult children that live nearby)  Confirm Follow Up Transport: Family  Plan discussed with Pt/Family/Caregiver: Yes  Freedom of Choice Offered: Yes  Discharge Location  Discharge Placement: Home with home health

## 2018-05-07 NOTE — DISCHARGE SUMMARY
Discharge Summary    Patient: Juanita Seth MRN: [id number]  CSN: [id number]    YOB: 1950  Age: 79 y.o. Sex: female    DOA: 5/4/2018 LOS:  LOS: 3 days   Discharge Date:      Primary Care Provider:  Edis Munroe MD    Admission Diagnoses: Dehydration  Tachyarrhythmia  Dysphagia  Electrolyte abnormality    Discharge Diagnoses:    Problem List as of 5/7/2018  Date Reviewed: 4/26/2018          Codes Class Noted - Resolved    Oral thrush ICD-10-CM: B37.0  ICD-9-CM: 112.0  5/7/2018 - Present        * (Principal)Dehydration ICD-10-CM: E86.0  ICD-9-CM: 276.51  5/4/2018 - Present        Tachyarrhythmia ICD-10-CM: R00.0  ICD-9-CM: 785.0  5/4/2018 - Present        Dysphagia ICD-10-CM: R13.10  ICD-9-CM: 787.20  5/4/2018 - Present        Electrolyte abnormality ICD-10-CM: E87.8  ICD-9-CM: 276.9  5/4/2018 - Present        Lumbar back pain ICD-10-CM: M54.5  ICD-9-CM: 724.2  2/27/2018 - Present        Psoas abscess, left ([address] ICD-10-CM: D17.03  ICD-9-CM: 567.31  2/27/2018 - Present        Post-operative complication M-23-ET: K29. 9XXA  ICD-9-CM: 998.9  2/27/2018 - Present        Lumbar surgical wound fluid collection ICD-10-CM: T81.89XA  ICD-9-CM: 998.89  2/27/2018 - Present              Discharge Medications:     Current Discharge Medication List      START taking these medications    Details   nystatin (MYCOSTATIN) 100,000 unit/mL suspension Take 5 mL by mouth four (4) times daily for 7 days. swish and spit  Qty: 150 mL, Refills: 0         CONTINUE these medications which have NOT CHANGED    Details   Lactobacillus acidophilus (PROBIOTIC) 10 billion cell cap Take  by mouth. furosemide (LASIX) 20 mg tablet Take  by mouth daily. magnesium oxide (MAG-OX) 400 mg tablet Take 400 mg by mouth daily. ferrous sulfate 325 mg (65 mg iron) tablet Take 1 Tab by mouth three (3) times daily (with meals).   Qty: 90 Tab, Refills: 3    Associated Diagnoses: Iron deficiency anemia, unspecified iron deficiency anemia type      hydroCHLOROthiazide (MICROZIDE) 12.5 mg capsule Take 12.5 mg by mouth as needed. STOP taking these medications       morphine 15 mg TR12 Comments:   Reason for Stopping:         naloxone (NARCAN) 4 mg/actuation nasal spray Comments:   Reason for Stopping:         potassium chloride (KLOR-CON) 10 mEq tablet Comments:   Reason for Stopping:         cyclobenzaprine (FLEXERIL) 10 mg tablet Comments:   Reason for Stopping:         naproxen (NAPROSYN) 500 mg tablet Comments:   Reason for Stopping:               Discharge Condition: Good        Consults: Gastroenterology      PHYSICAL EXAM   Visit Vitals    /82    Pulse 81    Temp 97.5 °F (36.4 °C)    Resp 18    Ht 5' 4\" (1.626 m)    Wt 90 kg (198 lb 6.6 oz)    SpO2 97%    Breastfeeding No    BMI 34.06 kg/m2     General: Awake, cooperative, no acute distress    HEENT: NC, Atraumatic. PERRLA, EOMI. Anicteric sclerae. Oral thrush. Lungs:  CTA Bilaterally. No Wheezing/Rhonchi/Rales. Heart:  Regular  rhythm,  No murmur, No Rubs, No Gallops  Abdomen: Soft, Non distended, Non tender. +Bowel sounds,   Extremities: No c/c/e  Psych:   Not anxious or agitated. Neurologic:  No acute neurological deficits. Admission HPI :   Darrin Turner is a 79 y.o.  female who  Was sent to ER from GI  Who was doing her endoscopy; Patient has hx of lumbar surgery and secondary infection with broad abx for six weeks finished antibiotic April 14   While on antibiotic and after has had low appeitie dysphagia of solid and now liquids progressive over last few days associated with weakness and dysphagia progressing from pill to food and liquid  Was prescribed potassiumam and magnesium as out patient but is unable to swallow pills  With out vomitting  Patient has small bouts of diarrhhea a as well no preivous testing done     Hospital Course : Anorexia/nausea - likely secondary to oral thrush.  Seen by GI, no indication for endoscopy. Patient is tolerating full liquid diet. She reports that her symptoms improving with nystatin. Follow up with her GI as outpatient. Oral thrush - started on nystatin. No need for diflucan per GI. Will continue with nystatin solution at discharge. Continue for 2-3 days after resolution of thrush. Seen by PT/OT, will arrange for home health PT/OT at discharge    Activity: Activity as tolerated    Diet: Regular Diet    Follow-up: PCP, GI. Disposition: home with home health    Minutes spent on discharge: 45       Labs: Results:       Chemistry Recent Labs      05/07/18 0427 05/06/18 0306 05/05/18   1935 05/05/18   1030  05/05/18   0740  05/04/18 1942   GLU  87  78   --   91  70*  94   NA  140  140   --   136  138  137   K  3.6  3.0*  3.4*  3.1*  2.8*  3.0*   CL  104  105   --   103  103  96*   CO2  25  21   --   20*  19*  18*   BUN  2*  4*   --   9  9  10   CREA  0.70  0.76   --   0.85  0.81  1.07   CA  8.3*  8.4*   --   8.3*  8.1*  9.6   AGAP  11  14   --   13  16  23*   BUCR  3*  5*   --   11*  11*  9*   AP   --    --    --    --   137*  194*   TP   --    --    --    --   6.6  8.6*   ALB   --    --    --    --   2.3*  3.1*   GLOB   --    --    --    --   4.3*  5.5*   AGRAT   --    --    --    --   0.5*  0.6*      CBC w/Diff Recent Labs      05/07/18 0427 05/06/18 0306 05/05/18 0437   WBC  7.0  7.4  8.6   RBC  3.60*  3.54*  3.89*   HGB  9.2*  9.1*  10.0*   HCT  28.3*  27.8*  30.6*   PLT  292  309  302   GRANS  47  49  58   LYMPH  32  35  29   EOS  6*  6*  2      Cardiac Enzymes Recent Labs      05/05/18   1030  05/05/18   0437   CPK  20*  22*   CKND1  CALCULATION NOT PERFORMED WHEN RESULT IS BELOW LINEAR LIMIT  CALCULATION NOT PERFORMED WHEN RESULT IS BELOW LINEAR LIMIT      Coagulation No results for input(s): PTP, INR, APTT in the last 72 hours.     No lab exists for component: INREXT, INREXT    Lipid Panel No results found for: CHOL, CHOLPOCT, CHOLX, CHLST, CHOLV, [id number], HDL, LDL, LDLC, DLDLP, [id number], VLDLC, VLDL, TGLX, TRIGL, TRIGP, TGLPOCT, CHHD, CHHDX   BNP No results for input(s): BNPP in the last 72 hours. Liver Enzymes Recent Labs      05/05/18   0740   TP  6.6   ALB  2.3*   AP  137*   SGOT  35      Thyroid Studies No results found for: T4, T3U, TSH, TSHEXT, TSHEXT         Significant Diagnostic Studies: Xr Abd Acute W 1 V Chest    Result Date: 5/5/2018  EXAM: Acute Abdominal Series Indication: Abdominal pain, nausea and vomiting for 6 weeks. Technique: Single frontal view of the chest with, upright and supine frontal views of the abdomen. Comparison Studies: 04/26/2018. ________________ FINDINGS: -Chest x-ray:  The cardiomediastinal silhouette is midline and normal in size, for technique. No focal opacity, pneumothorax or pleural effusion is identified. Stable intact osseous structures. -Abdomen: Relative paucity of large and small bowel gas with a mild amount of admixed material in the low pelvis, potentially rectal vault/distal sigmoid. No pathologic air-fluid levels. No bowel wall thickening. No free air. Surgical clips in the right upper quadrant, from prior cholecystectomy. Intact osseous structures with mild levoscoliosis centered at L2. Lower lumbar degenerative disc disease and spondylosis at L4-5. ________________    IMPRESSION: 1. Chest:  No acute process. 2. Abdomen: Nonobstructive nonspecific bowel gas pattern. Mri Lumb Spine W Wo Cont    Result Date: 4/26/2018  INDICATION: Acutely worsening lumbar back pain, status post lumbar wound reexploration and irrigation for recurrent fluid collections and psoas abscess TECHNIQUE: Sagittal T2, T1, and STIR; axial T2 and T1 sequences of the lumbar spine were obtained before intravenous contrast administration.  Sagittal and axial T1 sequences were also obtained after intravenous administration of gadolinium FINDINGS: Comparison exam: Lumbar spine MRI 4/9/2018 and 2/27/2018 General comment: 5 lumbar type vertebrae are again assumed, consistent with previously used numbering. Again noted left-sided laminotomy/hemilaminectomy changes at L4 and L5. L5-S1: Near-complete resolution of fluid signal intensity seen at the L5-S1 disc previously. There is still mild residual enhancing granulation tissue/scar within the disc with irregularity of the adjacent endplates. Mild broad-based disc bulging. There complete effacement of thecal sac CSF mostly related to circumferential epidural lipomatosis and adjacent enhancing left-sided granulation tissue/scar. Foramina are moderately stenotic bilaterally, left more than right, with flattening of the left L5 nerve root within the foramen, similar to prior MRIs. L4-5: Previously seen fluid signal within the disc has resolved. There is increasing enhancement of material within the disc space, similar to that seen at L5-S1. Persistent broad-based disc bulging and spondylosis with unchanged retrolisthesis. Coupled with epidural lipomatosis, there is fairly high-grade canal stenosis with moderate recess narrowing, all similar to prior studies. Mild right-sided and moderate left-sided foraminal stenosis with mild flattening of the left L4 nerve root, also unchanged. L3-4: Mild to moderate broad-based disc osteophyte complex effacing ventral thecal sac CSF. Together with ligamentum flavum hypertrophy and epidural lipomatosis, there is high-grade multifactorial canal stenosis with loss of all CSF signal around the nerve roots, unchanged. Mild right-sided and moderately prominent left-sided foraminal stenosis due to foraminal disc protrusion, flattening of the left L3 nerve root, likewise unchanged. L2-3: Moderately prominent focal right paracentral disc protrusion effaces ventral thecal sac CSF, eccentric to the right. There is near complete effacement of the right lateral recesses well focally at the disc level.  Persistent circumferential epidural lipomatosis contributes to high-grade canal stenosis at this level, relatively unchanged. Moderately severe right foraminal stenosis with flattening of the right L2 foraminal nerve root. No foraminal stenosis on the left. L1-2: No significant disc pathology. Mild bilateral facet arthropathy. No canal or foraminal stenosis. T12-L1: No significant disc pathology. Mild bilateral facet arthropathy. No canal or foraminal stenosis. Distal cord and conus are normal, tip lying at L1. There is abundant bone marrow edema around L4-5 and L5-S1 which appears essentially unchanged compared to prior study with relatively diffuse abnormal intramedullary enhancement on post gadolinium phase images. Normal marrow signal intensity is seen elsewhere throughout lower thoracic and lumbar spine vertebral bodies. There is superior S1 marrow reactive changes. Marrow signal intensity appears normal elsewhere throughout the sacrum. Enhancing granulation tissue/scar is seen in the left paracentral surgical incision. There is no soft tissue abscess, hematoma or phlegmon. No soft tissue abscess with an adjacent paraspinal musculature. Complete interval resolution of left psoas intramuscular abscess seen previously.     ====IMPRESSION: 1. Persistent bone marrow edema and abnormal intramedullary enhancement are again seen throughout L4 and L5 vertebral bodies. There has been complete interval resolution of intradiscal fluid at L4-L5 and L5-S1 with only minimal residual intradiscal enhancement, probably granulation tissues/scar. 2. No focal epidural abscess. Resolution of fahad-incisional and deep left psoas intramuscular abscess seen on prior studies. 3. Persistent high-grade canal and foraminal stenoses, large part of which is related to circumferential epidural lipomatosis L2-S1. Mri Lumb Spine W Wo Cont    Result Date: 4/9/2018  INDICATION: Follow-up lumbar discitis/osteomyelitis and psoas abscess.  TECHNIQUE: Proton and T2 FSE sagittal, T2 FSE axial, pre and postcontrast T1 weighted sagittal and axial. FINDINGS: Comparison exam: February 27 General comment: 5 lumbar type vertebrae are again assumed, consistent with previously used numbering. Again noted left-sided laminotomy/hemilaminectomy changes at L4 and L5. L5-S1: Previously seen fluid signal within the disc has diminished/resolved, with increasing enhancement seen around the periphery of the disc, including at the bordering endplates. Posterior disc bulging. No central canal or lateral recess stenosis. Foramina are moderately stenotic bilaterally, left more than right, with flattening of the left L5 nerve root within the foramen, similar previously. L4-5: Previously seen fluid signal within the disc has essentially resolved. There is increasing enhancement of material within the disc space, similar to that seen at L5-S1. Persistent broad-based disc bulging and spondylosis with mild retrolisthesis. Coupled with epidural lipomatosis, there is fairly high-grade canal stenosis with moderate recess narrowing, all similar previously. Mild right-sided and moderate left-sided foraminal stenosis with mild flattening of the left L4 nerve root, unchanged. L3-4: Moderate broad-based disc protrusion plus spondylosis and slight retrolisthesis. Together with ligamentum flavum hypertrophy and epidural lipomatosis, there is high-grade canal stenosis with loss of all CSF signal around the nerve roots, unchanged. Mild right-sided and moderate to marked left-sided foraminal stenosis due to foraminal disc protrusion, flattening of the left L3 nerve root, likewise unchanged. L2-3: Prominent broad-based disc protrusion with focally larger protruded component right paracentral, moderately indenting the ventral sac. Coupled with the epidural lipomatosis, there is relatively high-grade canal stenosis at this level appearing unchanged.  There is also moderate to high-grade foraminal narrowing on the right with mild to moderate mass effect on the right L2 nerve root, related to disc protrusion, unchanged. L1-2: Unremarkable. T12-L1: Unremarkable. Distal cord and conus are normal, tip lying at L1. There is abundant bone marrow edema around L4-5 and L5-S1 which actually appears more prominent than it did previously. The sacrum is otherwise unremarkable as visualized. Previously seen fluid collection in the dorsal soft tissues leading from the surgical incisional site down to the laminectomy defect has resolved. Previously seen fluid collection within the deep left psoas muscle has also resolved. There is no convincing epidural hematoma.     ====IMPRESSION: 1. Although there is increased bone marrow edema at L4-5 and L5-S1, fluid signal within each of the 2 involved discs has essentially resolved, with increasing intradiscal enhancement, most likely granulation tissue. 2. No definite epidural abscess identified. 3. Resolution of previously seen large fluid collection extending from the surgical incision site down to the laminectomy defects, along with resolution of previously seen small fluid collection in the deep left psoas muscle. 4. Multilevel disc disease producing various degrees of canal, lateral recess and foraminal stenosis as described above which involves not only the 2 surgical levels but also the 2 levels above this, all of which appears unchanged. Please see above for further description at each level. Xr Chest Port    Result Date: 4/26/2018  Chest, single view Indication: Sepsis Comparison: None Findings:  Portable upright AP view of the chest was obtained. The patient is rotated on the provided projection foreshortening of the left hemithorax. No focal pneumonic opacity, pneumothorax, or pleural effusion. Normal cardiac size and mediastinal contours. No acute osseous abnormality. Impression: Rotated projection of the chest without superimposed acute radiographic abnormality.         No results found for this or any previous visit.        CC: Meche Leon MD

## 2018-05-07 NOTE — PROGRESS NOTES
0745 Assumed care of pt from [place] [id number]. Pt resting quietly in bed , no signs of distress, call bell within reach. 0845 Assessment completed, dysphagia screen done, scored 0. Pt states that sometimes she has difficulty eating because of the nausea, but also because she has thrush r/t the antibiotics that she was on. Pt did not want to eat her breakfast at this time, because she \"did not feel right\" and wanted to rest.    1008 PRN Zofran given for nausea, pt states that she wants to rest and will take her potassium effervescent and eat later.   -pt states that she is having pain in her back, she does not want to take any pain medication because they \"make her sick\" and believes that Tylenol is helping. Shift Summary- Shift uneventful. Pt rested throughout shift. Denied chest pain or shortness of breath. [address], pt ambulated with PT HR increased to 150s during it, returned to 91 after ambulation.  Per MD okay to proceed with discharge

## 2018-05-07 NOTE — PROGRESS NOTES
Problem: Dysphagia (Adult)  Goal: *Acute Goals and Plan of Care (Insert Text)  Recommendations:  Diet: Regular/thin   Meds: Per patient preference  Oral Care TID    Goals:  Patient will:  1. Tolerate PO trials with 0 s/s overt distress in 4/5 trials - goal met  2. Utilize compensatory swallow strategies/maneuvers (decrease bite/sip, size/rate, alt. liq/sol) with min cues in 4/5 trials - goal met        Outcome: Resolved/Met Date Met: 05/07/18  Speech LAnguage Pathology bedside swallow evaluation AND DISCHARGE    Patient: Veronica Prasad (89 y.o. female)  Date: 5/7/2018  Primary Diagnosis: Dehydration  Tachyarrhythmia  Dysphagia  Electrolyte abnormality        Precautions: None     PLOF: Independent  ASSESSMENT :  Clinical beside swallow eval completed per MD orders. Pt A&Ox4. Functional communication. Intelligibility >90%. Cognitive-linguistic function appears intact. OM examination revealed oral motor structures functional for mastication and deglutition. Presented with thin liquid, puree, and solid trials. Exhibited + bolus cohesion, manipulation and A-P transit. Further exhibited + swallow timing/reflex and hyolaryngeal excursion. Pt able to manipulate and clear with 0 clinical s/s aspiration and/or oropharyngeal dysphagia. Pt safe for regular solid, thin liquid diet following resolution of full liquid diet/nausea. 0 formal ST needs for dysphagia indicated at this time. SLP educated pt on role of speech therapist in current setting with re: speech/swallow; verbalized comprehension. SLP available for re-evaluation if indicated by MD.     Thank you for this referral.   Farshad Ruano, SLP     PLAN :  Recommendations and Planned Interventions:  No formal ST needs ID'd for dysphagia. Eval only. Discharge Recommendations: None     SUBJECTIVE:   Patient stated I couldn't eat anything for 6 weeks because the metallic taste made me gag.     OBJECTIVE:     Past Medical History:   Diagnosis Date    Hypertension Past Surgical History:   Procedure Laterality Date    HX CHOLECYSTECTOMY      HX GYN      HX ORTHOPAEDIC  1996    c5-6 fusion     HX ORTHOPAEDIC  01/2018    lumbar laminectomy      Prior Level of Function/Home Situation: Independent  Home Situation  Home Environment: Private residence  # Steps to Enter: 3  One/Two Story Residence: Two story  # of Interior Steps: 13  Height of Each Step (in): 12 inches  Interior Rails: Left  Lift Chair Available: No  Living Alone: No  Support Systems: Child(favio), Family member(s)  Patient Expects to be Discharged to[de-identified] Private residence  Current DME Used/Available at Home: Hospital bed, CPAP, Walker  Diet prior to admission: Full liquid for ~6 weeks due to nausea, regular solids/thin liquid prior  Current Diet:  Regular/thin   Cognitive and Communication Status:  Neurologic State: Alert  Orientation Level: Oriented X4  Cognition: Appropriate decision making, Appropriate for age attention/concentration, Appropriate safety awareness, Follows commands  Perception: Appears intact  Perseveration: No perseveration noted  Safety/Judgement: Awareness of environment, Good awareness of safety precautions  Oral Assessment:  Oral Assessment  Labial: No impairment  Dentition: Natural;Intact  Oral Hygiene: Fair (Oral thrush)  Lingual: No impairment  Velum: No impairment  Mandible: No impairment  Gag Reflex: No impairment  P.O. Trials:  Patient Position: [facility] 45  Vocal quality prior to P.O.: No impairment  Consistency Presented: Thin liquid;Puree; Solid  How Presented: Self-fed/presented;Straw     Bolus Acceptance: No impairment  Bolus Formation/Control: No impairment     Propulsion: No impairment  Oral Residue: None  Initiation of Swallow: No impairment  Laryngeal Elevation: Functional  Aspiration Signs/Symptoms: None  Pharyngeal Phase Characteristics: No impairment, issues, or problems   Effective Modifications: Small sips and bites  Cues for Modifications: None       Oral Phase Severity: No impairment  Pharyngeal Phase Severity : No impairment    GCODESwallowing:  Swallow Current Status CH= 0%   Swallow Goal Status CH= 0%   Swallow D/C Status CH= 0%    The severity rating is based on the following outcomes:  ADY [place] Swallow Level 7    Clinical Judgement      PAIN:  Start of Eval: 0  End of Eval: 0     After evaluation:   []            Patient left in no apparent distress sitting up in chair  [x]            Patient left in no apparent distress in bed  [x]            Call bell left within reach  []            Nursing notified  []            Family present  []            Caregiver present  []            Bed alarm activated      COMMUNICATION/EDUCATION:   [x]            Aspiration precautions; swallow safety; compensatory techniques. [x]            Patient/family have participated as able in goal setting and plan of care. [x]            Patient/family agree to work toward stated goals and plan of care. []            Patient understands intent and goals of therapy; neutral about participation. []            Patient unable to participate in goal setting/plan of care; educ ongoing with interdisciplinary staff  []         Posted safety precautions in patient's room.     Thank you for this referral.  AN Gloria  Time Calculation: 25 mins

## 2018-05-07 NOTE — PROGRESS NOTES
Problem: Mobility Impaired (Adult and Pediatric)  Goal: *Acute Goals and Plan of Care (Insert Text)  Physical Therapy Goals LT/ST  Initiated 5/7/2018 and to be accomplished within 1-2 day(s)  1. Patient will move from supine <> sit with S in prep for out of bed activity and change of position. 2.  Patient will perform sit<> stand with S/RW in prep for transfers/ambulation. 3.  Patient will transfer from bed <> chair with S/RW for time up in chair for completion of ADL activity. 4.  Patient will ambulate 80 feet with S/RW for improved functional mobility/safe discharge. 5.  Patient will ascend/descend 3-5 stairs with handrail(s) with minimal assistance/contact guard assist for home re-entry as needed. Outcome: Resolved/Met Date Met: 05/07/18  physical Therapy TREATMENT/DISCHARGE    Patient: Jonathan Martins (89 y.o. female)  Date: 5/7/2018  Diagnosis: Dehydration  Tachyarrhythmia  Dysphagia  Electrolyte abnormality Dehydration       Precautions: Back  Chart, physical therapy assessment, plan of care and goals were reviewed. ASSESSMENT:  Pt able to complete step negotiation with 6 inch box step using RW/CGA. Pt family will assist pt into home upon discharge. Pt returned to bed and left in R sidelying position again, in NAD and with all needs in reach. Pt ready to resume HHPT at this time with regard to rehab status. Progression toward goals:  [x]      Goals met  []      Improving appropriately and progressing toward goals  []      Improving slowly and progressing toward goals  []      Not making progress toward goals and plan of care will be adjusted     PLAN:  Patient will be discharged from physical therapy at this time.   Rationale for discharge:  [x] Goals Achieved  [] [address]  [] Patient not participating in therapy  [] Other:  Discharge Recommendations:  Home Health  Further Equipment Recommendations for Discharge:  N/A     SUBJECTIVE:   Patient stated Robson Quiñones went to the bathroom again.    OBJECTIVE DATA SUMMARY:   Critical Behavior:  Neurologic State: Alert, Appropriate for age  Orientation Level: Oriented X4  Cognition: Follows commands  Safety/Judgement: Awareness of environment, Fall prevention  Functional Mobility Training:  Bed Mobility:  Rolling: Modified independent  Supine to Sit: Modified independent  Sit to Supine: Modified independent  Scooting: Modified independent  Transfers:  Sit to Stand: Modified independent  Stand to Sit: Modified independent  Balance:  Sitting: Intact  Standing: Intact; With support  Ambulation/Gait Training:  Distance (ft): 40 Feet (ft)  Assistive Device: Gait belt;Walker, rolling  Ambulation - Level of Assistance: Modified independent  Gait Description (WDL): Exceptions to WDL  Gait Abnormalities: Antalgic  Speed/Donna: Pace decreased (<100 feet/min)  Step Length: Right shortened;Left shortened  Interventions: Safety awareness training  Stairs:  Number of Stairs Trained: 2  Stairs - Level of Assistance: Contact guard assistance   Rail Use: Both  Pain:  Pain Scale 1: Numeric (0 - 10)  Pain Intensity 1: 2  Pain Location 1: Buttocks  Pain Orientation 1: Left  Pain Description 1: Constant;Burning;Aching  Pain Intervention(s) 1: Medication (see MAR)  Activity Tolerance:   Fair   Please refer to the flowsheet for vital signs taken during this treatment.   After treatment:   [] Patient left in no apparent distress sitting up in chair  [x] Patient left in no apparent distress in bed  [x] Call bell left within reach  [] Nursing notified  [] Caregiver present  [] Bed alarm activated  Nima Banda, HIRAL   Time Calculation: 16 mins

## 2018-05-07 NOTE — PROGRESS NOTES
Bedside and Verbal shift change report given to Neha Cortez RN (oncoming nurse) by Krystal Cortes RN (offgoing nurse). Report included the following information SBAR, Kardex, ED Summary, Procedure Summary, Intake/Output, MAR, Accordion, Recent Results and Med Rec Status.

## 2018-05-07 NOTE — PROGRESS NOTES
Dual AVS reviewed with SENDY Barajas RN. All medications reviewed individually with patient. Opportunities for questions and concerns provided. Patient discharged via [facility] to the main entrance. Patient's arm band and IV removed and  appropriately discarded.

## 2018-05-07 NOTE — PROGRESS NOTES
Hospitalist Progress Note    Patient: Horacio Alexis MRN: [id number]  CSN: [id number]    YOB: 1950  Age: 79 y.o. Sex: female    DOA: 5/4/2018 LOS:  LOS: 3 days                Assessment/Plan     Patient Active Problem List   Diagnosis Code    Lumbar back pain M54.5    Psoas abscess, left ([address] K68.12    Post-operative complication X61. 9XXA    Lumbar surgical wound fluid collection T81.89XA    Obesity, morbid ([address] E66.01    Dehydration E86.0    Tachyarrhythmia R00.0    Dysphagia R13.10    Electrolyte abnormality E80.6            78 yo female admitted for nausea    Recent back surgery complicated by infection, completed 6 weeks of antibiotics. Anorexia/nausea - seen by GI, on full liquid diet  Ambulate    Hypokalemia/hypomagnesemia - replaced. HTN - controlled on lopressor    DVT prophylaxis      Disposition : 1-2 days    Review of systems  General: No fevers or chills. Cardiovascular: No chest pain or pressure. No palpitations. Pulmonary: No shortness of breath. Gastrointestinal: No nausea, vomiting. Physical Exam:  General: Awake, cooperative, no acute distress    HEENT: NC, Atraumatic. PERRLA, anicteric sclerae. Lungs: CTA Bilaterally. No Wheezing/Rhonchi/Rales. Heart:  Regular  rhythm,  No murmur, No Rubs, No Gallops  Abdomen: Soft, Non distended, Non tender.  +Bowel sounds,   Extremities: No c/c/e  Psych:   Not anxious or agitated. Neurologic:  No acute neurological deficit.            Vital signs/Intake and Output:  Visit Vitals    /60 (BP 1 Location: Right arm, BP Patient Position: At rest)    Pulse 97    Temp 97.2 °F (36.2 °C)    Resp 18    Ht 5' 4\" (1.626 m)    Wt 90 kg (198 lb 6.6 oz)    SpO2 98%    Breastfeeding No    BMI 34.06 kg/m2     Current Shift:     Last three shifts:  05/05 1901 - 05/07 0700  In: 1046.7 [P.O.:360; I.V.:686.7]  Out: 1150 [Urine:1150]            Labs: Results:       Chemistry Recent Labs      05/07/18   1445 05/06/18   0306  05/05/18   1935  05/05/18   1030  05/05/18   0740  05/04/18   1942   GLU  87  78   --   91  70*  94   NA  140  140   --   136  138  137   K  3.6  3.0*  3.4*  3.1*  2.8*  3.0*   CL  104  105   --   103  103  96*   CO2  25  21   --   20*  19*  18*   BUN  2*  4*   --   9  9  10   CREA  0.70  0.76   --   0.85  0.81  1.07   CA  8.3*  8.4*   --   8.3*  8.1*  9.6   AGAP  11  14   --   13  16  23*   BUCR  3*  5*   --   11*  11*  9*   AP   --    --    --    --   137*  194*   TP   --    --    --    --   6.6  8.6*   ALB   --    --    --    --   2.3*  3.1*   GLOB   --    --    --    --   4.3*  5.5*   AGRAT   --    --    --    --   0.5*  0.6*      CBC w/Diff Recent Labs      05/07/18   0427  05/06/18   0306  05/05/18   0437   WBC  7.0  7.4  8.6   RBC  3.60*  3.54*  3.89*   HGB  9.2*  9.1*  10.0*   HCT  28.3*  27.8*  30.6*   PLT  292  309  302   GRANS  47  49  58   LYMPH  32  35  29   EOS  6*  6*  2      Cardiac Enzymes Recent Labs      05/05/18   1030  05/05/18   0437   CPK  20*  22*   CKND1  CALCULATION NOT PERFORMED WHEN RESULT IS BELOW LINEAR LIMIT  CALCULATION NOT PERFORMED WHEN RESULT IS BELOW LINEAR LIMIT      Coagulation No results for input(s): PTP, INR, APTT in the last 72 hours. No lab exists for component: INREXT    Lipid Panel No results found for: CHOL, CHOLPOCT, CHOLX, CHLST, CHOLV, 419459, HDL, LDL, LDLC, DLDLP, 243296, VLDLC, VLDL, TGLX, TRIGL, TRIGP, TGLPOCT, CHHD, CHHDX   BNP No results for input(s): BNPP in the last 72 hours.    Liver Enzymes Recent Labs      05/05/18   0740   TP  6.6   ALB  2.3*   AP  137*   SGOT  35      Thyroid Studies No results found for: T4, T3U, TSH, TSHEXT     Procedures/imaging: see electronic medical records for all procedures/Xrays and details which were not copied into this note but were reviewed prior to creation of Plan

## 2018-05-07 NOTE — ROUTINE PROCESS
Bedside and Verbal shift change report given to Clare Doherty (oncoming nurse) by Deedee Ye RN   (offgoing nurse). Report included the following information SBAR, Kardex, Intake/Output, MAR and Recent Results.

## 2018-05-07 NOTE — PROGRESS NOTES
Shift Summary Note:  Assumed care of patient in bed awake and alert, c/o some nausea whrn taking po medication but no emesis durign the shift. Medicated x1 for left hip pain with tylenol. VSS, no s/s of acute distress.   Patient Vitals for the past 12 hrs:   Temp Pulse Resp BP SpO2   05/07/18 0339 97.2 °F (36.2 °C) 97 18 136/60 98 %   05/06/18 2341 97.6 °F (36.4 °C) 99 18 124/62 98 %   05/06/18 2010 97.9 °F (36.6 °C) 99 19 143/46 98 %

## 2018-05-07 NOTE — PROGRESS NOTES
Assumed care. Report received from [facility]. No distress noted or verbalized. Will continue to provide care until discharge.

## 2019-12-30 NOTE — PROGRESS NOTES
1200 [facility] care of pt at this time. Pt in chair with no signs of distress. Pt left with call light within reach and encouraged to call for assistance. 2024 -  Head to toe assessment performed at this time. Pt denies any chest pain or SOB. Pt denies any numbness or tingling to extremities. Pt encouraged to manage pain and to use the incentive spirometer. Pt educated on the side effects of medications taken. Pt left with call light within reach and bed in low position. Will continue to monitor. Shift summary - Pt pain managed with prn medication per MAR. Pt informed about all medications and side effects and encouraged to ask questions about medication. Pt encouraged throughout the night to use incentive spirometer and the purpose of the incentive spirometer. Pt left with no signs of distress and any concerns of pt addressed. 130

## 2020-07-23 NOTE — PROGRESS NOTES
Pharmacy Dosing Services: Renal    Pharmacist Renal Dosing Progress Note for Zonitishn   CESILIA Oconnor    The following medication: Zosyn was automatically dose-adjusted per THE [facility] P&T Committee Protocol, with respect to renal function. Consult provided for this   74 y.o. , female , for the indication of surgical site infection. Pt Weight:   Wt Readings from Last 1 Encounters:   02/27/18 105.7 kg (233 lb)         Previous Regimen Zosyn 3.375gm IV q8h   Serum Creatinine Lab Results   Component Value Date/Time    Creatinine 0.67 03/01/2018 08:22 AM       Creatinine Clearance Estimated Creatinine Clearance: 99.2 mL/min (based on Cr of 0.67). BUN Lab Results   Component Value Date/Time    BUN 20 (H) 03/01/2018 08:22 AM       Dosage changed to:  Zosyn 3.375gm IV q6h      Pharmacy to continue to monitor patient daily. Will make dosage adjustments based upon changing renal function.   Katiana Dudley, ADAMSD. Contact information: [phone] good, to achieve stated therapy goals

## 2020-11-09 ENCOUNTER — VIRTUAL VISIT (OUTPATIENT)
Dept: SURGERY | Age: 70
End: 2020-11-09
Payer: MEDICARE

## 2020-11-09 VITALS — HEIGHT: 63 IN | WEIGHT: 238 LBS | BODY MASS INDEX: 42.17 KG/M2

## 2020-11-09 DIAGNOSIS — G47.30 SLEEP APNEA, UNSPECIFIED TYPE: ICD-10-CM

## 2020-11-09 DIAGNOSIS — E66.01 MORBID OBESITY WITH BODY MASS INDEX (BMI) OF 40.0 TO 49.9 (HCC): ICD-10-CM

## 2020-11-09 DIAGNOSIS — G89.29 OTHER CHRONIC PAIN: ICD-10-CM

## 2020-11-09 DIAGNOSIS — Z87.891 SMOKING HISTORY: ICD-10-CM

## 2020-11-09 DIAGNOSIS — M54.50 LUMBAR BACK PAIN: ICD-10-CM

## 2020-11-09 DIAGNOSIS — E66.01 MORBID OBESITY (HCC): Primary | ICD-10-CM

## 2020-11-09 DIAGNOSIS — I10 ESSENTIAL HYPERTENSION: ICD-10-CM

## 2020-11-09 DIAGNOSIS — F32.A DEPRESSION, UNSPECIFIED DEPRESSION TYPE: ICD-10-CM

## 2020-11-09 DIAGNOSIS — D64.9 ANEMIA, UNSPECIFIED TYPE: ICD-10-CM

## 2020-11-09 DIAGNOSIS — Z90.49 HISTORY OF COLON RESECTION: ICD-10-CM

## 2020-11-09 PROBLEM — B37.0 ORAL THRUSH: Status: RESOLVED | Noted: 2018-05-07 | Resolved: 2020-11-09

## 2020-11-09 PROBLEM — T81.89XA LUMBAR SURGICAL WOUND FLUID COLLECTION: Status: RESOLVED | Noted: 2018-02-27 | Resolved: 2020-11-09

## 2020-11-09 PROBLEM — E87.8 ELECTROLYTE ABNORMALITY: Status: RESOLVED | Noted: 2018-05-04 | Resolved: 2020-11-09

## 2020-11-09 PROBLEM — T81.9XXA POST-OPERATIVE COMPLICATION: Status: RESOLVED | Noted: 2018-02-27 | Resolved: 2020-11-09

## 2020-11-09 PROBLEM — R00.0 TACHYARRHYTHMIA: Status: RESOLVED | Noted: 2018-05-04 | Resolved: 2020-11-09

## 2020-11-09 PROBLEM — R13.10 DYSPHAGIA: Status: RESOLVED | Noted: 2018-05-04 | Resolved: 2020-11-09

## 2020-11-09 PROBLEM — K68.12 PSOAS ABSCESS, LEFT (HCC): Status: RESOLVED | Noted: 2018-02-27 | Resolved: 2020-11-09

## 2020-11-09 PROBLEM — E86.0 DEHYDRATION: Status: RESOLVED | Noted: 2018-05-04 | Resolved: 2020-11-09

## 2020-11-09 PROCEDURE — G8400 PT W/DXA NO RESULTS DOC: HCPCS | Performed by: SPECIALIST

## 2020-11-09 PROCEDURE — 99205 OFFICE O/P NEW HI 60 MIN: CPT | Performed by: SPECIALIST

## 2020-11-09 PROCEDURE — G9711 PT HX TOT COL OR COLON CA: HCPCS | Performed by: SPECIALIST

## 2020-11-09 PROCEDURE — 1090F PRES/ABSN URINE INCON ASSESS: CPT | Performed by: SPECIALIST

## 2020-11-09 PROCEDURE — G8510 SCR DEP NEG, NO PLAN REQD: HCPCS | Performed by: SPECIALIST

## 2020-11-09 PROCEDURE — G8427 DOCREV CUR MEDS BY ELIG CLIN: HCPCS | Performed by: SPECIALIST

## 2020-11-09 PROCEDURE — 1101F PT FALLS ASSESS-DOCD LE1/YR: CPT | Performed by: SPECIALIST

## 2020-11-09 RX ORDER — GABAPENTIN 600 MG/1
600 TABLET ORAL 3 TIMES DAILY
COMMUNITY
Start: 2020-10-22 | End: 2021-04-14

## 2020-11-09 RX ORDER — FOLIC ACID 1 MG/1
1 TABLET ORAL DAILY
COMMUNITY
End: 2021-04-02

## 2020-11-09 RX ORDER — FLUOXETINE HYDROCHLORIDE 40 MG/1
40 CAPSULE ORAL DAILY
COMMUNITY
End: 2021-01-15 | Stop reason: CLARIF

## 2020-11-09 RX ORDER — ASPIRIN 325 MG
50000 TABLET, DELAYED RELEASE (ENTERIC COATED) ORAL
COMMUNITY
End: 2021-01-15 | Stop reason: CLARIF

## 2020-11-09 RX ORDER — QUETIAPINE FUMARATE 50 MG/1
50 TABLET, FILM COATED ORAL
COMMUNITY

## 2020-11-09 RX ORDER — CELECOXIB 200 MG/1
200 CAPSULE ORAL 2 TIMES DAILY
COMMUNITY
End: 2021-04-02

## 2020-11-09 RX ORDER — MAGNESIUM 200 MG
1000 TABLET ORAL DAILY
COMMUNITY

## 2020-11-09 NOTE — PROGRESS NOTES
Bariatric Surgery Consultation    Subjective: The patient is a 78 y.o. obese female with a Body mass index is 42.16 kg/m². .  The patient is at her heaviest weight for the past 5 years. she has been overweight since age 38.   she has been considering surgery since last year. she desires surgery at this time because of multiple health concerns and their lifestyle issues which are hindered by their weight. she has been referred by his family physician for evaluation and treatment of their obesity via surgical intervention. Joe Veliz has tried multiple diets in her lifetime most recently tried physician supervised, behavior modification, Weight Watchers and Atkins    Bariatric comorbidities present are   Patient Active Problem List   Diagnosis Code    Lumbar back pain M54.5    Psoas abscess, left ([facility] [address] K68.12    Post-operative complication Q22. 9XXA    Lumbar surgical wound fluid collection T81.89XA    Dehydration E86.0    Tachyarrhythmia R00.0    Dysphagia R13.10    Electrolyte abnormality E87.8    Oral thrush B37.0    Morbid obesity (HCC) E66.01    Morbid obesity with body mass index (BMI) of 40.0 to 49.9 (HCC) E66.01    Depression F32.9    Chronic pain G89.29    Anemia D64.9    Smoking history Z87.891    Sleep apnea G47.30    Acoustic neuroma (HCC) D33.3    History of colon resection Z90.49    Edema R60.9    Hypertension I10       The patient is considering laparoscopic sleeve gastrectomy for surgical weight loss due to their ineffective progress with medical forms of weight loss and the urging of their physician who cares for their primary medical issues. The patient  now presents  for consideration for weight loss surgery understanding the benefits of this over a medical approach of weight loss as was discussed in our presentation on weight loss surgery. They have discussed their plans both with their family and primary care physician who is in support of their pursuit of such.  The patient has   had health issues as of late and denies and gastrointestinal disturbances other than what is outlined below in their review of symptoms. All of their prior evaluations available by both their PCP's and specialists physicians have been reviewed today either in the Care Everywhere portal or scanned under the media tab. I have spent a large portion of my initial consultation today reviewing the patients current dietary habits which have contributed to their health issues and obesity. I have suggested to them personally a dietary regimen that they can initiate now to help with their status as it pertains to their weight. They understand that the most important aspect of their journey through their weight loss endeavor will be their adherence to a new lifestyle of healthy eating behavior. They also understand that an adherence to an exercise program will not only help with weight loss but is ultimately important in weight maintenance. The patients goal weight is 160lb. These goals are consistent with expected outcomes of their desired operation. her Medical goals are resolution of these health issues.         Patient Active Problem List    Diagnosis Date Noted    Morbid obesity ([facility] [address]     Morbid obesity with body mass index (BMI) of 40.0 to 49.9 (HCC)     Depression     Chronic pain     Anemia     Smoking history     Sleep apnea     Acoustic neuroma ([facility] [address]     History of colon resection     Edema     Hypertension     Oral thrush 05/07/2018    Dehydration 05/04/2018    Tachyarrhythmia 05/04/2018    Dysphagia 05/04/2018    Electrolyte abnormality 05/04/2018    Lumbar back pain 02/27/2018    Psoas abscess, left ([facility] [address] 02/27/2018    Post-operative complication 13/61/6873    Lumbar surgical wound fluid collection 02/27/2018     Past Surgical History:   Procedure Laterality Date    HX CARPAL TUNNEL RELEASE      HX CHOLECYSTECTOMY      HX COLECTOMY  1995    pt unsure of her history  HX HIP REPLACEMENT Left     HX HYSTERECTOMY      HX ORTHOPAEDIC  1996    c5-6 fusion     HX ORTHOPAEDIC  01/2018    lumbar laminectomy     HX ORTHOPAEDIC      trigger finger surgery    HX ORTHOPAEDIC Bilateral     shoulder replacement      Social History     Tobacco Use    Smoking status: Never Smoker    Smokeless tobacco: Never Used   Substance Use Topics    Alcohol use: No      No family history on file. Current Outpatient Medications   Medication Sig Dispense Refill    cyanocobalamin (VITAMIN B-12) 1,000 mcg sublingual tablet Take 1,000 mcg by mouth daily.  QUEtiapine (SEROqueL) 50 mg tablet Take 50 mg by mouth daily.  celecoxib (CeleBREX) 200 mg capsule Take 200 mg by mouth two (2) times a day.  cholecalciferol (VITAMIN D3) (50,000 UNITS /1250 MCG) capsule Take 50,000 Units by mouth every seven (7) days.  folic acid (FOLVITE) 1 mg tablet Take 1 mg by mouth daily.  FLUoxetine (PROzac) 40 mg capsule Take 40 mg by mouth daily.  furosemide (LASIX) 20 mg tablet Take  by mouth daily. As needed      ferrous sulfate 325 mg (65 mg iron) tablet Take 1 Tab by mouth three (3) times daily (with meals). 90 Tab 3    hydroCHLOROthiazide (MICROZIDE) 12.5 mg capsule Take 12.5 mg by mouth as needed.  gabapentin (NEURONTIN) 600 mg tablet TK 1 T PO TID      Lactobacillus acidophilus (PROBIOTIC) 10 billion cell cap Take  by mouth.  magnesium oxide (MAG-OX) 400 mg tablet Take 400 mg by mouth daily.        No Known Allergies       Review of Systems:       General - No history or complaints of unexpected fever, chills, or weight loss  Head/Neck - No history or complaints of headache, diplopia, dysphagia, hearing loss  Cardiac - No history or complaints of chest pain, palpitations, murmur, or shortness of breath  Pulmonary - No history or complaints of shortness of breath, productive cough, hemoptysis  Gastrointestinal - minimal reflux,no  abdominal pain, obstipation/constipation or blood per rectum  Genitourinary - No history or complaints of hematuria/dysuria, stress urinary incontinence symptoms, or renal lithiasis  Musculoskeletal - modertae joint pain in their hips, knees,  no muscular weakness  Hematologic - No history or complaints of bleeding disorders,  No blood transfusions  Neurologic - No history or complaints of  migraine headaches, seizure activity, syncopal episodes, TIA or stroke  Integumentary - No history or complaints of rashes, abnormal nevi, skin cancer  Gynecological - n/a               Objective:     Visit Vitals   5' 3\" (1.6 m)   Wt 108 kg (238 lb)   BMI 42.16 kg/m²       Physical Examination:       Physical Examination: General appearance - alert, well appearing, and in no distress and oriented to person, place, and time  Mental status - alert, oriented to person, place, and time, normal mood, behavior, speech, dress, motor activity, and thought processes  Eyes - pupils equal and reactive, extraocular eye movements intact, sclera anicteric, left eye normal, right eye normal  Ears - right ear normal, left ear normal  Neck- good extension and flexion, no obvious swelling  Chest - good air movement  Heart - N/A  Abdomen - no obvious distension, scars as noted. Neurological - alert, oriented, normal speech, no focal findings or movement disorder noted  Musculoskeletal - no swelling noted  Extremities - normal movement    Labs:       No results found for this or any previous visit (from the past 1440 hour(s)). Assessment:     Morbid obesity with comorbidity    Plan:     laparoscopic sleeve gastrectomy    This is a 78 y.o. female with a BMI of Body mass index is 42.16 kg/m². and the weight-related co-morbidties as noted below. Therese Ram meets the NIH criteria for bariatric surgery based upon the BMI of Body mass index is 42.16 kg/m². and multiple weight-related co-morbidties.  Therese Ram has elected laparoscopic sleeve gastrectomy as her intervention of choice for treatment of morbid obestiy through surgical means secondary to its safety profile, rapid return to work  and decreases in operative risks over gastric bypass. In the office today, following Anahy's history and physical examination, a 30 minute discussion regarding the anatomic alterations for the laparoscopic sleeve gastrectomy was undertaken. The dietary expectations and the patient and physician dependent factors for success were thoroughly discussed, to include the need for interval follow-up and long-term dietary changes associated with success. The possible complications of the sleeve gastrectomy  were also discussed, to include;death, DVT/PE, staple line leak, bleeding, stricture formation, infection, nutritional deficiencies and sleeve dilation. Specific weight related outcomes for success were also discussed with an emphasis on careful and close follow-up with the first year and eating behavior modification as the baseline and cyclical hunger return. The patient expressed an understanding of the above factors, and her questions were answered in their entirety. In addition, the patient watched a seminar regarding obesity, surgical weight loss including, adjustable gastric band, gastric bypass, and sleeve gastrectomy. This discussion contrasted the different surgical techniques, mechanisms of actions and expected outcomes, and surgical and medical risks associated with each procedure. In office today we had a long question and answer session where each questions was answered until there were no additional questions. Today, the patient had all of her questions answered and desires to proceed with  bariatric surgery initially choosing sleeve gastrectomy as her surgical option.     Secondary Diagnoses:     Possible or Existing Coronary Artery Disease - The patient has undergone or will undergo a preoperative evaluation by their cardiologist, or a cardiologist of their choice such that they are deemed   a reasonable candidate for surgery. The patient understands that with a history of cardiac disease that there is always an increased risk compared   to the average patient. Appropriate recommendations have been followed as recommended by the cardiologist.  The patients ASA will be resumed   approximately 1 month postoperatively in a coated form if appropriate. Will refer to [facility], [facility]. [facility]. Dietary Intervention  - The patient is currently scheduled to see or has been followed by a bariatric nutritionist for an attempt at preoperative weight loss as has been dictated by their insurance carrier. They will be assessed at various times during their follow up to evaluate their progress depending on the length of time that is required once again by their carrier. I have explained the importance of   preoperative weight loss and the benefits regarding lower surgical risk and also assisting the patient in reaching their weight loss goal. They understand also that they should participate in an  exercise program to assist in this weight loss. Finally they understand there is a physiologic benefit from the standpoint of hepatic volume reduction and reduction of central visceral adiposity   preoperatively. I have reiterated the importance of a low carbohydrate and high protein regimen to achieve their   stated goal. I have reviewed their current eating behavior prior to this encounter and explained to them in an exhaustive fashion the appropriate diet that they should adhere to. They have been   encouraged to loose weight pre operatively and understand it is our prerogative to cancel surgery or postpone their procedure in the event of significant weight gain. The patients weight loss goal pre operatively is 5-10 pounds.     Weight Related Arthritis -The patient understands the benefits that weight loss surgery can have on their arthritis but also understands that weight loss is not a guaranteed cure and relief of symptoms is often dependent on the severity of the underlying disease.  The patient also understands that traditional pharmaceutical treatments for this diagnosis are usually unavailable to post-operative weight loss patients due to the effects on the gastrointestinal tract particularly with the gastric bypass and to a lesser effect with the sleeve gastrectomy.  Any changes to the patients medication treatment will ultimately be made the patients PCP with input by our office. Obstructive Sleep Apnea -The patient understands the association of sleep apnea and obesity and the additional risk that it caries related to post surgical complications. If they have not been tested for sleep apnea and I feel they are at increased risk for this diagnosis, then they will be scheduled for a consultation with a Pulmonologist for such. In the event that they claribel this diagnosis we will have the patient bring their CPAP machine to the hospital for use both postoperatively in the PACU and on the floor at its appropriate setting.  We will have them continue using it while at home after surgery and follow up with their pulmonologist 6 months after to be retested to see if it can be discontinued at that time period. Hypertension - The patient has a clear understanding of how weight loss improves hypertension as a whole, but also they understand that there is a significant genetic component   to this disease process. We will monitor the patients blood pressure while in the hospital and the plan would be to continue those medications postoperatively. If a diuretic is being   used we will stop them on discharge to prevent dehydration particularly with the sleeve gastrectomy and the gastric bypass procedures. They will be instructed to monitor their blood   pressure postoperatively while at home and notify their primary care physician in the event of any significantly high or uncharacteristic readings.  They understand that surgery may be  cancelled if their blood pressure is deemed out of control the day of surgery either by myself or the anesthesia department. For this reason they must take their medications as directed   and monitor their blood pressure regularly working up until their surgical date. Signed By: Krunal Dominguez MD     November 9, 2020       This visit with Luisa Auguste was performed under virtual telemedicine guidelines during the coronavirus (XVTBA-64) public health emergency on 11/9/2020 in an interactive   fashion using [employer] me. They understand that this telemedicine encounter is a billable service, with coverage determined by their insurance carrier. They are aware that   they may receive a bill and have provided verbal consent for this virtual visit. This visit was performed with the patient in their home environment and provider was   present at [facility]. I have spent over 60 minutes on this visit  both prior to the visits reviewing the patients chart and with the patient face to face. I have reviewed their medical history, performed a telemedicine physical examination, and discussed the plan of action to date. They understand that they will be asked   to come to the office when our office is allowed normal patient interaction, as dictated by public health officials, for a face-to-face visit to rediscuss all of the things we  have talked about today. During this visit we discussed the varieties of surgeries that we perform, how they would impact the patient from a weight loss standpoint   considering their medical issues and prior surgeries, and also the restrictions that the patient would have long-term with the operation that they have chosen.     Lizzy Reyes M.D.  11/9/2020

## 2020-11-12 ENCOUNTER — CLINICAL SUPPORT (OUTPATIENT)
Dept: SURGERY | Age: 70
End: 2020-11-12

## 2020-11-12 VITALS — WEIGHT: 238 LBS | BODY MASS INDEX: 42.17 KG/M2 | HEIGHT: 63 IN

## 2020-11-12 DIAGNOSIS — E66.01 MORBID OBESITY (HCC): Primary | ICD-10-CM

## 2020-11-12 NOTE — PROGRESS NOTES
Medical Weight Loss Multi-Disciplinary Program    Name: Maria Del Carmen Vazquez   : 1950    Session# 1  Date: 2020     Height: 5' 3\" (160 cm)    Weight: 108 kg (238 lb) lbs. Body mass index is 42.16 kg/m². Dietitian: Raul Gibson is a 78 y.o. female who present for a pre-op evaluation. Visit Vitals  Ht 5' 3\" (1.6 m)   Wt 108 kg (238 lb)   BMI 42.16 kg/m²     Past Medical History:   Diagnosis Date    Acoustic neuroma (HCC)     stable    Anemia     Chronic pain     Depression     Edema     History of colon resection     pt unsure of the nature of her resection     Hypertension     takes diuretics \"PRN\"    Morbid obesity ([address]     Morbid obesity with body mass index (BMI) of 40.0 to 49.9 ([address]     Sleep apnea     uses c-pap           Procedure:  laparoscopic sleeve gastrectomy     Reasons for Surgery:  BMI > 40 with one or more medically significant comorbidities, HTN and LUIS    Summary:  Pt given brief pre/post-op diet ed and diet hx reviewed. Pt instructed to follow a low calorie, low carbohydrate, high protein diet of about 2331-1788 calories daily. Pt set several goals. See below. What have you done in the past to try to lose weight? Weight watchers, calorie controlled diet, exercise program (water aerobics)    Why didn't you lose weight or keep the weight off?: Patient found after back surgery in 67 complicated by infection - she had very difficult recovery and had issues managing weight since that time     Why do you think having weight loss surgery will make it possible for you to lose weight and keep it off? Patient feels that weight is impacting her quality of life and she wants to have a tool to lose weight and keep it off    Dietary Instructions    Nutritional Hx: What is the number of meals you eat per day? 2  Comment:     Do you eat between meals / snack? yes  Typical snack: buttered popcorn    How fast do you eat your meals?  slow    How often do you eat fast food? 4 times a week    How many sodas/sugared beverages do you drink per day? 3 cans of pepsi per day    How many caffeinated drinks do you have per day? Coffee, soda     How much milk and/or juice do you drink per day? Not typically     How much water do you drink per day? 3 (8oz glasses)    How often do you consume alcohol? None    Current Vitamins: MVI, Iron, Vitamin D     Diet History:    Typical intake is as follows:   W[place] at 6- 9:00 am - drinks coffee with 1/2 cup Amharic vanilla creamer   Meal 1 : 11:00 am - [place] with roast beef with provolone on sourdough bread- typically eats out 4 times per week fast food combo meal ([employer], [employer], [employer])  Snack: Microwave- extra butter popcorn   Dinner: Pasta with cheese and greek italian salad  Snacks: Microwave- extra butter popcorn  Fluids: 3- 8 oz glasses of water, Pepsi - 2-3 cans per day (previously 4-5 per day), 1 cup coffee with large amount of SS creamer     Reviewed intake  Understanding low carbohydrates, low sugar, higher protein meals  Understanding proper portions  Dining outside home  Instruction given for personal dietary changes  Discussed perceived compliance  Comments: Pt given brief pre/post-op diet ed and diet hx reviewed.      Patient Education and Materials Provided:  Supplement [facility], B Vitamin Information, MVI Recommendations, Calcium Citrate Information, Bariatric Supplement Companies, Protein Supplement Information, Fluid Requirements, No Caffeine or Carbonation, No Alcohol for One Year Post Op, 3 Balanced Meals a Day, Food Group Guide, Good Choices Dining Out, No Snacks, No Concentrated Sweets, Support System at Home, Exercising, Support Group Information and Addressed Current Habits / Changes to make  Physical Activity/Exercise    Discussed Perceived Compliance  Reasonable Goals Set  Motivation  Comments: none    Exercise:  Do you currently have an exercise routine? no and due to recent L hip replacement, had pelvic fracture unintentionally with procedure, but limited with mobility. Currently doing chair yoga with silver sneakers program - 3 days per week     Goals:   1. Work to increase to 3-4 small meals per day, with planned snacks as needed. Recommend following plate method for meal planning - focusing on lean protein, non-starchy vegetables, and measured amounts of starch. - Goal of  g protein and  g carbohydrate per day. - Recommend continuing protein supplement as meal replacement at least 1x/day  2. Increase non caloric fluid to 64 oz per day. Eliminate caffeine, added sugar, carbonation, and straws.               -Continue to work to decrease sugar sweetened beverages - goal of calorie free beverages only              -Must eliminate caffeine prior to surgery and avoid for ~6-8 weeks  3. Start activity regimen, work to increase ADL              -Try to start walking for at least 30-60  minutes 5 days per week  4. Continue complete MVI      Candidate for surgery (per RD):  PENDING   Mk Quiroz, DEJUAN  11/12/20

## 2020-12-08 ENCOUNTER — CLINICAL SUPPORT (OUTPATIENT)
Dept: SURGERY | Age: 70
End: 2020-12-08

## 2020-12-08 VITALS — HEIGHT: 63 IN | WEIGHT: 238 LBS | BODY MASS INDEX: 42.17 KG/M2

## 2020-12-08 DIAGNOSIS — E66.01 MORBID OBESITY (HCC): Primary | ICD-10-CM

## 2020-12-08 NOTE — PROGRESS NOTES
Medical Weight Loss Multi-Disciplinary Program    Name: Kilo Woodard   : 1950    Session# 2  Date: 2020    Visit Vitals  Ht 5' 3\" (1.6 m)   Wt 108 kg (238 lb)   BMI 42.16 kg/m²     Dietary Instructions    Reviewed intake  Understanding low carbohydrates, low sugar, higher protein meals  Instruction given for personal dietary changes  Discussed perceived compliance  Comments: Diet hx reviewed and personal dietary changes discussed. Reviewed recommendation to follow [id number] calorie diet, working to reduce total carbohydrate intake to  g or less per day and increasing protein intake to  g per day, compared current intake to recommendations. Today the majority of our visit was spent reviewing patient's food recall and identifying areas for improvement. Educated  on the importance of eating 3 meals/day at regularly scheduled times including breakfast within 1st 1-2 hours of waking. Suggested patient use a protein supplement as a meal replacement instead of skipping OR as a between meal high protein snack. Also educated on the importance of including a protein with every meal and discussed importance of label reading and working to track her intake of protein and carbohydrate foods    Today we spent majority of session reviewing key diet principles and beginning to discuss post operative diet advancement guidelines. Reinforcing the importance of adequate hydration and protein intake - emphasizing the role of protein supplements in the post operative diet. Discussed vitamin and mineral supplementation forever following surgery. Encouraged patient to review key diet principles of surgery and we will discuss individual questions or concerns. Patient was receptive to education and we will continue to review and reinforce in our follow-up next month.        Diet History:    Typical intake is as follows:   Wakes at 6- 8:00 am -2 eggs with 2 slices of turkey galvez - Hot tea with lemon and honey (1 tsp)    Meal 1 : 11:00 am-12:00 pm - Salad  (Spring mix) with craisins, SF raspberry vinaigrette OR Cheerios, sliced banana with skim milk   Snack: Avoiding snacking on popcorn in the afternoon   Dinner: Kobi Pete, brussel sprouts, rice (utilizing food scale and measuring cups to track her portions)    Snacks: Microwave- extra butter popcorn  Fluids: 5-6;  8 oz glasses of water, Pepsi -0-1 cans per day (previously 4-5 per day), 1 cup coffee with large amount of SS creamer     Nutritional Hx: What is the number of meals you eat per day? 3  Comment: Working to avoid skipping breakfast      Do you eat between meals / snack? yes  Typical snack: buttered popcorn    How fast do you eat your meals? slow    How often do you eat fast food? 3 times a week    How many sodas/sugared beverages do you drink per day? 0-1 cans of pepsi per day    How many caffeinated drinks do you have per day? Coffee, soda, hot tea    How much milk and/or juice do you drink per day? Not typically     How much water do you drink per day? 5-6 (8oz glasses)    How often do you consume alcohol? None    Current Vitamins: MVI, Iron, Vitamin D     Reviewed intake  Understanding low carbohydrates, low sugar, higher protein meals  Understanding proper portions  Dining outside home  Instruction given for personal dietary changes  Discussed perceived compliance  Comments: Pt given brief pre/post-op diet ed and diet hx reviewed.      Patient Education and Materials Provided:  Supplement [facility], B Vitamin Information, MVI Recommendations, Calcium Citrate Information, Bariatric Supplement Companies, Protein Supplement Information, Fluid Requirements, No Caffeine or Carbonation, No Alcohol for One Year Post Op, 3 Balanced Meals a Day, Food Group Guide, Good Choices Dining Out, No Snacks, No Concentrated Sweets, Support System at Home, Exercising, Support Group Information and Addressed Current Habits / Changes to make  Physical Activity/Exercise    Discussed Perceived Compliance  Reasonable Goals Set  Motivation  Comments: none    Exercise:  Do you currently have an exercise routine? no and due to recent L hip replacement, had pelvic fracture unintentionally with procedure, but limited with mobility. Currently doing chair yoga with silver sneakers program - 3 days per week     Goals:   1. Work to increase to 3-4 small meals per day, with planned snacks as needed. Recommend following plate method for meal planning - focusing on lean protein, non-starchy vegetables, and measured amounts of starch. - Goal of  g protein and  g carbohydrate per day. - Recommend continuing protein supplement as meal replacement at least 1x/day  2. Increase non caloric fluid to 64 oz per day. Eliminate caffeine, added sugar, carbonation, and straws.               -Continue to work to decrease sugar sweetened beverages - goal of calorie free beverages only              -Must eliminate caffeine prior to surgery and avoid for ~6-8 weeks  3. Start activity regimen, work to increase ADL              -Try to start walking for at least 30-60  minutes 5 days per week  4. Continue complete MVI      Behavior Modification    Identify obstacles to trigger change  Achieving/Rewarding goals met  Positive attitude  Comments: Reinforced importance continuing to modify lifestyle patterns and behaviors to promote weight loss and long term weight maintenance       Goals:   2. Work to increase to 3-4 small meals per day, with planned snacks as needed. Recommend following plate method for meal planning - focusing on lean protein, non-starchy vegetables, and measured amounts of starch. - Goal of  g protein and  g carbohydrate per day. - Recommend continuing protein supplement as meal replacement at least 1x/day OR as high protein snack option  2. Increase non caloric fluid to 64 oz per day.   Eliminate caffeine, added sugar, carbonation, and straws.               -Continue to work to decrease sugar sweetened beverages - goal of calorie free beverages only              -Must eliminate caffeine prior to surgery and avoid for ~6-8 weeks   -Practice 30:30 rule,  food and flood   3. Start activity regimen, work to increase ADL  4. Start Complete MVI    Candidate for surgery (per RD):  PENDING    Chavez Arnold    Dietitian: Chavez Arnold RD

## 2021-01-06 ENCOUNTER — HOSPITAL ENCOUNTER (OUTPATIENT)
Age: 71
Setting detail: OUTPATIENT SURGERY
Discharge: HOME | End: 2021-01-06
Attending: SPECIALIST | Admitting: SPECIALIST
Payer: MEDICARE

## 2021-01-06 ENCOUNTER — APPOINTMENT (OUTPATIENT)
Dept: GENERAL RADIOLOGY | Age: 71
End: 2021-01-06
Attending: SPECIALIST
Payer: MEDICARE

## 2021-01-06 VITALS
SYSTOLIC BLOOD PRESSURE: 172 MMHG | WEIGHT: 236 LBS | TEMPERATURE: 96.6 F | HEIGHT: 63 IN | HEART RATE: 84 BPM | BODY MASS INDEX: 41.82 KG/M2 | DIASTOLIC BLOOD PRESSURE: 94 MMHG | RESPIRATION RATE: 18 BRPM | OXYGEN SATURATION: 96 %

## 2021-01-06 DIAGNOSIS — E66.01 MORBID OBESITY (HCC): ICD-10-CM

## 2021-01-06 DIAGNOSIS — K44.9 HIATAL HERNIA: ICD-10-CM

## 2021-01-06 DIAGNOSIS — K21.9 GASTROESOPHAGEAL REFLUX DISEASE, UNSPECIFIED WHETHER ESOPHAGITIS PRESENT: ICD-10-CM

## 2021-01-06 DIAGNOSIS — E66.01 MORBID OBESITY (HCC): Primary | ICD-10-CM

## 2021-01-06 PROCEDURE — 74011000255 HC RX REV CODE- 255: Performed by: SPECIALIST

## 2021-01-06 PROCEDURE — 77030040361 HC SLV COMPR DVT MDII -B: Performed by: SPECIALIST

## 2021-01-06 PROCEDURE — 2709999900 HC NON-CHARGEABLE SUPPLY: Performed by: SPECIALIST

## 2021-01-06 PROCEDURE — 74240 X-RAY XM UPR GI TRC 1CNTRST: CPT | Performed by: SPECIALIST

## 2021-01-06 PROCEDURE — 76040000019: Performed by: SPECIALIST

## 2021-01-06 PROCEDURE — 74240 X-RAY XM UPR GI TRC 1CNTRST: CPT

## 2021-01-06 NOTE — PROCEDURES
Patient:Anahy Pisano   : 1950  Medical Record WGPPNO:[id number]            PREPROCEDURE DIAGNOSIS: This patient is preoperative for laparoscopic sleeve gastrectomyprocedure with a history of  reflux disease. POSTPROCEDURE DIAGNOSIS: This patient is preoperative for laparoscopic sleeve gastrectomyprocedure with a history of  reflux disease. PROCEDURES PERFORMED: Upper GI study with barium. ESTIMATED BLOOD LOSS: None. SPECIMENS: None. STATEMENT OF MEDICAL NECESSITY: The patient is a patient with a  longstanding history of obesity. They are now considering the laparoscopic sleeve gastrectomyprocedure as a means of surgical weight control and due to their history of reflux disease and are being assessed preoperatively for such. DESCRIPTION OF PROCEDURE: The patient was brought to the fluoroscopy unit and  was given thin barium. On swallowing of barium, they were noted to have  normal peristalsis of their esophagus. They had prompt filling of distal  esophagus with tapering into the gastroesophageal junction. There was evidence of a hiatal hernia present. Contrast then filled the gastric cardia, fundus,body and pre pyloric region with no abnormalities noted. Contrast then exited the pylorus in normal fashion. No obstruction was noted. There was no evidence of reflux noted.     (sizable hiatal hernia - needs EGD prior to proceeding with surgery to help determine if she can have a sleeve)    Marc Luna MD

## 2021-01-07 ENCOUNTER — CLINICAL SUPPORT (OUTPATIENT)
Dept: SURGERY | Age: 71
End: 2021-01-07

## 2021-01-07 VITALS — WEIGHT: 236 LBS | BODY MASS INDEX: 41.82 KG/M2 | HEIGHT: 63 IN

## 2021-01-07 DIAGNOSIS — E66.01 MORBID OBESITY WITH BODY MASS INDEX (BMI) OF 40.0 TO 49.9 (HCC): Primary | ICD-10-CM

## 2021-01-07 NOTE — PROGRESS NOTES
Medical Weight Loss Multi-Disciplinary Program    Name: Ivan Rodriguez   : 1950    Session# 3  Date: 2021    Visit Vitals  Ht 5' 3\" (1.6 m)   Wt 107 kg (236 lb)   BMI 41.81 kg/m²     Dietary Instructions    Reviewed intake  Understanding low carbohydrates, low sugar, higher protein meals  Instruction given for personal dietary changes  Discussed perceived compliance  Comments: Diet hx reviewed and personal dietary changes discussed. Reviewed recommendation to follow [id number] calorie diet, working to reduce total carbohydrate intake to  g or less per day and increasing protein intake to  g per day, compared current intake to recommendations. Today the majority of our visit was spent reviewing patient's food recall and identifying areas for improvement. Educated  on the importance of eating 3 meals/day at regularly scheduled times including breakfast within 1st 1-2 hours of waking. Suggested patient use a protein supplement as a meal replacement instead of skipping OR as a between meal high protein snack. Also educated on the importance of including a protein with every meal and discussed importance of label reading and working to track her intake of protein and carbohydrate foods. Patient has been focusing measuring portions, reducing carbonation, eliminating straws and practicing 30:30 rule     Today we spent majority of session reviewing key diet principles and beginning to discuss post operative diet advancement guidelines. Reinforcing the importance of adequate hydration and protein intake - emphasizing the role of protein supplements in the post operative diet. Discussed vitamin and mineral supplementation forever following surgery. Encouraged patient to review key diet principles of surgery and we will discuss individual questions or concerns. Patient was receptive to education and we will continue to review and reinforce in our follow-up next month.         Diet History:    Typical intake is as follows:   Wakes at 6- 8:00 am -2 eggs with 2 slices of turkey galvez - Hot tea with lemon and honey (1 tsp)    Meal 1 : 11:00 am-12:00 pm - Salad  (Spring mix) with craisins, SF raspberry vinaigrette OR Cheerios, sliced banana with skim milk   Snack: Avoiding snacking on popcorn in the afternoon   Dinner: Kobi Pete, brussel sprouts, rice (utilizing food scale and measuring cups to track her portions)    Snacks: Microwave- extra butter popcorn  Fluids: 5-6;  8 oz glasses of water, eliminated carbonated beverages , 1 cup coffee with large amount of SS creamer     Nutritional Hx: What is the number of meals you eat per day? 3  Comment: Working to avoid skipping breakfast      Do you eat between meals / snack? yes  Typical snack: buttered popcorn    How fast do you eat your meals? slow    How often do you eat fast food? 3 times a week    How many sodas/sugared beverages do you drink per day? 0-1 cans of pepsi per day    How many caffeinated drinks do you have per day? Coffee, soda, hot tea    How much milk and/or juice do you drink per day? Not typically     How much water do you drink per day? 5-6 (8oz glasses)    How often do you consume alcohol? None    Current Vitamins: MVI, Iron, Vitamin D     Reviewed intake  Understanding low carbohydrates, low sugar, higher protein meals  Understanding proper portions  Dining outside home  Instruction given for personal dietary changes  Discussed perceived compliance  Comments: Pt given brief pre/post-op diet ed and diet hx reviewed.      Patient Education and Materials Provided:  Supplement [facility], B Vitamin Information, MVI Recommendations, Calcium Citrate Information, Bariatric Supplement Companies, Protein Supplement Information, Fluid Requirements, No Caffeine or Carbonation, No Alcohol for One Year Post Op, 3 Balanced Meals a Day, Food Group Guide, Good Choices Dining Out, No Snacks, No Concentrated Sweets, Support System at Home, Exercising, Support Group Information and Addressed Current Habits / Changes to make  Physical Activity/Exercise    Discussed Perceived Compliance  Reasonable Goals Set  Motivation  Comments: none    Exercise:  Do you currently have an exercise routine? no and due to recent L hip replacement, had pelvic fracture unintentionally with procedure, but limited with mobility. Currently doing chair yoga with silver sneakers program - 3 days per week     Goals:   1. Work to increase to 3-4 small meals per day, with planned snacks as needed. Recommend following plate method for meal planning - focusing on lean protein, non-starchy vegetables, and measured amounts of starch. - Goal of  g protein and  g carbohydrate per day. - Recommend continuing protein supplement as meal replacement at least 1x/day  2. Increase non caloric fluid to 64 oz per day. Eliminate caffeine, added sugar, carbonation, and straws.               -Continue to work to decrease sugar sweetened beverages - goal of calorie free beverages only              -Must eliminate caffeine prior to surgery and avoid for ~6-8 weeks  3. Start activity regimen, work to increase ADL              -Try to start walking for at least 30-60  minutes 5 days per week  4. Continue complete MVI      Behavior Modification    Identify obstacles to trigger change  Achieving/Rewarding goals met  Positive attitude  Comments: Reinforced importance continuing to modify lifestyle patterns and behaviors to promote weight loss and long term weight maintenance       Goals:   2. Work to increase to 3-4 small meals per day, with planned snacks as needed. Recommend following plate method for meal planning - focusing on lean protein, non-starchy vegetables, and measured amounts of starch. - Goal of  g protein and  g carbohydrate per day.                - Recommend continuing protein supplement as meal replacement at least 1x/day OR as high protein snack option  2. Increase non caloric fluid to 64 oz per day. Eliminate caffeine, added sugar, carbonation, and straws.               -Continue to work to decrease sugar sweetened beverages - goal of calorie free beverages only              -Must eliminate caffeine prior to surgery and avoid for ~6-8 weeks   -Practice 30:30 rule,  food and flood   3. Start activity regimen, work to increase ADL  4. Start Complete MVI    Candidate for surgery (per RD):  PENDING    Evan Bennett    Dietitian: Evan Bennett RD

## 2021-01-14 DIAGNOSIS — Z01.812 BLOOD TESTS PRIOR TO TREATMENT OR PROCEDURE: ICD-10-CM

## 2021-01-14 DIAGNOSIS — R13.10 PROBLEMS WITH SWALLOWING AND MASTICATION: Primary | ICD-10-CM

## 2021-01-20 ENCOUNTER — HOSPITAL ENCOUNTER (OUTPATIENT)
Dept: PREADMISSION TESTING | Age: 71
Discharge: HOME | End: 2021-01-20
Payer: MEDICARE

## 2021-01-20 PROCEDURE — U0003 INFECTIOUS AGENT DETECTION BY NUCLEIC ACID (DNA OR RNA); SEVERE ACUTE RESPIRATORY SYNDROME CORONAVIRUS 2 (SARS-COV-2) (CORONAVIRUS DISEASE [COVID-19]), AMPLIFIED PROBE TECHNIQUE, MAKING USE OF HIGH THROUGHPUT TECHNOLOGIES AS DESCRIBED BY CMS-2020-01-R: HCPCS

## 2021-01-22 LAB — SARS-COV-2, COV2NT: NOT DETECTED

## 2021-01-25 ENCOUNTER — ANESTHESIA EVENT (OUTPATIENT)
Dept: SURGERY | Age: 71
End: 2021-01-25
Payer: MEDICARE

## 2021-01-26 ENCOUNTER — ANESTHESIA (OUTPATIENT)
Dept: SURGERY | Age: 71
End: 2021-01-26
Payer: MEDICARE

## 2021-01-26 ENCOUNTER — HOSPITAL ENCOUNTER (OUTPATIENT)
Age: 71
Setting detail: OUTPATIENT SURGERY
Discharge: HOME | End: 2021-01-26
Attending: SPECIALIST | Admitting: SPECIALIST
Payer: MEDICARE

## 2021-01-26 VITALS
HEART RATE: 71 BPM | BODY MASS INDEX: 41.47 KG/M2 | DIASTOLIC BLOOD PRESSURE: 60 MMHG | RESPIRATION RATE: 16 BRPM | OXYGEN SATURATION: 96 % | TEMPERATURE: 98.3 F | WEIGHT: 234.06 LBS | SYSTOLIC BLOOD PRESSURE: 131 MMHG | HEIGHT: 63 IN

## 2021-01-26 DIAGNOSIS — K44.9 HIATAL HERNIA: ICD-10-CM

## 2021-01-26 LAB
ATRIAL RATE: 66 BPM
ATRIAL RATE: 68 BPM
CALCULATED P AXIS, ECG09: 23 DEGREES
CALCULATED P AXIS, ECG09: 62 DEGREES
CALCULATED R AXIS, ECG10: 18 DEGREES
CALCULATED R AXIS, ECG10: 60 DEGREES
CALCULATED T AXIS, ECG11: 40 DEGREES
CALCULATED T AXIS, ECG11: 59 DEGREES
DIAGNOSIS, 93000: NORMAL
DIAGNOSIS, 93000: NORMAL
P-R INTERVAL, ECG05: 170 MS
P-R INTERVAL, ECG05: 172 MS
Q-T INTERVAL, ECG07: 404 MS
Q-T INTERVAL, ECG07: 414 MS
QRS DURATION, ECG06: 78 MS
QRS DURATION, ECG06: 84 MS
QTC CALCULATION (BEZET), ECG08: 429 MS
QTC CALCULATION (BEZET), ECG08: 434 MS
VENTRICULAR RATE, ECG03: 66 BPM
VENTRICULAR RATE, ECG03: 68 BPM

## 2021-01-26 PROCEDURE — 76210000021 HC REC RM PH II 0.5 TO 1 HR: Performed by: SPECIALIST

## 2021-01-26 PROCEDURE — 77030009426 HC FCPS BIOP ENDOSC BSC -B: Performed by: SPECIALIST

## 2021-01-26 PROCEDURE — 93005 ELECTROCARDIOGRAM TRACING: CPT

## 2021-01-26 PROCEDURE — 74011250636 HC RX REV CODE- 250/636: Performed by: SPECIALIST

## 2021-01-26 PROCEDURE — 74011250636 HC RX REV CODE- 250/636: Performed by: NURSE ANESTHETIST, CERTIFIED REGISTERED

## 2021-01-26 PROCEDURE — 88305 TISSUE EXAM BY PATHOLOGIST: CPT

## 2021-01-26 PROCEDURE — 77030040361 HC SLV COMPR DVT MDII -B: Performed by: SPECIALIST

## 2021-01-26 PROCEDURE — 77030031670 HC DEV INFL ENDOTEK BIG60 MRTM -B: Performed by: SPECIALIST

## 2021-01-26 PROCEDURE — 43239 EGD BIOPSY SINGLE/MULTIPLE: CPT | Performed by: SPECIALIST

## 2021-01-26 PROCEDURE — 77030039961 HC KT CUST COLON BSC -D: Performed by: SPECIALIST

## 2021-01-26 PROCEDURE — 77030020782 HC GWN BAIR PAWS FLX 3M -B: Performed by: SPECIALIST

## 2021-01-26 PROCEDURE — 76010000154 HC OR TIME FIRST 0.5 HR: Performed by: SPECIALIST

## 2021-01-26 PROCEDURE — 2709999900 HC NON-CHARGEABLE SUPPLY: Performed by: SPECIALIST

## 2021-01-26 PROCEDURE — 74011000250 HC RX REV CODE- 250: Performed by: NURSE ANESTHETIST, CERTIFIED REGISTERED

## 2021-01-26 PROCEDURE — 76060000031 HC ANESTHESIA FIRST 0.5 HR: Performed by: SPECIALIST

## 2021-01-26 RX ORDER — FLUMAZENIL 0.1 MG/ML
0.2 INJECTION INTRAVENOUS
Status: CANCELLED | OUTPATIENT
Start: 2021-01-26

## 2021-01-26 RX ORDER — NALOXONE HYDROCHLORIDE 0.4 MG/ML
0.1 INJECTION, SOLUTION INTRAMUSCULAR; INTRAVENOUS; SUBCUTANEOUS AS NEEDED
Status: CANCELLED | OUTPATIENT
Start: 2021-01-26

## 2021-01-26 RX ORDER — SODIUM CHLORIDE 0.9 % (FLUSH) 0.9 %
5-40 SYRINGE (ML) INJECTION EVERY 8 HOURS
Status: CANCELLED | OUTPATIENT
Start: 2021-01-26

## 2021-01-26 RX ORDER — MAGNESIUM SULFATE 100 %
4 CRYSTALS MISCELLANEOUS AS NEEDED
Status: CANCELLED | OUTPATIENT
Start: 2021-01-26

## 2021-01-26 RX ORDER — SODIUM CHLORIDE, SODIUM LACTATE, POTASSIUM CHLORIDE, CALCIUM CHLORIDE 600; 310; 30; 20 MG/100ML; MG/100ML; MG/100ML; MG/100ML
125 INJECTION, SOLUTION INTRAVENOUS CONTINUOUS
Status: DISCONTINUED | OUTPATIENT
Start: 2021-01-26 | End: 2021-01-26 | Stop reason: HOSPADM

## 2021-01-26 RX ORDER — HYDROMORPHONE HYDROCHLORIDE 2 MG/ML
0.5 INJECTION, SOLUTION INTRAMUSCULAR; INTRAVENOUS; SUBCUTANEOUS
Status: CANCELLED | OUTPATIENT
Start: 2021-01-26

## 2021-01-26 RX ORDER — FENTANYL CITRATE 50 UG/ML
25 INJECTION, SOLUTION INTRAMUSCULAR; INTRAVENOUS AS NEEDED
Status: CANCELLED | OUTPATIENT
Start: 2021-01-26

## 2021-01-26 RX ORDER — SODIUM CHLORIDE, SODIUM LACTATE, POTASSIUM CHLORIDE, CALCIUM CHLORIDE 600; 310; 30; 20 MG/100ML; MG/100ML; MG/100ML; MG/100ML
1000 INJECTION, SOLUTION INTRAVENOUS CONTINUOUS
Status: CANCELLED | OUTPATIENT
Start: 2021-01-26

## 2021-01-26 RX ORDER — PROPOFOL 10 MG/ML
INJECTION, EMULSION INTRAVENOUS AS NEEDED
Status: DISCONTINUED | OUTPATIENT
Start: 2021-01-26 | End: 2021-01-26 | Stop reason: HOSPADM

## 2021-01-26 RX ORDER — SODIUM CHLORIDE 0.9 % (FLUSH) 0.9 %
5-40 SYRINGE (ML) INJECTION AS NEEDED
Status: CANCELLED | OUTPATIENT
Start: 2021-01-26

## 2021-01-26 RX ORDER — LIDOCAINE HYDROCHLORIDE 20 MG/ML
INJECTION, SOLUTION EPIDURAL; INFILTRATION; INTRACAUDAL; PERINEURAL AS NEEDED
Status: DISCONTINUED | OUTPATIENT
Start: 2021-01-26 | End: 2021-01-26 | Stop reason: HOSPADM

## 2021-01-26 RX ADMIN — SODIUM CHLORIDE, SODIUM LACTATE, POTASSIUM CHLORIDE, AND CALCIUM CHLORIDE 125 ML/HR: 600; 310; 30; 20 INJECTION, SOLUTION INTRAVENOUS at 11:10

## 2021-01-26 RX ADMIN — PROPOFOL 80 MG: 10 INJECTION, EMULSION INTRAVENOUS at 15:07

## 2021-01-26 RX ADMIN — PROPOFOL 20 MG: 10 INJECTION, EMULSION INTRAVENOUS at 15:08

## 2021-01-26 RX ADMIN — LIDOCAINE HYDROCHLORIDE 80 MG: 20 INJECTION, SOLUTION EPIDURAL; INFILTRATION; INTRACAUDAL; PERINEURAL at 15:05

## 2021-01-26 NOTE — PERIOP NOTES
Reviewed PTA medication list with patient/caregiver and patient/caregiver denies any additional medications. Patient admits to having a responsible adult care for them at home for at least 24 hours after surgery. Patient encouraged to use gown warming system and informed that using said warming gown to regulate body temperature prior to a procedure has been shown to help reduce the risks of blood clots and infection. Patient's pharmacy of choice verified and documented in PTA medication section. Dual skin assessment & fall risk band verification completed with Asha Pacheco RN.

## 2021-01-26 NOTE — ANESTHESIA PREPROCEDURE EVALUATION
Relevant Problems   RESPIRATORY SYSTEM   (+) Sleep apnea      NEUROLOGY   (+) Depression      CARDIOVASCULAR   (+) Hypertension      ENDOCRINE   (+) Morbid obesity (HCC)   (+) Morbid obesity with body mass index (BMI) of 40.0 to 49.9 (HCC)      HEMATOLOGY   (+) Anemia       Anesthetic History   No history of anesthetic complications            Review of Systems / Medical History  Patient summary reviewed, nursing notes reviewed and pertinent labs reviewed    Pulmonary  Within defined limits      Sleep apnea: CPAP           Neuro/Psych   Within defined limits      Psychiatric history     Cardiovascular                  Exercise tolerance: >4 METS  Comments: Patient denies HTN   GI/Hepatic/Renal  Within defined limits             Comments: Fatty liver Endo/Other        Morbid obesity     Other Findings            Physical Exam    Airway  Mallampati: I  TM Distance: 4 - 6 cm  Neck ROM: normal range of motion        Cardiovascular    Rhythm: regular  Rate: normal         Dental    Dentition: Full lower dentures and Full upper dentures     Pulmonary  Breath sounds clear to auscultation               Abdominal  GI exam deferred       Other Findings            Anesthetic Plan    ASA: 3  Anesthesia type: MAC          Induction: Intravenous  Anesthetic plan and risks discussed with: Patient

## 2021-01-26 NOTE — PROCEDURES
Esophagogastroduodenoscopy Procedure Note    Indications: pt seeking sleeve resection with recent UGI showing likely large hiatal hernia that could prevent her from having a sleeve resection     Anesthesia/Sedation: MAC anesthesia    Pre-Procedure Exam:  Airway: clear   Heart: normal S1and S2    Lungs: clear bilateral  Abdomen: soft, nontender, bowel sounds present and normal in all quadrants   Mental Status: awake, alert, and oriented to person, place, and time      Procedure in Detail:  Informed consent was obtained for the procedure, including conscious sedation. Risks of pancreatitis, infection, perforation, hemorrhage, adverse drug reaction, and aspiration were discussed. The patient was placed in the left lateral decubitus position. Based on the pre-procedure assessment, including review of the patient's medical history, medications, allergies, and review of systems, he had been deemed to be an appropriate candidate for moderate sedation; he was therefore sedated with the medications listed above. He was monitored continuously with electrocardiogram tracing, pulse oximetry, blood pressure monitoring, and direct observation. The [id number] gastroscope was inserted into the mouth and advanced under direct vision to pylorus. A careful inspection was made as the gastroscope was withdrawn, including a retroflexed view of the proximal stomach; findings and interventions are described below. Appropriate photodocumentation was obtained. Findings:   Oropharynx - normal mucosa without abnormalities  Esophagus - normal mucosa without webs, rings, or strictures, no esophagitis or ulcers  Stomach - 4cm hiatal hernia with normal mucosa without evidence of ulcers       Will follow-up in office to discuss findings. Therapies:    none    Specimens: Specimens were collected and sent to pathology. Complications:   None; patient tolerated the procedure well.     EBL:  None           Attending Attestation:  I performed the procedure. Recommendations:  - Follow up with me. - await pathology     Signed by:  Saida Carpenter MD

## 2021-01-26 NOTE — DISCHARGE INSTRUCTIONS
[facility] Patient Education        9 Things To Do If You've Been Exposed to COVID-19    Stay home. If you've been exposed, you should stay in quarantine for at least 14 days. Ask your doctor when it's safe to end your quarantine. Don't go to school, work, or public areas. And don't use public transportation, ride-shares, or taxis unless you have no choice. Leave your home only if you need to get medical care. But call the doctor's office first so they know you're coming, and wear a cloth face cover when you go. Call your doctor. Call your doctor or other health professional to let them know that you've been exposed. They might want you to be tested, or they may have other instructions for you. If you become sick, wear a face cover when you are around other people. It can help stop the spread of the virus when you cough or sneeze. Limit contact with people in your home. If possible, stay in a separate bedroom and use a separate bathroom. Avoid contact with pets and other animals. Cover your mouth and nose with a tissue when you cough or sneeze. Then throw it in the trash right away. Wash your hands often, especially after you cough or sneeze. Use soap and water, and scrub for at least 20 seconds. If soap and water aren't available, use an alcohol-based hand . Don't share personal household items. These include bedding, towels, cups and glasses, and eating utensils. Clean and disinfect your home every day. Use household  or disinfectant wipes or sprays. Take special care to clean things that you grab with your hands. These include doorknobs, remote controls, phones, and handles on your refrigerator and microwave. And don't forget countertops, tabletops, bathrooms, and computer keyboards. Current as of: December 18, 2020               Content Version: 12.7  © 1986-5424 [employer].    Care instructions adapted under license by [facility] (which disclaims liability or warranty for this information). If you have questions about a medical condition or this instruction, always ask your healthcare professional. [place] [address] any warranty or liability for your use of this information. DISCHARGE SUMMARY from Nurse    PATIENT INSTRUCTIONS:    After general anesthesia or intravenous sedation, for 24 hours or while taking prescription Narcotics:  · Limit your activities  · Do not drive and operate hazardous machinery  · Do not make important personal or business decisions  · Do  not drink alcoholic beverages  · If you have not urinated within 8 hours after discharge, please contact your surgeon on call. Report the following to your surgeon:  · Excessive pain, swelling, redness or odor of or around the surgical area  · Temperature over 100.5  · Nausea and vomiting lasting longer than 4 hours or if unable to take medications  · Any signs of decreased circulation or nerve impairment to extremity: change in color, persistent  numbness, tingling, coldness or increase pain  · Any questions    What to do at Home:  [address] [facility] [address] [place]  [address]    If you experience any of the following symptoms heavy bleeding, difficulty swallowing, fevers, severe pain, please follow up with dr Tanya Mena    *  Please give a list of your current medications to your Primary Care Provider. *  Please update this list whenever your medications are discontinued, doses are      changed, or new medications (including over-the-counter products) are added. *  Please carry medication information at all times in case of emergency situations. These are general instructions for a healthy lifestyle:    No smoking/ No tobacco products/ Avoid exposure to second hand smoke  Surgeon General's Warning:  Quitting smoking now greatly reduces serious risk to your health.     Obesity, smoking, and sedentary lifestyle greatly increases your risk for illness    A healthy diet, regular physical exercise & weight monitoring are important for maintaining a healthy lifestyle    You may be retaining fluid if you have a history of heart failure or if you experience any of the following symptoms:  Weight gain of 3 pounds or more overnight or 5 pounds in a week, increased swelling in our hands or feet or shortness of breath while lying flat in bed. Please call your doctor as soon as you notice any of these symptoms; do not wait until your next office visit. The discharge information has been reviewed with the patient and caregiver. The patient and caregiver verbalized understanding. Discharge medications reviewed with the patient and caregiver and appropriate educational materials and side effects teaching were provided. ___________________________________________________________________________________________________________________________________  [id number]  1950    EGD DISCHARGE INSTRUCTIONS    Discomfort:  Sore throat- throat lozenges or warm salt water gargle  redness at IV site- apply warm compress to area; if redness or soreness persist- contact your physician  Gaseous discomfort- walking, belching will help relieve any discomfort  You should not operate a vehicle for 12 hours  You should note engage in an occupation involving machinery or appliances for rest of today  You may note drink alcoholic beverages for at least 12 hours  Avoid making any critical decisions for at least 24 hour  DIET  You may resume your regular diet - however -  remember your colon is empty and a heavy meal will produce gas. Avoid these foods:  vegetables, fried / greasy foods, carbonated drinks    ACTIVITY  You may resume your normal daily activities   Spend the remainder of the day resting -  avoid any strenuous activity. CALL M.D.   ANY SIGN OF   Increasing pain, nausea, vomiting  Abdominal distension (swelling)  New increased bleeding (oral or rectal)  Fever (chills)  Pain in chest area  Bloody discharge from nose or mouth  Shortness of breath       Follow-up Instructions:   Dr Galileo Everett will call you in one to two weeks. If you do not hear from him, please call his office [phone]. Jose Manuel Arteaga  [id number]  1950        DISCHARGE SUMMARY from Nurse    The following personal items collected during your admission are returned to you:   Dental Appliance: Dental Appliances: (in holding)  Vision: Visual Aid: Glasses, With patient(to pacu)  Hearing Aid:    Jewelry: Jewelry: None  Clothing: Clothing: Shirt, Pants, Footwear, Undergarments(9)  Other Valuables:  Other Valuables: (in holding)  Valuables sent to safe:      PATIENT INSTRUCTIONS:    Take Home Medications:      Patient armband removed and shredded

## 2021-01-26 NOTE — ANESTHESIA POSTPROCEDURE EVALUATION
Post-Anesthesia Evaluation & Assessment    Visit Vitals  /60 (BP 1 Location: Left arm, BP Patient Position: At rest)   Pulse 71   Temp 36.8 °C (98.3 °F)   Resp 16   Ht 5' 3\" (1.6 m)   Wt 106.2 kg (234 lb 1 oz)   SpO2 96%   BMI 41.46 kg/m²       Nausea/Vomiting: no nausea and no vomiting    Post-operative hydration adequate. Pain score (VAS): 0    Mental status & Level of consciousness: alert and oriented x 3    Neurological status: moves all extremities, sensation grossly intact    Pulmonary status: airway patent, no supplemental oxygen required    Complications related to anesthesia: none    Patient has met all discharge requirements. Additional comments:  Procedure(s):  EGD WITH MAC.     MAC    <BSHSIANPOST>    INITIAL Post-op Vital signs:   Vitals Value Taken Time   /60 01/26/21 1526   Temp 36.8 °C (98.3 °F) 01/26/21 1526   Pulse 71 01/26/21 1526   Resp 16 01/26/21 1526   SpO2 96 % 01/26/21 1526

## 2021-01-26 NOTE — H&P
EGD - History and Physical    Subjective: The patient is a 78 y.o. obese female who was found to have a sizable hiatal hernia - needs EGD prior to proceeding with surgery to help determine if she can have a sleeve.     Patient Active Problem List    Diagnosis Date Noted    Morbid obesity ([facility] [address]     Morbid obesity with body mass index (BMI) of 40.0 to 49.9 (HCC)     Depression     Chronic pain     Anemia     Smoking history     Sleep apnea     Acoustic neuroma ([facility] [address]     History of colon resection     Hypertension     Lumbar back pain 02/27/2018      Past Surgical History:   Procedure Laterality Date    HX CARPAL TUNNEL RELEASE Bilateral     HX CHOLECYSTECTOMY      HX COLECTOMY  1995    pt unsure of her history     HX GI      colonoscopy    HX HIP REPLACEMENT Left     HX HYSTERECTOMY      HX ORTHOPAEDIC  1996    c5-6 fusion     HX ORTHOPAEDIC  01/2018    lumbar laminectomy     HX ORTHOPAEDIC Left     trigger finger surgery- all but the thumb    HX ORTHOPAEDIC Bilateral     shoulder replacement      Social History     Tobacco Use    Smoking status: Never Smoker    Smokeless tobacco: Never Used   Substance Use Topics    Alcohol use: No      Family History   Problem Relation Age of Onset    Lung Disease Mother     Cancer Father     Cancer Brother       Current Facility-Administered Medications   Medication Dose Route Frequency Provider Last Rate Last Admin    lactated Ringers infusion  125 mL/hr IntraVENous CONTINUOUS Samanta Bui  mL/hr at 01/26/21 1110 125 mL/hr at 01/26/21 1110     Allergies   Allergen Reactions    Latex Rash          Review of Systems:        General - No history or complaints of unexpected fever, chills, or weight loss  Head/Neck - No history or complaints of headache, diplopia, dysphagia, hearing loss  Cardiac - No history or complaints of chest pain, palpitations, murmur, or shortness of breath  Pulmonary - No history or complaints of shortness of breath, productive cough, hemoptysis  Gastrointestinal - No history or complaints of reflux,  abdominal pain, obstipation/constipation, blood per rectum  Genitourinary - No history or complaints of hematuria/dysuria, stress urinary incontinence symptoms, or renal lithiasis  Musculoskeletal - No history or complaints of joint pain or muscular weakness  Hematologic - No history or complaints of bleeding disorders, blood transfusions, sickle cell anemia  Neurologic - No history or complaints of  migraine headaches, seizure activity, syncopal episodes, TIA or stroke  Integumentary - No history or complaints of rashes, abnormal nevi, skin cancer  Gynecological - unremarkable    Objective:     Visit Vitals  BP (!) 149/74 (BP 1 Location: Right arm, BP Patient Position: At rest;Pre-activity; Sitting)   Pulse 76   Temp 97.6 °F (36.4 °C)   Resp 16   Ht 5' 3\" (1.6 m)   Wt 106.2 kg (234 lb 1 oz)   SpO2 100%   BMI 41.46 kg/m²         Physical Examination: General appearance - alert, well appearing, and in no distress and oriented to person, place, and time  Mental status - alert, oriented to person, place, and time, normal mood, behavior, speech, dress, motor activity, and thought processes  Eyes - pupils equal and reactive, extraocular eye movements intact, sclera anicteric, left eye normal, right eye normal  Ears - right ear normal, left ear normal  Nose - normal and patent, no erythema, discharge or polyps  Mouth - mucous membranes moist, pharynx normal without lesions  Neck - supple, no significant adenopathy  Lymphatics - no palpable lymphadenopathy, no hepatosplenomegaly  Chest - clear to auscultation, no wheezes, rales or rhonchi, symmetric air entry  Heart - normal rate, regular rhythm, normal S1, S2, no murmurs, rubs, clicks or gallops  Abdomen - soft, nontender, nondistended, no masses or organomegaly  Back exam - full range of motion, no tenderness, palpable spasm or pain on motion  Neurological - alert, oriented, normal speech, no focal findings or movement disorder noted  Musculoskeletal - no joint tenderness, deformity or swelling  Extremities - peripheral pulses normal, no pedal edema, no clubbing or cyanosis  Skin - normal coloration and turgor, no rashes, no suspicious skin lesions noted    Labs / Preoperative Evaluations:     Recent Results (from the past 1008 hour(s))   NOVEL CORONAVIRUS (COVID-19)    Collection Time: 01/20/21  4:19 PM   Result Value Ref Range    SARS-CoV-2 Not Detected Not Detected     EKG, 12 LEAD, INITIAL    Collection Time: 01/26/21 10:55 AM   Result Value Ref Range    Ventricular Rate 68 BPM    Atrial Rate 68 BPM    P-R Interval 172 ms    QRS Duration 84 ms    Q-T Interval 404 ms    QTC Calculation (Bezet) 429 ms    Calculated P Axis 23 degrees    Calculated R Axis 18 degrees    Calculated T Axis 40 degrees    Diagnosis       Normal sinus rhythm  Cannot rule out Anterior infarct , age undetermined  Abnormal ECG  When compared with ECG of 04-MAY-2018 19:35,  premature atrial complexes are no longer present  Vent. rate has decreased BY  52 BPM  Nonspecific T wave abnormality no longer evident in Lateral leads     EKG, 12 LEAD, SUBSEQUENT    Collection Time: 01/26/21 11:59 AM   Result Value Ref Range    Ventricular Rate 66 BPM    Atrial Rate 66 BPM    P-R Interval 170 ms    QRS Duration 78 ms    Q-T Interval 414 ms    QTC Calculation (Bezet) 434 ms    Calculated P Axis 62 degrees    Calculated R Axis 60 degrees    Calculated T Axis 59 degrees    Diagnosis       Normal sinus rhythm  Normal ECG  When compared with ECG of 26-JAN-2021 10:55,  No significant change was found         Assessment:     78year old requesting sleeve with sizable hiatal hernia on UGI    Plan:     EGD    Plan for EGD. She understands the risks, benefits and alternatives of the EGD.   She understands the risk include but are not limited to; death, DVT/PE, damage to surrounding structures, perforation of the GI tract, equipment malfunction, bleeding, and infection. She understands all of the above and wishes to proceed with EGD.         Signed By: CESILIA Rowell     January 26, 2021

## 2021-02-04 ENCOUNTER — APPOINTMENT (OUTPATIENT)
Dept: SURGERY | Age: 71
End: 2021-02-04

## 2021-02-26 DIAGNOSIS — G47.30 SLEEP APNEA, UNSPECIFIED TYPE: ICD-10-CM

## 2021-02-26 DIAGNOSIS — Z01.812 BLOOD TESTS PRIOR TO TREATMENT OR PROCEDURE: ICD-10-CM

## 2021-02-26 DIAGNOSIS — I10 ESSENTIAL HYPERTENSION: Primary | ICD-10-CM

## 2021-02-26 DIAGNOSIS — E66.01 MORBID OBESITY (HCC): ICD-10-CM

## 2021-03-01 ENCOUNTER — OFFICE VISIT (OUTPATIENT)
Dept: SURGERY | Age: 71
End: 2021-03-01
Payer: MEDICARE

## 2021-03-01 VITALS
DIASTOLIC BLOOD PRESSURE: 57 MMHG | WEIGHT: 242 LBS | RESPIRATION RATE: 16 BRPM | SYSTOLIC BLOOD PRESSURE: 140 MMHG | BODY MASS INDEX: 42.88 KG/M2 | HEART RATE: 90 BPM | OXYGEN SATURATION: 97 % | HEIGHT: 63 IN

## 2021-03-01 DIAGNOSIS — F32.A DEPRESSION, UNSPECIFIED DEPRESSION TYPE: ICD-10-CM

## 2021-03-01 DIAGNOSIS — K44.9 HIATAL HERNIA: ICD-10-CM

## 2021-03-01 DIAGNOSIS — I10 ESSENTIAL HYPERTENSION: ICD-10-CM

## 2021-03-01 DIAGNOSIS — E66.01 MORBID OBESITY (HCC): Primary | ICD-10-CM

## 2021-03-01 DIAGNOSIS — E66.01 MORBID OBESITY WITH BODY MASS INDEX (BMI) OF 40.0 TO 49.9 (HCC): ICD-10-CM

## 2021-03-01 DIAGNOSIS — G47.30 SLEEP APNEA, UNSPECIFIED TYPE: ICD-10-CM

## 2021-03-01 DIAGNOSIS — M54.50 LUMBAR BACK PAIN: ICD-10-CM

## 2021-03-01 PROCEDURE — 1101F PT FALLS ASSESS-DOCD LE1/YR: CPT | Performed by: SPECIALIST

## 2021-03-01 PROCEDURE — G8536 NO DOC ELDER MAL SCRN: HCPCS | Performed by: SPECIALIST

## 2021-03-01 PROCEDURE — G9711 PT HX TOT COL OR COLON CA: HCPCS | Performed by: SPECIALIST

## 2021-03-01 PROCEDURE — 99214 OFFICE O/P EST MOD 30 MIN: CPT | Performed by: SPECIALIST

## 2021-03-01 PROCEDURE — G0463 HOSPITAL OUTPT CLINIC VISIT: HCPCS | Performed by: SPECIALIST

## 2021-03-01 PROCEDURE — G9717 DOC PT DX DEP/BP F/U NT REQ: HCPCS | Performed by: SPECIALIST

## 2021-03-01 PROCEDURE — G8754 DIAS BP LESS 90: HCPCS | Performed by: SPECIALIST

## 2021-03-01 PROCEDURE — G8428 CUR MEDS NOT DOCUMENT: HCPCS | Performed by: SPECIALIST

## 2021-03-01 PROCEDURE — G8400 PT W/DXA NO RESULTS DOC: HCPCS | Performed by: SPECIALIST

## 2021-03-01 PROCEDURE — 1090F PRES/ABSN URINE INCON ASSESS: CPT | Performed by: SPECIALIST

## 2021-03-01 PROCEDURE — G8753 SYS BP > OR = 140: HCPCS | Performed by: SPECIALIST

## 2021-03-01 PROCEDURE — G8417 CALC BMI ABV UP PARAM F/U: HCPCS | Performed by: SPECIALIST

## 2021-03-01 RX ORDER — AMLODIPINE BESYLATE 5 MG/1
5 TABLET ORAL DAILY
COMMUNITY
End: 2021-06-30 | Stop reason: ALTCHOICE

## 2021-03-01 RX ORDER — DULOXETIN HYDROCHLORIDE 30 MG/1
30 CAPSULE, DELAYED RELEASE ORAL DAILY
COMMUNITY
End: 2021-03-03

## 2021-03-01 NOTE — PROGRESS NOTES
Pre-Operative Progress Consultation    Subjective:     Anahy Pisano is a 70 y.o. obese female with a Body mass index is 42.87 kg/m²..  she desires surgery at this time because of health issues and quality of life issues.  Anahy Pisano has tried multiple diets in her lifetime most recently tried physician supervised, behavior modification and unsupervised diets     Patient's office history;     Nov 2020 - Virtual Consult requesting sleeve    Early Jan 2021 - UGI with sizable hernia    Late Jan 2021 - EGD confirms 4cm hiatal hernia    Bariatric comorbidities present are   Patient Active Problem List   Diagnosis Code   • Lumbar back pain M54.5   • Morbid obesity (HCC) E66.01   • Morbid obesity with body mass index (BMI) of 40.0 to 49.9 (HCC) E66.01   • Depression F32.9   • Chronic pain G89.29   • Anemia D64.9   • Smoking history Z87.891   • Sleep apnea G47.30   • Acoustic neuroma (HCC) D33.3   • History of colon resection Z90.49   • Hypertension I10     The patient desires laparoscopic sleeve gastrectomy for surgical weight loss.  Anahy Pisano is here today to check progress with weight loss / evaluate nutritional status and review all subspecialty clearances in hopes of proceeding to the operating room.     Patient Active Problem List    Diagnosis Date Noted   • Morbid obesity (HCC)    • Morbid obesity with body mass index (BMI) of 40.0 to 49.9 (HCC)    • Depression    • Chronic pain    • Anemia    • Smoking history    • Sleep apnea    • Acoustic neuroma (HCC)    • History of colon resection    • Hypertension    • Lumbar back pain 02/27/2018      Past Surgical History:   Procedure Laterality Date   • HX CARPAL TUNNEL RELEASE Bilateral    • HX CHOLECYSTECTOMY     • HX GI      colonoscopy   • HX HIP REPLACEMENT Left    • HX HYSTERECTOMY     • HX ORTHOPAEDIC  1996    c5-6 fusion    • HX ORTHOPAEDIC  01/2018    lumbar laminectomy    • HX ORTHOPAEDIC Left     trigger finger surgery- all but the thumb   • HX  ORTHOPAEDIC Bilateral     shoulder replacement    HX TOTAL COLECTOMY  1995    pt unsure of her history       Social History     Tobacco Use    Smoking status: Never Smoker    Smokeless tobacco: Never Used   Substance Use Topics    Alcohol use: No      Family History   Problem Relation Age of Onset    Lung Disease Mother     Cancer Father     Cancer Brother       Current Outpatient Medications   Medication Sig Dispense Refill    amLODIPine (NORVASC) 5 mg tablet Take 5 mg by mouth daily.  DULoxetine (CYMBALTA) 30 mg capsule Take 30 mg by mouth daily.  gabapentin (NEURONTIN) 600 mg tablet Take 600 mg by mouth three (3) times daily. Indications: neuropathic pain      cyanocobalamin (VITAMIN B-12) 1,000 mcg sublingual tablet Take 1,000 mcg by mouth daily.  QUEtiapine (SEROqueL) 50 mg tablet Take 50 mg by mouth nightly. Indications: bipolar      celecoxib (CeleBREX) 200 mg capsule Take 200 mg by mouth two (2) times a day. Indications: joint damage causing pain and loss of function      folic acid (FOLVITE) 1 mg tablet Take 1 mg by mouth daily.  ferrous sulfate 325 mg (65 mg iron) tablet Take 1 Tab by mouth three (3) times daily (with meals).  90 Tab 3     Allergies   Allergen Reactions    Latex Rash          Review of Systems:        General - No history or complaints of unexpected fever, chills, or weight loss  Head/Neck - No history or complaints of headache, diplopia, dysphagia, hearing loss  Cardiac - No history or complaints of chest pain, palpitations, murmur, or shortness of breath  Pulmonary - No history or complaints of shortness of breath, productive cough, hemoptysis  Gastrointestinal - No history or complaints of reflux,  abdominal pain, obstipation/constipation, blood per rectum  Genitourinary - No history or complaints of hematuria/dysuria, stress urinary incontinence symptoms, or renal lithiasis  Musculoskeletal - No history or complaints of joint pain or muscular weakness  Hematologic - No history or complaints of bleeding disorders, blood transfusions, sickle cell anemia  Neurologic - No history or complaints of  migraine headaches, seizure activity, syncopal episodes, TIA or stroke  Integumentary - No history or complaints of rashes, abnormal nevi, skin cancer  Gynecological - No history of heavy menses/abnormal menses    Objective:     Visit Vitals  BP (!) 140/57 (BP 1 Location: Left arm, BP Patient Position: Sitting)   Pulse 90   Resp 16   Ht 5' 3\" (1.6 m)   Wt 109.8 kg (242 lb)   SpO2 97%   BMI 42.87 kg/m²       Physical Examination: General appearance - alert, well appearing, and in no distress  Mental status - alert, oriented to person, place, and time  Eyes - pupils equal and reactive, extraocular eye movements intact  Ears - bilateral TM's and external ear canals normal  Nose - normal and patent, no erythema, discharge or polyps  Mouth - mucous membranes moist, pharynx normal without lesions  Neck - supple, no significant adenopathy  Lymphatics - no palpable lymphadenopathy, no hepatosplenomegaly  Chest - clear to auscultation, no wheezes, rales or rhonchi, symmetric air entry  Heart - normal rate, regular rhythm, normal S1, S2, no murmurs, rubs, clicks or gallops  Abdomen - soft, nontender, nondistended, no masses or organomegaly  Back exam - full range of motion, no tenderness, palpable spasm or pain on motion  Neurological - alert, oriented, normal speech, no focal findings or movement disorder noted  Musculoskeletal - no joint tenderness, deformity or swelling  Extremities - peripheral pulses normal, no pedal edema, no clubbing or cyanosis  Skin - normal coloration and turgor, no rashes, no suspicious skin lesions noted    Labs:             Assessment:     Morbid obesity with associated comorbidity and large hiatal hernia on recent EGD in a 78year old patient     Plan:     After all variables have been evaluated we have decided to proceed with a gastric bypass after a satisfactory cardiac evaluation to include an echo and stress test via [facility] (already scheduled). She has completed her nutrition visits as well as her psych evaluation. The patient's situation has been discussed with Oscar Philip who now knows she will be pursuing a gastric bypass after a successful cardiac work-up.     Signed By: Fernandez Gomez MD     March 1, 2021

## 2021-03-04 ENCOUNTER — HOSPITAL ENCOUNTER (OUTPATIENT)
Dept: PREADMISSION TESTING | Age: 71
Discharge: HOME | End: 2021-03-04
Payer: MEDICARE

## 2021-03-04 ENCOUNTER — TRANSCRIBE ORDER (OUTPATIENT)
Dept: REGISTRATION | Age: 71
End: 2021-03-04

## 2021-03-04 DIAGNOSIS — I10 HYPERTENSION: Primary | ICD-10-CM

## 2021-03-04 DIAGNOSIS — E66.01 MORBID OBESITY (HCC): ICD-10-CM

## 2021-03-04 LAB
ABO + RH BLD: NORMAL
ALBUMIN SERPL-MCNC: 3.3 G/DL (ref 3.4–5)
ALBUMIN/GLOB SERPL: 0.9 {RATIO} (ref 0.8–1.7)
ALP SERPL-CCNC: 184 U/L (ref 45–117)
ALT SERPL-CCNC: 26 U/L (ref 13–56)
ANION GAP SERPL CALC-SCNC: 7 MMOL/L (ref 3–18)
AST SERPL-CCNC: 20 U/L (ref 10–38)
BASOPHILS # BLD: 0 K/UL (ref 0–0.1)
BASOPHILS NFR BLD: 1 % (ref 0–2)
BILIRUB SERPL-MCNC: 0.3 MG/DL (ref 0.2–1)
BLOOD GROUP ANTIBODIES SERPL: NORMAL
BUN SERPL-MCNC: 21 MG/DL (ref 7–18)
BUN/CREAT SERPL: 24 (ref 12–20)
CALCIUM SERPL-MCNC: 8.6 MG/DL (ref 8.5–10.1)
CHLORIDE SERPL-SCNC: 107 MMOL/L (ref 100–111)
CO2 SERPL-SCNC: 30 MMOL/L (ref 21–32)
CREAT SERPL-MCNC: 0.88 MG/DL (ref 0.6–1.3)
DIFFERENTIAL METHOD BLD: ABNORMAL
EOSINOPHIL # BLD: 0.3 K/UL (ref 0–0.4)
EOSINOPHIL NFR BLD: 6 % (ref 0–5)
ERYTHROCYTE [DISTWIDTH] IN BLOOD BY AUTOMATED COUNT: 15 % (ref 11.6–14.5)
GLOBULIN SER CALC-MCNC: 3.7 G/DL (ref 2–4)
GLUCOSE SERPL-MCNC: 83 MG/DL (ref 74–99)
HCT VFR BLD AUTO: 39.3 % (ref 35–45)
HGB BLD-MCNC: 12.2 G/DL (ref 12–16)
LYMPHOCYTES # BLD: 1.8 K/UL (ref 0.9–3.6)
LYMPHOCYTES NFR BLD: 30 % (ref 21–52)
MCH RBC QN AUTO: 26.2 PG (ref 24–34)
MCHC RBC AUTO-ENTMCNC: 31 G/DL (ref 31–37)
MCV RBC AUTO: 84.5 FL (ref 74–97)
MONOCYTES # BLD: 0.6 K/UL (ref 0.05–1.2)
MONOCYTES NFR BLD: 9 % (ref 3–10)
NEUTS SEG # BLD: 3.3 K/UL (ref 1.8–8)
NEUTS SEG NFR BLD: 54 % (ref 40–73)
PLATELET # BLD AUTO: 182 K/UL (ref 135–420)
PMV BLD AUTO: 9.5 FL (ref 9.2–11.8)
POTASSIUM SERPL-SCNC: 4 MMOL/L (ref 3.5–5.5)
PROT SERPL-MCNC: 7 G/DL (ref 6.4–8.2)
RBC # BLD AUTO: 4.65 M/UL (ref 4.2–5.3)
SODIUM SERPL-SCNC: 144 MMOL/L (ref 136–145)
SPECIMEN EXP DATE BLD: NORMAL
WBC # BLD AUTO: 6.1 K/UL (ref 4.6–13.2)

## 2021-03-04 PROCEDURE — 36415 COLL VENOUS BLD VENIPUNCTURE: CPT

## 2021-03-04 PROCEDURE — 86901 BLOOD TYPING SEROLOGIC RH(D): CPT

## 2021-03-04 PROCEDURE — 85025 COMPLETE CBC W/AUTO DIFF WBC: CPT

## 2021-03-04 PROCEDURE — 80053 COMPREHEN METABOLIC PANEL: CPT

## 2021-03-10 ENCOUNTER — HOSPITAL ENCOUNTER (OUTPATIENT)
Dept: PREADMISSION TESTING | Age: 71
Discharge: HOME | End: 2021-03-10
Payer: MEDICARE

## 2021-03-10 PROCEDURE — U0003 INFECTIOUS AGENT DETECTION BY NUCLEIC ACID (DNA OR RNA); SEVERE ACUTE RESPIRATORY SYNDROME CORONAVIRUS 2 (SARS-COV-2) (CORONAVIRUS DISEASE [COVID-19]), AMPLIFIED PROBE TECHNIQUE, MAKING USE OF HIGH THROUGHPUT TECHNOLOGIES AS DESCRIBED BY CMS-2020-01-R: HCPCS

## 2021-03-11 ENCOUNTER — TELEPHONE (OUTPATIENT)
Dept: SURGERY | Age: 71
End: 2021-03-11

## 2021-03-11 LAB — SARS-COV-2, COV2NT: NOT DETECTED

## 2021-03-11 RX ORDER — ENOXAPARIN SODIUM 100 MG/ML
40 INJECTION SUBCUTANEOUS EVERY 12 HOURS
Qty: 14 SYRINGE | Refills: 0 | Status: SHIPPED | OUTPATIENT
Start: 2021-03-11 | End: 2021-03-16

## 2021-03-12 ENCOUNTER — HOSPITAL ENCOUNTER (OUTPATIENT)
Dept: LAB | Age: 71
Discharge: HOME | End: 2021-03-12
Payer: MEDICARE

## 2021-03-12 ENCOUNTER — TRANSCRIBE ORDER (OUTPATIENT)
Dept: REGISTRATION | Age: 71
End: 2021-03-12

## 2021-03-12 DIAGNOSIS — R94.39 ABNORMAL BALLISTOCARDIOGRAM: ICD-10-CM

## 2021-03-12 DIAGNOSIS — I20.9 ANGINAL SYNDROME (HCC): Primary | ICD-10-CM

## 2021-03-12 DIAGNOSIS — R06.02 SHORTNESS OF BREATH: ICD-10-CM

## 2021-03-12 DIAGNOSIS — I20.9 ANGINAL SYNDROME (HCC): ICD-10-CM

## 2021-03-12 LAB
INR PPP: 1 (ref 0.8–1.2)
PROTHROMBIN TIME: 13.4 SEC (ref 11.5–15.2)

## 2021-03-12 PROCEDURE — 85610 PROTHROMBIN TIME: CPT

## 2021-03-12 PROCEDURE — 36415 COLL VENOUS BLD VENIPUNCTURE: CPT

## 2021-03-16 ENCOUNTER — HOSPITAL ENCOUNTER (OUTPATIENT)
Age: 71
Setting detail: OUTPATIENT SURGERY
Discharge: HOME | End: 2021-03-16
Attending: INTERNAL MEDICINE | Admitting: INTERNAL MEDICINE
Payer: MEDICARE

## 2021-03-16 VITALS
DIASTOLIC BLOOD PRESSURE: 61 MMHG | WEIGHT: 236 LBS | HEART RATE: 64 BPM | RESPIRATION RATE: 18 BRPM | TEMPERATURE: 97.9 F | SYSTOLIC BLOOD PRESSURE: 142 MMHG | BODY MASS INDEX: 41.82 KG/M2 | OXYGEN SATURATION: 95 % | HEIGHT: 63 IN

## 2021-03-16 DIAGNOSIS — I20.9 ANGINA, CLASS III (HCC): ICD-10-CM

## 2021-03-16 DIAGNOSIS — R94.39 ABNORMAL STRESS TEST: ICD-10-CM

## 2021-03-16 PROCEDURE — 74011000250 HC RX REV CODE- 250: Performed by: INTERNAL MEDICINE

## 2021-03-16 PROCEDURE — 77030004522 HC CATH ANGI DX EXPO BSC -A: Performed by: INTERNAL MEDICINE

## 2021-03-16 PROCEDURE — 99153 MOD SED SAME PHYS/QHP EA: CPT | Performed by: INTERNAL MEDICINE

## 2021-03-16 PROCEDURE — 77030029997 HC DEV COM RDL R BND TELE -B: Performed by: INTERNAL MEDICINE

## 2021-03-16 PROCEDURE — 77030008543 HC TBNG MON PRSS MRTM -A: Performed by: INTERNAL MEDICINE

## 2021-03-16 PROCEDURE — 77030016699 HC CATH ANGI DX INFN1 CARD -A: Performed by: INTERNAL MEDICINE

## 2021-03-16 PROCEDURE — C1769 GUIDE WIRE: HCPCS | Performed by: INTERNAL MEDICINE

## 2021-03-16 PROCEDURE — 74011250636 HC RX REV CODE- 250/636: Performed by: INTERNAL MEDICINE

## 2021-03-16 PROCEDURE — 99152 MOD SED SAME PHYS/QHP 5/>YRS: CPT | Performed by: INTERNAL MEDICINE

## 2021-03-16 PROCEDURE — 93458 L HRT ARTERY/VENTRICLE ANGIO: CPT | Performed by: INTERNAL MEDICINE

## 2021-03-16 PROCEDURE — 77030013797 HC KT TRNSDUC PRSSR EDWD -A: Performed by: INTERNAL MEDICINE

## 2021-03-16 PROCEDURE — 74011000636 HC RX REV CODE- 636: Performed by: INTERNAL MEDICINE

## 2021-03-16 PROCEDURE — C1894 INTRO/SHEATH, NON-LASER: HCPCS | Performed by: INTERNAL MEDICINE

## 2021-03-16 RX ORDER — SODIUM CHLORIDE 9 MG/ML
75 INJECTION, SOLUTION INTRAVENOUS CONTINUOUS
Status: DISPENSED | OUTPATIENT
Start: 2021-03-16 | End: 2021-03-16

## 2021-03-16 RX ORDER — SODIUM CHLORIDE 0.9 % (FLUSH) 0.9 %
5-40 SYRINGE (ML) INJECTION AS NEEDED
Status: DISCONTINUED | OUTPATIENT
Start: 2021-03-16 | End: 2021-03-16 | Stop reason: HOSPADM

## 2021-03-16 RX ORDER — HEPARIN SODIUM 1000 [USP'U]/ML
INJECTION, SOLUTION INTRAVENOUS; SUBCUTANEOUS AS NEEDED
Status: DISCONTINUED | OUTPATIENT
Start: 2021-03-16 | End: 2021-03-16 | Stop reason: HOSPADM

## 2021-03-16 RX ORDER — HEPARIN SODIUM 200 [USP'U]/100ML
INJECTION, SOLUTION INTRAVENOUS
Status: COMPLETED | OUTPATIENT
Start: 2021-03-16 | End: 2021-03-16

## 2021-03-16 RX ORDER — FENTANYL CITRATE 50 UG/ML
INJECTION, SOLUTION INTRAMUSCULAR; INTRAVENOUS AS NEEDED
Status: DISCONTINUED | OUTPATIENT
Start: 2021-03-16 | End: 2021-03-16 | Stop reason: HOSPADM

## 2021-03-16 RX ORDER — LIDOCAINE HYDROCHLORIDE 10 MG/ML
INJECTION INFILTRATION; PERINEURAL AS NEEDED
Status: DISCONTINUED | OUTPATIENT
Start: 2021-03-16 | End: 2021-03-16 | Stop reason: HOSPADM

## 2021-03-16 RX ORDER — MIDAZOLAM HYDROCHLORIDE 1 MG/ML
INJECTION, SOLUTION INTRAMUSCULAR; INTRAVENOUS AS NEEDED
Status: DISCONTINUED | OUTPATIENT
Start: 2021-03-16 | End: 2021-03-16 | Stop reason: HOSPADM

## 2021-03-16 RX ORDER — SODIUM CHLORIDE 0.9 % (FLUSH) 0.9 %
5-40 SYRINGE (ML) INJECTION EVERY 8 HOURS
Status: DISCONTINUED | OUTPATIENT
Start: 2021-03-16 | End: 2021-03-16 | Stop reason: HOSPADM

## 2021-03-16 RX ORDER — VERAPAMIL HYDROCHLORIDE 2.5 MG/ML
INJECTION, SOLUTION INTRAVENOUS AS NEEDED
Status: DISCONTINUED | OUTPATIENT
Start: 2021-03-16 | End: 2021-03-16 | Stop reason: HOSPADM

## 2021-03-16 RX ORDER — ASPIRIN 81 MG/1
81 TABLET ORAL DAILY
COMMUNITY
End: 2021-04-02

## 2021-03-16 RX ADMIN — SODIUM CHLORIDE 75 ML/HR: 900 INJECTION, SOLUTION INTRAVENOUS at 09:10

## 2021-03-16 NOTE — Clinical Note
Sheath #1: Closed using R-Band. Radial band pressure set at: 15. Spoke with patient via phone.   INR goal range: 2.0-3.0  DX: atrial fib  Last INR 3/2/20 was 2.5.  Dose maintained per protocol.   Today's INR is 2.2 and is within goal range.    Current warfarin dosing verified with patient. Patient was informed that the INR result is within therapeutic range and instructed to maintain current dose per protocol. Discussed dose and return date for next INR.  Next INR 6 weeks, lab draw, order placed.   See Ambulatory Anticoagulation Flowsheet.   Medication list reviewed with patient.     Dr. Lucero is in the office today supervising the treatment.    Patient was instructed to contact the clinic with any unusual bleeding or bruising, any changes in medications, diet, health status, lifestyle, or any other changes, questions or concerns. Patient verbalized understanding of all discussed.      COVID Screen:    Does patient OR patient's household members have the following?  · Temperature: Fever >100.4°F or >38.0°C    No  · Respiratory symptoms: New or worsening cough, shortness of breath, or sore throat   No  · GI symptoms: New onset of nausea, vomiting or diarrhea   No  · Miscellaneous: New onset of loss of taste or smell   No  · Has the patient or a household member tested positive for COVID-19 in the last 14  Days?   No  If Yes to any of the above, the screen is positive.     Patient screens negative for the COVID19 virus at this time.  Advised to contact clinic if answers yes to any question between now and next visit.

## 2021-03-16 NOTE — Clinical Note
TRANSFER - OUT REPORT:     Verbal report given to: rn.     Report consisted of patient's Situation, Background, Assessment and   Recommendations(SBAR). Opportunity for questions and clarification was provided. Patient transported with a Registered Nurse and [address] [place] [address] / [facility]. Patient transported to: care unit.

## 2021-03-16 NOTE — ROUTINE PROCESS
Cardiac Cath Lab:  Pre Procedure Chart Check Patients chart was accessed and reviewed for possible and/or scheduled procedure. Creatinine Clearance: 
Serum creatinine: 0.88 mg/dL 03/04/21 1002 Estimated creatinine clearance: 70.7 mL/min Total Contrast  Load: 
3 x estimated clearance amount=  212.1ml 
 
75% of Contrast Load: 0.75 x Total Contrast Load=    159.1ml No results for input(s): WBC, RBC, HCT, HGB, PLT, INR, APTT, PTP, NA, K, BUN, CREA, GFRAA, GFRNA, CA, MAG, CPK, CKMB, CKNDX, CKND1, TROPT, TROIQ, BNPP, BNP, HCTEXT, HGBEXT, PLTEXT, INREXT in the last 72 hours. No lab exists for component: DDIMER, GLUCOSE 
 
BMI: Body mass index is 41.81 kg/m². ALLERGIES:  
Allergies Allergen Reactions  Latex Rash Lines: 
  
  
Peripheral IV 03/16/21 Right Hand (Active) Site Assessment Clean, dry, & intact 03/16/21 0901 Phlebitis Assessment 0 03/16/21 0901 Infiltration Assessment 0 03/16/21 0901 Dressing Status Clean, dry, & intact 03/16/21 0901 Dressing Type Transparent;Tape 03/16/21 0901 Hub Color/Line Status Blue 03/16/21 0901 Action Taken Open ports on tubing capped;Catheter retaped 03/16/21 0901 Alcohol Cap Used Yes 03/16/21 0901 History: 
 
Past Medical History:  
Diagnosis Date  Acoustic neuroma ([facility] 2009  
 stable-right ear  Acoustic neuroma ([facility] [address] [facility] 2009  
 right  Anemia  Arthritis  Cat scratch fever 1998  
 in lymph nodes  Chronic bronchitis ([facility] [address]  Chronic pain  Depression  Edema  History of colon resection 1995  
 pt unsure of the nature of her resection  Hypertension   
 takes diuretics \"PRN\"- stopped meds 2018- only on med for BP while getting her CPAP adjusted  Ill-defined condition   
 hiatal hernia  Liver disease 2021  
 fatty liver  Morbid obesity ([address]  Morbid obesity with body mass index (BMI) of 40.0 to 49.9 ([address]  Nausea & vomiting  Sleep apnea   
 uses c-pap [address] treated  Vertigo 2009 Past Surgical History:  
Procedure Laterality Date  HX CARPAL TUNNEL RELEASE Bilateral   
 HX CHOLECYSTECTOMY  HX GI    
 colonoscopy  HX HIP REPLACEMENT Left  HX HYSTERECTOMY [address] c5-6 fusion  HX ORTHOPAEDIC  01/2018  
 lumbar laminectomy  HX ORTHOPAEDIC Left   
 trigger finger surgery- all but the thumb  HX ORTHOPAEDIC Bilateral 2020  
 shoulder replacement  HX ORTHOPAEDIC Left 2020  
 hip replacement [address]  
 pt unsure of her history Patient Active Problem List  
Diagnosis Code  Lumbar back pain M54.5  Morbid obesity ([facility]) E66.01  
 Morbid obesity with body mass index (BMI) of 40.0 to 49.9 (HCC) E66.01  
 Depression F32.9  Chronic pain G89.29  
 Anemia D64.9  Smoking history Z87.891  Sleep apnea G47.30  Acoustic neuroma (HCC) D33.3  History of colon resection Z90.49  Hypertension I10

## 2021-03-16 NOTE — H&P
Date of Surgery Update:  Luana Fiore was seen and examined. History and physical has been reviewed. The patient has been examined. There have been no significant clinical changes since the completion of the originally dated History and Physical.      Patient assessed and is candidate for moderate sedation.       Signed By: Jose Morales MD     March 16, 2021 10:46 AM

## 2021-03-16 NOTE — DISCHARGE INSTRUCTIONS
Cardiac Catheterization/Angiography Discharge Instructions    *Check the puncture site frequently for swelling or bleeding. If you see any bleeding, lie down and apply pressure over the area with a clean towel or washcloth. Notify your doctor for any redness, swelling, drainage or oozing from the puncture site. Notify your doctor for any fever or chills. *If the leg or arm with the puncture becomes cold, numb or painful, go to ER    *Activity should be limited for the next 24 hours. Climb stairs as little as possible and avoid any stooping, bending or strenuous activity for 24 hours. No heavy lifting (anything over 10 pounds) for 7 days. *Do not drive for 24 hours. *You may resume your usual diet. Drink more fluids than usual.    *Have a responsible person drive you home and stay with you for at least 24 hours after your heart catheterization/angiography. *You may remove the bandage from your Left and Arm in 24 hours. You may shower in 24 hours. No tub baths, hot tubs or swimming for one week. Do not place any lotions, creams, powders, ointments over the puncture site for one week. You may place a clean band-aid over the puncture site each day for 5 days. Change this daily. DISCHARGE SUMMARY from Nurse    PATIENT INSTRUCTIONS:    After general anesthesia or intravenous sedation, for 24 hours or while taking prescription Narcotics:  · Limit your activities  · Do not drive and operate hazardous machinery  · Do not make important personal or business decisions  · Do  not drink alcoholic beverages  · If you have not urinated within 8 hours after discharge, please contact your surgeon on call.     Report the following to your surgeon:  · Excessive pain, swelling, redness or odor of or around the surgical area  · Temperature over 100.5  · Nausea and vomiting lasting longer than 4 hours or if unable to take medications  · Any signs of decreased circulation or nerve impairment to extremity: change in color, persistent  numbness, tingling, coldness or increase pain  · Any questions    What to do at Home:  Recommended activity: Activity as tolerated,         *  Please give a list of your current medications to your Primary Care Provider. *  Please update this list whenever your medications are discontinued, doses are      changed, or new medications (including over-the-counter products) are added. *  Please carry medication information at all times in case of emergency situations. These are general instructions for a healthy lifestyle:    No smoking/ No tobacco products/ Avoid exposure to second hand smoke  Surgeon General's Warning:  Quitting smoking now greatly reduces serious risk to your health. Obesity, smoking, and sedentary lifestyle greatly increases your risk for illness    A healthy diet, regular physical exercise & weight monitoring are important for maintaining a healthy lifestyle    You may be retaining fluid if you have a history of heart failure or if you experience any of the following symptoms:  Weight gain of 3 pounds or more overnight or 5 pounds in a week, increased swelling in our hands or feet or shortness of breath while lying flat in bed. Please call your doctor as soon as you notice any of these symptoms; do not wait until your next office visit. The discharge information has been reviewed with the patient and caregiver. The patient and caregiver verbalized understanding. Discharge medications reviewed with the patient and caregiver and appropriate educational materials and side effects teaching were provided.   Patient armband removed and shredded    ___________________________________________________________________________________________________________________________________

## 2021-03-16 NOTE — DISCHARGE SUMMARY
Discharge Summary    Patient: Jeannette Lizama MRN: [id number]  CSN: [id number]    YOB: 1950  Age: 73 y.o. Sex: female    DOA: 3/16/2021 LOS:  LOS: 0 days   Discharge Date:      Primary Care Provider:  Becky Ly MD    Admission Diagnoses: Angina, class III ([address] [I20.9]  Abnormal stress test [R94.39]    Discharge Diagnoses:  CAD  Hospital Problems  Date Reviewed: 1/26/2021    None          Discharge Condition: Stable     Discharge Medications:     Current Discharge Medication List      CONTINUE these medications which have NOT CHANGED    Details   aspirin delayed-release 81 mg tablet Take 81 mg by mouth daily. antiox #8/om3/dha/epa/lut/zeax (PRESERVISION AREDS 2, OMEGA-3, PO) Take  by mouth two (2) times a day. multivitamin (ONE A DAY) tablet Take 1 Tab by mouth daily. DULoxetine (CYMBALTA) 30 mg capsule Take 30 mg by mouth daily. Indications: anxiousness associated with depression      amLODIPine (NORVASC) 5 mg tablet Take 5 mg by mouth daily. gabapentin (NEURONTIN) 600 mg tablet Take 600 mg by mouth three (3) times daily. Indications: neuropathic pain      cyanocobalamin (VITAMIN B-12) 1,000 mcg sublingual tablet Take 1,000 mcg by mouth daily. QUEtiapine (SEROqueL) 50 mg tablet Take 50 mg by mouth nightly. Indications: bipolar      celecoxib (CeleBREX) 200 mg capsule Take 200 mg by mouth two (2) times a day. Indications: joint damage causing pain and loss of function      folic acid (FOLVITE) 1 mg tablet Take 1 mg by mouth daily. ferrous sulfate 325 mg (65 mg iron) tablet Take 1 Tab by mouth three (3) times daily (with meals).   Qty: 90 Tab, Refills: 3    Associated Diagnoses: Iron deficiency anemia, unspecified iron deficiency anemia type         STOP taking these medications       enoxaparin (LOVENOX) 40 mg/0.4 mL Comments:   Reason for Stopping:               Procedures : LHC, coronary angiogram +/- PCI    Consults: None      PHYSICAL EXAM   Visit Vitals  BP (!) 140/68   Pulse 67   Temp 98 °F (36.7 °C)   Resp 16   Ht 5' 3\" (1.6 m)   Wt 107 kg (236 lb)   SpO2 94%   Breastfeeding No   BMI 41.81 kg/m²     General: Awake, cooperative, no acute distress    HEENT: NC, Atraumatic. PERRLA, EOMI. Anicteric sclerae. Lungs:  CTA Bilaterally. No Wheezing/Rhonchi/Rales. Heart:  Regular  rhythm,  No murmur, No Rubs, No Gallops  Abdomen: Soft, Non distended, Non tender. +Bowel sounds,   Extremities: No c/c/e  Psych:   Not anxious or agitated. Neurologic:  No acute neurological deficits. Admission HPI : See H/P    Hospital Course :  33-year-old female came for outpatient cardiac catheterization. Left heart catheterization, coronary angiogram done via left radial approach. Cardiac catheterization revealed    Left Main   Left main has luminal irregularities. Left main trifurcates in LAD, Ramus and LCx   Left Anterior Descending   Proximal LAD has 20% stenosis. LAD has luminal irregularities. D1 is a large caliber vessel with luminal irregularities. LAD/diagonal has corkscrew appearance. Ramus Intermedius   Ramus is very small caliber vessel with luminal irregularities. Ramus has corkscrew appearance   Left Circumflex   LCx is non dominant vessel. LCx has luminal irregularities. OM1 and OM2 are small caliber vessel with luminal irregularities. LCx/OM has corkscrew appearance. Right Coronary Artery   RCA is dominant vessel. RCA has luminal irregularities. RPDA is small caliber and RPLA is very small caliber with luminal irregularities. Left Ventricle LV gram was not done. LVEDP 20 mm hg. No significant gradient on pull back. Activity: No weight lifting no more than 10 lbs from left hand, no driving for 24 hours     Diet: Cardiac    Follow-up:  In cardiology clinic in 4 weeks    Disposition: Home    Minutes spent on discharge: 39 minutes        Labs: Results:       Chemistry No results for input(s): GLU, NA, K, CL, CO2, BUN, CREA, CA, AGAP, BUCR, TBIL, AP, TP, ALB, GLOB, AGRAT in the last 72 hours. No lab exists for component: GPT   CBC w/Diff No results for input(s): WBC, RBC, HGB, HCT, PLT, GRANS, LYMPH, EOS, HGBEXT, HCTEXT, PLTEXT in the last 72 hours. Cardiac Enzymes No results for input(s): CPK, CKND1, AYUSH in the last 72 hours. No lab exists for component: CKRMB, TROIP   Coagulation No results for input(s): PTP, INR, APTT, INREXT in the last 72 hours. Lipid Panel No results found for: CHOL, CHOLPOCT, CHOLX, CHLST, CHOLV, 127017, HDL, HDLP, LDL, LDLC, DLDLP, 828172, VLDLC, VLDL, TGLX, TRIGL, TRIGP, TGLPOCT, CHHD, CHHDX   BNP No results for input(s): BNPP in the last 72 hours. Liver Enzymes No results for input(s): TP, ALB, TBIL, AP in the last 72 hours. No lab exists for component: SGOT, GPT, DBIL   Thyroid Studies No results found for: T4, T3U, TSH, TSHEXT         Significant Diagnostic Studies: No results found.           CC: Nick Maya MD

## 2021-03-16 NOTE — PROGRESS NOTES
Pt. Is all prepped and ready for procedure. 1030 Pt. To cath lab via bed. NAD.    1140 Pt. Returned from cath lab, awake and alert. No c/o pain. TR band and immobilizer intact to left radial artery. No bleeding from site. PO fluids and food given to pt. NAD.    1240 Starting to release TR band per protocol. 1340 No bleeding from left radial artery. 1450 Pepsi given for headache.    1500  IV fluids infused per orders. 1 Pt. D/c'd in c/o daughter via w/c to private residence. Pt. In stable condition. No c/o pain. Dressing via left radial dry and intact with immobilizer intact. NAD.

## 2021-03-16 NOTE — Clinical Note
TRANSFER - IN REPORT:     Verbal report received from: rn.     Report consisted of patient's Situation, Background, Assessment and   Recommendations(SBAR). Opportunity for questions and clarification was provided. Assessment completed upon patient's arrival to unit and care assumed. Patient transported with a Registered Nurse and [address] [place] [address] / [facility].

## 2021-03-16 NOTE — PROGRESS NOTES
1320 Tr band removed and tolerated well. Sterile 2x2 with Tegaderm applied to site. No bleeding nor hematoma to site. Pulses WNL. Lt wrist immobilizer back in place.  Education reinforced regarding precautions

## 2021-03-22 ENCOUNTER — TELEPHONE (OUTPATIENT)
Dept: SURGERY | Age: 71
End: 2021-03-22

## 2021-03-22 RX ORDER — ENOXAPARIN SODIUM 100 MG/ML
40 INJECTION SUBCUTANEOUS EVERY 12 HOURS
Qty: 14 SYRINGE | Refills: 0 | Status: ON HOLD | OUTPATIENT
Start: 2021-03-22 | End: 2021-04-02

## 2021-03-24 ENCOUNTER — HOSPITAL ENCOUNTER (OUTPATIENT)
Dept: PREADMISSION TESTING | Age: 71
Discharge: HOME | End: 2021-03-24
Payer: MEDICARE

## 2021-03-24 PROCEDURE — U0003 INFECTIOUS AGENT DETECTION BY NUCLEIC ACID (DNA OR RNA); SEVERE ACUTE RESPIRATORY SYNDROME CORONAVIRUS 2 (SARS-COV-2) (CORONAVIRUS DISEASE [COVID-19]), AMPLIFIED PROBE TECHNIQUE, MAKING USE OF HIGH THROUGHPUT TECHNOLOGIES AS DESCRIBED BY CMS-2020-01-R: HCPCS

## 2021-03-25 ENCOUNTER — HOSPITAL ENCOUNTER (OUTPATIENT)
Dept: PREADMISSION TESTING | Age: 71
Discharge: HOME | End: 2021-03-25
Payer: MEDICARE

## 2021-03-25 LAB
ABO + RH BLD: NORMAL
BLOOD GROUP ANTIBODIES SERPL: NORMAL
SPECIMEN EXP DATE BLD: NORMAL

## 2021-03-25 PROCEDURE — 36415 COLL VENOUS BLD VENIPUNCTURE: CPT

## 2021-03-25 PROCEDURE — 86901 BLOOD TYPING SEROLOGIC RH(D): CPT

## 2021-03-26 LAB — SARS-COV-2, NAA: NOT DETECTED

## 2021-03-29 ENCOUNTER — TELEPHONE (OUTPATIENT)
Dept: SURGERY | Age: 71
End: 2021-03-29

## 2021-03-29 NOTE — H&P (VIEW-ONLY)
Gastric Bypass - History and Physical 
 
Subjective: The patient is a 73 y.o. obese female with a There is no height or weight on file to calculate BMI. .   she presents now to review their work up to date to see if they are a candidate for surgery and whether or not to proceed with the previously requested procedure. Bariatric comorbidities continue to include:  
Patient Active Problem List  
Diagnosis Code  Lumbar back pain M54.5  Morbid obesity ([facility]) E66.01  
 Morbid obesity with body mass index (BMI) of 40.0 to 49.9 ([facility]) E66.01  
 Depression F32.9  Chronic pain G89.29  
 Anemia D64.9  Smoking history Z87.891  Sleep apnea G47.30  Acoustic neuroma ([facility]) D33.3  History of colon resection Z90.49  Hypertension I10  
 Hiatal hernia K44.9 The patient's work-up to date has included an upper GI study which showed a large hiatal hernia present. We then confirmed this hiatal hernia via endoscopy. It should be noted that patient has absolutely no reflux symptoms at all and was not aware of the fact that she indeed had a hiatal hernia. We then suggested to her that a gastric bypass procedure would be a better option given her situation and she ultimately underwent a cardiac evaluation with a cardiac catheterization which is listed below. She now presents to review her work-up to date and to discuss the options of surgery once again. They have been generally well prior to this visit and have had no recent significant illnesses. The patient has had no gastrointestinal issues that would preclude them from proceeding with the surgery they have chosen. Madelyn Covarrubias has recently tried a preoperative weight loss program  in addition to seeing a bariatric nutritionist preoperatively. We have discussed on at least one other occasion about the various types of surgical weight loss procedures and they have considered these options after our initial consultation.  We have once again discussed these procedures in detail and they have now decided on a surgical procedure. They present today to discuss this and confirm that their evaluation pre operatively is acceptable to continue with surgery. The patient desires laparoscopic gastric bypass surgery for surgical weight loss. The patients goal weight is 160lb. These goals are consistent with expected outcomes of their desired operation. her Medical goals are resolution of these health issues. Patient Active Problem List  
 Diagnosis Date Noted  Hiatal hernia  Morbid obesity ([facility]  Morbid obesity with body mass index (BMI) of 40.0 to 49.9 ([facility]  Depression  Chronic pain  Anemia  Smoking history  Sleep apnea  Acoustic neuroma ([facility]  History of colon resection  Hypertension  Lumbar back pain 02/27/2018 Past Surgical History:  
Procedure Laterality Date  HX CARPAL TUNNEL RELEASE Bilateral   
 HX CHOLECYSTECTOMY  HX GI    
 colonoscopy  HX HEART CATHETERIZATION  03/2021  HX HIP REPLACEMENT Left  HX HYSTERECTOMY [address] [facility] c5-6 fusion  HX ORTHOPAEDIC  01/2018  
 lumbar laminectomy  HX ORTHOPAEDIC Left   
 trigger finger surgery- all but the thumb  HX ORTHOPAEDIC Bilateral 2020  
 shoulder replacement  HX ORTHOPAEDIC Left 2020  
 hip replacement [address]  
 pt unsure of her history Social History Tobacco Use  Smoking status: Never Smoker  Smokeless tobacco: Never Used Substance Use Topics  Alcohol use: No  
  
Family History Problem Relation Age of Onset  Lung Disease Mother  Heart Disease Mother  Cancer Father  Heart Disease Father  Cancer Brother Current Outpatient Medications Medication Sig Dispense Refill  enoxaparin (LOVENOX) 40 mg/0.4 mL 0.4 mL by SubCUTAneous route every twelve (12) hours every twelve (12) hours for 7 days.  Indications: deep vein thrombosis prevention 14 Syringe 0  
 metoprolol tartrate (LOPRESSOR) 25 mg tablet Take 12.5 mg by mouth two (2) times a day.  aspirin delayed-release 81 mg tablet Take 81 mg by mouth daily.  antiox #8/om3/dha/epa/lut/zeax (PRESERVISION AREDS 2, OMEGA-3, PO) Take  by mouth two (2) times a day.  multivitamin (ONE A DAY) tablet Take 1 Tab by mouth daily.  DULoxetine (CYMBALTA) 30 mg capsule Take 30 mg by mouth daily. Indications: anxiousness associated with depression  amLODIPine (NORVASC) 5 mg tablet Take 5 mg by mouth daily.  gabapentin (NEURONTIN) 600 mg tablet Take 600 mg by mouth three (3) times daily. Indications: neuropathic pain  cyanocobalamin (VITAMIN B-12) 1,000 mcg sublingual tablet Take 1,000 mcg by mouth daily.  QUEtiapine (SEROqueL) 50 mg tablet Take 50 mg by mouth nightly. Indications: bipolar  celecoxib (CeleBREX) 200 mg capsule Take 200 mg by mouth two (2) times a day. Indications: joint damage causing pain and loss of function  folic acid (FOLVITE) 1 mg tablet Take 1 mg by mouth daily.  ferrous sulfate 325 mg (65 mg iron) tablet Take 1 Tab by mouth three (3) times daily (with meals). (Patient taking differently: Take 325 mg by mouth daily.) 90 Tab 3 Allergies Allergen Reactions  Latex Rash Review of Systems:  
  
 
 
General - No history or complaints of unexpected fever, chills, or weight loss Head/Neck - No history or complaints of headache, diplopia, dysphagia, hearing loss Cardiac - No history or complaints of chest pain, palpitations, murmur, or shortness of breath Pulmonary - No history or complaints of shortness of breath, productive cough, hemoptysis Gastrointestinal - no reflux,no  abdominal pain, obstipation/constipation or blood per rectum Genitourinary - No history or complaints of hematuria/dysuria, stress urinary incontinence symptoms, or renal lithiasis Musculoskeletal - moderate joint pain in their lower extremities,  no muscular weakness Hematologic - No history or complaints of bleeding disorders,  No blood transfusions Neurologic - No history or complaints of  migraine headaches, seizure activity, syncopal episodes, TIA or stroke Integumentary - No history or complaints of rashes, abnormal nevi, skin cancer Gynecological - n/a Objective: There were no vitals taken for this visit. Physical Examination: General appearance - alert, well appearing, and in no distress and oriented to person, place, and time Mental status - alert, oriented to person, place, and time, normal mood, behavior, speech, dress, motor activity, and thought processes Eyes - pupils equal and reactive, extraocular eye movements intact, sclera anicteric, left eye normal, right eye normal 
Ears - right ear normal, left ear normal 
Neck - supple, no significant adenopathy Chest - clear to auscultation, no wheezes, rales or rhonchi, symmetric air entry Heart - normal rate, regular rhythm, normal S1, S2, no murmurs, rubs, clicks or gallops Abdomen - soft, nontender, nondistended, no masses or organomegaly Back exam - full range of motion, no tenderness, palpable spasm or pain on motion Neurological - alert, oriented, normal speech, no focal findings or movement disorder noted Musculoskeletal - no joint tenderness, deformity or swelling Extremities - peripheral pulses normal, no pedal edema, no clubbing or cyanosis Labs :  
 
Lab Results Component Value Date/Time WBC 6.1 03/04/2021 10:02 AM  
 HGB 12.2 03/04/2021 10:02 AM  
 HCT 39.3 03/04/2021 10:02 AM  
 PLATELET 978 00/65/1924 10:02 AM  
 MCV 84.5 03/04/2021 10:02 AM  
 
Lab Results Component Value Date/Time  Sodium 144 03/04/2021 10:02 AM  
 Potassium 4.0 03/04/2021 10:02 AM  
 Chloride 107 03/04/2021 10:02 AM  
 CO2 30 03/04/2021 10:02 AM  
 Anion gap 7 03/04/2021 10:02 AM  
 Glucose 83 03/04/2021 10:02 AM  
 BUN 21 (H) 03/04/2021 10:02 AM  
 Creatinine 0.88 2021 10:02 AM  
 BUN/Creatinine ratio 24 (H) 2021 10:02 AM  
 GFR est AA >60 2021 10:02 AM  
 GFR est non-AA >60 2021 10:02 AM  
 Calcium 8.6 2021 10:02 AM  
 Bilirubin, total 0.3 2021 10:02 AM  
 Alk. phosphatase 184 (H) 2021 10:02 AM  
 Protein, total 7.0 2021 10:02 AM  
 Albumin 3.3 (L) 2021 10:02 AM  
 Globulin 3.7 2021 10:02 AM  
 A-G Ratio 0.9 2021 10:02 AM  
 ALT (SGPT) 26 2021 10:02 AM  
 
Lab Results Component Value Date/Time Iron 30 (L) 2018 03:30 PM  
 TIBC 154 (L) 2018 03:30 PM  
 Iron % saturation 19 2018 03:30 PM  
 
No results found for: FOL, RBCF No results found for: Anup Victor, Petey Cabezas, Laura Jauregui, [id number] Cardiac / Pulmonary Evaluation:  
 
Coronary Findings Diagnostic Dominance: Right Left Main Left main has luminal irregularities. Left main trifurcates in LAD, Ramus and LCx Left Anterior Descending Proximal LAD has 20% stenosis. LAD has luminal irregularities. D1 is a large caliber vessel with luminal irregularities. LAD/diagonal has corkscrew appearance. Ramus Intermedius Ramus is very small caliber vessel with luminal irregularities. Ramus has corkscrew appearance Left Circumflex LCx is non dominant vessel. LCx has luminal irregularities. OM1 and OM2 are small caliber vessel with luminal irregularities. LCx/OM has corkscrew appearance. Right Coronary Artery RCA is dominant vessel. RCA has luminal irregularities. RPDA is small caliber and RPLA is very small caliber with luminal irregularities. Intervention No interventions have been documented. UGI Results:  
 
CESILIA Beasley Physician Assistant Physician Assistant Procedures     
Signed Date of Service:  21 1510 []Hide copied text []Dalia for details Patient:Anahy Box Garden Jose : 1950 Medical Record Number:[id number] 
  
  
  
  
  
PREPROCEDURE DIAGNOSIS: This patient is preoperative for laparoscopic sleeve gastrectomyprocedure with a history of  reflux disease. 
  
POSTPROCEDURE DIAGNOSIS: This patient is preoperative for laparoscopic sleeve gastrectomyprocedure with a history of  reflux disease. 
  
  
PROCEDURES PERFORMED: Upper GI study with barium. 
  
ESTIMATED BLOOD LOSS: None. 
  
SPECIMENS: None. 
  
STATEMENT OF MEDICAL NECESSITY: The patient is a patient with a 
longstanding history of obesity. They are now considering the laparoscopic sleeve gastrectomyprocedure as a means of surgical weight control and due to their history of reflux disease and are being assessed preoperatively for such. 
  
DESCRIPTION OF PROCEDURE: The patient was brought to the fluoroscopy unit and 
was given thin barium. On swallowing of barium, they were noted to have 
normal peristalsis of their esophagus. They had prompt filling of distal 
esophagus with tapering into the gastroesophageal junction. There was evidence of a hiatal hernia present. Contrast then filled the gastric cardia, fundus,body and pre pyloric region with no abnormalities noted. Contrast then exited the pylorus in normal fashion. No obstruction was noted. There was no evidence of reflux noted. 
  
(sizable hiatal hernia - needs EGD prior to proceeding with surgery to help determine if she can have a sleeve) 
  Nain Mendoza MD  
  
 
 
 
 
 
Esophagogastroduodenoscopy Procedure Note 
  
Indications: pt seeking sleeve resection with recent UGI showing likely large hiatal hernia that could prevent her from having a sleeve resection  
  
Anesthesia/Sedation: MAC anesthesia 
  
Pre-Procedure Exam: Airway: clear Heart: normal S1and S2 Lungs: clear bilateral 
Abdomen: soft, nontender, bowel sounds present and normal in all quadrants Mental Status: awake, alert, and oriented to person, place, and time Procedure in Detail: 
Informed consent was obtained for the procedure, including conscious sedation. Risks of pancreatitis, infection, perforation, hemorrhage, adverse drug reaction, and aspiration were discussed. The patient was placed in the left lateral decubitus position. Based on the pre-procedure assessment, including review of the patient's medical history, medications, allergies, and review of systems, he had been deemed to be an appropriate candidate for moderate sedation; he was therefore sedated with the medications listed above. He was monitored continuously with electrocardiogram tracing, pulse oximetry, blood pressure monitoring, and direct observation. The [id number] gastroscope was inserted into the mouth and advanced under direct vision to pylorus. A careful inspection was made as the gastroscope was withdrawn, including a retroflexed view of the proximal stomach; findings and interventions are described below. Appropriate photodocumentation was obtained. 
  
Findings:  
Oropharynx - normal mucosa without abnormalities Esophagus - normal mucosa without webs, rings, or strictures, no esophagitis or ulcers Stomach - 4cm hiatal hernia with normal mucosa without evidence of ulcers  
  
  Will follow-up in office to discuss findings. 
  
Therapies:   
none 
  
Specimens: Specimens were collected and sent to pathology. Complications:   None; patient tolerated the procedure well. 
  
EBL:  None Attending Attestation:  I performed the procedure. Assessment: Morbid obesity with associated comorbidity Plan:  
 
laparoscopic gastric bypass surgery and  Possible sleeve gastrectomy if gastric bypass procedure is not feasible given the level of adhesive disease present This is a 73 y.o. female with a BMI of There is no height or weight on file to calculate BMI. and the weight-related co-morbidties as noted above.  Branden Roberts meets the NIH criteria for bariatric surgery based upon the BMI of There is no height or weight on file to calculate BMI. and multiple weight-related co-morbidties. Abbe Kraft has elected laparoscopic gastric bypass as her intervention of choice for treatment of morbid obestiy through surgical means secondary to its  
uniform results,  profound baseline suppression of hunger and pace at which weight is lost. 
 
In the office today, following Anahy's history and physical examination, a 40 minute discussion regarding the anatomic alterations for the laparoscopic gastric bypass  was undertaken. The dietary  
expectations and the patient  dependent factors for success were thoroughly discussed, to include the need for interval follow-up and long-term dietary changes associated with success. The possible short  
and long term  complications of the gastric bypass were also discussed, to include but not limited to;death, DVT/PE, staple line leak, bleeding, stricture formation, infection,internal hernia  and pouch dilation. Specific weight related outcomes for success were also discussed with an emphasis on careful and close follow-up with the first year and Dietary behavior modification over the first years as baseline cyclical  
hunger returns  The patient expressed an understanding of the above factors, and her questions were answered in their entirety. In addition, the patient attended a 1.5 hour power point seminar or online seminar regarding obesity, surgical weight loss including, adjustable gastric band, gastric bypass, and sleeve gastrectomy. This discussion contrasted  
the different surgical techniques, mechanisms of actions and expected outcomes, and surgical and medical risks associated with each procedure. During the in office seminar, there was a long question and answer 
 session where each questions was answered until there were no additional questions.   
 
Today, the patient had all of her questions answered and the decision was made today that the patient's preoperative evaluation is acceptable for them  to proceed with bariatric surgery  choosing  gastric bypass as her surgical option. The patient understands that her situation is quite unique in that she has had a long laparotomy incision for an unknown colonic procedure. She understands that the level of adhesive disease present might preclude her from having a successful gastric bypass procedure. I have given her the option of simply backing out if that is the case versus routine with a sleeve gastrectomy and hiatal hernia repair. She states that she feels like she would like to undergo a surgical procedure for her morbid obesity and is amenable to either procedure. She does understand that at times sleeve gastrectomy patients can develop reflux disease after the procedure. She is still comfortable with her decision for proceeding first with the gastric bypass procedure if it is feasible and secondly she states that she is amenable to a sleeve gastrectomy if the gastric bypass procedure is not feasible. Secondary Diagnoses:  
 
DVT / Pulmonary Embolus Risk - The patient is at a higher risk for post operative DVT / pulmonary embolus secondary to their morbid obese status, relative sedentary 
 lifestyle, and impending general anesthetic. We will plan to use anticoagulation therapy pre and post operative as well as TEDs and  pneumatic compression devices and  
encourage ambulation once on the hospital nursing floor. The need for  at home anticoagulation therapy has also been discussed. The patient understands  
that their efforts at ambulation are of vital importance to reduce the risk of this complication thus placing significant burden on them as to the prevention of such issues. Signs and symptoms of DVT / PE have been discussed with the patient and they have been instructed to call the office if any these occur in the \"at home\" post op phase.   
The patient has been provided with a post operative prescription of Lovenox and instructed in its use for DVT prophylaxis. Hypertension - The patient has a clear understanding of how weight loss improves hypertension as a whole, but also they understand that there is a significant genetic component  
to this disease process. We will monitor the patients blood pressure while in the hospital and the plan would be to continue those medications postoperatively. If a diuretic is being  
used we will stop them on discharge to prevent dehydration particularly with the sleeve gastrectomy and the gastric bypass procedures. They will be instructed to monitor their blood  
pressure postoperatively while at home and notify their primary care physician in the event of any significantly high or uncharacteristic readings. They understand that surgery may be 
cancelled if their blood pressure is deemed out of control the day of surgery either by myself or the anesthesia department. For this reason they must take their medications as directed  
and monitor their blood pressure regularly working up until their surgical date. Obstructive Sleep Apnea -The patient understands the association of sleep apnea and obesity and the additional risk that it caries related to post surgical complications. If they have not been tested for sleep apnea and I feel they are at increased risk for this diagnosis, then they will be scheduled for a consultation with a Pulmonologist for such. In the event that they claribel this diagnosis we will have the patient bring their CPAP machine to the hospital for use both postoperatively in the PACU and on the floor at its appropriate setting.  We will have them continue using it while at home after surgery and follow up with their pulmonologist 6 months after to be retested to see if it can be discontinued at that time period. Signed By: Misael Pierre MD   
 March 29, 2021

## 2021-03-29 NOTE — TELEPHONE ENCOUNTER
RN spoke with patient pre-operatively to review pre-op education. Patient received emailed education to include the following: bariatric book, liquid diet slides, medical presentation, and nutritional presentation. Patient was able to view. Questions were answered in their entirety. Patient was reminded of a clear liquid diet the day before surgery and choices were reviewed, as well as nothing to eat and/or drink after midnight the day before, with the exception of a little bit of water with any medications that may need to be taken the morning of surgery. Patient verbalized understanding. Patient was given a bariatric book during pre-op appointment; electronic book sent, as well. Lovenox prescription: sent electronically  Percocet prescription: Dr. Galileo Everett will send electronically day of discharge  Prilosec: Patient informed to purchase a two month supply. Children's complete chewable multi-vitamin: Patient informed to purchase. Probiotic: Patient informed to purchase at least a 1 billion strain. Patient was encouraged to contact the office with further questions.

## 2021-03-30 ENCOUNTER — ANESTHESIA (OUTPATIENT)
Dept: SURGERY | Age: 71
DRG: 621 | End: 2021-03-30
Payer: MEDICARE

## 2021-03-30 ENCOUNTER — APPOINTMENT (OUTPATIENT)
Dept: SURGERY | Age: 71
DRG: 621 | End: 2021-03-30
Payer: MEDICARE

## 2021-03-30 ENCOUNTER — HOSPITAL ENCOUNTER (INPATIENT)
Age: 71
LOS: 3 days | Discharge: HOME | DRG: 621 | End: 2021-04-02
Attending: SPECIALIST | Admitting: SPECIALIST
Payer: MEDICARE

## 2021-03-30 ENCOUNTER — ANESTHESIA EVENT (OUTPATIENT)
Dept: SURGERY | Age: 71
DRG: 621 | End: 2021-03-30
Payer: MEDICARE

## 2021-03-30 DIAGNOSIS — K44.9 HIATAL HERNIA: ICD-10-CM

## 2021-03-30 DIAGNOSIS — E66.01 MORBID OBESITY WITH BMI OF 40.0-44.9, ADULT (HCC): ICD-10-CM

## 2021-03-30 PROCEDURE — 43239 EGD BIOPSY SINGLE/MULTIPLE: CPT | Performed by: SPECIALIST

## 2021-03-30 PROCEDURE — 0DB78ZX EXCISION OF STOMACH, PYLORUS, VIA NATURAL OR ARTIFICIAL OPENING ENDOSCOPIC, DIAGNOSTIC: ICD-10-PCS | Performed by: SPECIALIST

## 2021-03-30 PROCEDURE — 77030002912 HC SUT ETHBND J&J -A: Performed by: SPECIALIST

## 2021-03-30 PROCEDURE — 77030005513 HC CATH URETH FOL11 MDII -B: Performed by: SPECIALIST

## 2021-03-30 PROCEDURE — 77030039961 HC KT CUST COLON BSC -D: Performed by: SPECIALIST

## 2021-03-30 PROCEDURE — 74011250636 HC RX REV CODE- 250/636: Performed by: SPECIALIST

## 2021-03-30 PROCEDURE — 77030027876 HC STPLR ENDOSC FLX PWR J&J -G1: Performed by: SPECIALIST

## 2021-03-30 PROCEDURE — 0DNW4ZZ RELEASE PERITONEUM, PERCUTANEOUS ENDOSCOPIC APPROACH: ICD-10-PCS | Performed by: SPECIALIST

## 2021-03-30 PROCEDURE — 77030008477 HC STYL SATN SLP COVD -A: Performed by: ANESTHESIOLOGY

## 2021-03-30 PROCEDURE — 88305 TISSUE EXAM BY PATHOLOGIST: CPT

## 2021-03-30 PROCEDURE — 77030008518 HC TBNG INSUF ENDO STRY -B: Performed by: SPECIALIST

## 2021-03-30 PROCEDURE — 76060000034 HC ANESTHESIA 1.5 TO 2 HR: Performed by: SPECIALIST

## 2021-03-30 PROCEDURE — 77030020407 HC IV BLD WRMR ST 3M -A: Performed by: ANESTHESIOLOGY

## 2021-03-30 PROCEDURE — 77030002966 HC SUT PDS J&J -A: Performed by: SPECIALIST

## 2021-03-30 PROCEDURE — 74011250636 HC RX REV CODE- 250/636: Performed by: NURSE ANESTHETIST, CERTIFIED REGISTERED

## 2021-03-30 PROCEDURE — 77030040506 HC DRN WND MDII -A: Performed by: SPECIALIST

## 2021-03-30 PROCEDURE — 77030003580 HC NDL INSUF VERES J&J -B: Performed by: SPECIALIST

## 2021-03-30 PROCEDURE — 77030012893: Performed by: SPECIALIST

## 2021-03-30 PROCEDURE — 77030002933 HC SUT MCRYL J&J -A: Performed by: SPECIALIST

## 2021-03-30 PROCEDURE — 77030010030: Performed by: SPECIALIST

## 2021-03-30 PROCEDURE — 77030020269 HC MISC IMPL: Performed by: SPECIALIST

## 2021-03-30 PROCEDURE — 2709999900 HC NON-CHARGEABLE SUPPLY: Performed by: SPECIALIST

## 2021-03-30 PROCEDURE — 77030010515 HC APPL ENDOCLP LIG J&J -B: Performed by: SPECIALIST

## 2021-03-30 PROCEDURE — 77030016151 HC PROTCTR LNS DFOG COVD -B: Performed by: SPECIALIST

## 2021-03-30 PROCEDURE — 74011250636 HC RX REV CODE- 250/636: Performed by: ANESTHESIOLOGY

## 2021-03-30 PROCEDURE — 74011000250 HC RX REV CODE- 250: Performed by: NURSE ANESTHETIST, CERTIFIED REGISTERED

## 2021-03-30 PROCEDURE — 77030008683 HC TU ET CUF COVD -A: Performed by: ANESTHESIOLOGY

## 2021-03-30 PROCEDURE — 0FB24ZX EXCISION OF LEFT LOBE LIVER, PERCUTANEOUS ENDOSCOPIC APPROACH, DIAGNOSTIC: ICD-10-PCS | Performed by: SPECIALIST

## 2021-03-30 PROCEDURE — 77030041460 HC APL VISTASEAL J&J -B: Performed by: SPECIALIST

## 2021-03-30 PROCEDURE — 77030034154 HC SHR COAG HARM ACE J&J -F: Performed by: SPECIALIST

## 2021-03-30 PROCEDURE — 74011000250 HC RX REV CODE- 250: Performed by: SPECIALIST

## 2021-03-30 PROCEDURE — 65270000029 HC RM PRIVATE

## 2021-03-30 PROCEDURE — 77030002986 HC SUT PROL J&J -A: Performed by: SPECIALIST

## 2021-03-30 PROCEDURE — 77030008602 HC TRCR ENDOSC EPATH J&J -B: Performed by: SPECIALIST

## 2021-03-30 PROCEDURE — 47379 UNLISTED LAPS PX LIVER: CPT | Performed by: SPECIALIST

## 2021-03-30 PROCEDURE — 0BQT4ZZ REPAIR DIAPHRAGM, PERCUTANEOUS ENDOSCOPIC APPROACH: ICD-10-PCS | Performed by: SPECIALIST

## 2021-03-30 PROCEDURE — 77030036732 HC RELD STPLR VASC J&J -F: Performed by: SPECIALIST

## 2021-03-30 PROCEDURE — 76010000153 HC OR TIME 1.5 TO 2 HR: Performed by: SPECIALIST

## 2021-03-30 PROCEDURE — 77030040361 HC SLV COMPR DVT MDII -B: Performed by: SPECIALIST

## 2021-03-30 PROCEDURE — 74011000258 HC RX REV CODE- 258: Performed by: NURSE ANESTHETIST, CERTIFIED REGISTERED

## 2021-03-30 PROCEDURE — 77030008574 HC TBNG SUC IRR STRY -B: Performed by: SPECIALIST

## 2021-03-30 PROCEDURE — 77030009967 HC RELD STPLR ENDOSC J&J -C: Performed by: SPECIALIST

## 2021-03-30 PROCEDURE — 88307 TISSUE EXAM BY PATHOLOGIST: CPT

## 2021-03-30 PROCEDURE — 77030041458 HC SEALNT FIBRN VISTASEAL J&J -G: Performed by: SPECIALIST

## 2021-03-30 PROCEDURE — 77030009426 HC FCPS BIOP ENDOSC BSC -B: Performed by: SPECIALIST

## 2021-03-30 PROCEDURE — 77030008603 HC TRCR ENDOSC EPATH J&J -C: Performed by: SPECIALIST

## 2021-03-30 PROCEDURE — 43775 LAP SLEEVE GASTRECTOMY: CPT | Performed by: SPECIALIST

## 2021-03-30 PROCEDURE — 77030020782 HC GWN BAIR PAWS FLX 3M -B: Performed by: SPECIALIST

## 2021-03-30 PROCEDURE — 77030009851 HC PCH RTVR ENDOSC AMR -B: Performed by: SPECIALIST

## 2021-03-30 PROCEDURE — 77030002916 HC SUT ETHLN J&J -A: Performed by: SPECIALIST

## 2021-03-30 PROCEDURE — 77030025303 HC STPLR ENDOSC J&J -G: Performed by: SPECIALIST

## 2021-03-30 PROCEDURE — 74011250637 HC RX REV CODE- 250/637: Performed by: SPECIALIST

## 2021-03-30 PROCEDURE — 0DB64Z3 EXCISION OF STOMACH, PERCUTANEOUS ENDOSCOPIC APPROACH, VERTICAL: ICD-10-PCS | Performed by: SPECIALIST

## 2021-03-30 PROCEDURE — 77030006643: Performed by: ANESTHESIOLOGY

## 2021-03-30 PROCEDURE — 88313 SPECIAL STAINS GROUP 2: CPT

## 2021-03-30 PROCEDURE — 77030002896 HC SUT CLP ABSRB J&J -B: Performed by: SPECIALIST

## 2021-03-30 PROCEDURE — 88342 IMHCHEM/IMCYTCHM 1ST ANTB: CPT

## 2021-03-30 PROCEDURE — 77030014008 HC SPNG HEMSTAT J&J -C: Performed by: SPECIALIST

## 2021-03-30 PROCEDURE — 77030009968 HC RELD STPLR ENDOSC J&J -D: Performed by: SPECIALIST

## 2021-03-30 PROCEDURE — 76210000017 HC OR PH I REC 1.5 TO 2 HR: Performed by: SPECIALIST

## 2021-03-30 DEVICE — ECHELON 60MM REINFORCEMENT
Type: IMPLANTABLE DEVICE | Site: STOMACH | Status: FUNCTIONAL
Brand: ECHLEON

## 2021-03-30 RX ORDER — LIDOCAINE HYDROCHLORIDE 20 MG/ML
INJECTION, SOLUTION EPIDURAL; INFILTRATION; INTRACAUDAL; PERINEURAL AS NEEDED
Status: DISCONTINUED | OUTPATIENT
Start: 2021-03-30 | End: 2021-03-30 | Stop reason: HOSPADM

## 2021-03-30 RX ORDER — GLYCOPYRROLATE 0.2 MG/ML
INJECTION INTRAMUSCULAR; INTRAVENOUS AS NEEDED
Status: DISCONTINUED | OUTPATIENT
Start: 2021-03-30 | End: 2021-03-30 | Stop reason: HOSPADM

## 2021-03-30 RX ORDER — ONDANSETRON 2 MG/ML
4 INJECTION INTRAMUSCULAR; INTRAVENOUS
Status: DISCONTINUED | OUTPATIENT
Start: 2021-03-30 | End: 2021-04-02 | Stop reason: HOSPADM

## 2021-03-30 RX ORDER — KETAMINE HYDROCHLORIDE 10 MG/ML
INJECTION, SOLUTION INTRAMUSCULAR; INTRAVENOUS AS NEEDED
Status: DISCONTINUED | OUTPATIENT
Start: 2021-03-30 | End: 2021-03-30 | Stop reason: HOSPADM

## 2021-03-30 RX ORDER — KETOROLAC TROMETHAMINE 15 MG/ML
INJECTION, SOLUTION INTRAMUSCULAR; INTRAVENOUS AS NEEDED
Status: DISCONTINUED | OUTPATIENT
Start: 2021-03-30 | End: 2021-03-30 | Stop reason: HOSPADM

## 2021-03-30 RX ORDER — METOPROLOL TARTRATE 5 MG/5ML
INJECTION INTRAVENOUS AS NEEDED
Status: DISCONTINUED | OUTPATIENT
Start: 2021-03-30 | End: 2021-03-30 | Stop reason: HOSPADM

## 2021-03-30 RX ORDER — MIDAZOLAM HYDROCHLORIDE 1 MG/ML
INJECTION, SOLUTION INTRAMUSCULAR; INTRAVENOUS AS NEEDED
Status: DISCONTINUED | OUTPATIENT
Start: 2021-03-30 | End: 2021-03-30 | Stop reason: HOSPADM

## 2021-03-30 RX ORDER — METOCLOPRAMIDE HYDROCHLORIDE 5 MG/ML
INJECTION INTRAMUSCULAR; INTRAVENOUS AS NEEDED
Status: DISCONTINUED | OUTPATIENT
Start: 2021-03-30 | End: 2021-03-30 | Stop reason: HOSPADM

## 2021-03-30 RX ORDER — FAMOTIDINE 20 MG/50ML
20 INJECTION, SOLUTION INTRAVENOUS
Status: DISCONTINUED | OUTPATIENT
Start: 2021-03-30 | End: 2021-03-30

## 2021-03-30 RX ORDER — SODIUM CHLORIDE 0.9 % (FLUSH) 0.9 %
5-40 SYRINGE (ML) INJECTION AS NEEDED
Status: DISCONTINUED | OUTPATIENT
Start: 2021-03-30 | End: 2021-03-30 | Stop reason: HOSPADM

## 2021-03-30 RX ORDER — SODIUM CHLORIDE 0.9 % (FLUSH) 0.9 %
5-40 SYRINGE (ML) INJECTION EVERY 8 HOURS
Status: DISCONTINUED | OUTPATIENT
Start: 2021-03-30 | End: 2021-03-30 | Stop reason: HOSPADM

## 2021-03-30 RX ORDER — DIPHENHYDRAMINE HYDROCHLORIDE 50 MG/ML
12.5 INJECTION, SOLUTION INTRAMUSCULAR; INTRAVENOUS
Status: DISCONTINUED | OUTPATIENT
Start: 2021-03-30 | End: 2021-03-30 | Stop reason: HOSPADM

## 2021-03-30 RX ORDER — MORPHINE SULFATE 10 MG/ML
6 INJECTION, SOLUTION INTRAMUSCULAR; INTRAVENOUS
Status: DISCONTINUED | OUTPATIENT
Start: 2021-03-30 | End: 2021-03-31

## 2021-03-30 RX ORDER — SODIUM CHLORIDE, SODIUM LACTATE, POTASSIUM CHLORIDE, CALCIUM CHLORIDE 600; 310; 30; 20 MG/100ML; MG/100ML; MG/100ML; MG/100ML
100 INJECTION, SOLUTION INTRAVENOUS CONTINUOUS
Status: DISCONTINUED | OUTPATIENT
Start: 2021-03-30 | End: 2021-03-30 | Stop reason: HOSPADM

## 2021-03-30 RX ORDER — FENTANYL CITRATE 50 UG/ML
25 INJECTION, SOLUTION INTRAMUSCULAR; INTRAVENOUS AS NEEDED
Status: DISCONTINUED | OUTPATIENT
Start: 2021-03-30 | End: 2021-03-30 | Stop reason: HOSPADM

## 2021-03-30 RX ORDER — ONDANSETRON 2 MG/ML
INJECTION INTRAMUSCULAR; INTRAVENOUS AS NEEDED
Status: DISCONTINUED | OUTPATIENT
Start: 2021-03-30 | End: 2021-03-30 | Stop reason: HOSPADM

## 2021-03-30 RX ORDER — FENTANYL CITRATE 50 UG/ML
INJECTION, SOLUTION INTRAMUSCULAR; INTRAVENOUS AS NEEDED
Status: DISCONTINUED | OUTPATIENT
Start: 2021-03-30 | End: 2021-03-30 | Stop reason: HOSPADM

## 2021-03-30 RX ORDER — NYSTATIN 100000 [USP'U]/ML
500000 SUSPENSION ORAL
Status: COMPLETED | OUTPATIENT
Start: 2021-03-30 | End: 2021-03-30

## 2021-03-30 RX ORDER — ALBUTEROL SULFATE 0.83 MG/ML
2.5 SOLUTION RESPIRATORY (INHALATION)
Status: DISCONTINUED | OUTPATIENT
Start: 2021-03-30 | End: 2021-03-30 | Stop reason: HOSPADM

## 2021-03-30 RX ORDER — PROPOFOL 10 MG/ML
INJECTION, EMULSION INTRAVENOUS AS NEEDED
Status: DISCONTINUED | OUTPATIENT
Start: 2021-03-30 | End: 2021-03-30 | Stop reason: HOSPADM

## 2021-03-30 RX ORDER — NEOSTIGMINE METHYLSULFATE 1 MG/ML
INJECTION, SOLUTION INTRAVENOUS AS NEEDED
Status: DISCONTINUED | OUTPATIENT
Start: 2021-03-30 | End: 2021-03-30 | Stop reason: HOSPADM

## 2021-03-30 RX ORDER — NALOXONE HYDROCHLORIDE 0.4 MG/ML
0.1 INJECTION, SOLUTION INTRAMUSCULAR; INTRAVENOUS; SUBCUTANEOUS AS NEEDED
Status: DISCONTINUED | OUTPATIENT
Start: 2021-03-30 | End: 2021-03-30 | Stop reason: HOSPADM

## 2021-03-30 RX ORDER — SODIUM CHLORIDE, SODIUM LACTATE, POTASSIUM CHLORIDE, CALCIUM CHLORIDE 600; 310; 30; 20 MG/100ML; MG/100ML; MG/100ML; MG/100ML
50 INJECTION, SOLUTION INTRAVENOUS CONTINUOUS
Status: DISCONTINUED | OUTPATIENT
Start: 2021-03-30 | End: 2021-04-02 | Stop reason: HOSPADM

## 2021-03-30 RX ORDER — ROCURONIUM BROMIDE 10 MG/ML
INJECTION, SOLUTION INTRAVENOUS AS NEEDED
Status: DISCONTINUED | OUTPATIENT
Start: 2021-03-30 | End: 2021-03-30 | Stop reason: HOSPADM

## 2021-03-30 RX ORDER — ENOXAPARIN SODIUM 100 MG/ML
40 INJECTION SUBCUTANEOUS
Status: COMPLETED | OUTPATIENT
Start: 2021-03-30 | End: 2021-03-30

## 2021-03-30 RX ORDER — DEXMEDETOMIDINE HYDROCHLORIDE 100 UG/ML
INJECTION, SOLUTION INTRAVENOUS AS NEEDED
Status: DISCONTINUED | OUTPATIENT
Start: 2021-03-30 | End: 2021-03-30 | Stop reason: HOSPADM

## 2021-03-30 RX ORDER — HYDROMORPHONE HYDROCHLORIDE 1 MG/ML
0.5 INJECTION, SOLUTION INTRAMUSCULAR; INTRAVENOUS; SUBCUTANEOUS
Status: DISCONTINUED | OUTPATIENT
Start: 2021-03-30 | End: 2021-03-30 | Stop reason: HOSPADM

## 2021-03-30 RX ORDER — CEFAZOLIN SODIUM/WATER 2 G/20 ML
2 SYRINGE (ML) INTRAVENOUS ONCE
Status: COMPLETED | OUTPATIENT
Start: 2021-03-30 | End: 2021-03-30

## 2021-03-30 RX ORDER — NALOXONE HYDROCHLORIDE 0.4 MG/ML
0.4 INJECTION, SOLUTION INTRAMUSCULAR; INTRAVENOUS; SUBCUTANEOUS AS NEEDED
Status: DISCONTINUED | OUTPATIENT
Start: 2021-03-30 | End: 2021-04-02 | Stop reason: HOSPADM

## 2021-03-30 RX ORDER — ACETAMINOPHEN 500 MG
1000 TABLET ORAL ONCE
Status: COMPLETED | OUTPATIENT
Start: 2021-03-30 | End: 2021-03-30

## 2021-03-30 RX ORDER — EPHEDRINE SULFATE/0.9% NACL/PF 50 MG/5 ML
SYRINGE (ML) INTRAVENOUS AS NEEDED
Status: DISCONTINUED | OUTPATIENT
Start: 2021-03-30 | End: 2021-03-30 | Stop reason: HOSPADM

## 2021-03-30 RX ORDER — ENOXAPARIN SODIUM 100 MG/ML
40 INJECTION SUBCUTANEOUS EVERY 12 HOURS
Status: DISCONTINUED | OUTPATIENT
Start: 2021-03-30 | End: 2021-04-02 | Stop reason: HOSPADM

## 2021-03-30 RX ORDER — SODIUM CHLORIDE, SODIUM LACTATE, POTASSIUM CHLORIDE, CALCIUM CHLORIDE 600; 310; 30; 20 MG/100ML; MG/100ML; MG/100ML; MG/100ML
125 INJECTION, SOLUTION INTRAVENOUS CONTINUOUS
Status: DISCONTINUED | OUTPATIENT
Start: 2021-03-30 | End: 2021-03-30

## 2021-03-30 RX ADMIN — Medication 10 MG: at 13:08

## 2021-03-30 RX ADMIN — SODIUM CHLORIDE, SODIUM LACTATE, POTASSIUM CHLORIDE, AND CALCIUM CHLORIDE 125 ML/HR: 600; 310; 30; 20 INJECTION, SOLUTION INTRAVENOUS at 10:26

## 2021-03-30 RX ADMIN — MORPHINE SULFATE 6 MG: 10 INJECTION INTRAVENOUS at 22:41

## 2021-03-30 RX ADMIN — FENTANYL CITRATE 50 MCG: 50 INJECTION, SOLUTION INTRAMUSCULAR; INTRAVENOUS at 14:09

## 2021-03-30 RX ADMIN — METOPROLOL TARTRATE 2 MG: 5 INJECTION INTRAVENOUS at 14:13

## 2021-03-30 RX ADMIN — MIDAZOLAM HYDROCHLORIDE 2 MG: 1 INJECTION, SOLUTION INTRAMUSCULAR; INTRAVENOUS at 12:51

## 2021-03-30 RX ADMIN — CEFAZOLIN 3 G: 10 INJECTION, POWDER, FOR SOLUTION INTRAVENOUS at 21:06

## 2021-03-30 RX ADMIN — CEFAZOLIN 2 G: 1 INJECTION, POWDER, FOR SOLUTION INTRAVENOUS at 13:09

## 2021-03-30 RX ADMIN — PHENYLEPHRINE HYDROCHLORIDE 100 MCG: 10 INJECTION INTRAVENOUS at 13:15

## 2021-03-30 RX ADMIN — ONDANSETRON HYDROCHLORIDE 4 MG: 2 INJECTION INTRAMUSCULAR; INTRAVENOUS at 14:28

## 2021-03-30 RX ADMIN — MORPHINE SULFATE 6 MG: 10 INJECTION INTRAVENOUS at 18:53

## 2021-03-30 RX ADMIN — DEXMEDETOMIDINE HYDROCHLORIDE 10 MCG: 100 INJECTION, SOLUTION INTRAVENOUS at 13:34

## 2021-03-30 RX ADMIN — PROPOFOL 150 MG: 10 INJECTION, EMULSION INTRAVENOUS at 12:57

## 2021-03-30 RX ADMIN — FENTANYL CITRATE 50 MCG: 50 INJECTION, SOLUTION INTRAMUSCULAR; INTRAVENOUS at 13:24

## 2021-03-30 RX ADMIN — ENOXAPARIN SODIUM 40 MG: 40 INJECTION SUBCUTANEOUS at 21:06

## 2021-03-30 RX ADMIN — Medication 10 MG: at 13:22

## 2021-03-30 RX ADMIN — KETOROLAC TROMETHAMINE 15 MG: 15 INJECTION, SOLUTION INTRAMUSCULAR; INTRAVENOUS at 14:24

## 2021-03-30 RX ADMIN — KETAMINE HYDROCHLORIDE 20 MG: 10 INJECTION, SOLUTION INTRAMUSCULAR; INTRAVENOUS at 13:48

## 2021-03-30 RX ADMIN — ROCURONIUM BROMIDE 50 MG: 10 INJECTION, SOLUTION INTRAVENOUS at 12:57

## 2021-03-30 RX ADMIN — DEXMEDETOMIDINE HYDROCHLORIDE 10 MCG: 100 INJECTION, SOLUTION INTRAVENOUS at 14:07

## 2021-03-30 RX ADMIN — DEXMEDETOMIDINE HYDROCHLORIDE 15 MCG: 100 INJECTION, SOLUTION INTRAVENOUS at 13:47

## 2021-03-30 RX ADMIN — SODIUM CHLORIDE, SODIUM LACTATE, POTASSIUM CHLORIDE, AND CALCIUM CHLORIDE: 600; 310; 30; 20 INJECTION, SOLUTION INTRAVENOUS at 14:13

## 2021-03-30 RX ADMIN — KETAMINE HYDROCHLORIDE 30 MG: 10 INJECTION, SOLUTION INTRAMUSCULAR; INTRAVENOUS at 13:32

## 2021-03-30 RX ADMIN — SODIUM CHLORIDE, SODIUM LACTATE, POTASSIUM CHLORIDE, AND CALCIUM CHLORIDE: 600; 310; 30; 20 INJECTION, SOLUTION INTRAVENOUS at 13:25

## 2021-03-30 RX ADMIN — PHENYLEPHRINE HYDROCHLORIDE 100 MCG: 10 INJECTION INTRAVENOUS at 13:22

## 2021-03-30 RX ADMIN — Medication 10 MG: at 13:16

## 2021-03-30 RX ADMIN — FENTANYL CITRATE 25 MCG: 50 INJECTION, SOLUTION INTRAMUSCULAR; INTRAVENOUS at 15:52

## 2021-03-30 RX ADMIN — ENOXAPARIN SODIUM 40 MG: 100 INJECTION SUBCUTANEOUS at 10:28

## 2021-03-30 RX ADMIN — SODIUM CHLORIDE, SODIUM LACTATE, POTASSIUM CHLORIDE, AND CALCIUM CHLORIDE 150 ML/HR: 600; 310; 30; 20 INJECTION, SOLUTION INTRAVENOUS at 21:06

## 2021-03-30 RX ADMIN — ROCURONIUM BROMIDE 10 MG: 10 INJECTION, SOLUTION INTRAVENOUS at 13:27

## 2021-03-30 RX ADMIN — Medication 3 MG: at 14:32

## 2021-03-30 RX ADMIN — LIDOCAINE HYDROCHLORIDE 80 MG: 20 INJECTION, SOLUTION EPIDURAL; INFILTRATION; INTRACAUDAL; PERINEURAL at 12:57

## 2021-03-30 RX ADMIN — Medication 10 MG: at 13:12

## 2021-03-30 RX ADMIN — METOCLOPRAMIDE 10 MG: 5 INJECTION, SOLUTION INTRAMUSCULAR; INTRAVENOUS at 14:26

## 2021-03-30 RX ADMIN — FENTANYL CITRATE 25 MCG: 50 INJECTION, SOLUTION INTRAMUSCULAR; INTRAVENOUS at 16:00

## 2021-03-30 RX ADMIN — NYSTATIN 500000 UNITS: 100000 SUSPENSION ORAL at 10:28

## 2021-03-30 RX ADMIN — GLYCOPYRROLATE 0.6 MG: 0.2 INJECTION INTRAMUSCULAR; INTRAVENOUS at 14:32

## 2021-03-30 RX ADMIN — PHENYLEPHRINE HYDROCHLORIDE 100 MCG: 10 INJECTION INTRAVENOUS at 13:09

## 2021-03-30 RX ADMIN — FENTANYL CITRATE 100 MCG: 50 INJECTION, SOLUTION INTRAMUSCULAR; INTRAVENOUS at 12:57

## 2021-03-30 RX ADMIN — FENTANYL CITRATE 50 MCG: 50 INJECTION, SOLUTION INTRAMUSCULAR; INTRAVENOUS at 13:56

## 2021-03-30 RX ADMIN — HYDROMORPHONE HYDROCHLORIDE 0.5 MG: 1 INJECTION, SOLUTION INTRAMUSCULAR; INTRAVENOUS; SUBCUTANEOUS at 15:35

## 2021-03-30 RX ADMIN — FENTANYL CITRATE 50 MCG: 50 INJECTION, SOLUTION INTRAMUSCULAR; INTRAVENOUS at 13:48

## 2021-03-30 RX ADMIN — HYDROMORPHONE HYDROCHLORIDE 0.5 MG: 1 INJECTION, SOLUTION INTRAMUSCULAR; INTRAVENOUS; SUBCUTANEOUS at 15:25

## 2021-03-30 RX ADMIN — FAMOTIDINE 20 MG: 10 INJECTION INTRAVENOUS at 10:29

## 2021-03-30 RX ADMIN — ONDANSETRON 4 MG: 2 INJECTION INTRAMUSCULAR; INTRAVENOUS at 21:34

## 2021-03-30 RX ADMIN — ACETAMINOPHEN 1000 MG: 500 TABLET ORAL at 10:28

## 2021-03-30 NOTE — ANESTHESIA PREPROCEDURE EVALUATION
Relevant Problems   No relevant active problems       Anesthetic History     PONV     Comments: mild     Review of Systems / Medical History  Patient summary reviewed and pertinent labs reviewed    Pulmonary        Sleep apnea: CPAP      Pertinent negatives: No smoker     Neuro/Psych         Psychiatric history     Cardiovascular    Hypertension: well controlled              Exercise tolerance: >4 METS     GI/Hepatic/Renal           Hiatal hernia and liver disease  Pertinent negatives: No GERD   Endo/Other        Morbid obesity and arthritis     Other Findings              Physical Exam    Airway  Mallampati: II  TM Distance: > 6 cm  Neck ROM: decreased range of motion   Mouth opening: Normal    Comments: c5-6 fusion Cardiovascular    Rhythm: regular  Rate: normal         Dental    Dentition: Full upper dentures and Full lower dentures     Pulmonary  Breath sounds clear to auscultation               Abdominal  GI exam deferred       Other Findings            Anesthetic Plan    ASA: 3  Anesthesia type: general          Induction: Intravenous  Anesthetic plan and risks discussed with: Patient

## 2021-03-30 NOTE — PERIOP NOTES
TRANSFER - OUT REPORT:    Verbal report given to Glenis(name) on Darius  being transferred to [facility](unit) for routine post - op       Report consisted of patients Situation, Background, Assessment and   Recommendations(SBAR). Information from the following report(s) SBAR, Kardex, OR Summary, MAR and Cardiac Rhythm NSR was reviewed with the receiving nurse. Lines:   Peripheral IV 03/30/21 Left;Posterior Hand (Active)   Site Assessment Clean, dry, & intact 03/30/21 1510   Phlebitis Assessment 0 03/30/21 1510   Infiltration Assessment 0 03/30/21 1510   Dressing Status Clean, dry, & intact 03/30/21 1510   Dressing Type Tape;Transparent 03/30/21 1510   Hub Color/Line Status Infusing;Green;Patent 03/30/21 1510   Alcohol Cap Used No 03/30/21 1027        Opportunity for questions and clarification was provided.       Patient transported with:   O2 @ 3 liters  Registered Nurse  [facility]

## 2021-03-30 NOTE — PROGRESS NOTES
Aasa 45 client from PACU in satisfactory condition. Client is a pt of Dr. Lakesha Nino. Pt had a Lap Sleeve Gastrectomy, EMILY, Hiatal hernia repair, Wedge Liver Bx, and Inra Op Endoscopy with Bx.  today. Client is A/O X 4. Client is calm and cooperative. Clients PERRLA. Denies numbness or tingling to anty extremity. With + Radial,Posterior Tibial and Dorsalis Pedis pulses. Capillary Refill less than 3 seconds. Skin is warm , dry and skin color is appropriate to race. Client is negative for JVD. Bibasilar breath sounds clear. No use of accessory muscles. Bowel sounds hypoactive to all quadrants. Abdomen is soft and non-tender. Client has garcia cath draining clear yellow urine. Client has 5 Lap sites to abdomen with gauze and medipore dressings which are dry and intact. Pt has KRISTIE drain with blackish reddish drainage. .Pt hasTed hose to both LE's. Sequential compression device applied. Client has LH 18 gauge PIV present and running LR at 150 ml/hr. Clients pain is 7/10 on 0-10 scale. Client oriented to call bell use as well as bed use. Client oriented to phone and how to order meals. Call bell within reach. Bed in low position. Three side rails up. Armbands/ fall risk band intact. Dual skin check done with Lanie Gr RN. No skin breakdown on sacrum but  Has abrasions to R Arm, left Lateral Hip, Bruising on r Arm, excoriations under breasts, and pannus, .   1855   Pt medicated with Morphine 6 mg IV for pain level 7/10.

## 2021-03-30 NOTE — PERIOP NOTES
18 [facility] made aware that SBAR is ready for review. Patient assigned room #226.  Julien Soulier will be the nurse

## 2021-03-30 NOTE — PERIOP NOTES
Reviewed PTA medication list with patient/caregiver and patient/caregiver denies any additional medications. Patient admits to having a responsible adult care for them at home for at least 24 hours after surgery. Patient encouraged to use gown warming system and informed that using said warming gown to regulate body temperature prior to a procedure has been shown to help reduce the risks of blood clots and infection. Patient's pharmacy of choice verified and documented in PTA medication section. Dual skin assessment & fall risk band verification completed with LORENA Holliday RN.

## 2021-03-30 NOTE — PERIOP NOTES
Primary Nurse Paradise Kim and Julien Soulier, RN performed a dual skin assessment on this patient No impairment noted

## 2021-03-30 NOTE — INTERVAL H&P NOTE
Update History & Physical 
 
The Patient's History and Physical of March 29, 2021 was reviewed with the patient and I examined the patient. There was no change. The surgical site was confirmed by the patient and me. Plan:  The risk, benefits, expected outcome, and alternative to the recommended procedure have been discussed with the patient. Patient understands and wants to proceed with the procedure.  
 
Electronically signed by Jeffery Mora MD on 3/30/2021 at 11:56 AM

## 2021-03-30 NOTE — ANESTHESIA POSTPROCEDURE EVALUATION
Procedure(s):  LAPAROSCOPIC SLEEVE GASTRECTOMY, LYSIS OF ADHESIONS,  HIATAL HERNIA REPAIR, WEDGE LIVER BIOPSY AND INTRAOPERATIVE ENDOSCOPY WITH BIOPSY. general    Anesthesia Post Evaluation      Multimodal analgesia: multimodal analgesia used between 6 hours prior to anesthesia start to PACU discharge  Patient location during evaluation: PACU  Patient participation: complete - patient participated  Level of consciousness: awake  Pain score: 7  Airway patency: patent  Anesthetic complications: no  Cardiovascular status: acceptable  Respiratory status: acceptable  Hydration status: acceptable  Post anesthesia nausea and vomiting:  none  Final Post Anesthesia Temperature Assessment:  Normothermia (36.0-37.5 degrees C)      INITIAL Post-op Vital signs:   Vitals Value Taken Time   /55 03/30/21 1615   Temp 37 °C (98.6 °F) 03/30/21 1450   Pulse 87 03/30/21 1620   Resp 14 03/30/21 1620   SpO2 92 % 03/30/21 1620   Vitals shown include unvalidated device data.

## 2021-03-30 NOTE — NURSE NAVIGATOR
Patient dozing in and out of sleep throughout visit. Patient complained of expected pain with mild nausea. Vitals:   Visit Vitals  BP (!) 135/50   Pulse 86   Temp 97.6 °F (36.4 °C)   Resp 14   Ht 5' 3\" (1.6 m)   Wt 106.3 kg (234 lb 4 oz)   SpO2 96%   BMI 41.50 kg/m²     General: Alert & oriented to person, place, time, and situation  Pulmonary: Lungs clear to auscultation in all fields. Cardiac: Regular rate and rhythm  Abdomen: Appropriate tenderness; soft; non-distended;   hypo-active bowel sounds  Lap sites: Within normal limits  KRISTIE drain: Small amount of serosanguinous drainage  SCD's:  In place and operating WNL    Plan:  -Continue medications for symptom management  -Encourage ambulation  -SCD use when in bed  -IS 10 times an hour  -NPO  -UGI in AM; bariatric full liquid diet  if UGI within normal limits  -Howell removed in AM

## 2021-03-30 NOTE — OP NOTES
OPERATIVE REPORT         Patient:Anahy Pisano   : 1950  Medical Record VPRDQK:[id number]    Pre-operative Diagnosis:  SLEEP APNEA, HYPERTENSION, MORBID, OBESITY, BMI 42, FATTY LIVER, HIATAL HERNIA  Post-operative Diagnosis: SLEEP APNEA, HYPERTENSION, MORBID, OBESITY, BMI 42, FATTY LIVER, HIATAL HERNIA  Procedure: Procedure(s):  LAPAROSCOPIC SLEEVE GASTRECTOMY  LYSIS OF ADHESIONS GREATER THAN 40 MINUTES  HIATAL HERNIA REPAIR  WEDGE LIVER BIOPSY  INTRAOPERATIVE ENDOSCOPY WITH BIOPSY  Location: [facility]  Surgeon: Viola Francisco MD  Assistant:  Tre Olmstead [facility]  - (there are no qualified interns, residents, or any other qualified house   physicians available to assist with this surgery) performed retraction of various structures,  assisted in   creation of the gastric sleeve, fired stapling devices, obtained hemostasis along staple lines via hemoclips,       Anesthesia: General       Specimens: 1. Gastric Sleeve Resection                       2. Liver Wedge Biopsy                       3. Endoscopy biopsy    EBL: less than 10cc  Additional Findings: None  Complications: None             STATEMENT OF MEDICAL NECESSITY: The patient is a 73y.o.-year-old female who has   had a history of obesity. she has failed conservative weight loss measures,   such began to consider weight loss surgical options. she chose the   sleeve gastrectomy as a means of surgical weight control. she has undergone   nutritional and psychological teaching at this time period and does wish to proceed   with sleeve gastrectomy. OPERATIVE PROCEDURE: The patient was brought to the operating room, placed   on the table in supine position at which time general  anesthesia was administered   without any difficulty. The abdomen was then prepped and draped in the   usual sterile fashion. Using a 15 blade, a 1 cm incision was made just to the   left of the umbilicus.  The veress needle approach was used to gain access to the peritoneal cavity which was then insufflated. The Visi-Port was then placed   at that site,then 4 additional trocars were placed in the usual U-shaped   configuration with a subxiphoid incision being made to accommodate the   [facility] retractor. Before beginning the operation proper I used the hook cautery device to   take down multiple dense areas of adhesions related to prior abdominal surgery. These adhesions   were located between the liver, omentum, stomach and the abdominal wall and left subhepatic space. The adhesiolysis at the beginning of her main procedure to facilitate such took in excess of 40 minutes   to achieve. Her pelvis was fused with adhesions from her prior colectomy with extensive small   bowel adhesive disease that we knew we could not perform a gastric bypass. We therefore chose   to proceed with the sleeve since her hiatal hernia was not as large as we originally thought. No issues   were noted during this portion of the procedure. On entering the abdomen, the patient was noted to have a   moderately fatty liver with possible evidence of early steatohepatitis. I elevated the liver and noted the   patient had a moderate sized diaphragmatic hernia present. I began the operation by choosing an area 2-3 cm   proximal to the pylorus and within the gastroepiploic vessels I began to divide off these vessels individually. I moved cephalad toward short gastric vessels, which were very difficult to take down due to the proximity   to the splenic hilum. I was able to do so, clearing the entire left crural area. I then placed a Visigi tube,   impacting at the distal antrum. I then began the resection with the powered [place]   stapler using the green loads with Endopath buttress strips for the first firing tangential along the   antral region. The second and subsequent firings were used with gold and blue  reloads and the same buttress strips reaching just past the incisura region.  The remainder of the 5 vertical   firings completed the resection at the left crural region. I then tested   the pouch via the Visigi tube using dilute methylene blue,it was noted to be completely   Watertight. I then left the operative field and proceeded to the the head of the bed and performed an   intraoperative EGD. The scope was passed successfully into the gastric sleeve to the level of the pylorus. Biopsies were taken of this region and submitted to test both for H Pylori and for pathologic diagnosis. There was no bleeding noted and no leak appreciated with air insufflation. I then returned to the surgical field. I then obtained hemostasis along the staple line using   Hemoclips and sutures where needed. I then used 3 separate 2-0 Ethibond sutures to secure the lateral   aspect of the newly created sleeve stomach to the resected edge of the gastrocolic omentum in an effort   to maintain the continuity of the sleeve and prevent twisting. I then turned my attention to the diaphragmatic   repair. I then dissected out the diaphragmatic hernia and closed it using 2  2-0 Ethibond   sutures against the esophageal wall, taking care not to encroach upon the esophagus. I then used Eviseal   along the entire staple line to obtain hemostasis and then place Surgicel Snow over the staple line also. With all this having been completed, I then removed the liver retractor. I performed a liver wedge biopsy of   the left lobe of the liver due to its abnormality and submitted to pathology for permanent section. I then placed   a KRISTIE drain in the left upper quadrant region along the staple line. I removed the specimen from the operative   field via the LLQ incision. I closed the left lower quadrant trocar site using a transabdominal #0 PDS   suture along the fascia, and all skin incisions were then closed using 4-0 subcuticular Monocryl. Steri-Strips   and sterile dressings were applied.  The patient tolerated the procedure well.        Alexia Riggs M.D.

## 2021-03-30 NOTE — ROUTINE PROCESS
STEVEN Hernández 115 TRANSFER - IN REPORT: 
 
Verbal report received from [address], RN(name) on Darius  being received from [employer]) for routine post - op Report consisted of patients Situation, Background, Assessment and  
Recommendations(SBAR). Information from the following report(s) SBAR, Kardex and MAR was reviewed with the receiving nurse. Opportunity for questions and clarification was provided. Assessment completed upon patients arrival to unit and care assumed.

## 2021-03-31 ENCOUNTER — APPOINTMENT (OUTPATIENT)
Dept: GENERAL RADIOLOGY | Age: 71
DRG: 621 | End: 2021-03-31
Attending: SPECIALIST
Payer: MEDICARE

## 2021-03-31 PROCEDURE — 74011250636 HC RX REV CODE- 250/636: Performed by: SPECIALIST

## 2021-03-31 PROCEDURE — 74011000250 HC RX REV CODE- 250: Performed by: SPECIALIST

## 2021-03-31 PROCEDURE — C9113 INJ PANTOPRAZOLE SODIUM, VIA: HCPCS | Performed by: SPECIALIST

## 2021-03-31 PROCEDURE — 74011000636 HC RX REV CODE- 636: Performed by: SPECIALIST

## 2021-03-31 PROCEDURE — 65270000029 HC RM PRIVATE

## 2021-03-31 PROCEDURE — 74011250637 HC RX REV CODE- 250/637: Performed by: SPECIALIST

## 2021-03-31 PROCEDURE — 74240 X-RAY XM UPR GI TRC 1CNTRST: CPT

## 2021-03-31 RX ORDER — METOCLOPRAMIDE HYDROCHLORIDE 5 MG/ML
5 INJECTION INTRAMUSCULAR; INTRAVENOUS EVERY 6 HOURS
Status: DISCONTINUED | OUTPATIENT
Start: 2021-03-31 | End: 2021-04-02 | Stop reason: HOSPADM

## 2021-03-31 RX ORDER — OXYCODONE AND ACETAMINOPHEN 5; 325 MG/1; MG/1
1-2 TABLET ORAL
Status: DISCONTINUED | OUTPATIENT
Start: 2021-03-31 | End: 2021-04-02 | Stop reason: HOSPADM

## 2021-03-31 RX ADMIN — MORPHINE SULFATE 6 MG: 10 INJECTION INTRAVENOUS at 04:47

## 2021-03-31 RX ADMIN — METOCLOPRAMIDE 5 MG: 5 INJECTION, SOLUTION INTRAMUSCULAR; INTRAVENOUS at 18:35

## 2021-03-31 RX ADMIN — OXYCODONE HYDROCHLORIDE AND ACETAMINOPHEN 1 TABLET: 5; 325 TABLET ORAL at 22:06

## 2021-03-31 RX ADMIN — ONDANSETRON 4 MG: 2 INJECTION INTRAMUSCULAR; INTRAVENOUS at 06:21

## 2021-03-31 RX ADMIN — ENOXAPARIN SODIUM 40 MG: 40 INJECTION SUBCUTANEOUS at 10:00

## 2021-03-31 RX ADMIN — CEFAZOLIN 3 G: 10 INJECTION, POWDER, FOR SOLUTION INTRAVENOUS at 04:47

## 2021-03-31 RX ADMIN — ENOXAPARIN SODIUM 40 MG: 40 INJECTION SUBCUTANEOUS at 21:58

## 2021-03-31 RX ADMIN — METOCLOPRAMIDE 5 MG: 5 INJECTION, SOLUTION INTRAMUSCULAR; INTRAVENOUS at 12:19

## 2021-03-31 RX ADMIN — ONDANSETRON 4 MG: 2 INJECTION INTRAMUSCULAR; INTRAVENOUS at 14:55

## 2021-03-31 RX ADMIN — SODIUM CHLORIDE 40 MG: 9 INJECTION, SOLUTION INTRAMUSCULAR; INTRAVENOUS; SUBCUTANEOUS at 08:43

## 2021-03-31 RX ADMIN — OXYCODONE HYDROCHLORIDE AND ACETAMINOPHEN 1 TABLET: 5; 325 TABLET ORAL at 14:55

## 2021-03-31 RX ADMIN — IOHEXOL 75 ML: 240 INJECTION, SOLUTION INTRATHECAL; INTRAVASCULAR; INTRAVENOUS; ORAL at 08:16

## 2021-03-31 NOTE — PROGRESS NOTES
1430 Received bedside shift report from Ofelia Watson RN. Report included the following information SBAR, Kardex, Intake/Output, MAR and Recent Results. 1431 Stand by assist. Patient up to the bathroom with walker. Pt walked in hallway with walker. I let patient know she can get up on her own to walk as she looks strong and safety awareness. Call for assist if she feels light headed. Instructed patient how to order a bariatric tray as she has no food in her room. Reminded her to order protein shake. Reminded her 4 cups an hour while awake. 1635 Ordered a protein shake(prefers chocolate) as there was not one on patient tray. Pt not demonstrating understanding bariatric diet. Encouraged to sample all items on tray using 4-5 cups an hour. Pt up with walker to the bathroom. 1919 Gave bedside shift report to oncoming nurse Angelita Riggs RN. Report included the following information: SBAR, Kardex, Intake/Output, MAR and Recent Results.

## 2021-03-31 NOTE — PROGRESS NOTES
Problem: Falls - Risk of  Goal: *Absence of Falls  Description: Document [facility] Fall Risk and appropriate interventions in the flowsheet.   Outcome: Progressing Towards Goal  Note: Fall Risk Interventions:  Mobility Interventions: Communicate number of staff needed for ambulation/transfer, Patient to call before getting OOB, Utilize walker, cane, or other assistive device    Mentation Interventions: Adequate sleep, hydration, pain control, Update white board    Medication Interventions: Teach patient to arise slowly, Patient to call before getting OOB    Elimination Interventions: Call light in reach, Patient to call for help with toileting needs, Toileting schedule/hourly rounds

## 2021-03-31 NOTE — ROUTINE PROCESS
2005 
Bedside and Verbal shift change report given to Ishan Shahid RN (oncoming nurse) by Benjamin Cha RN (offgoing nurse). Report included the following information SBAR, Kardex and MAR.

## 2021-03-31 NOTE — PROGRESS NOTES
[address] care at this time. 2103 - Patient A&Ox4, RA. Denies chest pain and SOB. SCD compression device and TEDs bilaterally. x5 lap sites with covaderm dressing CDI, KRISTIE draining and patent. Pt is burping, denies flatus. Denies numbness/tingling/calf pain. Pain 8/10 with a tolerable level of 4/10. Pt ambulated from her room to the end of the long blakely and back. Pt educated on IS use, q2h rounds, pain management, CHG wipes, and morning GI study. Pt verbalized understanding, no concerns voiced. Call bell within reach, bed in lowest position. Pt encouraged to call for assistance. 2234 - Patient rated pain 5/10, pain medication administered per MAR. No other concerns voiced at this time. Call bell within reach, bed in lowest position. Pt encouraged to use call bell for any needs. 5094 - Patient rated pain 5/10, pain medication administered per MAR. Last dose prior to GI study. No other concerns voiced at this time. Call bell within reach, bed in lowest position. Pt encouraged to use call bell for any needs. 2375 - Howell removed, CHG wipes completed. Pt ambulated in hallway. C/o nausea, Zofran administered. No other concerns voiced. Telephone and call bell within reach, bed in lowest position.

## 2021-03-31 NOTE — PROGRESS NOTES
Transition of Care (DAPHNEY) Plan:          Pt admitted for an elective surgical procedure- laparoscopic sleeve gastrectomy, lysis of adhesions greater than 40 minutes, hiatal hernia repair, wedge liver biopsy on 3/30/21. Pt is independent. Please encourage ambulation. No transition of care needs identified at this time. Anticipate pt will be medically stable for discharge within the next 24-48 hours with physician follow up. CM available to assist as needed. DAPH[facility] Transportation:   How is patient being transported at discharge? Family/Friend      When? Once cleared by physician     Is transport scheduled? N/A      Follow-up appointment and transportation:   PCP/Specialist?  See AVS for Appointment         Who is transporting to the follow-up appointment? Self/Family/Friend      Is transport for follow up appointment scheduled? N/A    Communication plan (with patient/family): Who is being called? Patient or Next of Kin? Responsible party? Patient      What number(s) is to be used? See Facesheet      What service provider is calling for [facility] services? When are they calling? Readmission Risk?   (Green/Low; Yellow/Moderate; Red/High):  [employer] here to complete [place] including selection of the Healthcare Decision Maker Relationship (ie \"Primary\") - Daughter Yariel Gonzalez

## 2021-03-31 NOTE — ROUTINE PROCESS
Bedside and Verbal shift change report given to [address] (oncoming nurse) by Ronaldo Cook RN (offgoing nurse). Report included the following information SBAR, Kardex, MAR and Recent Results.

## 2021-03-31 NOTE — PROGRESS NOTES
Assumed care of pt at this time. Assessment complete. Pt alert and oriented x 4. Showing no signs of distress. Denies SOB and chest pain. Pt lungs clear bilaterally. Cap refill  less than 3 seconds. Pt denies numbness and tingling to all extremities. Stated pain 4 /10. Pt has 18G IV to L hand. Pt has x5 trocar sites. SCDS and TEDs applied to bilaterally. Pt encouraged to continue use of IS. Pt verbalized understanding. Ice pack in place. Fall risk armband intact. Call light and possessions within reach. Bed in low position with wheels locked. Will continue to monitor.      1146-Pt ambulated to bathroom

## 2021-03-31 NOTE — ROUTINE PROCESS
Bedside and Verbal shift change report given to BEBE Quesada RN by Estelle Cornelius RN. Report included the following information SBAR, Kardex, Intake/Output and MAR.

## 2021-03-31 NOTE — PROGRESS NOTES
Bariatric Surgery                POD #1    Visit Vitals  BP (!) 136/45   Pulse 87   Temp 98 °F (36.7 °C)   Resp 17   Ht 5' 3\" (1.6 m)   Wt 106.3 kg (234 lb 4 oz)   SpO2 95%   BMI 41.50 kg/m²     Patient has minimal complaints of pain, moderate nausea noted     Exam:  Appears well in no distress  Lungs- clear bilaterally  Abd - soft, incisions look good without erythema           RKISTIE with minimal serosanguinous output  Extremities- no new edema or swelling    UGI - no obstrustion or leak    Data Review:    Labs: Results:       Chemistry No results for input(s): GLU, NA, K, CL, CO2, BUN, CREA, CA, AGAP, BUCR, TBIL, AP, TP, ALB, GLOB, AGRAT in the last 72 hours. No lab exists for component: GPT   CBC w/Diff No results for input(s): WBC, RBC, HGB, HCT, PLT, GRANS, LYMPH, EOS, RETIC, HGBEXT, HCTEXT, PLTEXT in the last 72 hours. Coagulation No results for input(s): PTP, INR, APTT, INREXT in the last 72 hours. Liver Enzymes No results for input(s): TP, ALB, TBIL, AP in the last 72 hours. No lab exists for component: SGOT, GPT, DBIL       Assessment/Plan: S/P  laparoscopic sleeve gastrectomy - moderate nausea, may need to stay another night. 1.Start bariatric diet and protein shakes  2. D/C IV pain meds and start PO pain meds  3. D/C KRISTIE drain

## 2021-04-01 PROCEDURE — 74011250636 HC RX REV CODE- 250/636: Performed by: SPECIALIST

## 2021-04-01 PROCEDURE — 74011000258 HC RX REV CODE- 258: Performed by: SPECIALIST

## 2021-04-01 PROCEDURE — 74011250637 HC RX REV CODE- 250/637: Performed by: SPECIALIST

## 2021-04-01 PROCEDURE — C9113 INJ PANTOPRAZOLE SODIUM, VIA: HCPCS | Performed by: SPECIALIST

## 2021-04-01 PROCEDURE — 65270000029 HC RM PRIVATE

## 2021-04-01 PROCEDURE — 74011000250 HC RX REV CODE- 250: Performed by: SPECIALIST

## 2021-04-01 RX ORDER — METOPROLOL TARTRATE 25 MG/1
12.5 TABLET, FILM COATED ORAL 2 TIMES DAILY
Status: DISCONTINUED | OUTPATIENT
Start: 2021-04-01 | End: 2021-04-02 | Stop reason: HOSPADM

## 2021-04-01 RX ORDER — AMLODIPINE BESYLATE 5 MG/1
5 TABLET ORAL DAILY
Status: DISCONTINUED | OUTPATIENT
Start: 2021-04-01 | End: 2021-04-02 | Stop reason: HOSPADM

## 2021-04-01 RX ORDER — FUROSEMIDE 10 MG/ML
20 INJECTION INTRAMUSCULAR; INTRAVENOUS ONCE
Status: COMPLETED | OUTPATIENT
Start: 2021-04-01 | End: 2021-04-01

## 2021-04-01 RX ADMIN — METOCLOPRAMIDE 5 MG: 5 INJECTION, SOLUTION INTRAMUSCULAR; INTRAVENOUS at 00:19

## 2021-04-01 RX ADMIN — ENOXAPARIN SODIUM 40 MG: 40 INJECTION SUBCUTANEOUS at 10:01

## 2021-04-01 RX ADMIN — AMLODIPINE BESYLATE 5 MG: 5 TABLET ORAL at 10:00

## 2021-04-01 RX ADMIN — METOCLOPRAMIDE 5 MG: 5 INJECTION, SOLUTION INTRAMUSCULAR; INTRAVENOUS at 12:09

## 2021-04-01 RX ADMIN — METOPROLOL TARTRATE 12.5 MG: 25 TABLET, FILM COATED ORAL at 09:59

## 2021-04-01 RX ADMIN — METOCLOPRAMIDE 5 MG: 5 INJECTION, SOLUTION INTRAMUSCULAR; INTRAVENOUS at 23:42

## 2021-04-01 RX ADMIN — SODIUM CHLORIDE 40 MG: 9 INJECTION, SOLUTION INTRAMUSCULAR; INTRAVENOUS; SUBCUTANEOUS at 10:02

## 2021-04-01 RX ADMIN — ENOXAPARIN SODIUM 40 MG: 40 INJECTION SUBCUTANEOUS at 21:22

## 2021-04-01 RX ADMIN — METOCLOPRAMIDE 5 MG: 5 INJECTION, SOLUTION INTRAMUSCULAR; INTRAVENOUS at 18:15

## 2021-04-01 RX ADMIN — FUROSEMIDE 20 MG: 10 INJECTION, SOLUTION INTRAMUSCULAR; INTRAVENOUS at 11:11

## 2021-04-01 RX ADMIN — METOPROLOL TARTRATE 12.5 MG: 25 TABLET, FILM COATED ORAL at 21:22

## 2021-04-01 RX ADMIN — METOCLOPRAMIDE 5 MG: 5 INJECTION, SOLUTION INTRAMUSCULAR; INTRAVENOUS at 06:23

## 2021-04-01 RX ADMIN — OXYCODONE HYDROCHLORIDE AND ACETAMINOPHEN 1 TABLET: 5; 325 TABLET ORAL at 21:26

## 2021-04-01 RX ADMIN — ONDANSETRON 4 MG: 2 INJECTION INTRAMUSCULAR; INTRAVENOUS at 19:56

## 2021-04-01 RX ADMIN — FOSAPREPITANT 150 MG: 150 INJECTION, POWDER, LYOPHILIZED, FOR SOLUTION INTRAVENOUS at 09:56

## 2021-04-01 NOTE — ROUTINE PROCESS
Bedside and Verbal shift change report given to Adriana Lazaro RN by Clarisa Bardales. Report included the following information SBAR, Kardex, OR Summary, Intake/Output and MAR.

## 2021-04-01 NOTE — PROGRESS NOTES
Problem: Falls - Risk of  Goal: *Absence of Falls  Description: Document Jess Pacen Fall Risk and appropriate interventions in the flowsheet.   Outcome: Progressing Towards Goal  Note: Fall Risk Interventions:  Mobility Interventions: Communicate number of staff needed for ambulation/transfer, Patient to call before getting OOB    Mentation Interventions: Adequate sleep, hydration, pain control    Medication Interventions: Patient to call before getting OOB, Teach patient to arise slowly    Elimination Interventions: Call light in reach, Patient to call for help with toileting needs

## 2021-04-01 NOTE — PROGRESS NOTES
3022 - Bedside report received from [facility]. Patient in bed. Pain LH/10.     2100 - Patient in bed at this time. IV to [address]  intact and patent. Sequential compression device bilaterally. TEDs to BLE. Sterri strips and  site drsg to abd CDI. + CMS. Pt A & O x 4. LS clear, on RA. Abdomen soft, NT and ND. + BS to all 4 quadrants. Denies nausea. Pain 3/10. Call light within reach. 0625-Pt declines pain meds, reports nausea, Reglan given. CHG wipes offered, pt declines, says she is nauseous and wants to sleep. Pt had an uneventful shift. Uses IS every hour while awake. Pt ambulated with assistance with a walker. Pain remained well-controlled with the ordered medication. No issues/concerns at this time.  Call bell within reach

## 2021-04-01 NOTE — ROUTINE PROCESS
1919 
Bedside and Verbal shift change report given to Dean Stewart RN (oncoming nurse) by Chung Antonio RN (offgoing nurse). Report included the following information SBAR, Kardex and MAR.

## 2021-04-01 NOTE — PROGRESS NOTES
1920 Assumed patient care at this time. Report received from James Andrew RN. Patient in bed in lowest position with wheels locked. Call light within reach. 1956 Shift assessment completed at this time. Patient is alert and oriented x4. Lungs diminished on room air, incentive spirometer encouraged. Last bowel movement on 03/29/2021. PRN Zofran administered for nausea. 5 trocar sites to abdomen. Ice chips provided, tolerating well. TEDs and SCDs applied to bilateral lower extremities. Lovenox ordered for DVT prophylaxis. 18 gauge IV to the left hand infusing LR at 50 mL/hr. Pain rating currently 4/10, in between shoulder blades. Call light within reach. 2126 PRN Percocet 1 tab administered for 5/10 pain rating.     0600 No needs expressed at this time. Ice chips replenished. Patient declines CHG wipes. Declines assistance to bathroom. Bedside and verbal shift change report given to Alberto Igleisas RN (oncoming nurse) by Itzel Douglas RN (offgoing nurse). Report included the following information: SBAR, Kardex, MAR, and recent results.

## 2021-04-01 NOTE — PROGRESS NOTES
4504   Pt resting in bed.  % Lap sites to abdomen dry and intact. Dressing to old KRISTIE site  Dry and intact. 1000  Pt ambulated and voided in BR. Lungs are clear. Abdomen soft, obese with active BS. Pt 's pain level 4/10 but comfortable. Pt having nausea. Seen by Nurse Randi Melendez and aware of nausea. Emend 450 mg IVPB x 1 given. Ate popsicle for breakfast.  1112   Pt's nausea very minimal at this time. Pt ambulated with RN in hallway. Lasix 20 mg IV given and LR was decreased to 50 ml/hr as ordered. 1409  Pt resting in bed. As per pt, will stay another day. 1   Pt ambulated and voided in BR then back in bed. Denies nausea at this time. [address]   Pt  Ambulated in hallway then sat at edge of bed. Only ate 60 ml of pudding at lunch. Pt not hungry. Only eating ice chips. [address] to walk in hallway with pt but pt refused.

## 2021-04-01 NOTE — NURSE NAVIGATOR
Patient lying in bed during this visit. RN assisted her to a chair at bedside. Patient ambulated using a front wheeled walker and tolerated well, for the most part; patient had some exhaustion as noted by deep sighs and expected post-op pain to her abdomen. Vitals:   Blood pressure (!) 146/52, pulse 84, temperature 98.2 °F (36.8 °C), resp. rate 18, height 5' 3\" (1.6 m), weight 106.3 kg (234 lb 4 oz), SpO2 96 %. Output: reviewed, and patient verified urinating clear, yellow urine. Pulmonary: Clear in all lobes  Cardiac: Regular rate and rhythm  Abdomen: Bowel sounds active x4. Lap sites without erythema, swelling,  and/or drainage. Patient with expected pain, but is being managed and is currently tolerable. Patient with nausea; Emend and Reglan initiated by Dr. Sheila Martinez. No vomiting. Patient has been ambulating within her room. Education was provided on the importance of ambulation within the halls, and she verbalized understanding. Patient is tolerating swallowing pills. Post-op diet progression discussed with patient. Patient to be discharged on a bariatric full liquid diet. Patient verbalized understanding of liquid diet for next two weeks until first post-op visit. Goal of four ounces per hour with one ounce being protein was clearly understood. Medications were discussed (i.e., pain- Percocet, Tylenol, not to use aspirin or ibuprofen based products, as well as steroids). Constipation was discussed and ways to alleviate it were discussed. Education completed on IS use and to ambulate every hour when at home. Patient completed a return demonstration on IS with no issues or concerns from this RN. Lovenox filled and in the home. Lovenox education provided, and patient will be administering herself. Percocet script will be given to patient upon discharge. Patient has chewable multi-vitamin, probiotic, and Prilosec in the home; they were reviewed. Ketosis was also reviewed.   Patient given a report card to record intake and a handout to support bedside education. All questions were answered by this RN, and patient verbalized understanding to all education provided. Goals for discharge were discussed, and patient verbalized understanding. Post-op follow-up appt. beatriz scheduled.

## 2021-04-02 VITALS
HEART RATE: 81 BPM | OXYGEN SATURATION: 97 % | SYSTOLIC BLOOD PRESSURE: 141 MMHG | BODY MASS INDEX: 41.5 KG/M2 | TEMPERATURE: 98.7 F | DIASTOLIC BLOOD PRESSURE: 68 MMHG | RESPIRATION RATE: 18 BRPM | WEIGHT: 234.25 LBS | HEIGHT: 63 IN

## 2021-04-02 DIAGNOSIS — G89.18 POST-OP PAIN: Primary | ICD-10-CM

## 2021-04-02 PROCEDURE — C9113 INJ PANTOPRAZOLE SODIUM, VIA: HCPCS | Performed by: SPECIALIST

## 2021-04-02 PROCEDURE — 74011250636 HC RX REV CODE- 250/636: Performed by: SPECIALIST

## 2021-04-02 PROCEDURE — 74011000250 HC RX REV CODE- 250: Performed by: SPECIALIST

## 2021-04-02 PROCEDURE — 74011250637 HC RX REV CODE- 250/637: Performed by: SPECIALIST

## 2021-04-02 RX ORDER — ENOXAPARIN SODIUM 100 MG/ML
40 INJECTION SUBCUTANEOUS EVERY 12 HOURS
Qty: 14 SYRINGE | Refills: 0 | Status: SHIPPED
Start: 2021-04-02 | End: 2021-04-09

## 2021-04-02 RX ORDER — OXYCODONE AND ACETAMINOPHEN 5; 325 MG/1; MG/1
1 TABLET ORAL
Qty: 30 TAB | Refills: 0 | Status: SHIPPED | OUTPATIENT
Start: 2021-04-02 | End: 2021-04-09

## 2021-04-02 RX ADMIN — METOCLOPRAMIDE 5 MG: 5 INJECTION, SOLUTION INTRAMUSCULAR; INTRAVENOUS at 06:10

## 2021-04-02 RX ADMIN — AMLODIPINE BESYLATE 5 MG: 5 TABLET ORAL at 09:48

## 2021-04-02 RX ADMIN — OXYCODONE HYDROCHLORIDE AND ACETAMINOPHEN 2 TABLET: 5; 325 TABLET ORAL at 14:20

## 2021-04-02 RX ADMIN — SODIUM CHLORIDE 40 MG: 9 INJECTION, SOLUTION INTRAMUSCULAR; INTRAVENOUS; SUBCUTANEOUS at 09:48

## 2021-04-02 RX ADMIN — ENOXAPARIN SODIUM 40 MG: 40 INJECTION SUBCUTANEOUS at 09:48

## 2021-04-02 RX ADMIN — METOPROLOL TARTRATE 12.5 MG: 25 TABLET, FILM COATED ORAL at 09:46

## 2021-04-02 RX ADMIN — METOCLOPRAMIDE 5 MG: 5 INJECTION, SOLUTION INTRAMUSCULAR; INTRAVENOUS at 11:52

## 2021-04-02 RX ADMIN — SODIUM CHLORIDE, SODIUM LACTATE, POTASSIUM CHLORIDE, AND CALCIUM CHLORIDE 50 ML/HR: 600; 310; 30; 20 INJECTION, SOLUTION INTRAVENOUS at 06:10

## 2021-04-02 NOTE — PROGRESS NOTES
Bariatric Surgery                POD #3 (note reflects encounter Dr. Venice Pineda had the patient earlier today)    Visit Vitals  BP (!) 134/59 (BP 1 Location: Right upper arm, BP Patient Position: At rest)   Pulse 80   Temp 97.6 °F (36.4 °C)   Resp 16   Ht 5' 3\" (1.6 m)   Wt 106.3 kg (234 lb 4 oz)   SpO2 97%   BMI 41.50 kg/m²     Patient states her nausea has resolved     Exam:  Appears well in no distress  Lungs- clear bilaterally  Abd - soft, incisions look good without erythema  Extremities- no new edema or swelling    Data Review:    Labs: Results:       Chemistry No results for input(s): GLU, NA, K, CL, CO2, BUN, CREA, CA, AGAP, BUCR, TBIL, AP, TP, ALB, GLOB, AGRAT in the last 72 hours. No lab exists for component: GPT   CBC w/Diff No results for input(s): WBC, RBC, HGB, HCT, PLT, GRANS, LYMPH, EOS, RETIC, HGBEXT, HCTEXT, PLTEXT in the last 72 hours. Coagulation No results for input(s): PTP, INR, APTT, INREXT in the last 72 hours. Liver Enzymes No results for input(s): TP, ALB, TBIL, AP in the last 72 hours. No lab exists for component: SGOT, GPT, DBIL       Assessment/Plan: S/P  laparoscopic sleeve gastrectomy - doing well without any issues. She is anxious for discharge.     1. D/C home

## 2021-04-02 NOTE — NURSE NAVIGATOR
Patient sitting on side of bed sipping protein drink and tolerating well. Vitals:   Blood pressure (!) 134/59, pulse 80, temperature 97.6 °F (36.4 °C), resp. rate 16, height 5' 3\" (1.6 m), weight 106.3 kg (234 lb 4 oz), SpO2 97 %. Output: reviewed, and patient verified urinating clear, yellow urine. Pulmonary: Clear in all lobes  Cardiac: Regular rate and rhythm  Abdomen: Bowel sounds hypo-active x4. Lap sites without erythema, swelling,  and/or drainage. Dressings clean, dry, and intact. SCD's: Positioned and operating WNL    Patient with expected pain, but is being managed and is currently tolerable. No nausea and/or vomiting. Patient has been ambulating the halls. Patient has not had the opportunity to swallow pills. RN reminded her of the following: Post-op diet progression discussed with patient. Patient to be discharged on a bariatric full liquid diet. Patient verbalized understanding of liquid diet for next two weeks until first post-op visit. Goal of four ounces per hour with one ounce being protein was clearly understood. Medications were discussed (i.e., pain- Percocet, Tylenol, not to use aspirin or ibuprofen based products, as well as steroids). Constipation was discussed and ways to alleviate it were discussed. Education completed on IS use and to ambulate every hour when at home. Patient completed a return demonstration on IS with no issues or concerns from this RN. Lovenox filled and in the home. Lovenox education provided, and patient will be administering herself. Percocet script will be given to patient upon discharge. Patient has chewable multi-vitamin, probiotic, and Prilosec in the home; they were reviewed. Ketosis was also reviewed. Patient given a report card to record intake and a handout to support bedside education. All questions were answered by this RN, and patient verbalized understanding to all education provided.   Goals for discharge were discussed, and patient verbalized understanding. Post-op follow-up appt. beatriz alcantar.

## 2021-04-02 NOTE — DISCHARGE SUMMARY
Discharge Summary    Patient: Albino Mcmullen               Sex: female          DOA: 3/30/2021         YOB: 1950      Age:  73 y.o.        LOS:  LOS: 3 days                Admit Date: 3/30/2021    Discharge Date: 4/2/2021    Admission Diagnoses: Morbid obesity with BMI of 40.0-44.9, adult ([facility] [E66.01, Z68.41]    Discharge Diagnoses:    Problem List as of 4/2/2021 Date Reviewed: 3/30/2021          Codes Class Noted - Resolved    Morbid obesity with BMI of 40.0-44.9, adult [facility]) ICD-10-CM: E66.01, Z68.41  ICD-9-CM: 278.01, V85.41  3/30/2021 - Present        Hiatal hernia ICD-10-CM: K44.9  ICD-9-CM: 553.3  Unknown - Present        Morbid obesity ([facility] ICD-10-CM: E66.01  ICD-9-CM: 278.01  Unknown - Present        Morbid obesity with body mass index (BMI) of 40.0 to 49.9 ([facility]) ICD-10-CM: E66.01  ICD-9-CM: 278.01  Unknown - Present        Depression ICD-10-CM: F32.9  ICD-9-CM: 311  Unknown - Present        Chronic pain ICD-10-CM: G89.29  ICD-9-CM: 338.29  Unknown - Present        Anemia ICD-10-CM: D64.9  ICD-9-CM: 285. 9  Unknown - Present        Smoking history ICD-10-CM: Z87.891  ICD-9-CM: V15.82  Unknown - Present        Sleep apnea ICD-10-CM: G47.30  ICD-9-CM: 780.57  Unknown - Present        Acoustic neuroma ([facility] ICD-10-CM: D33.3  ICD-9-CM: 225.1  Unknown - Present        History of colon resection ICD-10-CM: Z90.49  ICD-9-CM: V45.89  Unknown - Present        Hypertension ICD-10-CM: I10  ICD-9-CM: 401.9  Unknown - Present    Overview Signed 11/9/2020 11:32 AM by Vishnu Guerrero MD     takes diuretics \"PRN\"             Lumbar back pain ICD-10-CM: M54.5  ICD-9-CM: 724.2  2/27/2018 - Present        RESOLVED: Edema ICD-10-CM: R60.9  ICD-9-CM: 638. 3  Unknown - 11/9/2020        RESOLVED: Oral thrush ICD-10-CM: B37.0  ICD-9-CM: 112.0  5/7/2018 - 11/9/2020        RESOLVED: Dehydration ICD-10-CM: E86.0  ICD-9-CM: 276.51  5/4/2018 - 11/9/2020        RESOLVED: Tachyarrhythmia ICD-10-CM: R00.0  ICD-9-CM: 785.0  5/4/2018 - 11/9/2020        RESOLVED: Dysphagia ICD-10-CM: R13.10  ICD-9-CM: 787.20  5/4/2018 - 11/9/2020        RESOLVED: Electrolyte abnormality ICD-10-CM: E87.8  ICD-9-CM: 276.9  5/4/2018 - 11/9/2020        RESOLVED: Psoas abscess, left ([address] ICD-10-CM: Q91.03  ICD-9-CM: 567.31  2/27/2018 - 11/9/2020        RESOLVED: Post-operative complication VCL-71-TP: U65. 9XXA  ICD-9-CM: 998.9  2/27/2018 - 11/9/2020        RESOLVED: Lumbar surgical wound fluid collection ICD-10-CM: T81.89XA  ICD-9-CM: 998.89  2/27/2018 - 11/9/2020              Discharge Condition: [facility] Course: The patient underwent  laparoscopic sleeve gastrectomy  on 3/30/2021. The patient tolerated the procedure well. Vital signs remained stable and the patient was transferred to  3rd floor surgical unit without complications. The patient remained stable throughout the first night post operatively with stable vital signs and adequate urine output and pain control. Pain was controlled with Dilaudid IV and IV Tylenol . The patient on the first morning post operative was transferred to the radiology suite where they underwent a gastrograffin UGI study which showed no evidence of a leak or stricture. The drain was discontinued on POD # 1 and the patient was started on a bariatric liquid diet with protein shakes. It was at this point that she developed significant nausea that ultimately required an additional 48 hours of monitoring and only resolved with the use of emend. By the morning of POD 3 she had improved greatly and desired to be discharged home. The patient progressed throughout the day and was ambulating well and tolerating their diet. They were therefore discharged home with instructions to notify me with any issues that may arise. Significant Diagnostic Studies:   No results for input(s): HGB, HGBEXT in the last 72 hours. No results for input(s): HCT, HCTEXT in the last 72 hours.     Current Discharge Medication List CONTINUE these medications which have CHANGED    Details   enoxaparin (LOVENOX) 40 mg/0.4 mL 0.4 mL by SubCUTAneous route every twelve (12) hours every twelve (12) hours for 7 days. Indications: deep vein thrombosis prevention  Qty: 14 Syringe, Refills: 0    Comments: BMI: 42.16         CONTINUE these medications which have NOT CHANGED    Details   multivitamin (ONE A DAY) tablet Take 1 Tab by mouth daily. DULoxetine (CYMBALTA) 30 mg capsule Take 30 mg by mouth daily. Indications: anxiousness associated with depression      amLODIPine (NORVASC) 5 mg tablet Take 5 mg by mouth daily. gabapentin (NEURONTIN) 600 mg tablet Take 600 mg by mouth three (3) times daily. Indications: neuropathic pain      cyanocobalamin (VITAMIN B-12) 1,000 mcg sublingual tablet Take 1,000 mcg by mouth daily. QUEtiapine (SEROqueL) 50 mg tablet Take 50 mg by mouth nightly. Indications: bipolar         STOP taking these medications       antiox #8/om3/dha/epa/lut/zeax (PRESERVISION AREDS 2, OMEGA-3, PO) Comments:   Reason for Stopping:         folic acid (FOLVITE) 1 mg tablet Comments:   Reason for Stopping:         ferrous sulfate 325 mg (65 mg iron) tablet Comments:   Reason for Stopping:         aspirin delayed-release 81 mg tablet Comments:   Reason for Stopping:         celecoxib (CeleBREX) 200 mg capsule Comments:   Reason for Stopping:               Activity: activity as tolerated with no heavy lifting of greater than 20 pounds. No anti- inflammatory medications. Use stool softeners at home as needed while taking pain medications since they are constipating. Diet: Bariatric liquid diet    Wound Care: Keep wound clean and dry, Reinforce dressing PRN and ice to area for comfort. Do not get wound wet for 2 days.     Follow-up: 14 days with Dr Gama Thomas M.D

## 2021-04-02 NOTE — DISCHARGE INSTRUCTIONS
[facility]  Brain Valle. Anel Vega M.D., F.A.C.S.  [address] [place] [address], [address] [facility]  Office: [phone]    Fax:  [phone]    Discharge Instruction for Gastric Bypass / Sleeve Gastrectomy Patients    Diet:   Continue with the liquid diet until you are seen in the office. Make sure you sip fluids all day. Aim for  Gms of protein every day. Activity:   Walking is encouraged. Rest when you are tired.  You may shower.  You may climb stairs. Take your time.  No lifting more than 10-15 lbs.  If you feel discomfort during an activity, rest.   Do not drive for 1 week. Wound Care:   Clean incisions with soap and water when in the shower. Pat dry.  Leave steri-strips on until they fall off.  A small amount of drainage may be present from the incisions. Contact the office if you notice an increase in drainage, an odor, increased redness, or fever > 100.5. Medications:   You will receive a prescription for pain medication at the time of discharge.  For upset stomach you may take over the counter medications such as Maalox, Mylanta, Pepcid, Zantac, or Tagamet.  You may take Tylenol   Non-aspirin based arthritis medications may be resumed after the first month.  Take a childrens or adult chewable multivitamin.  Milk of Magnesia will help with constipation.  It is fine to take your usual home medications. Blood pressure medications should be continued after surgery. Diabetic medications can frequently be reduced very quickly after surgery. Diabetics should continue to monitor blood sugars frequently after surgery and contact the prescribing physician for any questions. Follow-Up Appointment:   If you do not already have a follow-up appointment scheduled, contact the office in the next few days to obtain one. It is usually scheduled for 10-14 days after you surgery date. Office phone number:  [phone].         REV  09/2010

## 2021-04-02 NOTE — PROGRESS NOTES
Progress Note  04/01/21    My PA and myself saw Ms. Pisano early in the morning for rounds and she still had significant nausea and retching episodes despite us adding Reglan yesterday. Of course, I reviewed her upper GI study once again today and it was totally normal with no real hesitation of any contrast passing through her esophagus or into her gastric sleeve and the interior of the duodenum. This morning what was done is we have added the medication Emend to help with her nausea in the hopes that this will finally alleviate it since we have tried everything else to date. I then saw her a second time in the hospital late in the afternoon and she states she was slightly better, now tolerating some of her diet. I do believe with her significantly frail nature that we need to keep her in another night to make sure her oral intake is good, such that she does not require readmission in the future. Her vital signs have been totally stable, there are no issues related to anything such as this, and we have resumed some of her home medications. We will hopefully plan on discharge 04/02/21 in the morning.

## 2021-04-02 NOTE — PROGRESS NOTES
8164  Bedside shift change report given to [id number] [address] Altosmatt Hansen (oncoming nurse) by Fritz Maynard (offgoing nurse). Report included the following information SBAR, Kardex, Intake/Output and MAR.     0800  Assumed care at this time. Patient alert and oriented x4. Encouragd patient to ambulate out in blak[place]. Denies SOB, chest pain. Shows no signs of distress. Patient lungs clear bilaterally. Cap refill < 3 sec to all extremities. Patient has 18 G IV to L hand CDI. Stated pain 0/10. Lap dressings CDI and drain removal site dressing to abdomen loose with scant serous d/c. Redressed with tape and gauze. SCDs and orly stockings applied to BLE. Incentive spirometer at bedside. Patient reaches 1500 on IS. Bowel sounds present. Skin intact. Patient call light and possessions within reach. Bed locked and in lowest position. Nurse will continue to monitor. 1150  Encouraged patient to ambulate. 1259  Encouraged patient to ambulate a third time. 1430  Dual AVS reviewed with Blane RICHARD RN. All medications reviewed individually with patient. Opportunities for questions and concerns provided. Patient to be discharged via (car, transport, ambulance). Patient's armband appropriately discarded.

## 2021-04-05 ENCOUNTER — TELEPHONE (OUTPATIENT)
Dept: SURGERY | Age: 71
End: 2021-04-05

## 2021-04-05 NOTE — TELEPHONE ENCOUNTER
This RN spoke with patient post-operatively. Sipping: Patient is up to sipping 60 ounces. Nausea and/or vomitting: None    Pain: Currently managed with Percocet at night    Lovenox injections: Administering every 12 hours, rotating sites. RN reminded patient to complete all injections, in which patient verbalized understanding. Lap sites: No erythema, drainage, and/or swelling    KRISTIE drain site: No drainage; dressing removed    BM: Couple since discharge    Ambulation: Patient is walking throughout house every hour. IS: Patient continues to use 10x's per hour while awake. Temperature: 95.7 degrees     Pulse: 67 bpm    Medications: (confirmed currently taking)   *Multi-vitamin: yes   *Probiotic: yes   *Prilosec: yes    Questions: None    This RN reminded the patient to contact the office with any questions and/or concerns. Patient verbalized understanding. Patient's two week post-op visit is scheduled and was confirmed.

## 2021-04-14 ENCOUNTER — OFFICE VISIT (OUTPATIENT)
Dept: SURGERY | Age: 71
End: 2021-04-14
Payer: MEDICARE

## 2021-04-14 VITALS
WEIGHT: 215.3 LBS | DIASTOLIC BLOOD PRESSURE: 67 MMHG | BODY MASS INDEX: 38.15 KG/M2 | HEIGHT: 63 IN | TEMPERATURE: 98.1 F | HEART RATE: 81 BPM | OXYGEN SATURATION: 93 % | SYSTOLIC BLOOD PRESSURE: 141 MMHG

## 2021-04-14 DIAGNOSIS — K90.9 INTESTINAL MALABSORPTION, UNSPECIFIED TYPE: Primary | ICD-10-CM

## 2021-04-14 DIAGNOSIS — Z98.84 S/P LAPAROSCOPIC SLEEVE GASTRECTOMY: ICD-10-CM

## 2021-04-14 PROCEDURE — 99024 POSTOP FOLLOW-UP VISIT: CPT | Performed by: NURSE PRACTITIONER

## 2021-04-14 RX ORDER — DICLOFENAC SODIUM 10 MG/G
GEL TOPICAL 4 TIMES DAILY
Qty: 1 EACH | Refills: 0 | Status: SHIPPED
Start: 2021-04-14 | End: 2021-12-21 | Stop reason: CLARIF

## 2021-04-14 NOTE — PATIENT INSTRUCTIONS
Isopure protein water, try tart flavors at [facility] [employer] unflavored protein powder Continue focus on hydration. May advance to soft diet. Please review material in your binder. Remember - your motto is \"soft foods with protein\". Eat regularly. Continue to use protein shakes. Remember to eat slowly & not to drink fluids with your meals. Increase activity as able - cardio (walking) only. Continue to take multi-vitamins. Continue regular follow-up with your PCP. Return to office in 2 weeks for your next appointment. Call the office if you have any questions or concerns. Learning About Being Physically Active What is physical activity? Being physically active means doing any kind of activity that gets your body moving. The types of physical activity that can help you get fit and stay healthy include: · Aerobic or \"cardio\" activities. These make your heart beat faster and make you breathe harder, such as brisk walking, riding a bike, or running. They strengthen your heart and lungs and build up your endurance. · Strength training activities. These make your muscles work against, or \"resist,\" something. Examples include lifting weights or doing push-ups. These activities help tone and strengthen your muscles and bones. · Stretches. These let you move your joints and muscles through their full range of motion. Stretching helps you be more flexible. What are the benefits of being active? Being active is one of the best things you can do for your health. It helps you to: · Feel stronger and have more energy to do all the things you like to do. · Focus better at school or work. · Feel, think, and sleep better. · Reach and stay at a healthy weight. · Lose fat and build lean muscle. · Lower your risk for serious health problems, including diabetes, heart attack, high blood pressure, and some cancers. · Keep your heart, lungs, bones, muscles, and joints strong and healthy.  
How can you make being active part of your life? Start slowly. Make it your long-term goal to get at least 30 minutes of exercise on most days of the week. Walking is a good choice. You also may want to do other activities, such as running, swimming, cycling, or playing tennis or team sports. Pick activities that you likeones that make your heart beat faster, your muscles stronger, and your muscles and joints more flexible. If you find more than one thing you like doing, do them all. You don't have to do the same thing every day. Get your heart pumping every day. Any activity that makes your heart beat faster and keeps it at that rate for a while counts. Here are some great ways to get your heart beating faster: · Go for a brisk walk, run, or bike ride. · Go for a hike or swim. · Go in-line skating. · Play a game of touch football, basketball, or soccer. · Ride a bike. · Play tennis or racquetball. · Climb stairs. Even some household chores can be aerobicjust do them at a faster pace. Vacuuming, raking or mowing the lawn, sweeping the garage, and washing and waxing the car all can help get your heart rate up. Strengthen your muscles during the week. You don't have to lift heavy weights or grow big, bulky muscles to get stronger. Doing a few simple activities that make your muscles work against, or \"resist,\" something can help you get stronger. For example, you can: · Do push-ups or sit-ups, which use your own body weight as resistance. · Lift weights or dumbbells or use stretch bands at home or in a gym or community center. Stretch your muscles often. Stretching will help you as you become more active. It can help you stay flexible, loosen tight muscles, and avoid injury. It can also help improve your balance and posture and can be a great way to relax. Be sure to stretch the muscles you'll be using when you work out. It's best to warm your muscles slightly before you stretch them.  Walk or do some other light aerobic activity for a few minutes, and then start stretching. When you stretch your muscles: · Do it slowly. Stretching is not about going fast or making sudden movements. · Don't push or bounce during a stretch. · Hold each stretch for at least 15 to 30 seconds, if you can. You should feel a stretch in the muscle, but not pain. · Breathe out as you do the stretch. Then breathe in as you hold the stretch. Don't hold your breath. If you're worried about how more activity might affect your health, have a checkup before you start. Follow any special advice your doctor gives you for getting a smart start. Where can you learn more? Go to http://www.gray.com/ Enter T617 in the search box to learn more about \"Learning About Being Physically Active. \" Current as of: September 10, 2020               Content Version: 12.8 © 0605-7508 [employer]. Care instructions adapted under license by [employer] (which disclaims liability or warranty for this information). If you have questions about a medical condition or this instruction, always ask your healthcare professional. [address] any warranty or liability for your use of this information.

## 2021-04-14 NOTE — PROGRESS NOTES
Subjective:      Conception [place] is a 73 y.o. female is now 2 weeks status post laparoscopic sleeve gastrectomy with hiatal hernia repair. Doing well overall. She has lost a total of 19 pounds since surgery. Body mass index is 38.14 kg/m². Currently on a liquid diet without difficulty. Fluid intake is good. Sources of protein include protein shakes. No exercise yet, lots of knee pain right now. Bowel movements are alternating diarrhea and regularity ever since having gallbladder out 20 years ago. The patient is not having any pain. . The patient is compliant with multivitamins. Surgery related complications: NA.  Liver bx report reviewed with patient. Stomach bx report reviewed with patient. Weight Loss Metrics 4/14/2021 3/30/2021 3/18/2021 3/16/2021 3/3/2021 3/1/2021 1/26/2021   Today's Wt 215 lb 4.8 oz 234 lb 4 oz 238 lb 236 lb 242 lb 242 lb 234 lb 1 oz   BMI 38.14 kg/m2 41.5 kg/m2 42.16 kg/m2 41.81 kg/m2 42.87 kg/m2 42.87 kg/m2 41.46 kg/m2          Comorbidities:    Hypertension: improved , on Rx denies lightheadedness or dizziness  Diabetes: not applicable  Obstructive Sleep Apnea: unchanged , using CPAP  Hyperlipidemia: not applicable  Stress Urinary Incontinence: not applicable  Gastroesophageal Reflux: not applicable  Weight related arthropathy:worsened, more knee pain since coming off Celebrex    Denies diagnosis of COVID-19 since surgery.      Patient Active Problem List   Diagnosis Code    Lumbar back pain M54.5    Morbid obesity ([facility] [address] E66.01    Morbid obesity with body mass index (BMI) of 40.0 to 49.9 ([facility]) E66.01    Depression F32.9    Chronic pain G89.29    Anemia D64.9    Smoking history Z87.891    Sleep apnea G47.30    Acoustic neuroma ([facility]) D33.3    History of colon resection Z90.49    Hypertension I10    Hiatal hernia K44.9    Morbid obesity with BMI of 40.0-44.9, adult ([facility]) E66.01, Z68.41    Intestinal malabsorption K90.9    S/P laparoscopic sleeve gastrectomy Z98.84 Past Medical History:   Diagnosis Date    Acoustic neuroma [facility]) 2009    stable-right ear    Acoustic neuroma [facility]) 2009    right    Anemia     Arthritis     Cat scratch fever 1998    in lymph nodes    Chronic bronchitis (HCC)     Chronic pain     \"joints\"    Depression     Edema     Hiatal hernia     History of colon resection 1995    pt unsure of the nature of her resection     Hypertension     takes diuretics \"PRN\"- stopped meds 2018- only on med for BP while getting her CPAP adjusted    Ill-defined condition     hiatal hernia    Liver disease 2021    fatty liver    Morbid obesity ([address]     Morbid obesity with body mass index (BMI) of 40.0 to 49.9 (HCC)     Nausea & vomiting     Sleep apnea     uses c-pap, Patient instructed to bring CPAP machine on DOS    Tuberculosis 1995    treated    Vertigo 2009       Past Surgical History:   Procedure Laterality Date    HX CARPAL TUNNEL RELEASE Bilateral     HX CHOLECYSTECTOMY      HX GI      colonoscopy    HX HEART CATHETERIZATION  03/2021    HX HIP REPLACEMENT Left     HX HYSTERECTOMY      HX ORTHOPAEDIC  1996    c5-6 fusion     HX ORTHOPAEDIC  01/2018    lumbar laminectomy     HX ORTHOPAEDIC Left     trigger finger surgery- all but the thumb    HX ORTHOPAEDIC Bilateral 2020    shoulder replacement    HX ORTHOPAEDIC Left 2020    hip replacement    HX TOTAL COLECTOMY  1995    pt unsure of her history     [place] LAP, STELLA RESTRICT PROC, LONGITUDINAL GASTRECTOMY  03/30/2021    Dr Julissa Sorto       Current Outpatient Medications   Medication Sig Dispense Refill    diclofenac (VOLTAREN) 1 % gel Apply  to affected area four (4) times daily. 1 Each 0    metoprolol tartrate (LOPRESSOR) 25 mg tablet Take 12.5 mg by mouth two (2) times a day.  multivitamin (ONE A DAY) tablet Take 1 Tab by mouth daily.  DULoxetine (CYMBALTA) 30 mg capsule Take 30 mg by mouth daily.  Indications: anxiousness associated with depression      amLODIPine (NORVASC) 5 mg tablet Take 5 mg by mouth daily.  QUEtiapine (SEROqueL) 50 mg tablet Take 50 mg by mouth nightly. Indications: bipolar      gabapentin (NEURONTIN) 600 mg tablet Take 600 mg by mouth three (3) times daily. Indications: neuropathic pain      cyanocobalamin (VITAMIN B-12) 1,000 mcg sublingual tablet Take 1,000 mcg by mouth daily. Allergies   Allergen Reactions    Latex Rash       Review of Symptoms:       General - No history or complaints of unexpected fever or chills  Head/Neck - No history or complaints of headache or dizziness  Cardiac - No history or complaints of chest pain, palpitations, or shortness of breath  Pulmonary - No history or complaints of shortness of breath or productive cough  Gastrointestinal - as noted above  Genitourinary - No history or complaints of hematuria/dysuria or renal lithiasis  Musculoskeletal - No history or complaints of joint  muscular weakness  Hematologic - No history of any bleeding episodes  Neurologic - No history or complaints of  migraine headaches or neurologic symptoms        Objective:     Visit Vitals  BP (!) 141/67 (BP 1 Location: Right arm, BP Patient Position: Sitting, BP Cuff Size: Adult)   Pulse 81   Temp 98.1 °F (36.7 °C) (Temporal)   Ht 5' 3\" (1.6 m)   Wt 97.7 kg (215 lb 4.8 oz)   SpO2 93%   BMI 38.14 kg/m²       General:  alert, cooperative, no distress, appears stated age   Chest: lungs clear to auscultation, breath sounds equal and symmetric, no rhonchi, rales or wheezes, no accessory muscle use   Cor:   Regular rate and rhythm, S1S2 present or without murmur or extra heart sounds   Abdomen: soft, bowel sounds active, non-tender, no masses or organomegaly   Incisions:   healing well, no drainage, no erythema, no hernia, no seroma, no swelling, no dehiscence, incision well approximated       LIVER, WEDGE BIOPSY:   MILD STEATOSIS. MILD SIDEROSIS.      STOMACH, SLEEVE RESECTION:   VASCULAR CONGESTION AND ACUTE INTERSTITIAL HEMORRHAGE. MILD CHRONIC MUCOSAL INFLAMMATION. STOMACH, PRE-PYLORIC REGION, BIOPSY:   ANTRAL AND FUNDIC-TYPE GASTRIC MUCOSA WITH MILD CHRONIC   INFLAMMATION. H pylori is negative     Assessment:   History of Morbid obesity, status post laparoscopic sleeve gastrectomy. Doing well postoperatively. Plan:     1. Increase activity to the goal of 30 minutes daily and Increase fluids  2. Advance diet to soft solid phase. Reminded to measure portions, continue high protein, low carbohydrate diet. Reminded to eat regularly, to eat slowly & not to drink with meals. Refer to the handbook given in class. 3. Continue multivitamin   4. Continue current medications and follow up with PCP for management of regimen. 5. Encouraged to attend support group   6. I have discussed this plan with patient and they verbalized understanding  7. Follow up in 2 weeks or sooner if patient has questions, concerns or worsening of condition, if unable to reach our office, patient should report to the ED. 8. Ms. Angelique Ceja has a reminder for a \"due or due soon\" health maintenance. I have asked that she contact her primary care provider for a follow-up on this health maintenance.

## 2021-05-05 ENCOUNTER — TELEPHONE (OUTPATIENT)
Dept: SURGERY | Age: 71
End: 2021-05-05

## 2021-05-05 ENCOUNTER — APPOINTMENT (OUTPATIENT)
Dept: SURGERY | Age: 71
End: 2021-05-05

## 2021-05-05 NOTE — TELEPHONE ENCOUNTER
Patient is currently on a soft food diet without difficulty. Patient is hydrating with    50-60 ounces, daily. Patient is tolerating the following sources of protein: tuna, chicken, ground beef, baked beans, chili, scrambled eggs, and not tolerating protein shakes due to sweetness despite diluting and blending. RN recommended switching to a lactose free brand. Patient is exercising by walking daily for 15 minutes. Patient has not had any readmissions, reoperations, complications, ED visits, nor IVF at [facility] during her first post-op month. Patient was reminded of the followin. Cholscectomy; no need for Ursodiol  2. Negative for H.pylori   3. Patient is cleared to return to work without restrictions. 4. Can stop probiotic    5. Advance diet to solid phase. Reminded to measure portions, continue high protein, low carbohydrate diet. Reminded to eat regularly, to eat slowly & not to drink with meals. Refer to the handbook and video. 6. Continue multi-vitamin with iron and add the additional vitamin supplementation (calcium citrate 1,500 mg per day taken one hour apart from your other vitamins/supplements, vitamin B complex one a day, vitamin B12 1,000 mcg daily taken sublingually, vitamin D3 3,000 IU per day, all listed in handbook)  7. Continue current medications and follow up with PCP for management of regimen  8. Continue cardio exercise and add resistance exercises. Discussed with patient. Minimum of 30 minutes of exercise daily, five days a week  9. Encouraged to attend support group   10. I have discussed this plan with patient, and they verbalized understanding. 11. Follow-up at three month appointment or sooner if patient has questions, concerns or worsening of condition. If unable to reach our office, patient should report to the ED. 12. One month nutrition video to include the above mentioned education e-mailed to patient.     13. Patient was reminded to  in the office the nutrition educational folder to include the above mentioned education.

## 2021-06-30 ENCOUNTER — OFFICE VISIT (OUTPATIENT)
Dept: SURGERY | Age: 71
End: 2021-06-30
Payer: MEDICARE

## 2021-06-30 VITALS
SYSTOLIC BLOOD PRESSURE: 146 MMHG | DIASTOLIC BLOOD PRESSURE: 72 MMHG | HEART RATE: 94 BPM | HEIGHT: 63 IN | OXYGEN SATURATION: 94 % | BODY MASS INDEX: 33.03 KG/M2 | WEIGHT: 186.4 LBS

## 2021-06-30 DIAGNOSIS — Z98.84 S/P LAPAROSCOPIC SLEEVE GASTRECTOMY: ICD-10-CM

## 2021-06-30 DIAGNOSIS — K90.9 INTESTINAL MALABSORPTION, UNSPECIFIED TYPE: Primary | ICD-10-CM

## 2021-06-30 PROCEDURE — G8400 PT W/DXA NO RESULTS DOC: HCPCS | Performed by: SPECIALIST

## 2021-06-30 PROCEDURE — 99214 OFFICE O/P EST MOD 30 MIN: CPT | Performed by: SPECIALIST

## 2021-06-30 PROCEDURE — G9717 DOC PT DX DEP/BP F/U NT REQ: HCPCS | Performed by: SPECIALIST

## 2021-06-30 PROCEDURE — 1101F PT FALLS ASSESS-DOCD LE1/YR: CPT | Performed by: SPECIALIST

## 2021-06-30 PROCEDURE — G8754 DIAS BP LESS 90: HCPCS | Performed by: SPECIALIST

## 2021-06-30 PROCEDURE — G8427 DOCREV CUR MEDS BY ELIG CLIN: HCPCS | Performed by: SPECIALIST

## 2021-06-30 PROCEDURE — 1090F PRES/ABSN URINE INCON ASSESS: CPT | Performed by: SPECIALIST

## 2021-06-30 PROCEDURE — G9711 PT HX TOT COL OR COLON CA: HCPCS | Performed by: SPECIALIST

## 2021-06-30 PROCEDURE — G8417 CALC BMI ABV UP PARAM F/U: HCPCS | Performed by: SPECIALIST

## 2021-06-30 PROCEDURE — G8753 SYS BP > OR = 140: HCPCS | Performed by: SPECIALIST

## 2021-06-30 PROCEDURE — G0463 HOSPITAL OUTPT CLINIC VISIT: HCPCS | Performed by: SPECIALIST

## 2021-06-30 PROCEDURE — G8536 NO DOC ELDER MAL SCRN: HCPCS | Performed by: SPECIALIST

## 2021-06-30 RX ORDER — LANOLIN ALCOHOL/MO/W.PET/CERES
500 CREAM (GRAM) TOPICAL DAILY
COMMUNITY
Start: 2021-04-29 | End: 2021-12-21 | Stop reason: CLARIF

## 2021-06-30 NOTE — PATIENT INSTRUCTIONS

## 2021-06-30 NOTE — PROGRESS NOTES
Subjective:     Sybil Rodriguez  is a 73 y.o. female who presents for follow-up about 3 months following laparoscopic sleeve gastrectomy. She has lost a total of 48 pounds since surgery. Body mass index is 33.02 kg/m². . EBWL is (45%). The patient presents today to assess their progress toward their goal of weight loss and to address any issues that may be present. Today the patient and I have reviewed their diet and how appropriate their food choices are. The following issues have been identified : no new issues. Pain assessment - 0  . Surgery related complication: none       She reports no issues and denies vomiting and abdominal pain. The patient's exercise level: moderately active. Changes in her medical history and medications have been reviewed.     Patient Active Problem List   Diagnosis Code    Lumbar back pain M54.5    Morbid obesity ([address] E66.01    Morbid obesity with body mass index (BMI) of 40.0 to 49.9 (HCC) E66.01    Depression F32.9    Chronic pain G89.29    Anemia D64.9    Smoking history Z87.891    Sleep apnea G47.30    Acoustic neuroma ([address] D33.3    History of colon resection Z90.49    Hypertension I10    Hiatal hernia K44.9    Morbid obesity with BMI of 40.0-44.9, adult ([address] E66.01, Z68.41    Intestinal malabsorption K90.9    S/P laparoscopic sleeve gastrectomy Z98.84     Past Medical History:   Diagnosis Date    Acoustic neuroma [facility]) 2009    stable-right ear    Acoustic neuroma ([facility] [address] 2009    right    Anemia     Arthritis     Cat scratch fever 1998    in lymph nodes    Chronic bronchitis (HCC)     Chronic pain     \"joints\"    Depression     Edema     Hiatal hernia     History of colon resection 1995    pt unsure of the nature of her resection     Hypertension     takes diuretics \"PRN\"- stopped meds 2018- only on med for BP while getting her CPAP adjusted    Ill-defined condition     hiatal hernia    Liver disease 2021    fatty liver    Morbid obesity ([facility] [address]     Morbid obesity with body mass index (BMI) of 40.0 to 49.9 ([facility])     Nausea & vomiting     Sleep apnea     uses c-pap, Patient instructed to bring CPAP machine on DOS    Tuberculosis 1995    treated    Vertigo 2009     Past Surgical History:   Procedure Laterality Date    HX CARPAL TUNNEL RELEASE Bilateral     HX CHOLECYSTECTOMY      HX GI      colonoscopy    HX HEART CATHETERIZATION  03/2021    HX HIP REPLACEMENT Left     HX HYSTERECTOMY      HX ORTHOPAEDIC  1996    c5-6 fusion     HX ORTHOPAEDIC  01/2018    lumbar laminectomy     HX ORTHOPAEDIC Left     trigger finger surgery- all but the thumb    HX ORTHOPAEDIC Bilateral 2020    shoulder replacement    HX ORTHOPAEDIC Left 2020    hip replacement    HX TOTAL COLECTOMY  1995    pt unsure of her history     [place] LAP, STELLA RESTRICT PROC, LONGITUDINAL GASTRECTOMY  03/30/2021    Dr Glory Whyte     Current Outpatient Medications   Medication Sig Dispense Refill    cyanocobalamin 1,000 mcg tablet Take 500 mcg by mouth daily.  multivitamin (ONE A DAY) tablet Take 1 Tab by mouth daily.  DULoxetine (CYMBALTA) 30 mg capsule Take 30 mg by mouth daily. Indications: anxiousness associated with depression      QUEtiapine (SEROqueL) 50 mg tablet Take 50 mg by mouth nightly. Indications: bipolar      diclofenac (VOLTAREN) 1 % gel Apply  to affected area four (4) times daily. 1 Each 0    metoprolol tartrate (LOPRESSOR) 25 mg tablet Take 12.5 mg by mouth two (2) times a day.  amLODIPine (NORVASC) 5 mg tablet Take 5 mg by mouth daily. (Patient not taking: Reported on 6/30/2021)      cyanocobalamin (VITAMIN B-12) 1,000 mcg sublingual tablet Take 1,000 mcg by mouth daily.           Review of Symptoms:     General - No history or complaints of unexpected fever or chills  Head/Neck - No history or complaints of headache or dizziness  Cardiac - No history or complaints of chest pain, palpitations, or shortness of breath  Pulmonary - No history or complaints of shortness of breath or productive cough  Gastrointestinal - as noted above  Genitourinary - No history or complaints of hematuria/dysuria or renal lithiasis  Musculoskeletal - No history or complaints of joint  muscular weakness  Hematologic - No history of any bleeding episodes  Neurologic - No history or complaints of  migraine headaches or neurologic symptoms    Objective:     Visit Vitals  BP (!) 146/72   Pulse 94   Ht 5' 3\" (1.6 m)   Wt 84.6 kg (186 lb 6.4 oz)   SpO2 94%   BMI 33.02 kg/m²        Physical Exam:    General:  alert, cooperative, no distress, appears stated age   Lungs:   clear to auscultation bilaterally   Heart:  Regular rate and rhythm   Abdomen:   abdomen is soft without significant tenderness, masses, organomegaly or guarding; Incisions: healing well, no hernias         Assessment:     1. History of Morbid obesity, status post  laparoscopic sleeve gastrectomy. Doing well; no concerns. .     Plan:     1. Remember to measure portions, continue low carbohydrate diet  2. Continue to expand solid foods. 3. Remember vitamin supplements. 4. Exercise regimen appears adequate. 5. Attend support group  6. Follow-up in 3 month(s). 7. Total time spent with the patient 30 minutes.   8. The patient understands the plan of action

## 2021-08-03 PROBLEM — E66.01 MORBID OBESITY (HCC): Status: RESOLVED | Noted: 2021-08-03 | Resolved: 2021-08-03

## 2021-10-06 ENCOUNTER — HOSPITAL ENCOUNTER (OUTPATIENT)
Dept: LAB | Age: 71
Discharge: HOME | End: 2021-10-06
Payer: MEDICARE

## 2021-10-06 ENCOUNTER — OFFICE VISIT (OUTPATIENT)
Dept: SURGERY | Age: 71
End: 2021-10-06
Payer: MEDICARE

## 2021-10-06 VITALS
WEIGHT: 166 LBS | BODY MASS INDEX: 29.41 KG/M2 | RESPIRATION RATE: 16 BRPM | DIASTOLIC BLOOD PRESSURE: 64 MMHG | HEART RATE: 83 BPM | HEIGHT: 63 IN | TEMPERATURE: 97.9 F | OXYGEN SATURATION: 98 % | SYSTOLIC BLOOD PRESSURE: 137 MMHG

## 2021-10-06 DIAGNOSIS — R79.89 LOW VITAMIN D LEVEL: ICD-10-CM

## 2021-10-06 DIAGNOSIS — K90.9 INTESTINAL MALABSORPTION, UNSPECIFIED TYPE: ICD-10-CM

## 2021-10-06 DIAGNOSIS — K90.9 INTESTINAL MALABSORPTION, UNSPECIFIED TYPE: Primary | ICD-10-CM

## 2021-10-06 LAB
25(OH)D3 SERPL-MCNC: 53.7 NG/ML (ref 30–100)
ALBUMIN SERPL-MCNC: 3.3 G/DL (ref 3.4–5)
ALBUMIN/GLOB SERPL: 1 {RATIO} (ref 0.8–1.7)
ALP SERPL-CCNC: 173 U/L (ref 45–117)
ALT SERPL-CCNC: 18 U/L (ref 13–56)
ANION GAP SERPL CALC-SCNC: 4 MMOL/L (ref 3–18)
AST SERPL-CCNC: 17 U/L (ref 10–38)
BASOPHILS # BLD: 0 K/UL (ref 0–0.1)
BASOPHILS NFR BLD: 1 % (ref 0–2)
BILIRUB SERPL-MCNC: 0.8 MG/DL (ref 0.2–1)
BUN SERPL-MCNC: 19 MG/DL (ref 7–18)
BUN/CREAT SERPL: 30 (ref 12–20)
CALCIUM SERPL-MCNC: 8.8 MG/DL (ref 8.5–10.1)
CHLORIDE SERPL-SCNC: 110 MMOL/L (ref 100–111)
CO2 SERPL-SCNC: 30 MMOL/L (ref 21–32)
CREAT SERPL-MCNC: 0.64 MG/DL (ref 0.6–1.3)
DIFFERENTIAL METHOD BLD: ABNORMAL
EOSINOPHIL # BLD: 0.2 K/UL (ref 0–0.4)
EOSINOPHIL NFR BLD: 3 % (ref 0–5)
ERYTHROCYTE [DISTWIDTH] IN BLOOD BY AUTOMATED COUNT: 14.7 % (ref 11.6–14.5)
FERRITIN SERPL-MCNC: 116 NG/ML (ref 8–388)
FOLATE SERPL-MCNC: >20 NG/ML (ref 3.1–17.5)
GLOBULIN SER CALC-MCNC: 3.4 G/DL (ref 2–4)
GLUCOSE SERPL-MCNC: 82 MG/DL (ref 74–99)
HCT VFR BLD AUTO: 40 % (ref 35–45)
HGB BLD-MCNC: 12.5 G/DL (ref 12–16)
IRON SERPL-MCNC: 58 UG/DL (ref 50–175)
LYMPHOCYTES # BLD: 1.1 K/UL (ref 0.9–3.6)
LYMPHOCYTES NFR BLD: 20 % (ref 21–52)
MCH RBC QN AUTO: 27 PG (ref 24–34)
MCHC RBC AUTO-ENTMCNC: 31.3 G/DL (ref 31–37)
MCV RBC AUTO: 86.4 FL (ref 78–100)
MONOCYTES # BLD: 0.5 K/UL (ref 0.05–1.2)
MONOCYTES NFR BLD: 9 % (ref 3–10)
NEUTS SEG # BLD: 3.6 K/UL (ref 1.8–8)
NEUTS SEG NFR BLD: 67 % (ref 40–73)
PLATELET # BLD AUTO: 179 K/UL (ref 135–420)
PMV BLD AUTO: 10.5 FL (ref 9.2–11.8)
POTASSIUM SERPL-SCNC: 3.8 MMOL/L (ref 3.5–5.5)
PROT SERPL-MCNC: 6.7 G/DL (ref 6.4–8.2)
RBC # BLD AUTO: 4.63 M/UL (ref 4.2–5.3)
SODIUM SERPL-SCNC: 144 MMOL/L (ref 136–145)
VIT B12 SERPL-MCNC: 1256 PG/ML (ref 211–911)
WBC # BLD AUTO: 5.4 K/UL (ref 4.6–13.2)

## 2021-10-06 PROCEDURE — 85025 COMPLETE CBC W/AUTO DIFF WBC: CPT

## 2021-10-06 PROCEDURE — G8417 CALC BMI ABV UP PARAM F/U: HCPCS | Performed by: SPECIALIST

## 2021-10-06 PROCEDURE — 84425 ASSAY OF VITAMIN B-1: CPT

## 2021-10-06 PROCEDURE — 80053 COMPREHEN METABOLIC PANEL: CPT

## 2021-10-06 PROCEDURE — 82607 VITAMIN B-12: CPT

## 2021-10-06 PROCEDURE — G8752 SYS BP LESS 140: HCPCS | Performed by: SPECIALIST

## 2021-10-06 PROCEDURE — 36415 COLL VENOUS BLD VENIPUNCTURE: CPT

## 2021-10-06 PROCEDURE — G0463 HOSPITAL OUTPT CLINIC VISIT: HCPCS | Performed by: SPECIALIST

## 2021-10-06 PROCEDURE — G9717 DOC PT DX DEP/BP F/U NT REQ: HCPCS | Performed by: SPECIALIST

## 2021-10-06 PROCEDURE — 1090F PRES/ABSN URINE INCON ASSESS: CPT | Performed by: SPECIALIST

## 2021-10-06 PROCEDURE — 1101F PT FALLS ASSESS-DOCD LE1/YR: CPT | Performed by: SPECIALIST

## 2021-10-06 PROCEDURE — 83540 ASSAY OF IRON: CPT

## 2021-10-06 PROCEDURE — G8427 DOCREV CUR MEDS BY ELIG CLIN: HCPCS | Performed by: SPECIALIST

## 2021-10-06 PROCEDURE — G8536 NO DOC ELDER MAL SCRN: HCPCS | Performed by: SPECIALIST

## 2021-10-06 PROCEDURE — 99214 OFFICE O/P EST MOD 30 MIN: CPT | Performed by: SPECIALIST

## 2021-10-06 PROCEDURE — 82306 VITAMIN D 25 HYDROXY: CPT

## 2021-10-06 PROCEDURE — G8400 PT W/DXA NO RESULTS DOC: HCPCS | Performed by: SPECIALIST

## 2021-10-06 PROCEDURE — 82728 ASSAY OF FERRITIN: CPT

## 2021-10-06 PROCEDURE — G8754 DIAS BP LESS 90: HCPCS | Performed by: SPECIALIST

## 2021-10-06 PROCEDURE — G9711 PT HX TOT COL OR COLON CA: HCPCS | Performed by: SPECIALIST

## 2021-10-06 RX ORDER — CELECOXIB 100 MG/1
CAPSULE ORAL 2 TIMES DAILY
COMMUNITY
End: 2021-12-29

## 2021-10-06 NOTE — PROGRESS NOTES
6 months follow-up    Subjective:     Alessandra Lopez  is a 73 y.o. female who presents for follow-up about 6 months following laparoscopic sleeve gastrectomy. She has lost a total of 68 pounds since surgery. Body mass index is 29.41 kg/m². . EBWL is (64%). The patient presents today to assess their progress toward their goal of weight loss and to address any issues that may be present. Today the patient and I have reviewed their diet and how appropriate their food choices are. The following issues have been identified - No new issues noted. Is very happy with her weight loss. Pain assessment - 0  . Surgery related complication: none       She reports no issues and denies vomiting and abdominal pain. The patient's exercise level: very active. Changes in her medical history and medications have been reviewed. Patient Active Problem List   Diagnosis Code    Lumbar back pain M54.50    Morbid obesity with body mass index (BMI) of 40.0 to 49.9 ([facility]) E66.01    Depression F32. A    Chronic pain G89.29    Anemia D64.9    Smoking history Z87.891    Sleep apnea G47.30    Acoustic neuroma ([facility]) D33.3    History of colon resection Z90.49    Hypertension I10    Hiatal hernia K44.9    Morbid obesity with BMI of 40.0-44.9, adult ([facility]) E66.01, Z68.41    Intestinal malabsorption K90.9    S/P laparoscopic sleeve gastrectomy Z98.84     Past Medical History:   Diagnosis Date    Acoustic neuroma [facility]) 2009    stable-right ear    Acoustic neuroma ([facility] [address] 2009    right    Anemia     Arthritis     Cat scratch fever 1998    in lymph nodes    Chronic bronchitis ([facility])     Chronic pain     \"joints\"    Depression     Edema     Hiatal hernia     History of colon resection 1995    pt unsure of the nature of her resection     Hypertension     takes diuretics \"PRN\"- stopped meds 2018- only on med for BP while getting her CPAP adjusted    Ill-defined condition     hiatal hernia    Liver disease 2021 fatty liver    Morbid obesity ([facility] [address]     Morbid obesity with body mass index (BMI) of 40.0 to 49.9 ([facility])     Nausea & vomiting     Sleep apnea     uses c-pap, Patient instructed to bring CPAP machine on DOS    Tuberculosis 1995    treated    Vertigo 2009     Past Surgical History:   Procedure Laterality Date    HX CARPAL TUNNEL RELEASE Bilateral     HX CHOLECYSTECTOMY      HX GI      colonoscopy    HX HEART CATHETERIZATION  03/2021    HX HIP REPLACEMENT Left     HX HYSTERECTOMY      HX ORTHOPAEDIC  1996    c5-6 fusion     HX ORTHOPAEDIC  01/2018    lumbar laminectomy     HX ORTHOPAEDIC Left     trigger finger surgery- all but the thumb    HX ORTHOPAEDIC Bilateral 2020    shoulder replacement    HX ORTHOPAEDIC Left 2020    hip replacement    HX TOTAL COLECTOMY  1995    pt unsure of her history     [place] LAP, STELLA RESTRICT PROC, LONGITUDINAL GASTRECTOMY  03/30/2021    Dr Tyree James     Current Outpatient Medications   Medication Sig Dispense Refill    celecoxib (CeleBREX) 100 mg capsule Take  by mouth two (2) times a day.  GABAPENTIN PO Take  by mouth.  cyanocobalamin 1,000 mcg tablet Take 500 mcg by mouth daily.  multivitamin (ONE A DAY) tablet Take 1 Tab by mouth daily.  DULoxetine (CYMBALTA) 30 mg capsule Take 30 mg by mouth daily. Indications: anxiousness associated with depression      QUEtiapine (SEROqueL) 50 mg tablet Take 50 mg by mouth nightly. Indications: bipolar      diclofenac (VOLTAREN) 1 % gel Apply  to affected area four (4) times daily. 1 Each 0    metoprolol tartrate (LOPRESSOR) 25 mg tablet Take 12.5 mg by mouth two (2) times a day.  cyanocobalamin (VITAMIN B-12) 1,000 mcg sublingual tablet Take 1,000 mcg by mouth daily.            Review of Symptoms:     General - No history or complaints of unexpected fever or chills  Head/Neck - No history or complaints of headache or dizziness  Cardiac - No history or complaints of chest pain, palpitations, or shortness of breath  Pulmonary - No history or complaints of shortness of breath or productive cough  Gastrointestinal - as noted above  Genitourinary - No history or complaints of hematuria/dysuria or renal lithiasis  Musculoskeletal - No history or complaints of joint  muscular weakness  Hematologic - No history of any bleeding episodes  Neurologic - No history or complaints of  migraine headaches or neurologic symptoms    Objective:     Visit Vitals  /64 (BP 1 Location: Left upper arm, BP Patient Position: Sitting, BP Cuff Size: Large adult)   Pulse 83   Temp 97.9 °F (36.6 °C)   Resp 16   Ht 5' 3\" (1.6 m)   Wt 75.3 kg (166 lb)   SpO2 98%   BMI 29.41 kg/m²        Physical Exam:    General:  alert, cooperative, no distress, appears stated age   Lungs:   clear to auscultation bilaterally   Heart:  Regular rate and rhythm   Abdomen:   abdomen is soft without significant tenderness, masses, organomegaly or guarding; Incisions: healing well, no hernias         Labs:     No results found for this or any previous visit (from the past 2016 hour(s)). Assessment:     1. History of Morbid obesity, status post  laparoscopic sleeve gastrectomy. Doing well; no concerns. Plan:     1. Remember to measure portions, continue low carbohydrate diet  2. Lab reviewed with patient and appropriate changes were made to vitamin regimen. 3. Remember vitamin supplements. 4. Exercise regimen appears adequate. 5. Attend support group  6. Follow-up in 3 month(s). 7. Total time spent with the patient 30 minutes. 8. The patient understands the plan of action  9.  Obtain labs today

## 2021-10-06 NOTE — PATIENT INSTRUCTIONS

## 2021-10-10 LAB — VIT B1 BLD-SCNC: 86.6 NMOL/L (ref 66.5–200)

## 2021-12-07 ENCOUNTER — HOSPITAL ENCOUNTER (OUTPATIENT)
Dept: PREADMISSION TESTING | Age: 71
Discharge: HOME | End: 2021-12-07
Payer: MEDICARE

## 2021-12-07 ENCOUNTER — TRANSCRIBE ORDER (OUTPATIENT)
Dept: REGISTRATION | Age: 71
End: 2021-12-07

## 2021-12-07 DIAGNOSIS — M17.12 DEGENERATIVE ARTHRITIS OF LEFT KNEE: ICD-10-CM

## 2021-12-07 DIAGNOSIS — M17.12 DEGENERATIVE ARTHRITIS OF LEFT KNEE: Primary | ICD-10-CM

## 2021-12-07 LAB
ALBUMIN SERPL-MCNC: 3.2 G/DL (ref 3.4–5)
ALBUMIN/GLOB SERPL: 1 {RATIO} (ref 0.8–1.7)
ALP SERPL-CCNC: 169 U/L (ref 45–117)
ALT SERPL-CCNC: 25 U/L (ref 13–56)
ANION GAP SERPL CALC-SCNC: 4 MMOL/L (ref 3–18)
APPEARANCE UR: CLEAR
AST SERPL-CCNC: 19 U/L (ref 10–38)
ATRIAL RATE: 62 BPM
BACTERIA URNS QL MICRO: ABNORMAL /HPF
BILIRUB SERPL-MCNC: 0.5 MG/DL (ref 0.2–1)
BILIRUB UR QL: NEGATIVE
BUN SERPL-MCNC: 23 MG/DL (ref 7–18)
BUN/CREAT SERPL: 33 (ref 12–20)
CALCIUM SERPL-MCNC: 8.8 MG/DL (ref 8.5–10.1)
CALCULATED P AXIS, ECG09: 78 DEGREES
CALCULATED R AXIS, ECG10: 60 DEGREES
CALCULATED T AXIS, ECG11: 63 DEGREES
CHLORIDE SERPL-SCNC: 108 MMOL/L (ref 100–111)
CO2 SERPL-SCNC: 32 MMOL/L (ref 21–32)
COLOR UR: YELLOW
CREAT SERPL-MCNC: 0.7 MG/DL (ref 0.6–1.3)
DIAGNOSIS, 93000: NORMAL
EPITH CASTS URNS QL MICRO: ABNORMAL /LPF (ref 0–5)
ERYTHROCYTE [DISTWIDTH] IN BLOOD BY AUTOMATED COUNT: 14.7 % (ref 11.6–14.5)
GLOBULIN SER CALC-MCNC: 3.2 G/DL (ref 2–4)
GLUCOSE SERPL-MCNC: 75 MG/DL (ref 74–99)
GLUCOSE UR STRIP.AUTO-MCNC: NEGATIVE MG/DL
HCT VFR BLD AUTO: 40.3 % (ref 35–45)
HGB BLD-MCNC: 12.7 G/DL (ref 12–16)
HGB UR QL STRIP: NEGATIVE
HYALINE CASTS URNS QL MICRO: ABNORMAL /LPF (ref 0–2)
KETONES UR QL STRIP.AUTO: NEGATIVE MG/DL
LEUKOCYTE ESTERASE UR QL STRIP.AUTO: ABNORMAL
MCH RBC QN AUTO: 27.1 PG (ref 24–34)
MCHC RBC AUTO-ENTMCNC: 31.5 G/DL (ref 31–37)
MCV RBC AUTO: 86.1 FL (ref 78–100)
MUCOUS THREADS URNS QL MICRO: ABNORMAL /LPF
NITRITE UR QL STRIP.AUTO: NEGATIVE
NRBC # BLD: 0 K/UL (ref 0–0.01)
NRBC BLD-RTO: 0 PER 100 WBC
P-R INTERVAL, ECG05: 170 MS
PH UR STRIP: 5 [PH] (ref 5–8)
PLATELET # BLD AUTO: 183 K/UL (ref 135–420)
PMV BLD AUTO: 10.1 FL (ref 9.2–11.8)
POTASSIUM SERPL-SCNC: 4.1 MMOL/L (ref 3.5–5.5)
PROT SERPL-MCNC: 6.4 G/DL (ref 6.4–8.2)
PROT UR STRIP-MCNC: NEGATIVE MG/DL
Q-T INTERVAL, ECG07: 404 MS
QRS DURATION, ECG06: 78 MS
QTC CALCULATION (BEZET), ECG08: 410 MS
RBC # BLD AUTO: 4.68 M/UL (ref 4.2–5.3)
RBC #/AREA URNS HPF: ABNORMAL /HPF (ref 0–5)
SODIUM SERPL-SCNC: 144 MMOL/L (ref 136–145)
SP GR UR REFRACTOMETRY: 1.02 (ref 1–1.03)
UROBILINOGEN UR QL STRIP.AUTO: 1 EU/DL (ref 0.2–1)
VENTRICULAR RATE, ECG03: 62 BPM
WBC # BLD AUTO: 5.8 K/UL (ref 4.6–13.2)
WBC URNS QL MICRO: ABNORMAL /HPF (ref 0–5)

## 2021-12-07 PROCEDURE — 87086 URINE CULTURE/COLONY COUNT: CPT

## 2021-12-07 PROCEDURE — 81001 URINALYSIS AUTO W/SCOPE: CPT

## 2021-12-07 PROCEDURE — 80053 COMPREHEN METABOLIC PANEL: CPT

## 2021-12-07 PROCEDURE — 85027 COMPLETE CBC AUTOMATED: CPT

## 2021-12-07 PROCEDURE — 36415 COLL VENOUS BLD VENIPUNCTURE: CPT

## 2021-12-07 PROCEDURE — 93005 ELECTROCARDIOGRAM TRACING: CPT

## 2021-12-07 NOTE — PERIOP NOTES
Facility Wt:      74.35   kg  BMI: 29.8  Ht: 5' 2\"  Neck: 14 in    Chlorhexidine wash given and instructions reviewed.

## 2021-12-08 LAB
BACTERIA SPEC CULT: NORMAL
CC UR VC: NORMAL
SERVICE CMNT-IMP: NORMAL

## 2021-12-21 ENCOUNTER — HOSPITAL ENCOUNTER (OUTPATIENT)
Dept: PREADMISSION TESTING | Age: 71
Discharge: HOME | End: 2021-12-21

## 2021-12-21 VITALS — BODY MASS INDEX: 29.23 KG/M2 | WEIGHT: 165 LBS | HEIGHT: 63 IN

## 2021-12-21 RX ORDER — CEFAZOLIN SODIUM/WATER 2 G/20 ML
2 SYRINGE (ML) INTRAVENOUS ONCE
Status: CANCELLED | OUTPATIENT
Start: 2021-12-29 | End: 2021-12-29

## 2021-12-21 NOTE — PERIOP NOTES
Leave all valuables at home or loved ones;to include wallets/purse, money/credit cards, electronics  such as laptops and tablets. If you want to have your prescriptions filled here, please have some form of payment with your . Please arrange for your transportation home Patient states family physician is aware of upcoming procedure/surgery. Stop nsaids 7 days prior to [place]. Do not put any lotion, jewelry, makeup, fingernail or toenail polish; no wigs, no private piercings; no tictac,gum and mouthwash. Patient instructed to bring cpap machine in on day of procedure/surgery. Denies family history of anesthesia complications. Please be aware that due to unforeseen circumstances, delays may occur and your patience will be appreciated. If you ae scheduled to be discharged the same day, please plan to be with us for most of the day. If an inpatient, room assignments may be delayed as well. Our priority is to make you as comfortable as possible and to keep your family informed of your status when possible. Denies shortness of breath nor chest pain while climbing stairs. No dnr  Preoperative Nutrition Screen (KATE)   Patient's Age: 73 y.o. Patient's BMI: Estimated body mass index is 29.23 kg/m² as calculated from the following:    Height as of this encounter: 5' 3\" (1.6 m). Weight as of this encounter: 74.8 kg (165 lb). 1. Does the patient have a documented serum albumin less than 3.0 within the last 90 days? No = 0          2. Is patient's BMI less than 18.5 (or less than 20 if age over 72)? No = 0        3. Has the patient had an unplanned weight loss of 10% of body weight or more in the last 6 months? No = 0   4. Has the patient been eating less than 50% of their normal diet in the preceding week?  No = 0   KATE Score (number of Yes responses), 0-4 0       Electronically signed by Angel Floyd RN on 12/21/21 at 9:07 AM

## 2021-12-27 PROBLEM — M17.12 PRIMARY OSTEOARTHRITIS OF LEFT KNEE: Chronic | Status: ACTIVE | Noted: 2021-12-27

## 2021-12-27 NOTE — H&P
History and Physical        Patient: Navin Guadarrama               Sex: female          DOA: (Not on file)         YOB: 1950      Age:  73 y.o.        LOS:  LOS: 0 days        HPI:     Jessica Bonilla is a 81-year-old right-handed white female who is referred here for evaluation of a left greater than right severe tibiofemoral DJD. The patient is referred today for consultation by Dr. Gretchen Hui. The patient has had her shoulders replaced and had her hip replaced. She has had a recent gastric sleeve by Dr. Liban Ga and has lost about 75-76 pounds since March of this year. At this point, she is really ready to progress to a knee replacement. She has tried all other interventions for her knees and these have not made her any better. She has had cortisone injections, Visco supplementation, physical therapy, and medications. Outside x-rays of both knees reviewed from outside at [facility] show the left to have severe bone-on-bone tibiofemoral DJD with moderate patellofemoral DJD and the right knee to have severe lateral tibiofemoral DJD with moderate medial tibiofemoral DJD. Otherwise, no periosteal reaction, no medullary lesions, no osteopenia, and no chondrolysis.   Past Medical History:   Diagnosis Date    Acoustic neuroma [facility]) 2009    stable-right ear    Acoustic neuroma [facility]) 2009    right    Anemia     Arthritis     Cat scratch fever 1998    in lymph nodes    Chronic bronchitis (HCC)     Chronic pain     \"joints\"    Depression     Edema     Hiatal hernia     History of colon resection 1995    pt unsure of the nature of her resection     Ill-defined condition     hiatal hernia    Liver disease 2021    fatty liver    Low vitamin D level     Morbid obesity ([address]     resolved    Morbid obesity with body mass index (BMI) of 40.0 to 49.9 (HCC)     Nausea & vomiting     Sleep apnea     uses c-pap, Patient instructed to bring CPAP machine on [address] treated    Vertigo 2009       Past Surgical History:   Procedure Laterality Date    HX CARPAL TUNNEL RELEASE Bilateral     HX CHOLECYSTECTOMY      HX GI      colonoscopy    HX GI      sleeve    HX HEART CATHETERIZATION  03/2021    HX HIP REPLACEMENT Left     HX HYSTERECTOMY      HX ORTHOPAEDIC  1996    c5-6 fusion     HX ORTHOPAEDIC  01/2018    lumbar laminectomy     HX ORTHOPAEDIC Left     trigger finger surgery- all but the thumb    HX ORTHOPAEDIC Bilateral 2020    shoulder replacement    HX ORTHOPAEDIC Left 2020    hip replacement    HX ORTHOPAEDIC Bilateral     ctr    HX ORTHOPAEDIC      tib-fib hemant and removed    HX TOTAL COLECTOMY  1995    pt unsure of her history     WI LAP, STELLA RESTRICT PROC, LONGITUDINAL GASTRECTOMY  03/30/2021    Dr Mary Tiwari       Family History   Problem Relation Age of Onset    Lung Disease Mother     Heart Disease Mother     Cancer Father     Heart Disease Father     Cancer Brother        Social History     Socioeconomic History    Marital status:    Tobacco Use    Smoking status: Never Smoker    Smokeless tobacco: Never Used   Vaping Use    Vaping Use: Never used   Substance and Sexual Activity    Alcohol use: No    Drug use: No    Sexual activity: Not Currently     Birth control/protection: Surgical       Prior to Admission medications    Medication Sig Start Date End Date Taking? Authorizing Provider   celecoxib (CeleBREX) 100 mg capsule Take  by mouth two (2) times a day. Provider, Historical   GABAPENTIN PO Take 600 mg by mouth three (3) times daily as needed. Provider, Historical   multivitamin (ONE A DAY) tablet Take 1 Tab by mouth daily. Provider, Historical   DULoxetine (CYMBALTA) 30 mg capsule Take 30 mg by mouth daily. Indications: anxiousness associated with depression    Provider, Historical   cyanocobalamin (VITAMIN B-12) 1,000 mcg sublingual tablet Take 1,000 mcg by mouth daily.     Provider, Historical   QUEtiapine (SEROqueL) 50 mg tablet Take 50 mg by mouth nightly. Indications: bipolar    Provider, Historical       Allergies   Allergen Reactions    Latex Rash       Review of Systems  GENERAL: Patient presents with weight change. Patient has no signs of fever or chills. HEAD/ENTM: Patient presents with headaches. Patient has no signs of dizziness, hearing loss, ringing in ears, sore throat/hoarseness, recent cold, double vision, blurred vision, itchy eyes, eye redness or eye discharge. NEUROLOGIC:  Patient has no signs of fainting, muscle weakness, numbness/tingling, loss of balance or seizure disorder. CARDIOVASCULAR:  Patient has no signs of chest pain, palpitations, rheumatic fever or heart murmur. RESPIRATORY:  Patient has no signs of chronic cough, wheezing, difficulty breathing, pain on breathing or shortness of breath. GASTROINTESTINAL:  Patient has no signs of nausea/vomiting, difficulty swallowing, gas/bloating, indigestion, abdominal pain, diarrhea, bloody stools or hemorrhoids. GENITOURINARY:  Patient has no signs of blood in the urine, painful urinating, burning sensation, bladder/kidney infection, frequent urinating or incontinence. MUSCULOSKELETAL: Patient presents with joint stiffness and joint pain. Patient has no signs of fracture/dislocation, sprain/strain, tendonitis, rheumatoid disease, gout or swelling of feet. INTEGUMENTARY:  Patient has no signs of rash/itching, psoriasis, Raynaud's phenomenon or varicose veins. EMOTIONAL: Patient presents with bipolar disorder. Patient has no signs of anxiety, depression, memory loss or change in mood. Physical Exam:      There were no vitals taken for this visit. Physical Exam:  Physical exam shows a healthy-appearing, elderly white female. The left knee has extension to about to -10 degrees and has flexion to about 115 degrees.   She has got obvious mild genu varum and positive patellofemoral crepitation, positive patellar inhibition, and pain on both the medial and lateral joint lines. Otherwise, examination of the  knee demonstrates the patient has a negative Lachman and has no valgus instability at zero degrees or 30 degrees. There is a negative posterior sag. There is no knee effusion. There is no swelling, ecchymosis, or wounds. The patient has no popliteal pain. The patient has a negative patellar grind and a negative patellar apprehension test.  There is a negative Duc Maneuver. There is no pain at the inferior pole of the patella or the tibial tubercle. The patient has 5/5 muscle strength with good quadriceps tone. The patient has good capillary refill with normal motor strength of the foot and ankle and normal light-touch sensation in the foot and lower leg. Assessment/Plan     Principal Problem:    Primary osteoarthritis of left knee (12/27/2021)    Active Problems:    Depression ()      Chronic pain ()      Acoustic neuroma (HCC) ()      History of colon resection ()      Intestinal malabsorption (4/14/2021)        The risks associated with left outpatient total knee arthroplasty using the subvastus approach were reviewed and all questions were answered. The benefits include earlier ambulation, better mobility, and quicker return of muscle function. The risks including pain, bleeding, infection, DVT, need for future surgeries amongst others are reviewed and questions are answered. The patient is going to be scheduled for outpatient TKA using the Sisteer knee replacement system. We have given the patient Keflex for antibiotic prophylaxis and also a prescription for Roxicodone for postoperative pain management. We will use aspirin 81mg twice daily for DVT prophylaxis. The patient will also receive an IV dose of antibiotics preoperatively. The patient will be discharged the same day to home health physical therapy.   The patient was counseled on the importance of ice, elevation, muscle stretching and range of motion exercises. The patient will follow up two weeks postoperatively. Review of last X-Rays show Kellgren-Hernandez grade 3/4 changes. She has her own walker. Once we get her left knee better enough, we will discuss progressing to the right knee.

## 2021-12-28 RX ORDER — SODIUM CHLORIDE 0.9 % (FLUSH) 0.9 %
5-40 SYRINGE (ML) INJECTION EVERY 8 HOURS
Status: CANCELLED | OUTPATIENT
Start: 2021-12-28

## 2021-12-28 RX ORDER — SODIUM CHLORIDE 0.9 % (FLUSH) 0.9 %
5-40 SYRINGE (ML) INJECTION AS NEEDED
Status: CANCELLED | OUTPATIENT
Start: 2021-12-28

## 2021-12-28 RX ORDER — SODIUM CHLORIDE, SODIUM LACTATE, POTASSIUM CHLORIDE, CALCIUM CHLORIDE 600; 310; 30; 20 MG/100ML; MG/100ML; MG/100ML; MG/100ML
125 INJECTION, SOLUTION INTRAVENOUS CONTINUOUS
Status: CANCELLED | OUTPATIENT
Start: 2021-12-28 | End: 2021-12-29

## 2021-12-28 RX ORDER — CEFAZOLIN SODIUM/WATER 2 G/20 ML
2 SYRINGE (ML) INTRAVENOUS ONCE
Status: CANCELLED | OUTPATIENT
Start: 2021-12-28 | End: 2021-12-28

## 2021-12-29 ENCOUNTER — ANESTHESIA EVENT (OUTPATIENT)
Dept: SURGERY | Age: 71
End: 2021-12-29
Payer: MEDICARE

## 2021-12-29 ENCOUNTER — ANESTHESIA (OUTPATIENT)
Dept: SURGERY | Age: 71
End: 2021-12-29
Payer: MEDICARE

## 2021-12-29 ENCOUNTER — HOSPITAL ENCOUNTER (OUTPATIENT)
Age: 71
Discharge: HOME HEALTH | End: 2021-12-29
Attending: ORTHOPAEDIC SURGERY | Admitting: ORTHOPAEDIC SURGERY
Payer: MEDICARE

## 2021-12-29 VITALS
RESPIRATION RATE: 16 BRPM | HEART RATE: 82 BPM | TEMPERATURE: 98.2 F | OXYGEN SATURATION: 96 % | BODY MASS INDEX: 29.34 KG/M2 | HEIGHT: 63 IN | DIASTOLIC BLOOD PRESSURE: 68 MMHG | WEIGHT: 165.6 LBS | SYSTOLIC BLOOD PRESSURE: 146 MMHG

## 2021-12-29 PROBLEM — M17.12 LEFT KNEE DJD: Status: ACTIVE | Noted: 2021-12-29

## 2021-12-29 LAB
ABO + RH BLD: NORMAL
BLOOD GROUP ANTIBODIES SERPL: NORMAL
SPECIMEN EXP DATE BLD: NORMAL

## 2021-12-29 PROCEDURE — 36415 COLL VENOUS BLD VENIPUNCTURE: CPT

## 2021-12-29 PROCEDURE — 77030033263 HC DRSG MEPILEX 16-48IN BORD MOLN -B: Performed by: ORTHOPAEDIC SURGERY

## 2021-12-29 PROCEDURE — C1776 JOINT DEVICE (IMPLANTABLE): HCPCS | Performed by: ORTHOPAEDIC SURGERY

## 2021-12-29 PROCEDURE — 97116 GAIT TRAINING THERAPY: CPT

## 2021-12-29 PROCEDURE — 74011250637 HC RX REV CODE- 250/637: Performed by: PHYSICIAN ASSISTANT

## 2021-12-29 PROCEDURE — 74011250636 HC RX REV CODE- 250/636: Performed by: PHYSICIAN ASSISTANT

## 2021-12-29 PROCEDURE — L1830 KO IMMOB CANVAS LONG PRE OTS: HCPCS | Performed by: ORTHOPAEDIC SURGERY

## 2021-12-29 PROCEDURE — 77030011628: Performed by: ORTHOPAEDIC SURGERY

## 2021-12-29 PROCEDURE — 74011250636 HC RX REV CODE- 250/636: Performed by: ANESTHESIOLOGY

## 2021-12-29 PROCEDURE — 77030034694 HC SCPL CANADY PLSM DISP USMD -E: Performed by: ORTHOPAEDIC SURGERY

## 2021-12-29 PROCEDURE — 74011250636 HC RX REV CODE- 250/636: Performed by: ORTHOPAEDIC SURGERY

## 2021-12-29 PROCEDURE — 77030012508 HC MSK AIRWY LMA AMBU -A: Performed by: ANESTHESIOLOGY

## 2021-12-29 PROCEDURE — 74011000258 HC RX REV CODE- 258: Performed by: ORTHOPAEDIC SURGERY

## 2021-12-29 PROCEDURE — 76010000153 HC OR TIME 1.5 TO 2 HR: Performed by: ORTHOPAEDIC SURGERY

## 2021-12-29 PROCEDURE — 74011250637 HC RX REV CODE- 250/637: Performed by: ANESTHESIOLOGY

## 2021-12-29 PROCEDURE — 77030002934 HC SUT MCRYL J&J -B: Performed by: ORTHOPAEDIC SURGERY

## 2021-12-29 PROCEDURE — 77030016060 HC NDL NRV BLK TELE -A: Performed by: ANESTHESIOLOGY

## 2021-12-29 PROCEDURE — 76210000023 HC REC RM PH II 2 TO 2.5 HR: Performed by: ORTHOPAEDIC SURGERY

## 2021-12-29 PROCEDURE — 77030003666 HC NDL SPINAL BD -A: Performed by: ORTHOPAEDIC SURGERY

## 2021-12-29 PROCEDURE — 74011000250 HC RX REV CODE- 250: Performed by: ANESTHESIOLOGY

## 2021-12-29 PROCEDURE — 97165 OT EVAL LOW COMPLEX 30 MIN: CPT

## 2021-12-29 PROCEDURE — 77030020782 HC GWN BAIR PAWS FLX 3M -B: Performed by: ORTHOPAEDIC SURGERY

## 2021-12-29 PROCEDURE — 76942 ECHO GUIDE FOR BIOPSY: CPT | Performed by: ORTHOPAEDIC SURGERY

## 2021-12-29 PROCEDURE — 76060000034 HC ANESTHESIA 1.5 TO 2 HR: Performed by: ORTHOPAEDIC SURGERY

## 2021-12-29 PROCEDURE — C1713 ANCHOR/SCREW BN/BN,TIS/BN: HCPCS | Performed by: ORTHOPAEDIC SURGERY

## 2021-12-29 PROCEDURE — 64447 NJX AA&/STRD FEMORAL NRV IMG: CPT | Performed by: ANESTHESIOLOGY

## 2021-12-29 PROCEDURE — 74011000250 HC RX REV CODE- 250: Performed by: ORTHOPAEDIC SURGERY

## 2021-12-29 PROCEDURE — 77030013708 HC HNDPC SUC IRR PULS STRY –B: Performed by: ORTHOPAEDIC SURGERY

## 2021-12-29 PROCEDURE — 77030027138 HC INCENT SPIROMETER -A: Performed by: ORTHOPAEDIC SURGERY

## 2021-12-29 PROCEDURE — 97161 PT EVAL LOW COMPLEX 20 MIN: CPT

## 2021-12-29 PROCEDURE — 74011000272 HC RX REV CODE- 272: Performed by: ORTHOPAEDIC SURGERY

## 2021-12-29 PROCEDURE — 86901 BLOOD TYPING SEROLOGIC RH(D): CPT

## 2021-12-29 PROCEDURE — 64450 NJX AA&/STRD OTHER PN/BRANCH: CPT | Performed by: ANESTHESIOLOGY

## 2021-12-29 PROCEDURE — 77030038010: Performed by: ORTHOPAEDIC SURGERY

## 2021-12-29 PROCEDURE — 77030040361 HC SLV COMPR DVT MDII -B: Performed by: ORTHOPAEDIC SURGERY

## 2021-12-29 PROCEDURE — 97535 SELF CARE MNGMENT TRAINING: CPT

## 2021-12-29 PROCEDURE — 77030034479 HC ADH SKN CLSR PRINEO J&J -B: Performed by: ORTHOPAEDIC SURGERY

## 2021-12-29 PROCEDURE — 77030031139 HC SUT VCRL2 J&J -A: Performed by: ORTHOPAEDIC SURGERY

## 2021-12-29 PROCEDURE — 76210000006 HC OR PH I REC 0.5 TO 1 HR: Performed by: ORTHOPAEDIC SURGERY

## 2021-12-29 PROCEDURE — 2709999900 HC NON-CHARGEABLE SUPPLY: Performed by: ORTHOPAEDIC SURGERY

## 2021-12-29 DEVICE — INSERT TIB SZ 6 THK10MM POLY POST STBL ROT PLATFRM ATTUNE: Type: IMPLANTABLE DEVICE | Site: KNEE | Status: FUNCTIONAL

## 2021-12-29 DEVICE — COMPONENT FEM SZ 6 L KNEE POST STBL CEM ATTUNE: Type: IMPLANTABLE DEVICE | Site: KNEE | Status: FUNCTIONAL

## 2021-12-29 DEVICE — BASEPLATE TIB SZ 5 KNEE ROT PLATFRM CEM SYS ATTUNE: Type: IMPLANTABLE DEVICE | Site: KNEE | Status: FUNCTIONAL

## 2021-12-29 DEVICE — KNEE K1 TOT HEMI STD CEM IMPL CAPPED SYNTHES: Type: IMPLANTABLE DEVICE | Site: KNEE | Status: FUNCTIONAL

## 2021-12-29 DEVICE — CEMENT BNE 40GM FULL DOSE PMMA W/O ANTIBIO HI VISC N RADPQ: Type: IMPLANTABLE DEVICE | Site: KNEE | Status: FUNCTIONAL

## 2021-12-29 DEVICE — COMPONENT PAT DIA35MM KNEE POLY CEM MEDIALIZED ANAT ATTUNE: Type: IMPLANTABLE DEVICE | Site: KNEE | Status: FUNCTIONAL

## 2021-12-29 RX ORDER — HYDROMORPHONE HYDROCHLORIDE 1 MG/ML
0.5 INJECTION, SOLUTION INTRAMUSCULAR; INTRAVENOUS; SUBCUTANEOUS ONCE
Status: COMPLETED | OUTPATIENT
Start: 2021-12-29 | End: 2021-12-29

## 2021-12-29 RX ORDER — CEFAZOLIN SODIUM/WATER 2 G/20 ML
2 SYRINGE (ML) INTRAVENOUS ONCE
Status: COMPLETED | OUTPATIENT
Start: 2021-12-29 | End: 2021-12-29

## 2021-12-29 RX ORDER — SODIUM CHLORIDE, SODIUM LACTATE, POTASSIUM CHLORIDE, CALCIUM CHLORIDE 600; 310; 30; 20 MG/100ML; MG/100ML; MG/100ML; MG/100ML
125 INJECTION, SOLUTION INTRAVENOUS CONTINUOUS
Status: DISCONTINUED | OUTPATIENT
Start: 2021-12-29 | End: 2021-12-29 | Stop reason: HOSPADM

## 2021-12-29 RX ORDER — PANTOPRAZOLE SODIUM 40 MG/1
40 TABLET, DELAYED RELEASE ORAL ONCE
Status: COMPLETED | OUTPATIENT
Start: 2021-12-29 | End: 2021-12-29

## 2021-12-29 RX ORDER — NALOXONE HYDROCHLORIDE 0.4 MG/ML
0.1 INJECTION, SOLUTION INTRAMUSCULAR; INTRAVENOUS; SUBCUTANEOUS
Status: DISCONTINUED | OUTPATIENT
Start: 2021-12-29 | End: 2021-12-29 | Stop reason: HOSPADM

## 2021-12-29 RX ORDER — ONDANSETRON 2 MG/ML
4 INJECTION INTRAMUSCULAR; INTRAVENOUS ONCE
Status: COMPLETED | OUTPATIENT
Start: 2021-12-29 | End: 2021-12-29

## 2021-12-29 RX ORDER — TRANEXAMIC ACID 650 1/1
1950 TABLET ORAL ONCE
Status: COMPLETED | OUTPATIENT
Start: 2021-12-29 | End: 2021-12-29

## 2021-12-29 RX ORDER — DEXAMETHASONE SODIUM PHOSPHATE 4 MG/ML
8 INJECTION, SOLUTION INTRA-ARTICULAR; INTRALESIONAL; INTRAMUSCULAR; INTRAVENOUS; SOFT TISSUE ONCE
Status: COMPLETED | OUTPATIENT
Start: 2021-12-29 | End: 2021-12-29

## 2021-12-29 RX ORDER — ACETAMINOPHEN 500 MG
1000 TABLET ORAL ONCE
Status: COMPLETED | OUTPATIENT
Start: 2021-12-29 | End: 2021-12-29

## 2021-12-29 RX ORDER — HYDROMORPHONE HYDROCHLORIDE 1 MG/ML
0.5 INJECTION, SOLUTION INTRAMUSCULAR; INTRAVENOUS; SUBCUTANEOUS
Status: DISCONTINUED | OUTPATIENT
Start: 2021-12-29 | End: 2021-12-29 | Stop reason: HOSPADM

## 2021-12-29 RX ORDER — FENTANYL CITRATE 50 UG/ML
INJECTION, SOLUTION INTRAMUSCULAR; INTRAVENOUS AS NEEDED
Status: DISCONTINUED | OUTPATIENT
Start: 2021-12-29 | End: 2021-12-29 | Stop reason: HOSPADM

## 2021-12-29 RX ORDER — PROPOFOL 10 MG/ML
INJECTION, EMULSION INTRAVENOUS AS NEEDED
Status: DISCONTINUED | OUTPATIENT
Start: 2021-12-29 | End: 2021-12-29 | Stop reason: HOSPADM

## 2021-12-29 RX ORDER — LIDOCAINE HYDROCHLORIDE 20 MG/ML
INJECTION, SOLUTION EPIDURAL; INFILTRATION; INTRACAUDAL; PERINEURAL AS NEEDED
Status: DISCONTINUED | OUTPATIENT
Start: 2021-12-29 | End: 2021-12-29 | Stop reason: HOSPADM

## 2021-12-29 RX ORDER — OXYCODONE AND ACETAMINOPHEN 5; 325 MG/1; MG/1
1 TABLET ORAL ONCE
Status: COMPLETED | OUTPATIENT
Start: 2021-12-29 | End: 2021-12-29

## 2021-12-29 RX ORDER — INSULIN LISPRO 100 [IU]/ML
INJECTION, SOLUTION INTRAVENOUS; SUBCUTANEOUS ONCE
Status: DISCONTINUED | OUTPATIENT
Start: 2021-12-29 | End: 2021-12-29 | Stop reason: HOSPADM

## 2021-12-29 RX ORDER — MAGNESIUM SULFATE 100 %
4 CRYSTALS MISCELLANEOUS AS NEEDED
Status: DISCONTINUED | OUTPATIENT
Start: 2021-12-29 | End: 2021-12-29 | Stop reason: HOSPADM

## 2021-12-29 RX ORDER — MIDAZOLAM HYDROCHLORIDE 1 MG/ML
INJECTION INTRAMUSCULAR; INTRAVENOUS AS NEEDED
Status: DISCONTINUED | OUTPATIENT
Start: 2021-12-29 | End: 2021-12-29 | Stop reason: HOSPADM

## 2021-12-29 RX ORDER — CEPHALEXIN 500 MG/1
500 CAPSULE ORAL 4 TIMES DAILY
Qty: 4 CAPSULE | Refills: 0 | Status: SHIPPED
Start: 2021-12-29 | End: 2021-12-30

## 2021-12-29 RX ORDER — ROPIVACAINE HYDROCHLORIDE 5 MG/ML
INJECTION, SOLUTION EPIDURAL; INFILTRATION; PERINEURAL
Status: SHIPPED | OUTPATIENT
Start: 2021-12-29 | End: 2021-12-29

## 2021-12-29 RX ORDER — FENTANYL CITRATE 50 UG/ML
25 INJECTION, SOLUTION INTRAMUSCULAR; INTRAVENOUS
Status: DISCONTINUED | OUTPATIENT
Start: 2021-12-29 | End: 2021-12-29 | Stop reason: HOSPADM

## 2021-12-29 RX ORDER — KETAMINE HCL IN 0.9 % NACL 50 MG/5 ML
SYRINGE (ML) INTRAVENOUS AS NEEDED
Status: DISCONTINUED | OUTPATIENT
Start: 2021-12-29 | End: 2021-12-29 | Stop reason: HOSPADM

## 2021-12-29 RX ORDER — ACETAMINOPHEN 500 MG
1000 TABLET ORAL
Status: CANCELLED | OUTPATIENT
Start: 2021-12-29 | End: 2021-12-29

## 2021-12-29 RX ORDER — DEXAMETHASONE SODIUM PHOSPHATE 4 MG/ML
INJECTION, SOLUTION INTRA-ARTICULAR; INTRALESIONAL; INTRAMUSCULAR; INTRAVENOUS; SOFT TISSUE AS NEEDED
Status: DISCONTINUED | OUTPATIENT
Start: 2021-12-29 | End: 2021-12-29 | Stop reason: HOSPADM

## 2021-12-29 RX ORDER — SODIUM CHLORIDE 0.9 % (FLUSH) 0.9 %
5-40 SYRINGE (ML) INJECTION AS NEEDED
Status: DISCONTINUED | OUTPATIENT
Start: 2021-12-29 | End: 2021-12-29 | Stop reason: HOSPADM

## 2021-12-29 RX ORDER — ONDANSETRON 2 MG/ML
4 INJECTION INTRAMUSCULAR; INTRAVENOUS ONCE
Status: DISCONTINUED | OUTPATIENT
Start: 2021-12-29 | End: 2021-12-29 | Stop reason: HOSPADM

## 2021-12-29 RX ORDER — OXYCODONE AND ACETAMINOPHEN 5; 325 MG/1; MG/1
1 TABLET ORAL AS NEEDED
Status: DISCONTINUED | OUTPATIENT
Start: 2021-12-29 | End: 2021-12-29 | Stop reason: HOSPADM

## 2021-12-29 RX ORDER — DEXTROSE 50 % IN WATER (D50W) INTRAVENOUS SYRINGE
25-50 AS NEEDED
Status: DISCONTINUED | OUTPATIENT
Start: 2021-12-29 | End: 2021-12-29 | Stop reason: HOSPADM

## 2021-12-29 RX ORDER — GUAIFENESIN 100 MG/5ML
81 LIQUID (ML) ORAL 2 TIMES DAILY
Qty: 42 TABLET | Refills: 0 | Status: SHIPPED
Start: 2021-12-29

## 2021-12-29 RX ORDER — SODIUM CHLORIDE, SODIUM LACTATE, POTASSIUM CHLORIDE, CALCIUM CHLORIDE 600; 310; 30; 20 MG/100ML; MG/100ML; MG/100ML; MG/100ML
50 INJECTION, SOLUTION INTRAVENOUS CONTINUOUS
Status: DISCONTINUED | OUTPATIENT
Start: 2021-12-29 | End: 2021-12-29 | Stop reason: HOSPADM

## 2021-12-29 RX ORDER — SODIUM CHLORIDE 0.9 % (FLUSH) 0.9 %
5-40 SYRINGE (ML) INJECTION EVERY 8 HOURS
Status: DISCONTINUED | OUTPATIENT
Start: 2021-12-29 | End: 2021-12-29 | Stop reason: HOSPADM

## 2021-12-29 RX ORDER — HYDROMORPHONE HYDROCHLORIDE 1 MG/ML
INJECTION, SOLUTION INTRAMUSCULAR; INTRAVENOUS; SUBCUTANEOUS AS NEEDED
Status: DISCONTINUED | OUTPATIENT
Start: 2021-12-29 | End: 2021-12-29 | Stop reason: HOSPADM

## 2021-12-29 RX ORDER — ONDANSETRON 2 MG/ML
INJECTION INTRAMUSCULAR; INTRAVENOUS AS NEEDED
Status: DISCONTINUED | OUTPATIENT
Start: 2021-12-29 | End: 2021-12-29 | Stop reason: HOSPADM

## 2021-12-29 RX ADMIN — PANTOPRAZOLE SODIUM 40 MG: 40 TABLET, DELAYED RELEASE ORAL at 07:42

## 2021-12-29 RX ADMIN — DEXAMETHASONE SODIUM PHOSPHATE 8 MG: 4 INJECTION, SOLUTION INTRAMUSCULAR; INTRAVENOUS at 07:54

## 2021-12-29 RX ADMIN — SODIUM CHLORIDE, POTASSIUM CHLORIDE, SODIUM LACTATE AND CALCIUM CHLORIDE 125 ML/HR: 600; 310; 30; 20 INJECTION, SOLUTION INTRAVENOUS at 07:54

## 2021-12-29 RX ADMIN — LIDOCAINE HYDROCHLORIDE 60 MG: 20 INJECTION, SOLUTION INTRAVENOUS at 09:19

## 2021-12-29 RX ADMIN — HYDROMORPHONE HYDROCHLORIDE 0.5 MG: 1 INJECTION, SOLUTION INTRAMUSCULAR; INTRAVENOUS; SUBCUTANEOUS at 12:49

## 2021-12-29 RX ADMIN — SODIUM CHLORIDE, POTASSIUM CHLORIDE, SODIUM LACTATE AND CALCIUM CHLORIDE 1000 ML: 600; 310; 30; 20 INJECTION, SOLUTION INTRAVENOUS at 07:52

## 2021-12-29 RX ADMIN — FENTANYL CITRATE 100 MCG: 50 INJECTION, SOLUTION INTRAMUSCULAR; INTRAVENOUS at 09:19

## 2021-12-29 RX ADMIN — ONDANSETRON 4 MG: 2 INJECTION INTRAMUSCULAR; INTRAVENOUS at 12:02

## 2021-12-29 RX ADMIN — Medication 40 MG: at 09:19

## 2021-12-29 RX ADMIN — OXYCODONE AND ACETAMINOPHEN 1 TABLET: 5; 325 TABLET ORAL at 12:12

## 2021-12-29 RX ADMIN — TRANEXAMIC ACID 1950 MG: 650 TABLET ORAL at 07:42

## 2021-12-29 RX ADMIN — DEXAMETHASONE SODIUM PHOSPHATE 4 MG: 4 INJECTION, SOLUTION INTRAMUSCULAR; INTRAVENOUS at 09:36

## 2021-12-29 RX ADMIN — ACETAMINOPHEN 1000 MG: 500 TABLET ORAL at 07:42

## 2021-12-29 RX ADMIN — MIDAZOLAM HYDROCHLORIDE 2 MG: 1 INJECTION, SOLUTION INTRAMUSCULAR; INTRAVENOUS at 09:04

## 2021-12-29 RX ADMIN — Medication 2 G: at 09:28

## 2021-12-29 RX ADMIN — ROPIVACAINE HYDROCHLORIDE 30 ML: 5 INJECTION, SOLUTION EPIDURAL; INFILTRATION; PERINEURAL at 09:30

## 2021-12-29 RX ADMIN — PROPOFOL 150 MG: 10 INJECTION, EMULSION INTRAVENOUS at 09:19

## 2021-12-29 RX ADMIN — ONDANSETRON HYDROCHLORIDE 4 MG: 2 INJECTION INTRAMUSCULAR; INTRAVENOUS at 10:44

## 2021-12-29 RX ADMIN — HYDROMORPHONE HYDROCHLORIDE 1 MG: 1 INJECTION, SOLUTION INTRAMUSCULAR; INTRAVENOUS; SUBCUTANEOUS at 09:24

## 2021-12-29 RX ADMIN — Medication 10 MG: at 09:04

## 2021-12-29 NOTE — OP NOTES
Left  Tourniquetless Cemented Posterior Stabilized Total Knee Arthroplasty    Patient: Savanna Quesada MRN: [id number]  CSN: [id number]   YOB: 1950  Age: 73 y.o. Sex: female      Date of Surgery:12/29/2021    PREOPERATIVE DIAGNOSES : LEFT KNEE OSTEOARTHRITIS    POSTOPERATIVE DIAGNOSES : LEFT KNEE OSTEOARTHRITIS    PROCEDURE: Left tourniquetless posterior stabilized cemented  total knee arthroplasty using the Attune knee system by [employer]. IMPLANTS:   Implant Name Type Inv. Item Serial No.  Lot No. LRB No. Used Action   CEMENT BNE 40GM FULL DOSE PMMA W/O ANTIBIO HI VISC N RADPQ - [id number]  CEMENT BNE 40GM FULL DOSE PMMA W/O ANTIBIO HI VISC N RADPQ  [facility] [id number] N/A 1 Implanted   INSERT TIB SZ 6 [id number] POLY POST STBL ROT PLATFRM ATTUNE - [id number]  INSERT TIB SZ 6 [id number] POLY POST STBL ROT PLATFRM ATTUNE  [facility] [id number] N/A 1 Implanted   COMPONENT FEM SZ 6 L KNEE POST STBL SUHA ATTUNE - [id number]  COMPONENT FEM SZ 6 L KNEE POST STBL SUHA ATTUNE  [facility] [id number] N/A 1 Implanted   BASEPLATE TIB SZ 5 KNEE ROT PLATFRM SUHA SYS ATTUNE - [id number]  BASEPLATE TIB SZ 5 KNEE ROT PLATFRM SUHA SYS ATTUNE  [facility] [id number] N/A 1 Implanted   COMPONENT PAT [id number] KNEE POLY SUHA MEDIALIZED JENSEN ATTUNE - [id number]  COMPONENT PAT [id number] KNEE POLY SUHA MEDIALIZED JENSEN ATTUNE  [facility] [employer]_ [id number] N/A 1 Implanted       SURGEON: Mina Escobedo MD    FIRST ASSISTANT: Yessy Palacios PA-C    SECOND ASSISTANT: Physician Assistant: Molly Singer PA-C    ANESTHESIA: General    TOURNIQUET TIME:  None. SPECIMEN TO PATHOLOGY:  * No specimens in log *    ESTIMATED BLOOD LOSS: 75cc    FINDINGS:  Same    COMPLICATIONS:  None     INDICATIONS:  A 73 y.o. WHITE/NON-  female  with known left severe gonarthrosis.   The patient has bone-on-bone arthritis by x-rays and has failed all conservative interventions including medications, weight loss, physical therapy, relative rest, ambulatory aids and injections. The patient now presents for a left total knee arthroplasty due to constant pain with activities of daily living and diminished safety due to patients pain. OPERATION:  The patient was brought to the operating theater   and, after adequate anesthesia, the left knee was prepped and draped in the   typical sterile fashion. The 1.95gm of po tranexamic acid was already administered 2 hours before surgery. The analgesic cocktail used for the case was 50cc of .25% Marcaine with epinephrine mixed with 30 mg( 1cc ) of Toradol and 30cc of NS ( total of 81 cc). 40cc's was injected in the skin and capsule/quadriceps after the incision. The Argon Wand was used during the case for bleeding control. An anterior midline   incision was then made from just above the patella to the tibial tubercle. The medial and lateral flaps were developed and then a sub vastus approach   to the knee was then performed. The dissection was carried on the proximal   medial tibia from anterior to posterior, taking the anterior half of the   medial meniscus. The lateral joint line was exposed up to the anterolateral   corner, and then the patella was slid lateral and the knee flexed to   greater than 90 degrees with Z retractors being placed. Findings at this   time showed significant arthritic change. The ACL & PCL was resected. The external alignment guide for the [employer] system was then lined up with the first webspace. The guide was made   parallel to the anterior tibia and then the cutting guide was placed at a 3   degree slope. It was placed so that approximately a couple of millimeters were   taken off the lowest side. It was then pinned and the proximal tibia was   then resected.  Tibial resection cut alignment was performed with the drop down hemant and found to be aligned with the first web space. The femur was then addressed next. The large drill bit was placed down the femoral canal and the distal femoral cutting guide was then pinned to the   anterior femur at 9 mm below the lowest surface, and was placed at a 5   degree valgus angle. The distal femur was then resected, and extension gap   was measured and the correct mm polyethylene thickness was selected. The knee was   then flexed to 90 degrees. The knee was kept at 90 degrees and  / sizer was inserted over the short IM hemant and the anterior lateral femur was referenced. The size was then measured and noted. The guide was then distracted so flexion gap equalled extension gap ( and was stable ) and the guide was pinned. The femoral finishing guide was placed over the 2 pins and then 2 lock down pins were placed medially and laterally. The flexion gap   as checked again and found to be stable. The anterior and posterior cuts,   along with both chamfer cuts were then performed. The guide was removed, as   well as any free bony fragments. The posterior horns of both the medial and   lateral menisci were resected. The femoral box resection guide was used to cut out the bone for the PS femur. The attention was now turned to the tibia. The pickle fork was placed in the posterior part of the tibia and, using the lateral & medial knee retractors, the   tibia was brought into the wound. Any further bony work, as well as any   meniscal work was done at this time to remove these fragments. The tibia   was then sized with the tibial baseplate. This guide was aligned to the medial third of the tibial tubercle. It was pinned in position. The smokestack guide that was on the [employer] system was placed through the top of this baseplate, locking the plate in good position. The guide was then reamed down to the appropriate depth, and then the keel   punch was placed.  The guide system was removed and the trial tibial   polyethylene was snapped into position, and then the appropriate size femur was   placed on the distal femur. There was good fit to both components. The natural patella tracked very well. These components were selected for implantation. The patella was everted. It was measured and it was cut from the original thickness of 24 to the residual thickness of about 15 mm. It was cut by free hand technique and it was cut from the medial articular edge to the lateral articular edge. The patella was   then laterally electrocauterized and then the patellar clamp was   placed on the cut surface of the patella. It was placed perpendicular to   the trochlear groove and then it was flipped into position and the anatomic patella tracked well. The 3 peg holes were drilled and  the excessive bone on the lateral side was resected. The lateral retinaculum was released subperiosteally off the patella. The wound was then irrigated out copiously. The posterior medial and posterior lateral knee was injected with 20cc's each of the remaining analgesic cocktail. All trial components were removed and the real components were opened, and the [employer] #2 cement was mixed on the back table. The knee was then Simpulse lavaged and dried. The cement was then packed on the inferior surface of the tibial baseplate with cement down the cone. The tibia was then impacted. All excessive cement was removed with pickups and a knife. The tibial   polyethylene was then placed into the baseplate, and   then the femur was cemented next with cement being put on the posterior   part of each femoral runner, and then an additional amount of cement placed   in a U-shaped pattern around medial femoral condyle, anterior femur, and   the lateral femoral condyle. This was hand packed into the distal femur. The femoral component was then placed, impacted into position. Any   excessive cement was removed with pickups and a knife.  The patella was   addressed next, and the anatomic patella was cemented by placing cement on the   posterior part of the patellar component. It was placed into the 3 drill   holes and held into position with a clamp until it hardened. The patient now had full extension to flexion of 120 degrees. Once the cement was hardened, the clamp was removed. The patella   tracked with a no-thumbs technique very nicely. The wound was irrigated out. The skin was closed with a running #2 Quill stitch. The shin was then closed with inverted 2-0 Vicryls. The skin was sealed with the Dermabond   Prineo skin system, dressed with a Mepilex dressing then 4x4, ABD's and Large Ace wrap from toes to thigh. The patient returned to the recovery room awake, in   stable condition. All instrument, sponge and needle counts were correct. The knee was dressed with a Mepilex and an Ace wrap and a knee immobilzer. During multiple steps in the procedure the simpulse lavage was used with a 500cc bag of normal saline with 30cc of 5% sterile opthalmologic povidone solution ( .30% ). Before component implantation the bone was irrigated  with normal saline on a bulb syringe. At the end of the case another 500cc normal saline bag (with 50,000 units of bacitracin and 500,000 units of polymixin )was attached to the simpulse lavage and the entire wound reirrgated before closure. The patient had flexion stability which necessitated additional cuts and raising the joint line to balance her knee. Final product looked great. A physician assistant was used during the surgery which contributes to better patient outcomes by decreasing operating room time and decreasing the amount of anesthesia the patient had to undergo. The physician assistant helped with positioning and draping of the patient for implantation of implants, retraction of soft tissues so as not to traumatize the tissues.   During several parts of the procedure the PA used the Simpulse lavage to irrigate/suction the sterile field which contributed to a  surgical field and decrease in infection rates. The physician assistant used the cauterization under my guidance to decrease blood loss which limits the need for blood transfusion in the post operative period. The physician assistant instilled local anesthetic which decreased the need for post operative narcotics. During closure the physician assistant was able to close the fascia layer independently using a running Stratafix closure system ensuring a water tight fascia closure and decreasing the risk of deep fahad-prosthetic infection. The superficial tissues were closed using inverted 2-0 Vicryl sutures by the physician assistant as well. The skin was closed using an Exofin skin closure system so that there was no discharge from the incision. This helps contribute to a lower risk of infection. During the procedure the physician assistant was given the task of irrigating the wound allowing myself to prepare the prosthesis for implantation.                eCsar Mckeon MD  12/29/2021

## 2021-12-29 NOTE — PROGRESS NOTES
Problem: Self Care Deficits Care Plan (Adult)  Goal: *Acute Goals and Plan of Care (Insert Text)  Description: Initial Occupational Therapy Goals (12/29/2021) Within 7 day(s):    1. Patient will perform grooming standing sinkside with supervision for increased independence with ADLs. 2. Patient will perform LB dressing with supervision & A/E PRN for increased independence with ADLs. 3. Patient will perform toilet transfer with supervision for increased independence with ADLs. 4. Patient will perform all aspects of toileting with supervision for increased independence with ADLs. 5. Patient will independently apply energy conservation techniques with 1 verbal cue(s)for increased independence with ADLs. 6. Patient will perform bathroom mobility with supervision for increased independence/safety with ADLs. Outcome: Progressing Towards Goal  OCCUPATIONAL THERAPY EVALUATION    Patient: Xiomara Kern (89 y.o. female)  Date: 12/29/2021  Primary Diagnosis: Left knee DJD [M17.12]  Procedure(s) (LRB):  LEFT TOTAL KNEE ARTHROPLASTY (Left) Day of Surgery   Precautions: Fall,WBAT  PLOF: pt mod I for ADLs/functional mobility    ASSESSMENT AND RECOMMENDATIONS:  Based on the objective data described below, the patient presents with LLE decreased ROM and strength affecting LE ADLs. Pt found seated in recliner chair, vitals assessed and WNL, pt reporting pain 7/10, agreeable to therapy. Educated pt on proper body mechanics for ADLs s/p TKR. Pt completed upper body dressing with supervision seated in chair. Pt able to thread B feet through underwear/pants without assist, and CGA when standing to pull up to waist. Pt also owns hip kit at home and has used PTA. Pt CGA/SBA for STS/bathroom mobility with vc for safe use of RW. Pt required seated rest break due to increased nausea and feeling hot. Pt ambulated back to recliner, ice applied to L knee. Friend present during session for education on home safety.  Provided opportunity for pt to voice questions on ADL performance when home, pt has no further concerns. Patient will benefit from skilled Occupational Therapy intervention to maximize safety/independence with ADLs at d/c.    Education: Reviewed home safety, body mechanics, importance of moving every hour to prevent joint stiffness, role of ice for edema/pain control, Rolling Walker management/safety, and adaptive dressing techniques with patient verbalizing  understanding at this time     Patient will benefit from skilled intervention to address the above impairments. Patient's rehabilitation potential is considered to be Good  Factors which may influence rehabilitation potential include:   [x]             None noted  []             Mental ability/status  []             Medical condition  []             Home/family situation and support systems  []             Safety awareness  []             Pain tolerance/management  []             Other:        PLAN :  Recommendations and Planned Interventions:   [x]               Self Care Training                  [x]      Therapeutic Activities  [x]               Functional Mobility Training   []      Cognitive Retraining  []               Therapeutic Exercises           []      Endurance Activities  []               Balance Training                    []      Neuromuscular Re-Education  []               Visual/Perceptual Training     [x]      Home Safety Training  [x]               Patient Education                   [x]      Family Training/Education  []               Other (comment):    Frequency/Duration: Patient will be followed by Occupational Therapy 1-2 times per day/4-7 days per week to address goals. Discharge Recommendations: Home health with adult supervision at least 24 hours after d/c  Further Equipment Recommendations for Discharge: N/A     SUBJECTIVE:   Patient stated I just get overheated easily.     OBJECTIVE DATA SUMMARY:     Past Medical History:   Diagnosis Date Acoustic neuroma [facility]) 2009    stable-right ear    Acoustic neuroma [facility]) 2009    right    Anemia     Arthritis     Cat scratch fever 1998    in lymph nodes    Chronic bronchitis (HCC)     Chronic pain     \"joints\"    Depression     Edema     Hiatal hernia     History of colon resection 1995    pt unsure of the nature of her resection     Ill-defined condition     hiatal hernia    Liver disease 2021    fatty liver    Low vitamin D level     Morbid obesity ([address]     resolved    Morbid obesity with body mass index (BMI) of 40.0 to 49.9 (HCC)     Nausea & vomiting     Sleep apnea     uses c-pap, Patient instructed to bring CPAP machine on DOS    Tuberculosis 1995    treated    Vertigo 2009     Past Surgical History:   Procedure Laterality Date    HX CARPAL TUNNEL RELEASE Bilateral     HX CHOLECYSTECTOMY      HX GI      colonoscopy    HX GI      sleeve    HX HEART CATHETERIZATION  03/2021    HX HIP REPLACEMENT Left     HX HYSTERECTOMY      HX ORTHOPAEDIC  1996    c5-6 fusion     HX ORTHOPAEDIC  01/2018    lumbar laminectomy     HX ORTHOPAEDIC Left     trigger finger surgery- all but the thumb    HX ORTHOPAEDIC Bilateral 2020    shoulder replacement    HX ORTHOPAEDIC Left 2020    hip replacement    HX ORTHOPAEDIC Bilateral     ctr    HX ORTHOPAEDIC      tib-fib hemant and removed    [address]    pt unsure of her history     [place] LAP, STELLA RESTRICT PROC, LONGITUDINAL GASTRECTOMY  03/30/2021    Dr Box Retort     Barriers to Learning/Limitations: yes;  physical and other (post-anesthesia)  Compensate with: visual, verbal, tactile, kinesthetic cues/model    Home Situation/Prior Level of Function:   Home Situation  Home Environment: Private residence  # Steps to Enter: 3  Rails to Enter: Yes  Hand Rails : Left  One/Two Story Residence: Two story, live on 1st floor  # of Interior Steps: 17  Interior Rails: Left  Lift Chair Available: No  Living Alone: No  Support Systems: Other Family Member(s),Friend/Neighbor  Patient Expects to be Discharged to[de-identified] House  Current DME Used/Available at Home: Cane, straight,Commode, bedside,Hospital bed,Walker, rollator,Walker, rolling  Tub or Shower Type: Tub/Shower combination  []  Right hand dominant   []  Left hand dominant    Cognitive/Behavioral Status:  Neurologic State: Alert  Orientation Level: Oriented to person;Oriented to place;Oriented to situation  Cognition: Follows commands  Safety/Judgement: Awareness of environment    Skin: L knee incision w/ Mepilex   Edema: compression hose in place & applied ice     Coordination: BUE  Coordination: Within functional limits  Fine Motor Skills-Upper: Left Intact; Right Intact    Gross Motor Skills-Upper: Left Intact; Right Intact    Balance:  Sitting: Intact  Standing: Intact; With support    Strength: BUE  Strength: Generally decreased, functional    Tone & Sensation:BUE  Tone: Normal  Sensation: Impaired (L knee)    Range of Motion: BUE  AROM: Generally decreased, functional    Functional Mobility and Transfers for ADLs:  Bed Mobility:  Scooting: Stand-by assistance (vc)  Transfers:  Sit to Stand: Contact guard assistance;Stand-by assistance (vc)    ADL Assessment:  Feeding: Independent  Oral Facial Hygiene/Grooming: Stand-by assistance  Bathing: Contact guard assistance; Adaptive equipment  Upper Body Dressing: Supervision  Lower Body Dressing: Contact guard assistance; Adaptive equipment  Toileting: Stand by assistance    ADL Intervention:  Upper Body Dressing Assistance  Dressing Assistance: Supervision  Pullover Shirt: Supervision    Lower Body Dressing Assistance  Dressing Assistance: Contact guard assistance  Underpants: Contact guard assistance  Pants With Elastic Waist: Contact guard assistance  Leg Crossed Method Used: No  Position Performed: Seated in chair  Cues: Verbal cues provided;Visual cues provided    Cognitive Retraining  Safety/Judgement: Awareness of environment    Pain:  Pain level pre-treatment: 7/10  Pain level post-treatment: 7/10  Pain Intervention(s): Medication administer by Nursing (see MAR); Rest, Ice, Repositioning   Response to intervention: Nurse notified, see doc flow     Activity Tolerance:   Fair. Patient able to stand ~5 minute(s). Patient able to complete ADLs with intermittent rest breaks. Patient limited by pain, strength, ROM. Patient unsteady. Please refer to the flowsheet for vital signs taken during this treatment. After treatment:   [x]  Patient left in no apparent distress sitting up in chair  []  Patient sitting on EOB  []  Patient left in no apparent distress in bed  [x]  Call bell left within reach  [x]  Nursing notified  [x]  Caregiver present  [x]  Ice applied  []  SCD's on while back in bed  [] Bed alarm activated    COMMUNICATION/EDUCATION:   Communication/Collaboration:  [x]       Role of Occupational Therapy in the acute care setting. [x]      Home safety education was provided and the patient/caregiver indicated understanding. [x]      Patient/family have participated as able in goal setting and plan of care. [x]      Patient/family agree to work toward stated goals and plan of care. []      Patient understands intent and goals of therapy, but is neutral about his/her participation. []      Patient is unable to participate in plan of care at this time. Thank you for this referral.  Claudia Oliveira OTR/L  Time Calculation: 23 mins    Eval Complexity: History: MEDIUM Complexity : Expanded review of history including physical, cognitive and psychosocial  history ; Examination: LOW Complexity : 1-3 performance deficits relating to physical, cognitive , or psychosocial skils that result in activity limitations and / or participation restrictions ;    Decision Making:LOW Complexity : No comorbidities that affect functional and no verbal or physical assistance needed to complete eval tasks

## 2021-12-29 NOTE — PROGRESS NOTES
Problem: Mobility Impaired (Adult and Pediatric)  Goal: *Acute Goals and Plan of Care (Insert Text)  Description: PT goals to be met in 1 day:  Pt will be able to perform supine<>sit SBA for transfers at home. Pt will be able to perform sit<>stand SBA for increased ability to transfer at home safely. Pt will be able to participate in gt training >100' w/ RW, WBAT, GB and CGA/SBA for improved ability in home upon d/c. Pt will be able to perform stair training step to pattern, B/U rail and CGA to obtain safe entry into home upon d/c. Pt will be educated regarding HEP per MD protocol for optimal AROM/strength outcomes. Note: [x]  Patient has met MD mobilization critieria for d/c home   [x]  Recommend HH with 24 hour adult care   []  Benefit from additional acute PT session to address:      PHYSICAL THERAPY EVALUATION    Patient: Bowen Chester (77 y.o. female)  Date: 12/29/2021  Primary Diagnosis: Left knee DJD [M17.12]  Procedure(s) (LRB):  LEFT TOTAL KNEE ARTHROPLASTY (Left) Day of Surgery   Precautions:   Fall,WBAT    PLOF: Independent w/ use of SPC    ASSESSMENT :  Based on the objective data described below, the patient presents with decreased mobility in regards to bed mobility, transfers, gt quality and tolerance, balance, stair negotiation and safety due to L TKA surgery. Decreased AROM of L knee, dec strength of L knee, pain in L knee, dec sensation of L knee also impacting pt functional mobility. Pt rating pain on numerical pain scale pre/post and during session 7/10. Pt sitting in recliner upon arrival.  Pt and friend ed regarding mobility safety, WB, HEP, ice application/use, elevation, environmental safety and home safe techniques. Pt able to perform ssit<>stand w/ CGA/SBA. Safety vc required throughout session to reinforce safety. Pt able to participate in gt training using RW, GB, WBAT and CGA w/ antalgic gt pattern.   Pt was able to participate in stair training using step to pattern, B rails and CGA. Answered questions by pt and friend in regards to PT and mobility. Pt left sitting in recliner w/ all needs within reach. Nurse Aaron Linares aware of session and outcomes. Recommend HHPT with responsible adult care at least 24 hours upon hospital d/c. Patient will benefit from skilled intervention to address the above impairments. Patient's rehabilitation potential is considered to be Good  Factors which may influence rehabilitation potential include:   []         None noted  []         Mental ability/status  []         Medical condition  []         Home/family situation and support systems  []         Safety awareness  [x]         Pain tolerance/management  []         Other:      PLAN :  Recommendations and Planned Interventions:   [x]           Bed Mobility Training             []    Neuromuscular Re-Education  [x]           Transfer Training                   []    Orthotic/Prosthetic Training  [x]           Gait Training                          [x]    Modalities  [x]           Therapeutic Exercises           [x]    Edema Management/Control  [x]           Therapeutic Activities            [x]    Family Training/Education  [x]           Patient Education  []           Other (comment):    Frequency/Duration: Patient will be followed by physical therapy 1-2 times per day/4-7 days per week to address goals. Discharge Recommendations: Home Health  Further Equipment Recommendations for Discharge: N/A     SUBJECTIVE:   Patient stated I am feeling a little better now that my pain is managed but I am still hurting.     OBJECTIVE DATA SUMMARY:     Past Medical History:   Diagnosis Date    Acoustic neuroma [facility] [address] 2009    stable-right ear    Acoustic neuroma ([facility] [address] 2009    right    Anemia     Arthritis     Cat scratch fever 1998    in lymph nodes    Chronic bronchitis (HCC)     Chronic pain     \"joints\"    Depression     Edema     Hiatal hernia     History of colon resection 1995    pt unsure of the nature of her resection     Ill-defined condition     hiatal hernia    Liver disease 2021    fatty liver    Low vitamin D level     Morbid obesity ([address]     resolved    Morbid obesity with body mass index (BMI) of 40.0 to 49.9 (HCC)     Nausea & vomiting     Sleep apnea     uses c-pap, Patient instructed to bring CPAP machine on DOS    Tuberculosis 1995    treated    Vertigo 2009     Past Surgical History:   Procedure Laterality Date    HX CARPAL TUNNEL RELEASE Bilateral     HX CHOLECYSTECTOMY      HX GI      colonoscopy    HX GI      sleeve    HX HEART CATHETERIZATION  03/2021    HX HIP REPLACEMENT Left     HX HYSTERECTOMY      HX ORTHOPAEDIC  1996    c5-6 fusion     HX ORTHOPAEDIC  01/2018    lumbar laminectomy     HX ORTHOPAEDIC Left     trigger finger surgery- all but the thumb    HX ORTHOPAEDIC Bilateral 2020    shoulder replacement    HX ORTHOPAEDIC Left 2020    hip replacement    HX ORTHOPAEDIC Bilateral     ctr    HX ORTHOPAEDIC      tib-fib hemant and removed    [address]    pt unsure of her history     [place] LAP, STELLA RESTRICT PROC, LONGITUDINAL GASTRECTOMY  03/30/2021    Dr Daniela Groves     Barriers to Learning/Limitations: yes;  anesthesia  Compensate with: Visual Cues, Verbal Cues, and Tactile Cues  Home Situation:  Home Situation  Home Environment: Private residence  # Steps to Enter: 3  Rails to Enter: Yes  Hand Rails : Left  One/Two Story Residence: Two story, live on 1st floor  Lift Chair Available: No  Living Alone: No  Support Systems: Other Family Member(s),Friend/Neighbor  Patient Expects to be Discharged to[de-identified] House  Current DME Used/Available at Home: Cane, straight,Hospital bed,Commode, bedside,Walker, rolling,Walker, rollator  Tub or Shower Type: Tub/Shower combination  Critical Behavior:  Neurologic State: Alert  Orientation Level: Oriented to person;Oriented to place;Oriented to situation  Cognition: Follows commands  Safety/Judgement: Awareness of environment  Psychosocial  Patient Behaviors: Calm; Cooperative  Visitor Behaviors: Supportive;Calm  Skin Condition/Temp: Dry;Warm  Skin Integrity: Incision (comment) (L knee)  Skin Integumentary  Skin Color: Appropriate for ethnicity  Skin Condition/Temp: Dry;Warm  Skin Integrity: Incision (comment) (L knee)  Turgor: Non-tenting  Hair Growth: Present  Varicosities: Absent  Nails: Within Defined Limits  Strength:    Strength: Generally decreased, functional  Tone & Sensation:   Tone: Normal  Sensation: Impaired (L knee)  Range Of Motion:  AROM: Generally decreased, functional  Functional Mobility:  Bed Mobility:  Scooting: Stand-by assistance (vc)  Transfers:  Sit to Stand: Contact guard assistance;Stand-by assistance (vc)  Stand to Sit: Contact guard assistance;Stand-by assistance (vc)  Balance:   Sitting: Intact  Standing: Intact; With support  Ambulation/Gait Training:  Distance (ft): 90 Feet (ft)  Assistive Device: Walker, rolling;Gait belt  Ambulation - Level of Assistance: Contact guard assistance (vc)  Gait Abnormalities: Antalgic;Decreased step clearance; Step to gait  Left Side Weight Bearing: As tolerated  Base of Support: Shift to right  Stance: Left decreased  Speed/Donna: Slow  Step Length: Left shortened;Right shortened  Swing Pattern: Left asymmetrical;Right asymmetrical  Interventions: Safety awareness training; Tactile cues; Verbal cues; Visual/Demos  Stairs:  Number of Stairs Trained: 1  Stairs - Level of Assistance: Contact guard assistance (vc)  Rail Use: Both     Therapeutic Exercises:   Encouraged HEP  Pain:  Pain level pre-treatment: 7/10   Pain level post-treatment: 7/10   Pain Intervention(s) : Medication (see MAR); Rest, Ice, Repositioning  Response to intervention: Nurse notified, See doc flow    Activity Tolerance:   Fair   Please refer to the flowsheet for vital signs taken during this treatment.   After treatment:   [x]         Patient left in no apparent distress sitting up in chair  []         Patient left in no apparent distress in bed  [x]         Call bell left within reach  [x]         Nursing notified  [x]         Caregiver present  []         Bed alarm activated  []         SCDs applied    COMMUNICATION/EDUCATION:   [x]         Role of Physical Therapy in the acute care setting. [x]         Fall prevention education was provided and the patient/caregiver indicated understanding. [x]         Patient/family have participated as able in goal setting and plan of care. [x]         Patient/family agree to work toward stated goals and plan of care. []         Patient understands intent and goals of therapy, but is neutral about his/her participation. []         Patient is unable to participate in goal setting/plan of care: ongoing with therapy staff.  []         Other:     Thank you for this referral.  Madiha Hernandez, PT   Time Calculation: 23 mins      Eval Complexity: History: HIGH Complexity :3+ comorbidities / personal factors will impact the outcome/ POC Exam:MEDIUM Complexity : 3 Standardized tests and measures addressing body structure, function, activity limitation and / or participation in recreation  Presentation: LOW Complexity : Stable, uncomplicated  Clinical Decision Making:Low Complexity    Overall Complexity:LOW

## 2021-12-29 NOTE — PERIOP NOTES
Reviewed PTA medication list with patient/caregiver and patient/caregiver denies any additional medications. Patient admits to having a responsible adult care for them at home for at least 24 hours after surgery. Patient encouraged to use gown warming system and informed that using said warming gown to regulate body temperature prior to a procedure has been shown to help reduce the risks of blood clots and infection. Patient's pharmacy of choice verified and documented in PTA medication section. Dual skin assessment & fall risk band verification completed with Adebayo CURRY.

## 2021-12-29 NOTE — PERIOP NOTES
Reassessed patient following administration of percocet. Patient crying and in 10/10 pain in posterior knee area. Paged Dr. Erick Blunt.

## 2021-12-29 NOTE — ANESTHESIA PREPROCEDURE EVALUATION
Relevant Problems   NEUROLOGY   (+) Depression       Anesthetic History     PONV          Review of Systems / Medical History  Patient summary reviewed, nursing notes reviewed and pertinent labs reviewed    Pulmonary        Sleep apnea           Neuro/Psych         Psychiatric history     Cardiovascular  Within defined limits                     GI/Hepatic/Renal           Liver disease     Endo/Other        Arthritis     Other Findings              Physical Exam    Airway  Mallampati: II  TM Distance: 4 - 6 cm  Neck ROM: normal range of motion   Mouth opening: Normal     Cardiovascular  Regular rate and rhythm,  S1 and S2 normal,  no murmur, click, rub, or gallop             Dental    Dentition: Edentulous     Pulmonary  Breath sounds clear to auscultation               Abdominal  GI exam deferred       Other Findings            Anesthetic Plan    ASA: 3  Anesthesia type: general      Post-op pain plan if not by surgeon: peripheral nerve block single    Induction: Intravenous  Anesthetic plan and risks discussed with: Patient

## 2021-12-29 NOTE — ANESTHESIA PROCEDURE NOTES
Peripheral Block    Start time: 12/29/2021 9:04 AM  End time: 12/29/2021 9:07 AM  Performed by: Augusto Boateng MD  Authorized by: Augusto Boateng MD       Pre-procedure: Indications: at surgeon's request and post-op pain management    Preanesthetic Checklist: patient identified, risks and benefits discussed, site marked, timeout performed, anesthesia consent given and patient being monitored    Timeout Time: 09:04 EST          Block Type:   Block Type:   Adductor canal block  Laterality:  Left  Monitoring:  Standard ASA monitoring, responsive to questions, continuous pulse ox, oxygen, frequent vital sign checks and heart rate  Injection Technique:  Single shot  Procedures: ultrasound guided    Patient Position: supine  Prep: chlorhexidine    Location:  Mid thigh  Needle Type:  Stimuplex  Needle Gauge:  21 G  Needle Localization:  Ultrasound guidance and anatomical landmarks  Medication Injected:  Ropivacaine (PF) (NAROPIN) 5 mg/mL (0.5 %) injection, 30 mL    Assessment:  Number of attempts:  1  Injection Assessment:  Incremental injection every 5 mL, no paresthesia, ultrasound image on chart, local visualized surrounding nerve on ultrasound, negative aspiration for blood and no intravascular symptoms  Patient tolerance:  Patient tolerated the procedure well with no immediate complications

## 2021-12-29 NOTE — PERIOP NOTES
TRANSFER - IN REPORT:    Verbal report received from [facility]) on Darius  being received from PACU(unit) for routine progression of care      Report consisted of patients Situation, Background, Assessment and   Recommendations(SBAR). Information from the following report(s) SBAR, Kardex and MAR was reviewed with the receiving nurse. Opportunity for questions and clarification was provided. Assessment completed upon patients arrival to unit and care assumed. Paged Dr. Marlen West for nausea medication.   Received order for Zofran 4mg

## 2021-12-29 NOTE — DISCHARGE INSTRUCTIONS
Aldo Aguilar III, MD Eustace Shad, PA-C    Lower Extremity Surgery  Discharge Instructions      Please take the time to review the following instructions before you leave the hospital and use them as guidelines during your recovery from surgery. If you have any questions you may contact my office at [phone]. In the event of an emergency please call 911 or have someone take you to the nearest emergency room. Wound Care/Dressing Changes:    [x]   You may change your dressing as needed. Beginning the 2 days after you are discharged from the hospital you can change your dressing daily. A big, bulky dressing isn't necessary as long as there isn't any drainage from the incisions. There will be a piece of mesh tape over your incision that resembles gauze. This tape should stay on and you should not attempt to remove it. Showering/Bathing:    [x]     You may shower 2 days after surgery. Your dressing may be removed for showering. Do not soak your incision underwater. Weight Bearing Status/Braces/Activity:    You are weight bearing as tolerated on the operative extremity. Ice/Elevation:    Continue ice and elevation consistently for 48 hours after surgery. Medication:    1. You will be given a prescription for pain medications. This should have been given at your preop appointment. Refills of pain medication are authorized during office hours only (8AM - 5PM Mon thru Fri). 2. You can take 1000 mg of Tylenol every 8 hours. Do not exceed 3000 mg of Tylenol per day. If you have been given Norco for post operative pain, do not take the Tylenol. 3. You should use Aspirin 81 mg twice daily for 21 days from the date of your surgery. This will help to prevent blood clots from forming in your legs. This should be started at dinner the day of your surgery.   If you are taking another medication such as Xarelto this should also be started the day after the surgery. 4. You may resume the medications you were taking prior to your surgery, unless otherwise specified in your discharge instructions. 5. You are to take an oral antibiotic when you get home from the surgery until the prescription is done. This should have been provided at the pre-operative appointment. This antibiotic is generally Keflex or Clindamycin. Follow Up Appointment:    If you are unsure of your follow-up appointment date and time, please call [phone]. Please let our  know you are scheduling a post-op appointment. Most appointments should be between 7-14 days after your surgery. Physical Therapy:    [x]   You are to participate with Home Health Physical Therapy as arranged pre-operatively in the convenience of your own home. Important signs and symptoms:    If any of the following signs and symptoms occurs, you should contact Dr. Dontae Mayes' office. Please be advised if a problem arises which you feel required immediate medical attention or your are unable to contact Dr. Dontae Mayes' office you should seek immediate medical attention. Signs and symptoms to watch for include:  1. A sudden increase in swelling and/or redness or warmth at the area your surgery was performed which isn't relieved by rest, ice and elevation. 2. Oral temperature greater than 101.5 degrees for 12 hours or more which isn't relieved by an increase in fluid intake and taking two Tylenol every 4-6 hours. Do not exceed 3000 mg of Tylenol per day. 3. Excessive drainage from your incisions or drainage that hasn't stopped by 72 hours. 4. Calf pain, tenderness, redness or swelling which isn't relieved with rest and elevation. 5. Fever, chills, shortness of breath, chest pain, nausea, vomiting or other signs and symptoms which are of concern to you.       DISCHARGE SUMMARY from Nurse    PATIENT INSTRUCTIONS:    After general anesthesia or intravenous sedation, for 24 hours or while taking prescription Narcotics:  · Limit your activities  · Do not drive and operate hazardous machinery  · Do not make important personal or business decisions  · Do  not drink alcoholic beverages  · If you have not urinated within 8 hours after discharge, please contact your surgeon on call. Report the following to your surgeon:  · Excessive pain, swelling, redness or odor of or around the surgical area  · Temperature over 100.5  · Nausea and vomiting lasting longer than 4 hours or if unable to take medications  · Any signs of decreased circulation or nerve impairment to extremity: change in color, persistent  numbness, tingling, coldness or increase pain  · Any questions    What to do at Home:  Recommended activity: Activity as tolerated and no driving for today,     If you experience any of the following symptoms as noted above, please follow up with Dr. Fabien Camargo. *  Please give a list of your current medications to your Primary Care Provider. *  Please update this list whenever your medications are discontinued, doses are      changed, or new medications (including over-the-counter products) are added. *  Please carry medication information at all times in case of emergency situations. These are general instructions for a healthy lifestyle:    No smoking/ No tobacco products/ Avoid exposure to second hand smoke  Surgeon General's Warning:  Quitting smoking now greatly reduces serious risk to your health. Obesity, smoking, and sedentary lifestyle greatly increases your risk for illness    A healthy diet, regular physical exercise & weight monitoring are important for maintaining a healthy lifestyle    You may be retaining fluid if you have a history of heart failure or if you experience any of the following symptoms:  Weight gain of 3 pounds or more overnight or 5 pounds in a week, increased swelling in our hands or feet or shortness of breath while lying flat in bed.   Please call your doctor as soon as you notice any of these symptoms; do not wait until your next office visit. The discharge information has been reviewed with the patient and caregiver. The patient and caregiver verbalized understanding. Discharge medications reviewed with the patient and caregiver and appropriate educational materials and side effects teaching were provided.   ___________________________________________________________________________________________________________________________________

## 2021-12-29 NOTE — ANESTHESIA POSTPROCEDURE EVALUATION
Post-Anesthesia Evaluation and Assessment    Cardiovascular Function/Vital Signs  Visit Vitals  BP (!) 150/67   Pulse 87   Temp 37.3 °C (99.2 °F)   Resp 14   Ht 5' 3\" (1.6 m)   Wt 75.1 kg (165 lb 9.6 oz)   SpO2 96%   BMI 29.33 kg/m²       Patient is status post Procedure(s):  LEFT TOTAL KNEE ARTHROPLASTY. Nausea/Vomiting: Controlled. Postoperative hydration reviewed and adequate. Pain:  Pain Scale 1: Numeric (0 - 10) (12/29/21 1110)  Pain Intensity 1: 0 (12/29/21 1110)   Managed. Neurological Status:   Neuro (WDL): Exceptions to WDL (12/29/21 1057)   At baseline. Mental Status and Level of Consciousness: Baseline and stable. Pulmonary Status:   O2 Device: Nasal cannula (12/29/21 1110)   Adequate oxygenation and airway patent. Complications related to anesthesia: None    Post-anesthesia assessment completed. No concerns. Patient has met all discharge requirements.     Signed By: Dalton Ellis MD

## 2021-12-29 NOTE — PERIOP NOTES
Patient reassessed following administration of hydromorphone. Patient is calm and pain has reduced significantly.     Patient and family given discharge instructions    Notified PT/OT that patient is ready for therapy

## 2021-12-29 NOTE — INTERVAL H&P NOTE
Update History & Physical    The Patient's History and Physical of December 27,   The surgery was reviewed with the patient and I examined the patient. There was no change. The surgical site was confirmed by the patient and me. Plan:  The risk, benefits, expected outcome, and alternative to the recommended procedure have been discussed with the patient. Patient understands and wants to proceed with the procedure.     Electronically signed by Silva Apple MD on 12/29/2021 at 8:24 AM

## 2022-01-06 ENCOUNTER — VIRTUAL VISIT (OUTPATIENT)
Dept: SURGERY | Age: 72
End: 2022-01-06
Payer: MEDICARE

## 2022-01-06 VITALS — WEIGHT: 161 LBS | BODY MASS INDEX: 28.53 KG/M2 | HEIGHT: 63 IN

## 2022-01-06 DIAGNOSIS — K90.9 INTESTINAL MALABSORPTION, UNSPECIFIED TYPE: Primary | ICD-10-CM

## 2022-01-06 DIAGNOSIS — Z98.84 S/P LAPAROSCOPIC SLEEVE GASTRECTOMY: ICD-10-CM

## 2022-01-06 PROBLEM — E66.01 MORBID OBESITY WITH BMI OF 40.0-44.9, ADULT (HCC): Status: RESOLVED | Noted: 2021-03-30 | Resolved: 2022-01-06

## 2022-01-06 PROCEDURE — 99214 OFFICE O/P EST MOD 30 MIN: CPT | Performed by: NURSE PRACTITIONER

## 2022-01-06 PROCEDURE — 1090F PRES/ABSN URINE INCON ASSESS: CPT | Performed by: NURSE PRACTITIONER

## 2022-01-06 PROCEDURE — G0463 HOSPITAL OUTPT CLINIC VISIT: HCPCS | Performed by: NURSE PRACTITIONER

## 2022-01-06 PROCEDURE — G8400 PT W/DXA NO RESULTS DOC: HCPCS | Performed by: NURSE PRACTITIONER

## 2022-01-06 PROCEDURE — 1101F PT FALLS ASSESS-DOCD LE1/YR: CPT | Performed by: NURSE PRACTITIONER

## 2022-01-06 PROCEDURE — G8427 DOCREV CUR MEDS BY ELIG CLIN: HCPCS | Performed by: NURSE PRACTITIONER

## 2022-01-06 PROCEDURE — G9717 DOC PT DX DEP/BP F/U NT REQ: HCPCS | Performed by: NURSE PRACTITIONER

## 2022-01-06 PROCEDURE — G9711 PT HX TOT COL OR COLON CA: HCPCS | Performed by: NURSE PRACTITIONER

## 2022-01-06 RX ORDER — NYSTATIN 100000 U/G
CREAM TOPICAL 2 TIMES DAILY
Qty: 30 G | Refills: 2 | Status: SHIPPED | OUTPATIENT
Start: 2022-01-06

## 2022-01-06 NOTE — PROGRESS NOTES
Subjective:     Lisa Cotter  is a 73 y.o. female who presents for follow-up about 9 months following laparoscopic sleeve gastrectomy. She has lost a total of 73 pounds since surgery. Body mass index is 28.52 kg/m². . EBWL is (69%). The patient presents today to assess their progress toward their goal of weight loss and to address any issues that may be present. Today the patient and I have reviewed their diet and how appropriate their food choices are. The following issues have been identified - none from a surgical standpoint. Feeling well, recovering from knee replacement in December    Surgery related complication: NA       She reports no real issues and denies vomiting, abdominal pain, diarrhea and reflux. The patients diet choices have been reviewed today and counseling was given. Fluid intake: good, 64 oz      Protein intake: 1 shakes + food, tolerating all proteins      Meals/day: 3    Taking vitamins as recommended. Patients pain score:0/10      The patient's exercise level: in PT 3 x/week. Changes in her medical history and medications have been reviewed. Comorbidities:    Hypertension: improved , off Rx   Diabetes: not applicable  Obstructive Sleep Apnea: unchanged , using CPAP  Hyperlipidemia: not applicable  Stress Urinary Incontinence: not applicable  Gastroesophageal Reflux: not applicable  Weight related arthropathy:improved     Patient Active Problem List   Diagnosis Code    Lumbar back pain M54.50    Morbid obesity with body mass index (BMI) of 40.0 to 49.9 ([facility]) E66.01    Depression F32. A    Chronic pain G89.29    Smoking history Z87.891    Acoustic neuroma ([address] D33.3    History of colon resection Z90.49    Morbid obesity with BMI of 40.0-44.9, adult ([facility]) E66.01, Z68.41    Intestinal malabsorption K90.9    S/P laparoscopic sleeve gastrectomy Z98.84    Low vitamin D level R79.89    Primary osteoarthritis of left knee M17.12    Left knee DJD M17.12     Past Medical History:   Diagnosis Date    Acoustic neuroma [facility]) 2009    stable-right ear    Acoustic neuroma [facility]) 2009    right    Anemia     Arthritis     Cat scratch fever 1998    in lymph nodes    Chronic bronchitis (HCC)     Chronic pain     \"joints\"    Depression     Edema     Hiatal hernia     History of colon resection 1995    pt unsure of the nature of her resection     Ill-defined condition     hiatal hernia    Liver disease 2021    fatty liver    Low vitamin D level     Morbid obesity ([address]     resolved    Morbid obesity with body mass index (BMI) of 40.0 to 49.9 (HCC)     Nausea & vomiting     Sleep apnea     uses c-pap, Patient instructed to bring CPAP machine on DOS    Tuberculosis 1995    treated    Vertigo 2009     Past Surgical History:   Procedure Laterality Date    HX CARPAL TUNNEL RELEASE Bilateral     HX CHOLECYSTECTOMY      HX GI      colonoscopy    HX GI      sleeve    HX HEART CATHETERIZATION  03/2021    HX HIP REPLACEMENT Left     HX HYSTERECTOMY      HX ORTHOPAEDIC  1996    c5-6 fusion     HX ORTHOPAEDIC  01/2018    lumbar laminectomy     HX ORTHOPAEDIC Left     trigger finger surgery- all but the thumb    HX ORTHOPAEDIC Bilateral 2020    shoulder replacement    HX ORTHOPAEDIC Left 2020    hip replacement    HX ORTHOPAEDIC Bilateral     ctr    HX ORTHOPAEDIC      tib-fib hemant and removed    HX TOTAL COLECTOMY  1995    pt unsure of her history     OR LAP, STELLA RESTRICT PROC, LONGITUDINAL GASTRECTOMY  03/30/2021    Dr Wilfrido Nolen     Current Outpatient Medications   Medication Sig Dispense Refill    multivitamin with iron (FLINTSTONES) chewable tablet Take 1 Tablet by mouth daily.  nystatin (MYCOSTATIN) topical cream Apply  to affected area two (2) times a day. 30 g 2    aspirin 81 mg chewable tablet Take 1 Tablet by mouth two (2) times a day. 42 Tablet 0    GABAPENTIN PO Take 600 mg by mouth three (3) times daily as needed.       DULoxetine (CYMBALTA) 30 mg capsule Take 30 mg by mouth daily. Indications: anxiousness associated with depression      cyanocobalamin (VITAMIN B-12) 1,000 mcg sublingual tablet Take 1,000 mcg by mouth daily.  QUEtiapine (SEROqueL) 50 mg tablet Take 50 mg by mouth nightly.  Indications: bipolar          Review of Symptoms:       General - No history or complaints of unexpected fever or chills  Head/Neck - No history or complaints of headache or dizziness  Cardiac - No history or complaints of chest pain, palpitations, or shortness of breath  Pulmonary - No history or complaints of shortness of breath or productive cough  Gastrointestinal - as noted above  Genitourinary - No history or complaints of hematuria/dysuria or renal lithiasis  Musculoskeletal - No history or complaints of joint  muscular weakness  Hematologic - No history of any bleeding episodes  Neurologic - No history or complaints of  migraine headaches or neurologic symptoms                     Objective:     Visit Vitals   5' 3\" (1.6 m)   Wt 73 kg (161 lb)   BMI 28.52 kg/m²        Physical Exam:      General appearance - well appearing and in no distress  Mental status - alert, oriented to person, place, and time  Pulmonary - normal respiratory effort  Abdomen - no obvious distention  Neurological - normal speech, no focal findings or movement disorder noted  Extremities - normal movement  Musculoskeletal - moving extremities without difficulty  Skin - no rashes, no suspicious skin lesions noted      Lab Results   Component Value Date/Time    WBC 5.8 12/07/2021 08:48 AM    HGB 12.7 12/07/2021 08:48 AM    HCT 40.3 12/07/2021 08:48 AM    PLATELET 966 12/19/3131 08:48 AM    MCV 86.1 12/07/2021 08:48 AM     Lab Results   Component Value Date/Time    Sodium 144 12/07/2021 08:48 AM    Potassium 4.1 12/07/2021 08:48 AM    Chloride 108 12/07/2021 08:48 AM    CO2 32 12/07/2021 08:48 AM    Anion gap 4 12/07/2021 08:48 AM    Glucose 75 12/07/2021 08:48 AM    BUN 23 (H) 12/07/2021 08:48 AM    Creatinine 0.70 12/07/2021 08:48 AM    BUN/Creatinine ratio 33 (H) 12/07/2021 08:48 AM    GFR est AA >60 12/07/2021 08:48 AM    GFR est non-AA >60 12/07/2021 08:48 AM    Calcium 8.8 12/07/2021 08:48 AM    Bilirubin, total 0.5 12/07/2021 08:48 AM    Alk. phosphatase 169 (H) 12/07/2021 08:48 AM    Protein, total 6.4 12/07/2021 08:48 AM    Albumin 3.2 (L) 12/07/2021 08:48 AM    Globulin 3.2 12/07/2021 08:48 AM    A-G Ratio 1.0 12/07/2021 08:48 AM    ALT (SGPT) 25 12/07/2021 08:48 AM     Lab Results   Component Value Date/Time    Iron 58 10/06/2021 09:55 AM    TIBC 154 (L) 03/23/2018 03:30 PM    Iron % saturation 19 03/23/2018 03:30 PM    Ferritin 116 10/06/2021 09:55 AM     Lab Results   Component Value Date/Time    Folate >20.0 (H) 10/06/2021 09:55 AM     Lab Results   Component Value Date/Time    Vitamin D 25-Hydroxy 53.7 10/06/2021 09:56 AM         Assessment and Plan:   1. Intestinal malabsorption  a. continue required Vitamins: B12, B complex, D, iron, calcium, multivitamin  2. S/p laparoscopic bariatric surgery,laparoscopic sleeve gastrectomy , history of morbid obesity. She has done very well with her weight loss, especially considering her inability to exercise with knee replacement. She has a weight loss goal of 136lbs, which puts her BMI at 24. We discussed continuing to focus on aiming for 80-90g protein daily. a. Sleep goal is 7-9 hours each night. Patient education given on the effects of sleep deprivation on weight control. b. Discussed patients weight loss goals and dietary choices in relation to goals. c. Reminded to measure portions, continue high protein, low carbohydrate diet. Reminded to eat regularly, to eat slowly & not to drink with meals. d. Continue cardio exercise and add resistance exercises. 60-90 minutes of aerobic activity 5 days a week and strength training 2 days each week. e. Encouraged to attend support group   f.  Required fluid intake is >64oz daily of decaffeinated sugar free beverages. I  have discussed this plan with patient and they verbalized understanding    30 minutes spent with patient. Follow up in 3 months or sooner if patient has questions, concerns or worsening of condition, if unable to reach our office, patient should report to the ED. Ms. Fadumo Wyatt has a reminder for a \"due or due soon\" health maintenance. I have asked that she contact her primary care provider for a follow-up on this health maintenance. This visit with  Ms Fadumo Wyatt was performed under virtual telemedicine guidelines during the coronavirus (JXFXQ-55) public health emergency on 1/6/22 in an interactive fashion using [employer] me. They understand that this telemedicine encounter is a billable service, with coverage determined by their insurance carrier. They are aware that they may receive a bill and have provided verbal consent for this virtual visit. This visit was performed with the patient in their home environment and [facility]. I have spent over 30 minutes on this visit  both prior to the visit reviewing the patients chart and with the patient face to face. I have reviewed their medical history, performed a telemedicine physical examination, and discussed the plan of action to date. They understand that they will be asked   to come to the office when our office is allowed normal patient interaction, as dictated by public health officials, for a face-to-face visit to rediscuss all of the things we have talked about today.

## 2022-01-06 NOTE — PATIENT INSTRUCTIONS
Patient Instructions      1. Remember hydration goals - minimum of 64 ounces of liquids per day (dehydration is the number one reason for hospital readmission). 2. Continue to monitor carbohydrate and protein intake you need a minimum of  Grams of protein daily- remember to keep your total carbohydrates to 50 grams or less per day for best results. 3. Continue to work towards exercise goals - 60-90 minutes, 5 times a week minimum of deliberate, aerobic exercise is the ultimate goal with strength training 2 times each week. Refer to [employer] for  information. 4. Remember to take vitamins as directed. 5. Attend support group the 2nd Thursday of each month. 6.  Constipation: Milk of Magnesia is for immediate relief only. Miralax is to be used every day if constipation is a chronic problem. 7.  Diarrhea: patients will occasionally develop lactose intolerance after surgery. Check to see if your protein shake has whey in it. If it does try a protein powder or drink that does not have whey and stop all yogurts, cheeses and milks to see if the diarrhea goes away. 8.  If you have had labs drawn. We will only call you if you have abnormal results. Otherwise you can access the lab results in \"mychart\". You will only need the access code the first time you sign on. 9.  Call us at [phone] or email us through [email or web address]" with questions,     concerns or worsening of condition, we have someone on call 24 hours a day. If you are unable to reach our office, you are to go to your Primary Care Physician or the Emergency Department.      NOTE TO GASTRIC BYPASS PATIENTS:  (SAME APPLIES TO GASTRIC SLEEVE PATIENTS FOR FIRST TWO MONTHS)  Remember that for the rest of your life, you are not able to take the following:  - NSAIDs (ibuprofen, goody powder, BC powder, Motrin, Advil, Mobic, Voltaren, Excedrin, etc.)  - Steroid pills or injections  - Smoke (cigarettes or recreational drugs)  - Alcohol  Use of any of the above may cause ulcers in your stomach which may perforate causing a medical emergency and surgery. Speak to our medical staff if another medical provider requires you to take steroids or NSAIDs. Supplement Resource Guide    Importance of Protein:   Maintains lean body mass, produces antibodies to fight off infections, heals wounds, minimizes hair loss, helps to give you energy, helps with satiety, and keeping you full between meals. Importance of Calcium:  Needed for healthy bones and teeth, normal blood clotting, and nervous system functioning, higher risk of osteoporosis and bone disease with non-compliance. Importance of Multivitamins: Many functions. Supply you with extra nutrients that you may be missing from food. May lead to iron deficiency anemia, weakness, fatigue, and many other symptoms with non-compliance. Importance of B Vitamins:  Important for red blood cell formation, metabolism, energy, and helps to maintain a healthy nervous system. Protein Supplement  Find one you like now. Use immediately after surgery. Look for:  35-50g protein each day from your protein supplement once you reach the progression diet. 0-3 g fat per serving  0-3 g sugar per serving    Protein drinks should be split in separate dosages. Recommend: Lifelong  1 year + Calcium Supplement:     Start taking within a month after surgery. Look for: Calcium Citrate Plus D (1500 mg per day)  Recommend: Citracal     .            Avoid chocolate chewable calcium. Can use chewable bariatric or [employer] brand or similar chewable. The body cannot absorb more than 500-600 mg of calcium at a time. Take for Life Multi-vitamin Supplement:      Start immediately after surgery: any complete chewable, such as: Doswells Complete chewables. Avoid Doswell sours or gummies.   They lack iron and other important nutrients and also have added sugar. Continue with chewable vitamin or change to adult complete multivitamin one month after surgery. Menstruating women can take a prenatal vitamin. Make sure has at least 18 mg iron and 583-100 mcg folic acid   Vitamin I48, B Complex Vitamin, and Biotin  Start taking within a month after surgery. Vitamin B12:  1000 mcg of Vitamin B12 three times weekly    Must take sublingually (meaning you take it under your tongue) or in a liquid drop form for easy absorption. B Complex Vitamin: Take a pill or liquid drop form once daily. Biotin: This vitamin can help prevent hair loss. Recommend 5mg   (5000 mcg) a day  Biotin is Optional              Learning About Being Physically Active  What is physical activity? Being physically active means doing any kind of activity that gets your body moving. The types of physical activity that can help you get fit and stay healthy include:  · Aerobic or \"cardio\" activities. These make your heart beat faster and make you breathe harder, such as brisk walking, riding a bike, or running. They strengthen your heart and lungs and build up your endurance. · Strength training activities. These make your muscles work against, or \"resist,\" something. Examples include lifting weights or doing push-ups. These activities help tone and strengthen your muscles and bones. · Stretches. These let you move your joints and muscles through their full range of motion. Stretching helps you be more flexible. What are the benefits of being active? Being active is one of the best things you can do for your health. It helps you to:  · Feel stronger and have more energy to do all the things you like to do. · Focus better at school or work. · Feel, think, and sleep better. · Reach and stay at a healthy weight. · Lose fat and build lean muscle.   · Lower your risk for serious health problems, including diabetes, heart attack, high blood pressure, and some cancers. · Keep your heart, lungs, bones, muscles, and joints strong and healthy. How can you make being active part of your life? Start slowly. Make it your long-term goal to get at least 30 minutes of exercise on most days of the week. Walking is a good choice. You also may want to do other activities, such as running, swimming, cycling, or playing tennis or team sports. Pick activities that you likeones that make your heart beat faster, your muscles stronger, and your muscles and joints more flexible. If you find more than one thing you like doing, do them all. You don't have to do the same thing every day. Get your heart pumping every day. Any activity that makes your heart beat faster and keeps it at that rate for a while counts. Here are some great ways to get your heart beating faster:  · Go for a brisk walk, run, or bike ride. · Go for a hike or swim. · Go in-line skating. · Play a game of touch football, basketball, or soccer. · Ride a bike. · Play tennis or racquetball. · Climb stairs. Even some household chores can be aerobicjust do them at a faster pace. Vacuuming, raking or mowing the lawn, sweeping the garage, and washing and waxing the car all can help get your heart rate up. Strengthen your muscles during the week. You don't have to lift heavy weights or grow big, bulky muscles to get stronger. Doing a few simple activities that make your muscles work against, or \"resist,\" something can help you get stronger. For example, you can:  · Do push-ups or sit-ups, which use your own body weight as resistance. · Lift weights or dumbbells or use stretch bands at home or in a gym or community center. Stretch your muscles often. Stretching will help you as you become more active. It can help you stay flexible, loosen tight muscles, and avoid injury. It can also help improve your balance and posture and can be a great way to relax.   Be sure to stretch the muscles you'll be using when you work out. It's best to warm your muscles slightly before you stretch them. Walk or do some other light aerobic activity for a few minutes, and then start stretching. When you stretch your muscles:  · Do it slowly. Stretching is not about going fast or making sudden movements. · Don't push or bounce during a stretch. · Hold each stretch for at least 15 to 30 seconds, if you can. You should feel a stretch in the muscle, but not pain. · Breathe out as you do the stretch. Then breathe in as you hold the stretch. Don't hold your breath. If you're worried about how more activity might affect your health, have a checkup before you start. Follow any special advice your doctor gives you for getting a smart start. Where can you learn more? Go to http://www.gray.com/  Enter [id number] in the search box to learn more about \"Learning About Being Physically Active. \"  Current as of: May 12, 2021               Content Version: 13.0  © 2006-2021 [employer]. Care instructions adapted under license by [employer] (which disclaims liability or warranty for this information). If you have questions about a medical condition or this instruction, always ask your healthcare professional. [address] any warranty or liability for your use of this information.

## 2022-03-18 PROBLEM — M54.50 LUMBAR BACK PAIN: Status: ACTIVE | Noted: 2018-02-27

## 2022-03-19 PROBLEM — M17.12 PRIMARY OSTEOARTHRITIS OF LEFT KNEE: Status: ACTIVE | Noted: 2021-12-27

## 2022-03-19 PROBLEM — K90.9 INTESTINAL MALABSORPTION: Status: ACTIVE | Noted: 2021-04-14

## 2022-03-19 PROBLEM — M17.12 LEFT KNEE DJD: Status: ACTIVE | Noted: 2021-12-29

## 2022-03-19 PROBLEM — Z98.84 S/P LAPAROSCOPIC SLEEVE GASTRECTOMY: Status: ACTIVE | Noted: 2021-04-14

## 2022-08-04 ENCOUNTER — OFFICE VISIT (OUTPATIENT)
Dept: SURGERY | Age: 72
End: 2022-08-04
Payer: MEDICARE

## 2022-08-04 VITALS
HEIGHT: 63 IN | SYSTOLIC BLOOD PRESSURE: 139 MMHG | TEMPERATURE: 97.3 F | BODY MASS INDEX: 28.12 KG/M2 | HEART RATE: 82 BPM | OXYGEN SATURATION: 100 % | WEIGHT: 158.7 LBS | DIASTOLIC BLOOD PRESSURE: 66 MMHG

## 2022-08-04 DIAGNOSIS — D64.9 ANEMIA, UNSPECIFIED TYPE: ICD-10-CM

## 2022-08-04 DIAGNOSIS — Z98.84 S/P LAPAROSCOPIC SLEEVE GASTRECTOMY: ICD-10-CM

## 2022-08-04 DIAGNOSIS — E55.9 HYPOVITAMINOSIS D: ICD-10-CM

## 2022-08-04 DIAGNOSIS — K90.9 INTESTINAL MALABSORPTION, UNSPECIFIED TYPE: Primary | ICD-10-CM

## 2022-08-04 PROCEDURE — 1101F PT FALLS ASSESS-DOCD LE1/YR: CPT | Performed by: NURSE PRACTITIONER

## 2022-08-04 PROCEDURE — G8427 DOCREV CUR MEDS BY ELIG CLIN: HCPCS | Performed by: NURSE PRACTITIONER

## 2022-08-04 PROCEDURE — 1090F PRES/ABSN URINE INCON ASSESS: CPT | Performed by: NURSE PRACTITIONER

## 2022-08-04 PROCEDURE — G8536 NO DOC ELDER MAL SCRN: HCPCS | Performed by: NURSE PRACTITIONER

## 2022-08-04 PROCEDURE — 1123F ACP DISCUSS/DSCN MKR DOCD: CPT | Performed by: NURSE PRACTITIONER

## 2022-08-04 PROCEDURE — G8417 CALC BMI ABV UP PARAM F/U: HCPCS | Performed by: NURSE PRACTITIONER

## 2022-08-04 PROCEDURE — G0463 HOSPITAL OUTPT CLINIC VISIT: HCPCS | Performed by: NURSE PRACTITIONER

## 2022-08-04 PROCEDURE — 99214 OFFICE O/P EST MOD 30 MIN: CPT | Performed by: NURSE PRACTITIONER

## 2022-08-04 PROCEDURE — G9711 PT HX TOT COL OR COLON CA: HCPCS | Performed by: NURSE PRACTITIONER

## 2022-08-04 PROCEDURE — G9717 DOC PT DX DEP/BP F/U NT REQ: HCPCS | Performed by: NURSE PRACTITIONER

## 2022-08-04 PROCEDURE — G8400 PT W/DXA NO RESULTS DOC: HCPCS | Performed by: NURSE PRACTITIONER

## 2022-08-04 RX ORDER — GLUCOSAMINE SULFATE 1500 MG
POWDER IN PACKET (EA) ORAL DAILY
COMMUNITY

## 2022-08-04 NOTE — PROGRESS NOTES
Subjective:     Alejandrina Lomas  is a 81 y.o. female who presents for follow-up about 16 months following laparoscopic sleeve gastrectomy. She has lost a total of 76 pounds since surgery. Body mass index is 28.11 kg/m². . EBWL is (72%). The patient presents today to assess their progress toward their goal of weight loss and to address any issues that may be present. Today the patient and I have reviewed their diet and how appropriate their food choices are. The following issues have been identified - none from a surgical standpoint. Feeling well, no complaints or concerns other than she would like additional weight loss and has been at same weight since last visit at 9 months postop. Surgery related complication: NA       She reports abdominal discomfort if she eats too fast or too much and denies vomiting,difficulty swallowing, nausea and reflux. The patients diet choices have been reviewed today and counseling was given. Fluid intake:      Protein intake: no supplements; steak, eggs, seafood       Meals/day: 3 + snacking on popcorn  Eating applesauce    Patients pain score:0/10    The patient's exercise level: no structured exercise, stays active. Changes in her medical history and medications have been reviewed. Just finished medrol dosepak for back pain. Comorbidities:    Hypertension: improved , off Rx  Diabetes: not applicable  Obstructive Sleep Apnea: improved , using CPAP  Hyperlipidemia: not applicable  Stress Urinary Incontinence: not applicable  Gastroesophageal Reflux: not applicable  Weight related arthropathy:improved     Patient Active Problem List   Diagnosis Code    Lumbar back pain M54.50    Depression F32. A    Chronic pain G89.29    Anemia D64.9    Smoking history Z87.891    Acoustic neuroma (HCC) D33.3    History of colon resection Z90.49    Intestinal malabsorption K90.9    S/P laparoscopic sleeve gastrectomy Z98.84    Low vitamin D level R79.89    Primary osteoarthritis of left knee M17.12    Left knee DJD M17.12    Hypovitaminosis D E55.9     Past Medical History:   Diagnosis Date    Acoustic neuroma ([facility] [address] 2009    stable-right ear    Acoustic neuroma ([facility] [address] 2009    right    Anemia     Arthritis     Cat scratch fever 1998    in lymph nodes    Chronic bronchitis (HCC)     Chronic pain     \"joints\"    Depression     Edema     Hiatal hernia     History of colon resection 1995    pt unsure of the nature of her resection     Ill-defined condition     hiatal hernia    Liver disease 2021    fatty liver    Low vitamin D level     Morbid obesity ([address]     resolved    Morbid obesity with body mass index (BMI) of 40.0 to 49.9 (HCC)     Nausea & vomiting     Sleep apnea     uses c-pap, Patient instructed to bring CPAP machine on DOS    Tuberculosis 1995    treated    Vertigo 2009     Past Surgical History:   Procedure Laterality Date    HX CARPAL TUNNEL RELEASE Bilateral     HX CHOLECYSTECTOMY      HX GI      colonoscopy    HX GI      sleeve    HX HEART CATHETERIZATION  03/2021    HX HIP REPLACEMENT Left     HX HYSTERECTOMY      HX ORTHOPAEDIC  1996    c5-6 fusion     HX ORTHOPAEDIC  01/2018    lumbar laminectomy     HX ORTHOPAEDIC Left     trigger finger surgery- all but the thumb    HX ORTHOPAEDIC Bilateral 2020    shoulder replacement    HX ORTHOPAEDIC Left 2020    hip replacement    HX ORTHOPAEDIC Bilateral     ctr    HX ORTHOPAEDIC      tib-fib hemant and removed    [address]    pt unsure of her history     [place] LAP, STELLA RESTRICT PROC, LONGITUDINAL GASTRECTOMY  03/30/2021    Dr Bernadine Allen     Current Outpatient Medications   Medication Sig Dispense Refill    cholecalciferol (Vitamin D3) 25 mcg (1,000 unit) cap Take  by mouth daily. multivitamin with iron (FLINTSTONES) chewable tablet Take 1 Tablet by mouth daily. nystatin (MYCOSTATIN) topical cream Apply  to affected area two (2) times a day.  30 g 2    aspirin 81 mg chewable tablet Take 1 Tablet by mouth two (2) times a day. 42 Tablet 0    GABAPENTIN PO Take 600 mg by mouth three (3) times daily as needed. DULoxetine (CYMBALTA) 30 mg capsule Take 30 mg by mouth daily. Indications: anxiousness associated with depression      cyanocobalamin (VITAMIN B-12) 1,000 mcg sublingual tablet Take 1,000 mcg by mouth daily. QUEtiapine (SEROquel) 50 mg tablet Take 50 mg by mouth nightly.  Indications: bipolar          Review of Symptoms:       General - No history or complaints of unexpected fever or chills  Head/Neck - No history or complaints of headache or dizziness  Cardiac - No history or complaints of chest pain, palpitations, or shortness of breath  Pulmonary - No history or complaints of shortness of breath or productive cough  Gastrointestinal - as noted above  Genitourinary - No history or complaints of hematuria/dysuria or renal lithiasis  Musculoskeletal - No history or complaints of joint  muscular weakness  Hematologic - No history of any bleeding episodes  Neurologic - No history or complaints of  migraine headaches or neurologic symptoms                     Objective:     Visit Vitals  /66   Pulse 82   Temp 97.3 °F (36.3 °C)   Ht 5' 3\" (1.6 m)   Wt 72 kg (158 lb 11.2 oz)   SpO2 100%   BMI 28.11 kg/m²        Physical Exam:      General appearance:  alert, cooperative, no distress, appears stated age   Mental status   alert, oriented to person, place, and time   Neck  supple, no significant adenopathy     Lymphatics  no palpable lymphadenopathy, no hepatosplenomegaly   Chest  clear to auscultation, no wheezes, rales or rhonchi, symmetric air entry   Heart  normal rate, regular rhythm, normal S1, S2, no murmurs, rubs, clicks or gallops    Abdomen: soft, nontender, nondistended, no masses or organomegaly   Incision:  Well healed, no hernias      Neurological  alert, oriented, normal speech, no focal findings or movement disorder noted   Musculoskeletal no joint tenderness, deformity or swelling   Extremities peripheral pulses normal, no pedal edema, no clubbing or cyanosis   Skin normal coloration and turgor, no rashes, no suspicious skin lesions noted          Lab Results   Component Value Date/Time    WBC 5.8 12/07/2021 08:48 AM    HGB 12.7 12/07/2021 08:48 AM    HCT 40.3 12/07/2021 08:48 AM    PLATELET 934 73/17/7320 08:48 AM    MCV 86.1 12/07/2021 08:48 AM     Lab Results   Component Value Date/Time    Sodium 144 12/07/2021 08:48 AM    Potassium 4.1 12/07/2021 08:48 AM    Chloride 108 12/07/2021 08:48 AM    CO2 32 12/07/2021 08:48 AM    Anion gap 4 12/07/2021 08:48 AM    Glucose 75 12/07/2021 08:48 AM    BUN 23 (H) 12/07/2021 08:48 AM    Creatinine 0.70 12/07/2021 08:48 AM    BUN/Creatinine ratio 33 (H) 12/07/2021 08:48 AM    GFR est AA >60 12/07/2021 08:48 AM    GFR est non-AA >60 12/07/2021 08:48 AM    Calcium 8.8 12/07/2021 08:48 AM    Bilirubin, total 0.5 12/07/2021 08:48 AM    Alk. phosphatase 169 (H) 12/07/2021 08:48 AM    Protein, total 6.4 12/07/2021 08:48 AM    Albumin 3.2 (L) 12/07/2021 08:48 AM    Globulin 3.2 12/07/2021 08:48 AM    A-G Ratio 1.0 12/07/2021 08:48 AM    ALT (SGPT) 25 12/07/2021 08:48 AM     Lab Results   Component Value Date/Time    Iron 58 10/06/2021 09:55 AM    TIBC 154 (L) 03/23/2018 03:30 PM    Iron % saturation 19 03/23/2018 03:30 PM    Ferritin 116 10/06/2021 09:55 AM     Lab Results   Component Value Date/Time    Folate >20.0 (H) 10/06/2021 09:55 AM     Lab Results   Component Value Date/Time    Vitamin D 25-Hydroxy 53.7 10/06/2021 09:56 AM         Recent Labs     10/06/21  0956   VITD3 53.7     Reviewed labs in Care Everywhere from 2/9/22:   Hgb 11.2  Hct 34.9    TSH 3.968    Assessment and Plan:   Intestinal malabsorption  continue required Vitamins: B12, B complex, D, iron, calcium, multivitamin  S/p laparoscopic bariatric surgery, SLEEVE GASTRECTOMY, history of morbid obesity. Reviewed weight loss progress and has done well with 72% EBWL, but wishing for additional weight loss.  She is wanting 136 lbs which is a BMI of 25. Do recommend using food tracking zheng to ensure adequate protein and caloric intake. Aim for 80-90 g protein daily and 800-1000 calories. Decrease daily carbs below 50g. Needs to increase exercise. Is aware of dietitians on staff for additional reinforcement if needed. Sleep goal is 7-9 hours each night. Patient education given on the effects of sleep deprivation on weight control. Discussed patients weight loss goals and dietary choices in relation to goals. Reminded to measure portions, continue high protein, low carbohydrate diet. Reminded to eat regularly, to eat slowly & not to drink with meals. Continue cardio exercise and add resistance exercises. 60-90 minutes of aerobic activity 5 days a week and strength training 2 days each week. Encouraged to attend support group   Required fluid intake is >64oz daily of decaffeinated sugar free beverages. 3. Anemia - switch to PNV with iron BID and check iron and ferritin levels    Labs ordered today  Follow up in 6 months or sooner if patient has questions, concerns or worsening of condition, if unable to reach our office, patient should report to the ED. Ms. Angelic Montilla has a reminder for a \"due or due soon\" health maintenance. I have asked that she contact her primary care provider for a follow-up on this health maintenance. Total time spent with the patient 30 minutes.

## 2022-08-04 NOTE — PATIENT INSTRUCTIONS
[employer] or My Fitness Pal [place]  80-90g protein  800-1000 calories   Keep carbs below 50g                                                                 Patient Instructions      Remember hydration goals - minimum of 64 ounces of liquids per day (dehydration is the number one reason for hospital readmission). Continue to monitor carbohydrate and protein intake you need a minimum of  Grams of protein daily- remember to keep your total carbohydrates to 50 grams or less per day for best results. Continue to work towards exercise goals - 60-90 minutes, 5 times a week minimum of deliberate, aerobic exercise is the ultimate goal with strength training 2 times each week. Refer to [employer] Get [employer] For for  information. Remember to take vitamins as directed. Attend support group the 2nd Thursday of each month. 6.  Constipation: Milk of Magnesia is for immediate relief only. Miralax is to be used every day if constipation is a chronic problem. 7.  Diarrhea: patients will occasionally develop lactose intolerance after surgery. Check to see if your protein shake has whey in it. If it does try a protein powder or drink that does not have whey and stop all yogurts, cheeses and milks to see if the diarrhea goes away. 8.  If you have had labs drawn. We will only call you if you have abnormal results. Otherwise you can access the lab results in \"mychart\". You will only need the access code the first time you sign on. 9.  Call us at [phone] or email us through [employer]" with questions,     concerns or worsening of condition, we have someone on call 24 hours a day. If you are unable to reach our office, you are to go to your Primary Care Physician or the Emergency Department.      NOTE TO GASTRIC BYPASS PATIENTS:  (SAME APPLIES TO GASTRIC SLEEVE PATIENTS FOR FIRST TWO MONTHS)  Remember that for the rest of your life, you are not able to take the following:  - NSAIDs (ibuprofen, goody powder, BC powder, Motrin, Advil, Mobic, Voltaren, Excedrin, etc.)  - Steroid pills or injections  - Smoke (cigarettes or recreational drugs)  - Alcohol  Use of any of the above may cause ulcers in your stomach which may perforate causing a medical emergency and surgery. Speak to our medical staff if another medical provider requires you to take steroids or NSAIDs. Supplement Resource Guide    Importance of Protein:   Maintains lean body mass, produces antibodies to fight off infections, heals wounds, minimizes hair loss, helps to give you energy, helps with satiety, and keeping you full between meals. Importance of Calcium:  Needed for healthy bones and teeth, normal blood clotting, and nervous system functioning, higher risk of osteoporosis and bone disease with non-compliance. Importance of Multivitamins: Many functions. Supply you with extra nutrients that you may be missing from food. May lead to iron deficiency anemia, weakness, fatigue, and many other symptoms with non-compliance. Importance of B Vitamins:  Important for red blood cell formation, metabolism, energy, and helps to maintain a healthy nervous system. Protein Supplement  Find one you like now. Use immediately after surgery. Look for:  35-50g protein each day from your protein supplement once you reach the progression diet. 0-3 g fat per serving  0-3 g sugar per serving    Protein drinks should be split in separate dosages. Recommend: Lifelong  1 year + Calcium Supplement:     Start taking within a month after surgery. Look for: Calcium Citrate Plus D (1500 mg per day)  Recommend: Citracal     .            Avoid chocolate chewable calcium. Can use chewable bariatric or [employer] brand or similar chewable. The body cannot absorb more than 500-600 mg of calcium at a time.       Take for Life Multi-vitamin Supplement:      Start immediately after surgery: any complete chewable, such as: [place]s Complete chewables. Avoid [place] sours or gummies. They lack iron and other important nutrients and also have added sugar. Continue with chewable vitamin or change to adult complete multivitamin one month after surgery. Menstruating women can take a prenatal vitamin. Make sure has at least 18 mg iron and 789-350 mcg folic acid   Vitamin W73, B Complex Vitamin, and Biotin  Start taking within a month after surgery. Vitamin B12:  1000 mcg of Vitamin B12 three times weekly    Must take sublingually (meaning you take it under your tongue) or in a liquid drop form for easy absorption. B Complex Vitamin: Take a pill or liquid drop form once daily. Biotin: This vitamin can help prevent hair loss. Recommend 5mg   (5000 mcg) a day  Biotin is Optional           Learning About Being Physically Active  What is physical activity? Being physically active means doing any kind of activity that gets your body moving. The types of physical activity that can help you get fit and stay healthy include:  Aerobic or \"cardio\" activities. These make your heart beat faster and make you breathe harder, such as brisk walking, riding a bike, or running. They strengthen your heart and lungs and build up your endurance. Strength training activities. These make your muscles work against, or \"resist,\" something. Examples include lifting weights or doing push-ups. These activities help tone and strengthen your muscles and bones. Stretches. These let you move your joints and muscles through their full range of motion. Stretching helps you be more flexible. What are the benefits of being active? Being active is one of the best things you can do for your health. It helps you to:  Feel stronger and have more energy to do all the things you like to do. Focus better at school or work. Feel, think, and sleep better. Reach and stay at a healthy weight. Lose fat and build lean muscle.   Lower your risk for serious health problems, including diabetes, heart attack, high blood pressure, and some cancers. Keep your heart, lungs, bones, muscles, and joints strong and healthy. How can you make being active part of your life? Start slowly. Make it your long-term goal to get at least 30 minutes of exercise on most days of the week. Walking is a good choice. You also may want to do other activities, such as running, swimming, cycling, or playing tennis or team sports. Pick activities that you like--ones that make your heart beat faster, your muscles stronger, and your muscles and joints more flexible. If you find more than one thing you like doing, do them all. You don't have to do the same thing every day. Get your heart pumping every day. Any activity that makes your heart beat faster and keeps it at that rate for a while counts. Here are some great ways to get your heart beating faster:  Go for a brisk walk, run, or bike ride. Go for a hike or swim. Go in-line skating. Play a game of touch football, basketball, or soccer. Ride a bike. Play tennis or racquetball. Climb stairs. Even some household chores can be aerobic--just do them at a faster pace. Vacuuming, raking or mowing the lawn, sweeping the garage, and washing and waxing the car all can help get your heart rate up. Strengthen your muscles during the week. You don't have to lift heavy weights or grow big, bulky muscles to get stronger. Doing a few simple activities that make your muscles work against, or \"resist,\" something can help you get stronger. For example, you can:  Do push-ups or sit-ups, which use your own body weight as resistance. Lift weights or dumbbells or use stretch bands at home or in a gym or community center. Stretch your muscles often. Stretching will help you as you become more active. It can help you stay flexible, loosen tight muscles, and avoid injury. It can also help improve your balance and posture and can be a great way to relax.   Be sure to stretch the muscles you'll be using when you work out. It's best to warm your muscles slightly before you stretch them. Walk or do some other light aerobic activity for a few minutes, and then start stretching. When you stretch your muscles:  Do it slowly. Stretching is not about going fast or making sudden movements. Don't push or bounce during a stretch. Hold each stretch for at least 15 to 30 seconds, if you can. You should feel a stretch in the muscle, but not pain. Breathe out as you do the stretch. Then breathe in as you hold the stretch. Don't hold your breath. If you're worried about how more activity might affect your health, have a checkup before you start. Follow any special advice your doctor gives you for getting a smart start. Where can you learn more? Go to http://www.gray.com/  Enter X252607 in the search box to learn more about \"Learning About Being Physically Active. \"  Current as of: May 12, 2021               Content Version: 13.2  © 2006-2022 [employer]wise, [employer]. Care instructions adapted under license by [employer] (which disclaims liability or warranty for this information). If you have questions about a medical condition or this instruction, always ask your healthcare professional. [address] any warranty or liability for your use of this information.

## 2022-12-20 ENCOUNTER — HOSPITAL ENCOUNTER (OUTPATIENT)
Dept: PREADMISSION TESTING | Age: 72
Discharge: HOME | End: 2022-12-20
Payer: MEDICARE

## 2022-12-20 ENCOUNTER — TRANSCRIBE ORDER (OUTPATIENT)
Dept: REGISTRATION | Age: 72
End: 2022-12-20

## 2022-12-20 DIAGNOSIS — M16.11 PRIMARY OSTEOARTHRITIS OF RIGHT HIP: ICD-10-CM

## 2022-12-20 DIAGNOSIS — M16.11 PRIMARY OSTEOARTHRITIS OF RIGHT HIP: Primary | ICD-10-CM

## 2022-12-20 LAB
ALBUMIN SERPL-MCNC: 3.4 G/DL (ref 3.4–5)
ALBUMIN/GLOB SERPL: 1.1 {RATIO} (ref 0.8–1.7)
ALP SERPL-CCNC: 196 U/L (ref 45–117)
ALT SERPL-CCNC: 22 U/L (ref 13–56)
ANION GAP SERPL CALC-SCNC: 4 MMOL/L (ref 3–18)
AST SERPL-CCNC: 21 U/L (ref 10–38)
ATRIAL RATE: 66 BPM
BILIRUB SERPL-MCNC: 0.4 MG/DL (ref 0.2–1)
BUN SERPL-MCNC: 19 MG/DL (ref 7–18)
BUN/CREAT SERPL: 28 (ref 12–20)
CALCIUM SERPL-MCNC: 8.8 MG/DL (ref 8.5–10.1)
CALCULATED P AXIS, ECG09: 69 DEGREES
CALCULATED R AXIS, ECG10: 74 DEGREES
CALCULATED T AXIS, ECG11: 62 DEGREES
CHLORIDE SERPL-SCNC: 109 MMOL/L (ref 100–111)
CO2 SERPL-SCNC: 30 MMOL/L (ref 21–32)
CREAT SERPL-MCNC: 0.67 MG/DL (ref 0.6–1.3)
DIAGNOSIS, 93000: NORMAL
ERYTHROCYTE [DISTWIDTH] IN BLOOD BY AUTOMATED COUNT: 14.5 % (ref 11.6–14.5)
GLOBULIN SER CALC-MCNC: 3.2 G/DL (ref 2–4)
GLUCOSE SERPL-MCNC: 89 MG/DL (ref 74–99)
HCT VFR BLD AUTO: 37.4 % (ref 35–45)
HGB BLD-MCNC: 11.8 G/DL (ref 12–16)
MCH RBC QN AUTO: 26.3 PG (ref 24–34)
MCHC RBC AUTO-ENTMCNC: 31.6 G/DL (ref 31–37)
MCV RBC AUTO: 83.5 FL (ref 78–100)
NRBC # BLD: 0 K/UL (ref 0–0.01)
NRBC BLD-RTO: 0 PER 100 WBC
P-R INTERVAL, ECG05: 168 MS
PLATELET # BLD AUTO: 211 K/UL (ref 135–420)
PMV BLD AUTO: 9.7 FL (ref 9.2–11.8)
POTASSIUM SERPL-SCNC: 3.8 MMOL/L (ref 3.5–5.5)
PROT SERPL-MCNC: 6.6 G/DL (ref 6.4–8.2)
Q-T INTERVAL, ECG07: 386 MS
QRS DURATION, ECG06: 72 MS
QTC CALCULATION (BEZET), ECG08: 404 MS
RBC # BLD AUTO: 4.48 M/UL (ref 4.2–5.3)
SODIUM SERPL-SCNC: 143 MMOL/L (ref 136–145)
VENTRICULAR RATE, ECG03: 66 BPM
WBC # BLD AUTO: 6 K/UL (ref 4.6–13.2)

## 2022-12-20 PROCEDURE — 87086 URINE CULTURE/COLONY COUNT: CPT

## 2022-12-20 PROCEDURE — 36415 COLL VENOUS BLD VENIPUNCTURE: CPT

## 2022-12-20 PROCEDURE — 80053 COMPREHEN METABOLIC PANEL: CPT

## 2022-12-20 PROCEDURE — 85027 COMPLETE CBC AUTOMATED: CPT

## 2022-12-20 PROCEDURE — 93005 ELECTROCARDIOGRAM TRACING: CPT

## 2022-12-21 LAB
BACTERIA SPEC CULT: NORMAL
SERVICE CMNT-IMP: NORMAL
SERVICE CMNT-IMP: NORMAL

## 2022-12-29 ENCOUNTER — PATIENT MESSAGE (OUTPATIENT)
Dept: CARDIOLOGY CLINIC | Age: 72
End: 2022-12-29

## 2023-01-03 ENCOUNTER — HOSPITAL ENCOUNTER (OUTPATIENT)
Dept: PREADMISSION TESTING | Age: 73
Discharge: HOME | End: 2023-01-03

## 2023-01-03 VITALS — BODY MASS INDEX: 30.65 KG/M2 | HEIGHT: 63 IN | WEIGHT: 173 LBS

## 2023-01-03 RX ORDER — SODIUM CHLORIDE, SODIUM LACTATE, POTASSIUM CHLORIDE, CALCIUM CHLORIDE 600; 310; 30; 20 MG/100ML; MG/100ML; MG/100ML; MG/100ML
125 INJECTION, SOLUTION INTRAVENOUS CONTINUOUS
Status: CANCELLED | OUTPATIENT
Start: 2023-01-11

## 2023-01-03 NOTE — PERIOP NOTES
PAT - SURGICAL PRE-ADMISSION INSTRUCTIONS    NAME:  Steven Parker                                                          TODAY'S DATE:  1/3/2023    SURGERY DATE:  1/11/2023                                  SURGERY ARRIVAL TIME:   TBD    Do NOT eat or drink anything, including candy or gum, after MIDNIGHT on 1/11/2023 , unless you have specific instructions from your Surgeon or Anesthesia Provider to do so. No smoking 24 hours before surgery. No alcohol 24 hours prior to the day of surgery. No recreational drugs for one week prior to the day of surgery. Leave all valuables, including money/purse, at home. Remove all jewelry, nail polish, makeup (including mascara); no lotions, powders, deodorant, or perfume/cologne/after shave. Glasses/Contact lenses and Dentures may be worn to the hospital.  They will be removed prior to surgery. Call your doctor if symptoms of a cold or illness develop within 24 ours prior to surgery. AN ADULT MUST DRIVE YOU HOME AFTER OUTPATIENT SURGERY. If you are having an OUTPATIENT procedure, please make arrangements for a responsible adult to be with you for 24 hours after your surgery. If you are admitted to the hospital, you will be assigned to a bed after surgery is complete. Normally a family member will not be able to see you until you are in your assigned bed. 12. Visitation Restrictions Explained. Preoperative Nutrition Screen (KATE)   Patient's Age: 81 y.o. Patient's BMI: Estimated body mass index is 30.65 kg/m² as calculated from the following:    Height as of this encounter: 5' 3\" (1.6 m). Weight as of this encounter: 78.5 kg (173 lb). If the answer to any of the following is Yes, then recommend prescribe Oral Nutrition Supplements (ONS) for at least 7 days prior to surgery and/or order referral to dietitian for further assessment and nutrition therapy. 1. Does the patient have a documented serum albumin less than 3.0 within the last 90 days?    No = 0        2. Is patient's BMI less than 18.5 (or less than 20 if age over 72)? No = 0      3. Has the patient had an unplanned weight loss of 10% of body weight or more in the last 6 months? No = 0   4. Has the patient been eating less than 50% of their normal diet in the preceding week? No = 0   KATE Score (number of Yes responses), 0-4 0     Plan:   No Nutrition Intervention indicated    Pt is vaccinated, covid vaccination record in media. Pt has an advanced directive on file, scanned in 3/2021. Special Instructions: Take duloxetine on dos with sips of water  Pt instructed to avoid NSAIDs 7 days prior to procedure per MDA. Pt instructed to hold vitamins/supplements 2 weeks prior to procedure per MDA. Patient Prep:  use CHG solution, pt received, instructions reviewed. These surgical instructions were reviewed with patient during the PAT phone interview.

## 2023-01-05 ENCOUNTER — OFFICE VISIT (OUTPATIENT)
Dept: CARDIOLOGY CLINIC | Age: 73
End: 2023-01-05
Payer: MEDICARE

## 2023-01-05 VITALS
SYSTOLIC BLOOD PRESSURE: 130 MMHG | HEART RATE: 63 BPM | HEIGHT: 63 IN | OXYGEN SATURATION: 98 % | DIASTOLIC BLOOD PRESSURE: 74 MMHG | BODY MASS INDEX: 31.18 KG/M2 | WEIGHT: 176 LBS

## 2023-01-05 DIAGNOSIS — Z01.810 PREOP CARDIOVASCULAR EXAM: ICD-10-CM

## 2023-01-05 DIAGNOSIS — M16.11 OSTEOARTHRITIS OF RIGHT HIP, UNSPECIFIED OSTEOARTHRITIS TYPE: ICD-10-CM

## 2023-01-05 DIAGNOSIS — I10 PRIMARY HYPERTENSION: Primary | ICD-10-CM

## 2023-01-05 PROCEDURE — G8536 NO DOC ELDER MAL SCRN: HCPCS | Performed by: INTERNAL MEDICINE

## 2023-01-05 PROCEDURE — G9711 PT HX TOT COL OR COLON CA: HCPCS | Performed by: INTERNAL MEDICINE

## 2023-01-05 PROCEDURE — 99203 OFFICE O/P NEW LOW 30 MIN: CPT | Performed by: INTERNAL MEDICINE

## 2023-01-05 PROCEDURE — 3078F DIAST BP <80 MM HG: CPT | Performed by: INTERNAL MEDICINE

## 2023-01-05 PROCEDURE — G8417 CALC BMI ABV UP PARAM F/U: HCPCS | Performed by: INTERNAL MEDICINE

## 2023-01-05 PROCEDURE — 3075F SYST BP GE 130 - 139MM HG: CPT | Performed by: INTERNAL MEDICINE

## 2023-01-05 PROCEDURE — G9717 DOC PT DX DEP/BP F/U NT REQ: HCPCS | Performed by: INTERNAL MEDICINE

## 2023-01-05 PROCEDURE — 1090F PRES/ABSN URINE INCON ASSESS: CPT | Performed by: INTERNAL MEDICINE

## 2023-01-05 PROCEDURE — 1101F PT FALLS ASSESS-DOCD LE1/YR: CPT | Performed by: INTERNAL MEDICINE

## 2023-01-05 PROCEDURE — 1123F ACP DISCUSS/DSCN MKR DOCD: CPT | Performed by: INTERNAL MEDICINE

## 2023-01-05 PROCEDURE — G8427 DOCREV CUR MEDS BY ELIG CLIN: HCPCS | Performed by: INTERNAL MEDICINE

## 2023-01-05 PROCEDURE — G8400 PT W/DXA NO RESULTS DOC: HCPCS | Performed by: INTERNAL MEDICINE

## 2023-01-05 RX ORDER — FOLIC ACID 1 MG/1
1 TABLET ORAL DAILY
COMMUNITY
Start: 2022-08-09 | End: 2023-08-09

## 2023-01-05 RX ORDER — ERGOCALCIFEROL 1.25 MG/1
50000 CAPSULE ORAL
COMMUNITY
Start: 2022-02-09

## 2023-01-05 RX ORDER — AMLODIPINE BESYLATE 5 MG/1
5 TABLET ORAL DAILY
COMMUNITY
Start: 2022-09-02 | End: 2023-09-02

## 2023-01-05 NOTE — PROGRESS NOTES
HISTORY OF PRESENT ILLNESS  Misbah Perdue is a 81 y.o. female. New Patient  Pertinent negatives include no chest pain, no abdominal pain, no headaches and no shortness of breath. Patient presents for a new office visit. She was referred here for preoperative cardiac evaluation prior to undergoing a right hip replacement which is scheduled for next week. Patient has a history of hypertension. She does not have a prior cardiac history. She did undergo a diagnostic cardiac catheterization in March 2021 prior to bariatric weight loss surgery for concern of an abnormal EKG. The abnormality likely turned out to be due to lead placement. The cardiac catheterization in 2021 demonstrated minimal, nonobstructive coronary artery disease. She denies any new chest pain with activity, no dyspnea on exertion. She is able to climb up a flight or 2 of stairs without any difficulty. She also can walk for 15 to 20 minutes without stopping. Her limiting factor is her hip pain and other joint pains. 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 ergocalciferol (ERGOCALCIFEROL) 1,250 mcg (50,000 unit) capsule Take 50,000 Units by mouth every seven (7) days.       folic acid (FOLVITE) 1 mg tablet Take 1 mg by mouth daily. vit A/vit C/vit E/zinc/copper (PRESERVISION AREDS PO) Take  by mouth daily. multivitamin with iron (FLINTSTONES) chewable tablet Take 1 Tablet by mouth daily. nystatin (MYCOSTATIN) topical cream Apply  to affected area two (2) times a day. 30 g 2    aspirin 81 mg chewable tablet Take 1 Tablet by mouth two (2) times a day. 42 Tablet 0    GABAPENTIN PO Take 600 mg by mouth two (2) times a day. DULoxetine (CYMBALTA) 30 mg capsule Take 30 mg by mouth daily. Indications: anxiousness associated with depression      QUEtiapine (SEROquel) 50 mg tablet Take 50 mg by mouth nightly. Indications: bipolar       Allergies   Allergen Reactions    Latex Rash      Social History     Tobacco Use    Smoking status: Never    Smokeless tobacco: Never   Vaping Use    Vaping Use: Never used   Substance Use Topics    Alcohol use: Not Currently     Comment: social    Drug use: Not Currently     Family History   Problem Relation Age of Onset    Lung Disease Mother     Heart Disease Mother     Cancer Father     Heart Disease Father     Cancer Brother          Review of Systems   Constitutional:  Negative for chills, fever and weight loss. HENT:  Negative for nosebleeds. Eyes:  Negative for blurred vision and double vision. Respiratory:  Negative for cough, shortness of breath and wheezing. Cardiovascular:  Negative for chest pain, palpitations, orthopnea, claudication, leg swelling and PND. Gastrointestinal:  Negative for abdominal pain, heartburn, nausea and vomiting. Genitourinary:  Negative for dysuria and hematuria. Musculoskeletal:  Positive for joint pain. Negative for falls and myalgias. Skin:  Negative for rash. Neurological:  Negative for dizziness, focal weakness and headaches. Endo/Heme/Allergies:  Does not bruise/bleed easily. Psychiatric/Behavioral:  Negative for substance abuse.       Visit Vitals  /74 (BP 1 Location: Left upper arm, BP Patient Position: Sitting, BP Cuff Size: Adult)   Pulse 63   Ht 5' 3\" (1.6 m)   Wt 79.8 kg (176 lb)   SpO2 98%   BMI 31.18 kg/m²       Physical Exam  Constitutional:       Appearance: She is well-developed. HENT:      Head: Normocephalic and atraumatic. Eyes:      Conjunctiva/sclera: Conjunctivae normal.   Neck:      Vascular: No carotid bruit or JVD. Cardiovascular:      Rate and Rhythm: Normal rate and regular rhythm. Pulses: Normal pulses. Heart sounds: S1 normal and S2 normal. No murmur heard. No gallop. Pulmonary:      Effort: Pulmonary effort is normal.      Breath sounds: Normal breath sounds. No wheezing or rales. Abdominal:      General: Bowel sounds are normal.      Palpations: Abdomen is soft. Tenderness: There is no abdominal tenderness. Musculoskeletal:         General: No swelling or deformity. Cervical back: Neck supple. Skin:     General: Skin is warm and dry. Neurological:      General: No focal deficit present. Mental Status: She is alert and oriented to person, place, and time. Psychiatric:         Mood and Affect: Mood normal.     December 20, 2022 EKG: Normal sinus rhythm, normal axis, normal QTc interval, no ST-T wave changes concerning for ischemia. Normal tracing. No change compared to prior EKGs. ASSESSMENT and PLAN  Encounter Diagnoses   Name Primary? Primary hypertension Yes    Osteoarthritis of right hip, unspecified osteoarthritis type     Preop cardiovascular exam      Low risk from a cardiac standpoint to proceed with orthopedic surgery as scheduled next week. No additional cardiac testing needed. She can stop her aspirin for 7 days if necessary. Hypertension. Patient's blood pressure has been well controlled historically with amlodipine 5 mg as monotherapy. She can continue this medication long-term. History of bariatric weight loss surgery. Patient underwent surgery in 2021 with a gastric sleeve.   As result she lost nearly 100 pounds in weight. Patient can follow-up in the future as needed.

## 2023-01-05 NOTE — PROGRESS NOTES
Alessandra Lopez presents today for   Chief Complaint   Patient presents with    New Patient     Referred by Dr. Kymberly Kern for [facility]    Surgical Clearance     1/11/23: rt total hip arthroplasty anterior approach w/c-arm with Dr. Kymberly Kern at [address] preferred language for health care discussion is english/other. Is someone accompanying this pt? no    Is the patient using any DME equipment during [address]? no    Depression Screening:  3 most recent PHQ Screens 1/5/2023   Little interest or pleasure in doing things Not at all   Feeling down, depressed, irritable, or hopeless Not at all   Total Score PHQ 2 0       Learning Assessment:  Learning Assessment 1/5/2023   PRIMARY LEARNER Patient   BARRIERS PRIMARY LEARNER -   CO-LEARNER CAREGIVER -   PRIMARY LANGUAGE ENGLISH   LEARNER PREFERENCE PRIMARY DEMONSTRATION   ANSWERED BY patient   RELATIONSHIP SELF       Abuse Screening:  Abuse Screening Questionnaire 1/5/2023   Do you ever feel afraid of your partner? N   Are you in a relationship with someone who physically or mentally threatens you? N   Is it safe for you to go home? Y       Fall Risk  Fall Risk Assessment, last 12 mths 1/5/2023   Able to walk? Yes   Fall in past 12 months? 0   Do you feel unsteady? 0   Are you worried about falling 0           Pt currently taking Anticoagulant therapy? no    Pt currently taking Antiplatelet therapy ? Aspirin 81 mg daily      Coordination of Care:  1. Have you been to the ER, urgent care clinic since your last visit? Hospitalized since your last visit? no    2. Have you seen or consulted any other health care providers outside of the [address] [place] [address] since your last visit? Include any pap smears or colon screening.  no

## 2023-01-06 PROBLEM — M16.11 PRIMARY OSTEOARTHRITIS OF RIGHT HIP: Chronic | Status: ACTIVE | Noted: 2023-01-06

## 2023-01-06 PROBLEM — M16.11 PRIMARY OSTEOARTHRITIS OF RIGHT HIP: Status: ACTIVE | Noted: 2023-01-06

## 2023-01-06 PROBLEM — F31.9 BIPOLAR DISORDER (HCC): Status: ACTIVE | Noted: 2023-01-06

## 2023-01-06 PROBLEM — F31.9 BIPOLAR DISORDER (HCC): Chronic | Status: ACTIVE | Noted: 2023-01-06

## 2023-01-06 NOTE — H&P
History and Physical        Patient: Osmar Hernandez               Sex: female          DOA: (Not on file)         YOB: 1950      Age:  81 y.o.        LOS:  LOS: 0 days        HPI:     Roz Archer is seen for right severe coxarthrosis/right severe tricompartmental knee DJD status post left PS ATTUNE TKA (120) from 12/29/2021. The patient is in for followup. She has actually doing really well with her left knee replacement and has progressed nicely. It has been almost a year since we did it. She has gotten better strength and is progressing nicely. She has not used a scooter for the last four cruises she has been on. She is now here for evaluation and treatment. The right hip seems to be bothering her more than the right knee. An AP view of the pelvis was obtained and interpreted in the office and shows right severe coxarthrosis, otherwise, x-rays reveals no periosteal reaction, no medullary lesions, no osteopenia, no chondrolysis, and no fractures.       Past Medical History:   Diagnosis Date    Acoustic neuroma ([address] 2009    stable-right ear    Anemia     Arthritis     OA    CAD (coronary artery disease) 03/2021    cardiac cath    Cat scratch fever 1998    in lymph nodes    Chronic bronchitis (HCC)     Chronic pain     right hip and right knee    Hiatal hernia     History of colon resection 1995    Ill-defined condition     hiatal hernia    Liver disease 2021    fatty liver    Nausea & vomiting     N&V post procedures    Psychiatric disorder     bipolar & depression    Sleep apnea     uses c-pap, bring on [address]    pt was a paramedic, treated    Vertigo 2009       Past Surgical History:   Procedure Laterality Date    HX CARPAL TUNNEL RELEASE Bilateral     HX CHOLECYSTECTOMY      HX COLONOSCOPY      HX HEART CATHETERIZATION  03/2021    HX HEENT      eye lid lift    HX HIP REPLACEMENT Left 2020    HX HYSTERECTOMY  1992    HX KNEE REPLACEMENT Left 12/29/2021    HX ORTHOPAEDIC  1996    c5-6 fusion     HX ORTHOPAEDIC  01/2018    lumbar laminectomy     HX ORTHOPAEDIC Left     trigger finger surgery- all but the thumb    HX ORTHOPAEDIC      tib-fib hemant and removed    HX SHOULDER REPLACEMENT Bilateral 2020    HX TOTAL COLECTOMY  1995    bowel resection    VT LAPS GSTRC RSTRICTIV PX LONGITUDINAL GASTRECTOMY  03/30/2021    Dr Wing Carroll       Family History   Problem Relation Age of Onset    Lung Disease Mother     Heart Disease Mother     Cancer Father     Heart Disease Father     Cancer Brother        Social History     Socioeconomic History    Marital status:    Tobacco Use    Smoking status: Never    Smokeless tobacco: Never   Vaping Use    Vaping Use: Never used   Substance and Sexual Activity    Alcohol use: Not Currently     Comment: social    Drug use: Not Currently    Sexual activity: Not Currently     Birth control/protection: Surgical       Prior to Admission medications    Medication Sig Start Date End Date Taking? Authorizing Provider   amLODIPine (NORVASC) 5 mg tablet Take 5 mg by mouth daily. 9/2/22 9/2/23  Provider, Historical   ergocalciferol (ERGOCALCIFEROL) 1,250 mcg (50,000 unit) capsule Take 50,000 Units by mouth every seven (7) days. 2/9/22   Provider, Historical   folic acid (FOLVITE) 1 mg tablet Take 1 mg by mouth daily. 8/9/22 8/9/23  Provider, Historical   vit A/vit C/vit E/zinc/copper (PRESERVISION AREDS PO) Take  by mouth daily. Provider, Historical   multivitamin with iron (FLINTSTONES) chewable tablet Take 1 Tablet by mouth daily. Provider, Historical   nystatin (MYCOSTATIN) topical cream Apply  to affected area two (2) times a day. 1/6/22   Ryan Gillette NP   aspirin 81 mg chewable tablet Take 1 Tablet by mouth two (2) times a day. 12/29/21   Kasey Segal PA-C   GABAPENTIN PO Take 600 mg by mouth two (2) times a day. Provider, Historical   DULoxetine (CYMBALTA) 30 mg capsule Take 30 mg by mouth daily.  Indications: anxiousness associated with depression    Provider, Historical   QUEtiapine (SEROquel) 50 mg tablet Take 50 mg by mouth nightly. Indications: bipolar    Provider, Historical       Allergies   Allergen Reactions    Latex Rash       Review of Systems  GENERAL:  Patient has no signs of fever, chills or weight change. or fatigue  HEAD/ENTM: Patient presents with blurred vision. Patient has no signs of headaches, dizziness, hearing loss, sore throat, recent cold, double vision, itchy eyes, eye redness or eye discharge. or ringing in ears  NEUROLOGIC: Patient presents with muscle weakness, numbness/tingling and loss of balance. Patient has no signs of fainting or seizure disorder. CARDIOVASCULAR:  Patient has no signs of chest pain, palpitations, rheumatic fever or heart murmur. RESPIRATORY:  Patient has no signs of chronic cough, wheezing, pain on breathing or shortness of breath. or difficulty breathing  GASTROINTESTINAL: Patient presents with gas/bloating and hemorrhoids. Patient has no signs of nausea/vomiting, difficulty swallowing, indigestion, abdominal pain, diarrhea or bloody stools. GENITOURINARY:  Patient has no signs of blood in the urine, burning sensation, frequent urinating or incontinence. , painful urinating or bladder/kidney infection  MUSCULOSKELETAL: Patient presents with joint pain and swelling of feet. Patient has no signs of fracture/dislocation, sprain/strain, tendonitis, joint stiffness, rheumatoid disease or gout. INTEGUMENTARY:  Patient has no signs of rash/itching, psoriasis, Raynaud's phenomenon or varicose veins. EMOTIONAL: Patient presents with bipolar disorder. Patient has no signs of change in mood. , anxiety, depression or memory loss        Physical Exam:      There were no vitals taken for this visit. Physical Exam:   Physical exam shows a healthy appearing elderly white female.   The right hip has only about a 50-degree arc of rotational motion with pain referred back to the right groin. Examination of the  hip shows the patient is nontender to palpation. The skin is normal with no contusions or wounds. There is no pain at either hip or the greater trochanters. As the iliotibial band is stretched, there is no pain. The patient has normal light touch sensation in all dermatomal patterns. There is normal motor strength to heel and toe walking. There is negative straight leg raising bilaterally to 90 degrees in the seated position. The patient has good capillary refill. Assessment/Plan     Principal Problem:    Primary osteoarthritis of right hip (1/6/2023)    Active Problems:    Depression ()      Chronic pain ()      Smoking history ()      Acoustic neuroma (HCC) ()      History of colon resection ()      Intestinal malabsorption (4/14/2021)      S/P laparoscopic sleeve gastrectomy (4/14/2021)      Bipolar disorder ([facility] (1/6/2023)        The risks associated with right outpatient direct anterior hip arthroplasty were reviewed and questions answered. The risks including pain, bleeding, infection, fracture, deep vein thrombosis, pulmonary embolism, need for future surgery  amongst others were reviewed and questions were answered. The patient is going to be scheduled for an outpatient direct anterior IRVIN using the [employer] system. The patient will use aspirin 81mg twice daily for DVT/VTE prophylaxis. The patient was issued a prescription for Roxicodone for postoperative pain management and a Keflex prescription for antibiotic prophylaxis. The patient will receive an IV dose of antibiotics preoperatively as well. We will discharge to home health the same day. Review of X-Rays show Kellgren-Hernandez grade 3/4 changes. The patient will follow up two weeks postoperatively. At the time of the surgery we will equalize leg lengths by X-ray. The patient was counseled on the importance of ice and elevation.  We are going to do this at [facility]. She did not require a walker today. Once we get the right hip better enough we will discuss whether or not right knee replacement is warranted.

## 2023-01-10 RX ORDER — SODIUM CHLORIDE, SODIUM LACTATE, POTASSIUM CHLORIDE, CALCIUM CHLORIDE 600; 310; 30; 20 MG/100ML; MG/100ML; MG/100ML; MG/100ML
125 INJECTION, SOLUTION INTRAVENOUS CONTINUOUS
Status: CANCELLED | OUTPATIENT
Start: 2023-01-10 | End: 2023-01-11

## 2023-01-10 RX ORDER — SODIUM CHLORIDE 0.9 % (FLUSH) 0.9 %
5-40 SYRINGE (ML) INJECTION EVERY 8 HOURS
Status: CANCELLED | OUTPATIENT
Start: 2023-01-10

## 2023-01-10 RX ORDER — SODIUM CHLORIDE 0.9 % (FLUSH) 0.9 %
5-40 SYRINGE (ML) INJECTION AS NEEDED
Status: CANCELLED | OUTPATIENT
Start: 2023-01-10

## 2023-01-11 ENCOUNTER — ANESTHESIA EVENT (OUTPATIENT)
Dept: SURGERY | Age: 73
End: 2023-01-11
Payer: MEDICARE

## 2023-01-11 ENCOUNTER — HOSPITAL ENCOUNTER (OUTPATIENT)
Age: 73
Discharge: HOME HEALTH | End: 2023-01-11
Attending: ORTHOPAEDIC SURGERY | Admitting: ORTHOPAEDIC SURGERY
Payer: MEDICARE

## 2023-01-11 ENCOUNTER — APPOINTMENT (OUTPATIENT)
Dept: GENERAL RADIOLOGY | Age: 73
End: 2023-01-11
Attending: ORTHOPAEDIC SURGERY
Payer: MEDICARE

## 2023-01-11 ENCOUNTER — ANESTHESIA (OUTPATIENT)
Dept: SURGERY | Age: 73
End: 2023-01-11
Payer: MEDICARE

## 2023-01-11 VITALS
HEART RATE: 71 BPM | SYSTOLIC BLOOD PRESSURE: 141 MMHG | RESPIRATION RATE: 16 BRPM | DIASTOLIC BLOOD PRESSURE: 68 MMHG | BODY MASS INDEX: 30.97 KG/M2 | OXYGEN SATURATION: 98 % | WEIGHT: 174.8 LBS | HEIGHT: 63 IN | TEMPERATURE: 98 F

## 2023-01-11 PROBLEM — M16.11 OSTEOARTHRITIS OF RIGHT HIP: Status: ACTIVE | Noted: 2023-01-11

## 2023-01-11 LAB
ABO + RH BLD: NORMAL
BLOOD GROUP ANTIBODIES SERPL: NORMAL
SPECIMEN EXP DATE BLD: NORMAL

## 2023-01-11 PROCEDURE — 77030034694 HC SCPL CANADY PLSM DISP USMD -E: Performed by: ORTHOPAEDIC SURGERY

## 2023-01-11 PROCEDURE — 77030034479 HC ADH SKN CLSR PRINEO J&J -B: Performed by: ORTHOPAEDIC SURGERY

## 2023-01-11 PROCEDURE — 74011250636 HC RX REV CODE- 250/636: Performed by: ORTHOPAEDIC SURGERY

## 2023-01-11 PROCEDURE — 74011000258 HC RX REV CODE- 258: Performed by: ORTHOPAEDIC SURGERY

## 2023-01-11 PROCEDURE — 77030010507 HC ADH SKN DERMBND J&J -B: Performed by: ORTHOPAEDIC SURGERY

## 2023-01-11 PROCEDURE — 76210000063 HC OR PH I REC FIRST 0.5 HR: Performed by: ORTHOPAEDIC SURGERY

## 2023-01-11 PROCEDURE — 77030020782 HC GWN BAIR PAWS FLX 3M -B: Performed by: ORTHOPAEDIC SURGERY

## 2023-01-11 PROCEDURE — 86900 BLOOD TYPING SEROLOGIC ABO: CPT

## 2023-01-11 PROCEDURE — 77030013708 HC HNDPC SUC IRR PULS STRY –B: Performed by: ORTHOPAEDIC SURGERY

## 2023-01-11 PROCEDURE — 74011000250 HC RX REV CODE- 250: Performed by: NURSE ANESTHETIST, CERTIFIED REGISTERED

## 2023-01-11 PROCEDURE — 77030031139 HC SUT VCRL2 J&J -A: Performed by: ORTHOPAEDIC SURGERY

## 2023-01-11 PROCEDURE — 97116 GAIT TRAINING THERAPY: CPT

## 2023-01-11 PROCEDURE — 97161 PT EVAL LOW COMPLEX 20 MIN: CPT

## 2023-01-11 PROCEDURE — 74011250636 HC RX REV CODE- 250/636: Performed by: STUDENT IN AN ORGANIZED HEALTH CARE EDUCATION/TRAINING PROGRAM

## 2023-01-11 PROCEDURE — 74011250636 HC RX REV CODE- 250/636: Performed by: PHYSICIAN ASSISTANT

## 2023-01-11 PROCEDURE — 77030041075 HC DRSG AG OPTIFRM MDII -B: Performed by: ORTHOPAEDIC SURGERY

## 2023-01-11 PROCEDURE — 77030040361 HC SLV COMPR DVT MDII -B: Performed by: ORTHOPAEDIC SURGERY

## 2023-01-11 PROCEDURE — 2709999900 HC NON-CHARGEABLE SUPPLY: Performed by: ORTHOPAEDIC SURGERY

## 2023-01-11 PROCEDURE — 74011000250 HC RX REV CODE- 250: Performed by: ORTHOPAEDIC SURGERY

## 2023-01-11 PROCEDURE — 76060000033 HC ANESTHESIA 1 TO 1.5 HR: Performed by: ORTHOPAEDIC SURGERY

## 2023-01-11 PROCEDURE — 74011250636 HC RX REV CODE- 250/636: Performed by: NURSE ANESTHETIST, CERTIFIED REGISTERED

## 2023-01-11 PROCEDURE — 77030018673: Performed by: ORTHOPAEDIC SURGERY

## 2023-01-11 PROCEDURE — 76210000023 HC REC RM PH II 2 TO 2.5 HR: Performed by: ORTHOPAEDIC SURGERY

## 2023-01-11 PROCEDURE — 76010000149 HC OR TIME 1 TO 1.5 HR: Performed by: ORTHOPAEDIC SURGERY

## 2023-01-11 PROCEDURE — 77030027138 HC INCENT SPIROMETER -A: Performed by: ORTHOPAEDIC SURGERY

## 2023-01-11 PROCEDURE — 36415 COLL VENOUS BLD VENIPUNCTURE: CPT

## 2023-01-11 PROCEDURE — 74011250637 HC RX REV CODE- 250/637: Performed by: PHYSICIAN ASSISTANT

## 2023-01-11 PROCEDURE — C1776 JOINT DEVICE (IMPLANTABLE): HCPCS | Performed by: ORTHOPAEDIC SURGERY

## 2023-01-11 PROCEDURE — 77030006835 HC BLD SAW SAG STRY -B: Performed by: ORTHOPAEDIC SURGERY

## 2023-01-11 DEVICE — HEAD FEM DIA36MM +1.5MM OFFSET 12/14 TAPR HIP CERAMIC: Type: IMPLANTABLE DEVICE | Site: HIP | Status: FUNCTIONAL

## 2023-01-11 DEVICE — CUP ACET DIA52MM HIP GRIPTION PRI CEMENTLESS FIX SECT SER: Type: IMPLANTABLE DEVICE | Site: HIP | Status: FUNCTIONAL

## 2023-01-11 DEVICE — HIP H2 TOT ADV OTHER HD IMPL CAPPED SYNTHES: Type: IMPLANTABLE DEVICE | Site: HIP | Status: FUNCTIONAL

## 2023-01-11 DEVICE — STEM FEM SZ 6 L107MM 12/14 TAPR STD OFFSET HIP DUOFIX CLLRD: Type: IMPLANTABLE DEVICE | Site: HIP | Status: FUNCTIONAL

## 2023-01-11 DEVICE — LINER ACET OD52MM ID36MM HIP ALTRX PINN: Type: IMPLANTABLE DEVICE | Site: HIP | Status: FUNCTIONAL

## 2023-01-11 RX ORDER — DIPHENHYDRAMINE HYDROCHLORIDE 50 MG/ML
12.5 INJECTION, SOLUTION INTRAMUSCULAR; INTRAVENOUS
Status: DISCONTINUED | OUTPATIENT
Start: 2023-01-11 | End: 2023-01-11 | Stop reason: HOSPADM

## 2023-01-11 RX ORDER — PANTOPRAZOLE SODIUM 40 MG/1
40 TABLET, DELAYED RELEASE ORAL ONCE
Status: COMPLETED | OUTPATIENT
Start: 2023-01-11 | End: 2023-01-11

## 2023-01-11 RX ORDER — NALOXONE HYDROCHLORIDE 0.4 MG/ML
0.04 INJECTION, SOLUTION INTRAMUSCULAR; INTRAVENOUS; SUBCUTANEOUS
Status: DISCONTINUED | OUTPATIENT
Start: 2023-01-11 | End: 2023-01-11 | Stop reason: HOSPADM

## 2023-01-11 RX ORDER — HYDROMORPHONE HYDROCHLORIDE 1 MG/ML
0.5 INJECTION, SOLUTION INTRAMUSCULAR; INTRAVENOUS; SUBCUTANEOUS AS NEEDED
Status: DISCONTINUED | OUTPATIENT
Start: 2023-01-11 | End: 2023-01-11 | Stop reason: HOSPADM

## 2023-01-11 RX ORDER — ONDANSETRON 2 MG/ML
4 INJECTION INTRAMUSCULAR; INTRAVENOUS ONCE
Status: DISCONTINUED | OUTPATIENT
Start: 2023-01-11 | End: 2023-01-11 | Stop reason: HOSPADM

## 2023-01-11 RX ORDER — SODIUM CHLORIDE 0.9 % (FLUSH) 0.9 %
5-40 SYRINGE (ML) INJECTION AS NEEDED
Status: DISCONTINUED | OUTPATIENT
Start: 2023-01-11 | End: 2023-01-11 | Stop reason: HOSPADM

## 2023-01-11 RX ORDER — ACETAMINOPHEN 500 MG
1000 TABLET ORAL ONCE
Status: COMPLETED | OUTPATIENT
Start: 2023-01-11 | End: 2023-01-11

## 2023-01-11 RX ORDER — SODIUM CHLORIDE, SODIUM LACTATE, POTASSIUM CHLORIDE, CALCIUM CHLORIDE 600; 310; 30; 20 MG/100ML; MG/100ML; MG/100ML; MG/100ML
50 INJECTION, SOLUTION INTRAVENOUS CONTINUOUS
Status: DISCONTINUED | OUTPATIENT
Start: 2023-01-11 | End: 2023-01-11 | Stop reason: HOSPADM

## 2023-01-11 RX ORDER — CEFAZOLIN SODIUM/WATER 2 G/20 ML
2 SYRINGE (ML) INTRAVENOUS ONCE
Status: COMPLETED | OUTPATIENT
Start: 2023-01-11 | End: 2023-01-11

## 2023-01-11 RX ORDER — LIDOCAINE HYDROCHLORIDE 20 MG/ML
INJECTION, SOLUTION EPIDURAL; INFILTRATION; INTRACAUDAL; PERINEURAL AS NEEDED
Status: DISCONTINUED | OUTPATIENT
Start: 2023-01-11 | End: 2023-01-11 | Stop reason: HOSPADM

## 2023-01-11 RX ORDER — CEPHALEXIN 500 MG/1
1000 CAPSULE ORAL SEE ADMIN INSTRUCTIONS
COMMUNITY

## 2023-01-11 RX ORDER — FENTANYL CITRATE 50 UG/ML
50 INJECTION, SOLUTION INTRAMUSCULAR; INTRAVENOUS
Status: DISCONTINUED | OUTPATIENT
Start: 2023-01-11 | End: 2023-01-11 | Stop reason: HOSPADM

## 2023-01-11 RX ORDER — NALBUPHINE HYDROCHLORIDE 10 MG/ML
5 INJECTION, SOLUTION INTRAMUSCULAR; INTRAVENOUS; SUBCUTANEOUS
Status: DISCONTINUED | OUTPATIENT
Start: 2023-01-11 | End: 2023-01-11 | Stop reason: HOSPADM

## 2023-01-11 RX ORDER — EPHEDRINE SULFATE/0.9% NACL/PF 50 MG/5 ML
SYRINGE (ML) INTRAVENOUS AS NEEDED
Status: DISCONTINUED | OUTPATIENT
Start: 2023-01-11 | End: 2023-01-11 | Stop reason: HOSPADM

## 2023-01-11 RX ORDER — SODIUM CHLORIDE 0.9 % (FLUSH) 0.9 %
5-40 SYRINGE (ML) INJECTION EVERY 8 HOURS
Status: DISCONTINUED | OUTPATIENT
Start: 2023-01-11 | End: 2023-01-11 | Stop reason: HOSPADM

## 2023-01-11 RX ORDER — TRANEXAMIC ACID 650 MG/1
1950 TABLET ORAL ONCE
Status: COMPLETED | OUTPATIENT
Start: 2023-01-11 | End: 2023-01-11

## 2023-01-11 RX ORDER — ONDANSETRON 2 MG/ML
INJECTION INTRAMUSCULAR; INTRAVENOUS AS NEEDED
Status: DISCONTINUED | OUTPATIENT
Start: 2023-01-11 | End: 2023-01-11 | Stop reason: HOSPADM

## 2023-01-11 RX ORDER — DEXAMETHASONE SODIUM PHOSPHATE 4 MG/ML
8 INJECTION, SOLUTION INTRA-ARTICULAR; INTRALESIONAL; INTRAMUSCULAR; INTRAVENOUS; SOFT TISSUE ONCE
Status: COMPLETED | OUTPATIENT
Start: 2023-01-11 | End: 2023-01-11

## 2023-01-11 RX ORDER — SODIUM CHLORIDE, SODIUM LACTATE, POTASSIUM CHLORIDE, CALCIUM CHLORIDE 600; 310; 30; 20 MG/100ML; MG/100ML; MG/100ML; MG/100ML
125 INJECTION, SOLUTION INTRAVENOUS CONTINUOUS
Status: DISCONTINUED | OUTPATIENT
Start: 2023-01-11 | End: 2023-01-11 | Stop reason: HOSPADM

## 2023-01-11 RX ORDER — ALBUTEROL SULFATE 0.83 MG/ML
2.5 SOLUTION RESPIRATORY (INHALATION)
Status: DISCONTINUED | OUTPATIENT
Start: 2023-01-11 | End: 2023-01-11 | Stop reason: HOSPADM

## 2023-01-11 RX ORDER — DEXAMETHASONE SODIUM PHOSPHATE 4 MG/ML
INJECTION, SOLUTION INTRA-ARTICULAR; INTRALESIONAL; INTRAMUSCULAR; INTRAVENOUS; SOFT TISSUE AS NEEDED
Status: DISCONTINUED | OUTPATIENT
Start: 2023-01-11 | End: 2023-01-11 | Stop reason: HOSPADM

## 2023-01-11 RX ORDER — MIDAZOLAM HYDROCHLORIDE 1 MG/ML
INJECTION, SOLUTION INTRAMUSCULAR; INTRAVENOUS AS NEEDED
Status: DISCONTINUED | OUTPATIENT
Start: 2023-01-11 | End: 2023-01-11 | Stop reason: HOSPADM

## 2023-01-11 RX ORDER — SODIUM CHLORIDE, SODIUM LACTATE, POTASSIUM CHLORIDE, CALCIUM CHLORIDE 600; 310; 30; 20 MG/100ML; MG/100ML; MG/100ML; MG/100ML
INJECTION, SOLUTION INTRAVENOUS
Status: DISCONTINUED | OUTPATIENT
Start: 2023-01-11 | End: 2023-01-11 | Stop reason: HOSPADM

## 2023-01-11 RX ORDER — PROPOFOL 10 MG/ML
INJECTION, EMULSION INTRAVENOUS AS NEEDED
Status: DISCONTINUED | OUTPATIENT
Start: 2023-01-11 | End: 2023-01-11 | Stop reason: HOSPADM

## 2023-01-11 RX ADMIN — TRANEXAMIC ACID 1950 MG: 650 TABLET ORAL at 10:05

## 2023-01-11 RX ADMIN — MIDAZOLAM 2 MG: 1 INJECTION INTRAMUSCULAR; INTRAVENOUS at 10:30

## 2023-01-11 RX ADMIN — ONDANSETRON HYDROCHLORIDE 4 MG: 2 INJECTION INTRAMUSCULAR; INTRAVENOUS at 10:57

## 2023-01-11 RX ADMIN — LIDOCAINE HYDROCHLORIDE 80 MG: 20 INJECTION, SOLUTION EPIDURAL; INFILTRATION; INTRACAUDAL; PERINEURAL at 10:46

## 2023-01-11 RX ADMIN — PANTOPRAZOLE SODIUM 40 MG: 40 TABLET, DELAYED RELEASE ORAL at 10:05

## 2023-01-11 RX ADMIN — MEPIVACAINE HYDROCHLORIDE 45 MG: 15 INJECTION, SOLUTION EPIDURAL; INFILTRATION at 10:40

## 2023-01-11 RX ADMIN — Medication 2 G: at 10:49

## 2023-01-11 RX ADMIN — SODIUM CHLORIDE, SODIUM LACTATE, POTASSIUM CHLORIDE, AND CALCIUM CHLORIDE 125 ML/HR: 600; 310; 30; 20 INJECTION, SOLUTION INTRAVENOUS at 10:04

## 2023-01-11 RX ADMIN — ACETAMINOPHEN 1000 MG: 500 TABLET ORAL at 10:05

## 2023-01-11 RX ADMIN — SODIUM CHLORIDE, SODIUM LACTATE, POTASSIUM CHLORIDE, AND CALCIUM CHLORIDE: 600; 310; 30; 20 INJECTION, SOLUTION INTRAVENOUS at 10:30

## 2023-01-11 RX ADMIN — PROPOFOL 20 MG: 10 INJECTION, EMULSION INTRAVENOUS at 10:46

## 2023-01-11 RX ADMIN — SODIUM CHLORIDE, SODIUM LACTATE, POTASSIUM CHLORIDE, AND CALCIUM CHLORIDE 1000 ML: 600; 310; 30; 20 INJECTION, SOLUTION INTRAVENOUS at 10:05

## 2023-01-11 RX ADMIN — PROPOFOL 25 MCG/KG/MIN: 10 INJECTION, EMULSION INTRAVENOUS at 10:47

## 2023-01-11 RX ADMIN — DEXAMETHASONE SODIUM PHOSPHATE 8 MG: 4 INJECTION, SOLUTION INTRAMUSCULAR; INTRAVENOUS at 10:05

## 2023-01-11 RX ADMIN — DEXAMETHASONE SODIUM PHOSPHATE 4 MG: 4 INJECTION, SOLUTION INTRAMUSCULAR; INTRAVENOUS at 10:57

## 2023-01-11 RX ADMIN — Medication 5 MG: at 11:31

## 2023-01-11 NOTE — OP NOTES
RIGHT DIRECT ANTERIOR TOTAL HIP REPLACEMENT    Patient: Nelly Centeno MRN: [id number]  CSN: [id number]   YOB: 1950  Age: 81 y.o. Sex: female      Date of Surgery:1/11/2023    PREOPERATIVE DIAGNOSES:RIGHT HIP OSTEOARTHRITIS      POSTOPERATIVE DIAGNOSES:RIGHT HIP OSTEOARTHRITIS    PROCEDURE: Right press fit direct anterior total hip arthroplasty. IMPLANTS:   Implant Name Type Inv. Item Serial No.  Lot No. LRB No. Used Action   CUP ACET [id number] HIP GRIPTION CHLOE CEMENTLESS FIX SECT SER - [id number]  CUP ACET [id number] HIP GRIPTION CHLOE CEMENTLESS FIX SECT SER  [facility] [employer] [facility] [id number] Right 1 Implanted   LINER ACET OD52MM ID36MM HIP ALTRX PINN - [id number]  LINER ACET OD52MM ID36MM HIP ALTRX PINN  [facility] [employer] [facility] [facility] [id number] Right 1 Implanted   STEM FEM SZ 6 L107MM 12/14 TAPR STD OFFSET [facility]IP [facility]FIX [facility] [id number]  STEM FEM SZ 6 L107MM 12/14 TAPR STD OFFSET HIP DUOFIX CLLRD  [facility] [employer] [facility] [id number] Right 1 Implanted   HEAD FEM [id number] +1.5MM OFFSET 12/14 TAPR HIP CERAMIC - [id number]  HEAD FEM [id number] +1.5MM OFFSET 12/14 TAPR HIP CERAMIC  [facility] [employer] [facility] [id number] Right 1 Implanted       SURGEON: Karen Fonseca MD    FIRST ASSISTANT: Yobani Grady PA-C    SECOND ASSISTANT: Physician Assistant: Joelle Chu PA-C    ANESTHESIA: Spinal    SPECIMENS TO PATHOLOGY: * No specimens in log *    ESTIMATED BLOOD LOSS: 75cc    FINDINGS: Same    COMPLICATIONS: None    INDICATIONS:  A 81 y.o. WHITE/NON- female with severe coxarthrosis documented by clinical exam and x-ray. The patient has bone-on-bone arthritis by x-rays and has failed all conservative interventions including medications, weight loss, physical therapy, relative rest, ambulatory aids and injections.   The patient now presents for a right total hip arthroplasty due to constant pain with activities of daily living and diminished safety due to patients pain. OPERATION:  The patient was brought into the operating theater, and after adequate appropriate anesthesia the patient was placed on the Stanton table. The 1.95gm of oral tranexamic acid was already administered 2 hours ahead of surgery. The surgical hip was prepped and draped for typical computer navigated direct anterior hip surgery. The analgesic cocktail used for the case was 50cc of .25% Marcaine with epinephrine mixed with 30 mg( 1cc ) of Toradol and 30cc of NS ( total of 81 cc). This was injected in the skin as well as the various muscle muscle groups encountered during the procedure using all 81cc's. A 9 cm incision was then made, 3 cm lateral to the anterior superior iliac spine, and 1 cm distal. The incision was deepened down to the fascia of the tensor fascia la nena muscle, where the fascia was incised the length of the wound. A Cobra was placed just lateral to the anterior inferior iliac spine and just lateral to the capsule of the hip. The tensor fascia muscle was then retracted laterally with the Patty, and the rectus femoris was retracted medially with another Patty. The ascending branches of the lateral femoral circumflex vessels were then clamped and electrocauterized. Using a Carroll elevator, the soft tissue was elevated off the anterior part of the femoral capsule, and a Cobra retractor was placed medially under the capsule around the femur. An anterolateral capsulotomy was then performed, from the acetabulum to the intertrochanteric line. The lateral capsule was excised, and the anterior capsule was elevated off the medial neck. The  Quadrate tubercle at the upper end of the intertrochanteric line was identified. The leg was then slightly externally rotated with traction and cut 1 fingerbreadth above the lesser trochanter. The corkscrew was inserted into the femoral head and it was removed easily rotating the head 180 degrees. A Cobra retractor was placed behind the acetabulum. A skinny Hohmann retractor was placed on the anterior part of the acetabulum rim, and then the labrum and any osteophytes around the acetabulum were resected. The acetabulum was registered using the pointer tracker. The pulvinar in the fovea was resected, and then the acetabulum was reamed in 45 degrees of abduction and the patient's natural anteversion. The reamer was medialized to the base of the fovea and then widened to the the actual cup size to be implanted. There was good peripheral fit with good bleeding bone. The appropriately sized acetabular cup was selected to be implanted. The acetabulum was Simpulse lavage irrigated and then dried with a sponge stick. The cup was then seated fully with excellent purchase and good stability at approximately 40 degrees of abduction and 20 degrees of anteversion. The anterior lip of the cup was just inside the natural acetabulum. No hole eliminator was placed, and the appropriately sized acetabular liner was then Shin-tapered into position. No screws were used in the acetabular cup(6.5 cancellous screws if used). Attention was now turned to the femur. The femoral hook was placed behind the femur. Traction was taken off the leg, and the hip was maximally externally rotated, adducted and extended. The hip was then brought up into the wound as much as possible. A Connor retractor was placed on the medial neck, and a large Hohmann was placed around the greater trochanter. The capsule was then released from the inside of the greater trochanter, from anterior to posterior ( Obturator Internus & Piriformis were released as necessary). The patient had excellent mobility of the femur. The bone closest to the greater trochanter, at the femoral neck, was then removed with a rongeur. Using the ME-1000, A box osteotome was then used to open the canal, then the lateralizer, the starter broach and finally by the size zero broach.  This was then progressively broached up to the appropriate size  following the anteversion of the posterior neck of the femur. Once the broach was seated completely the calcar was planed to make the femoral neck cut smooth and even. There was excellent stability on torquing the femoral broach in the femoral canal. The patient was trialed with a appropriately sized femoral head with different neck lengths. The femur was reduced in the acetabulum and the hip was checked for stability clinically( bone hook around the neck and the force directed anteriorly in max ER-see below) and the XRay was used for leg lengths. The appropriately sized femoral head and appropriate neck offset gave excellent anterior stability. This length and offset were determined with the XRay. The hip could be rotated externally 90 degrees at the knee and placing a bone hook behind the femoral neck it could not be dislocated anteriorly. The leg length was equal as measured by XRay and equivalent offset. These components were selected for implantation. All trial components were removed. The femur was Simpulse lavaged, and the appropriately sized femoral stem was impacted down the femur, with excellent purchase and rotational stability. The appropriately sized femoral head was Shin tapered on top of the femoral component, reduced into the socket, and the patient had the exact same stability characteristics and leg lengths as noted previously. The wound was irrigated out. The fascia of the tensor muscle was closed with a running locking #1 Vicryl. The skin was closed with inverted 2-0 Vicryls and then dermabonded ( Dermabond Nacho ) in 2 layers. The wound was dressed with a Mepilex dressing. The 2 small stab incisions used for the [employer] system were Dermabonded closed. The patient returned to the recovery room awake and in stable condition. All instrument, sponge, and needle counts were correct.    During multiple steps in the procedure the simpulse lavage was used with a 500cc bag of normal saline with 30cc of 5% sterile opthalmologic povidone solution ( .30% ). Before component implantation the bone was irrigated  with normal saline on a bulb syringe. At the end of the case another 500cc normal saline bag (with 50,000 units of bacitracin and 500,000 units of polymixin )was attached to the simpulse lavage and the entire wound reirrgated before closure. A physician assistant was used during the surgery which contributes to better patient outcomes by decreasing operating room time and decreasing the amount of anesthesia the patient had to undergo. The physician assistant helped with positioning and draping of the patient for implantation of implants, retraction of soft tissues so as not to traumatize the tissues. During several parts of the procedure the PA used the Simpulse lavage to irrigate/suction the sterile field which contributed to a  surgical field and decrease in infection rates. The physician assistant used the cauterization under my guidance to decrease blood loss which limits the need for blood transfusion in the post operative period. The physician assistant instilled local anesthetic which decreased the need for post operative narcotics. During closure the physician assistant was able to close the fascia layer independently using a running #1 Vicryl stitch ensuring a water tight fascia closure and decreasing the risk of deep fahad-prosthetic infection. The superficial tissues were closed using inverted 2-0 Vicryl sutures by the physician assistant as well. The skin was closed using an Exofin skin closure system so that there was no discharge from the incision. This helps contribute to a lower risk of infection. During the procedure the physician assistant was given the task of irrigating the wound allowing myself to prepare the prosthesis for implantation.        Areli Judge MD  1/11/2023

## 2023-01-11 NOTE — PERIOP NOTES
TRANSFER - IN REPORT:    Verbal report received from [employer] RN(name) on Darius  being received from "[employer] for routine post - op      Report consisted of patients Situation, Background, Assessment and   Recommendations(SBAR). Information from the following report(s) SBAR, Procedure Summary, and MAR was reviewed with the receiving nurse. Opportunity for questions and clarification was provided. Assessment completed upon patients arrival to unit and care assumed.

## 2023-01-11 NOTE — INTERVAL H&P NOTE
Update History & Physical    The Patient's History and Physical of January 6,   The surgery was reviewed with the patient and I examined the patient. There was no change. The surgical site was confirmed by the patient and me. Plan:  The risk, benefits, expected outcome, and alternative to the recommended procedure have been discussed with the patient. Patient understands and wants to proceed with the procedure.     Electronically signed by Barbara Esparza MD on 1/11/2023 at 11:39 AM

## 2023-01-11 NOTE — PERIOP NOTES
TRANSFER - OUT REPORT:    Verbal report given to PATRICIO Nuñez(name) on Darius  being transferred to Phase 2(unit) for routine post - op       Report consisted of patients Situation, Background, Assessment and   Recommendations(SBAR). Information from the following report(s) SBAR, Kardex, OR Summary, Procedure Summary, Intake/Output, and MAR was reviewed with the receiving nurse. Lines:   Peripheral IV 01/11/23 Posterior;Right Forearm (Active)   Site Assessment Clean, dry, & intact 01/11/23 1215   Phlebitis Assessment 0 01/11/23 1215   Infiltration Assessment 0 01/11/23 1215   Dressing Status Clean, dry, & intact 01/11/23 1215   Dressing Type Transparent;Tape 01/11/23 1215   Hub Color/Line Status Patent; Infusing;Green 01/11/23 1215        Opportunity for questions and clarification was provided.       Patient transported with:   Registered Nurse

## 2023-01-11 NOTE — ANESTHESIA POSTPROCEDURE EVALUATION
Post-Anesthesia Evaluation and Assessment    Cardiovascular Function/Vital Signs  Visit Vitals  BP (!) 117/46   Pulse (!) 59   Temp 36.3 °C (97.4 °F)   Resp 11   Ht 5' 3\" (1.6 m)   Wt 79.3 kg (174 lb 12.8 oz)   SpO2 100%   BMI 30.96 kg/m²       Patient is status post Procedure(s):  RIGHT TOTAL HIP ARTHROPLASTY ANTERIOR APPROACH WITH C-ARM. Nausea/Vomiting: Controlled. Postoperative hydration reviewed and adequate. Pain:  Pain Scale 1: Numeric (0 - 10) (01/11/23 1210)  Pain Intensity 1: 0 (01/11/23 1210)   Managed. Neurological Status:   Neuro (WDL): Within Defined Limits (01/11/23 1157)   At baseline. Mental Status and Level of Consciousness: Baseline and appropriate for discharge. Pulmonary Status:   O2 Device: Nasal cannula (01/11/23 1201)   Adequate oxygenation and airway patent. Complications related to anesthesia: None    Post-anesthesia assessment completed. No concerns. Patient has met all discharge requirements.     Signed By: Magui Abreu MD    January 11, 2023

## 2023-01-11 NOTE — ANESTHESIA PROCEDURE NOTES
Spinal Block    Start time: 1/11/2023 10:37 AM  End time: 1/11/2023 10:40 AM  Performed by: Jaspreet Ma CRNA  Authorized by: Ammy Michele MD     Pre-procedure: Indications: primary anesthetic  Preanesthetic Checklist: patient identified, risks and benefits discussed, anesthesia consent, site marked, patient being monitored, timeout performed and fire risk safety assessment completed and verbalized    Timeout Time: 10:36 EST      Spinal Block:   Patient Position:  Seated  Prep Region:  Lumbar  Prep: Betadine      Location:  L2-3  Technique:  Single shot  Local: mepivacaine-pf (CARBOCAINE-PF) 1.5% PF injection - Other   45 mg - 1/11/2023 10:40:00 AM    Med Admin Time: 1/11/2023 10:40 AM    Needle:   Needle Type:   Eileen  Needle Gauge:  25 G  Attempts:  1      Events: CSF confirmed, no blood with aspiration and paresthesia    Events comment:  L sided paresthesia- resolved immediately    Assessment:  Insertion:  Uncomplicated  Patient tolerance:  Patient tolerated the procedure well with no immediate complications

## 2023-01-11 NOTE — PROGRESS NOTES
Problem: Mobility Impaired (Adult and Pediatric)  Goal: *Acute Goals and Plan of Care (Insert Text)  Description: PT goals to be met in 1 day:  Pt will be able to perform supine<>sit SBA for transfers at home. Pt will be able to perform sit<>stand SBA for increased ability to transfer at home safely. Pt will be able to participate in gt training >100' w/ RW, WBAT, GB and CGA/SBA for improved ability in home upon d/c. Pt will be able to perform stair training step to pattern, B/U rail and CGA to obtain safe entry into home upon d/c. Pt will be educated regarding HEP per MD protocol for optimal AROM/strength outcomes. Note: [x]  Patient has met MD mobilization critieria for d/c home   [x]  Recommend HH with 24 hour adult care   []  Benefit from additional acute PT session to address:      PHYSICAL THERAPY EVALUATION    Patient: Blank Edouard (71 y.o. female)  Date: 1/11/2023  Primary Diagnosis: Osteoarthritis of right hip [M16.11]  Procedure(s) (LRB):  RIGHT TOTAL HIP ARTHROPLASTY ANTERIOR APPROACH WITH C-ARM (Right) Day of Surgery   Precautions:   Fall, WBAT  PLOF: Independent    ASSESSMENT :  Based on the objective data described below, decreased mobility in regards to bed mobility, transfers, gt quality and tolerance, balance, stair negotiation and safety due to R IRVIN surgery. Decreased AROM of R hip, dec strength of R hip, pain in R hip, dec sensation of R hip also impacting pt functional mobility. Pt rating pain on numerical pain scale pre/post and during session 1/10. Pt and caregiver ed regarding mobility safety, WB, HEP, ice application/use, elevation, environmental safety and home safe techniques. Pt supine on stretcher upon arrival.  Pt able to perform supine>sit SBA and sit<>stand w/ CGA/SBA. Safety vc required throughout session to reinforce safety. Pt able to participate in gt training using RW, GB, WBAT and CGA w/ antalgic gt pattern. Pt able to void on commode and perform own hygiene. Pt was able to participate in stair training using step to pattern, B UE support and CGA. Answered questions by pt and caregiver in regards to PT and mobility. Pt left sitting in recliner w/ all needs within reach and ice pack to R hip. Nurse aware of session and outcomes. Recommend HHPT with responsible adult care at least 24 hours upon hospital d/c. Patient will benefit from skilled intervention to address the above impairments. Patient's rehabilitation potential is considered to be Good  Factors which may influence rehabilitation potential include:   []         None noted  []         Mental ability/status  []         Medical condition  []         Home/family situation and support systems  []         Safety awareness  [x]         Pain tolerance/management  []         Other:      PLAN :  Recommendations and Planned Interventions:   [x]           Bed Mobility Training             []    Neuromuscular Re-Education  [x]           Transfer Training                   []    Orthotic/Prosthetic Training  [x]           Gait Training                          [x]    Modalities  [x]           Therapeutic Exercises           [x]    Edema Management/Control  [x]           Therapeutic Activities            [x]    Family Training/Education  [x]           Patient Education  []           Other (comment):    Frequency/Duration: Patient will be followed by physical therapy 1-2 times per day/4-7 days per week to address goals. Discharge Recommendations: Home Health  Further Equipment Recommendations for Discharge: N/A    AMPAC: 18/24    This AMPAC score should be considered in conjunction with interdisciplinary team recommendations to determine the most appropriate discharge setting. Patient's social support, diagnosis, medical stability, and prior level of function should also be taken into consideration. SUBJECTIVE:   Patient stated I feel the best ever after surgery this time.     OBJECTIVE DATA SUMMARY:     Past Medical History:   Diagnosis Date    Acoustic neuroma [facility]) 2009    stable-right ear    Anemia     Arthritis     OA    CAD (coronary artery disease) 03/2021    cardiac cath    Cat scratch fever 1998    in lymph nodes    Chronic bronchitis (HCC)     Chronic pain     right hip and right knee    Hiatal hernia     History of colon resection 1995    Ill-defined condition     hiatal hernia    Liver disease 2021    fatty liver    Nausea & vomiting     N&V post procedures    Psychiatric disorder     bipolar & depression    Sleep apnea     uses c-pap, bring on [address]    pt was a paramedic, treated    Vertigo 2009     Past Surgical History:   Procedure Laterality Date    HX CARPAL TUNNEL RELEASE Bilateral     HX CHOLECYSTECTOMY      HX COLONOSCOPY      HX HEART CATHETERIZATION  03/2021    HX HEENT      eye lid lift    HX HIP REPLACEMENT Left 2020    HX HYSTERECTOMY  1992    HX KNEE REPLACEMENT Left 12/29/2021    HX ORTHOPAEDIC  1996    c5-6 fusion     HX ORTHOPAEDIC  01/2018    lumbar laminectomy     HX ORTHOPAEDIC Left     trigger finger surgery- all but the thumb    HX ORTHOPAEDIC      tib-fib hemant and removed    HX SHOULDER REPLACEMENT Bilateral 2020    HX TOTAL COLECTOMY  1995    bowel resection    ID LAPS GSTRC RSTRICTIV PX LONGITUDINAL GASTRECTOMY  03/30/2021    Dr Hussein Pisano     Barriers to Learning/Limitations: yes;  physical and other anesthesia  Compensate with: Visual Cues, Verbal Cues, Tactile Cues, and Kinesthetic Cues  Home Situation:  Home Situation  Home Environment: Private residence  # Steps to Enter: 3  Rails to Enter: Yes  Hand Rails : Left  One/Two Story Residence: Two story  # of Interior Steps: 16  Interior Rails: Right (transitions to L)  Lift Chair Available: No  Living Alone: Yes  Support Systems: Friend/Neighbor  Patient Expects to be Discharged to[de-identified] Home with home health  Current DME Used/Available at Home: Cane, straight, Shower chair, Raised toilet seat, Walker, rolling  Tub or Shower Type: Tub/Shower combination  Critical Behavior:  Neurologic State: Alert  Orientation Level: Oriented to person;Oriented to place;Oriented to situation  Cognition: Follows commands  Safety/Judgement: Awareness of environment  Psychosocial  Patient Behaviors: Calm; Cooperative  Visitor Behaviors: Cooperative;Supportive  Skin Condition/Temp: Dry;Warm  Skin Integrity: Incision (comment) (right hip)  Skin Integumentary  Skin Color: Appropriate for ethnicity  Skin Condition/Temp: Dry;Warm  Skin Integrity: Incision (comment) (right hip)  Strength:    Strength: Generally decreased, functional  Tone & Sensation:   Tone: Normal  Sensation: Impaired (R hip)   Range Of Motion:  AROM: Generally decreased, functional  PROM: Generally decreased, functional   Posture:  Functional Mobility:  Bed Mobility:  Supine to Sit: Stand-by assistance (vc)  Scooting: Stand-by assistance (vc)  Transfers:  Sit to Stand: Contact guard assistance (vc)  Stand to Sit: Contact guard assistance;Stand-by assistance (vc)  Balance:   Sitting: Intact  Standing: Intact; With support  Wheelchair Mobility:  Ambulation/Gait Training:  Distance (ft): 150 Feet (ft)  Assistive Device: Walker, rolling;Gait belt  Ambulation - Level of Assistance: Contact guard assistance (vc)  Gait Abnormalities: Antalgic;Decreased step clearance; Step to gait  Right Side Weight Bearing: As tolerated  Base of Support: Shift to left  Stance: Right decreased  Speed/Donna: Slow  Step Length: Left shortened;Right shortened  Swing Pattern: Left asymmetrical;Right asymmetrical  Interventions: Safety awareness training; Tactile cues; Verbal cues; Visual/Demos  Stairs:  Number of Stairs Trained: 5  Stairs - Level of Assistance: Contact guard assistance (vc)  Rail Use: Both     Therapeutic Exercises:   Encouraged HEP  Pain:  Pain level pre-treatment: 1/10   Pain level post-treatment: 1/10   Pain Intervention(s) : Medication (see MAR);  Rest, Ice, Repositioning  Response to intervention: Nurse notified, See doc flow    Activity Tolerance:   Fair  Please refer to the flowsheet for vital signs taken during this treatment. After treatment:   [x]         Patient left in no apparent distress sitting up in chair  []         Patient left in no apparent distress in bed  [x]         Call bell left within reach  [x]         Nursing notified  [x]         Caregiver present  []         Bed alarm activated  []         SCDs applied    COMMUNICATION/EDUCATION:   [x]         Role of Physical Therapy in the acute care setting. [x]         Fall prevention education was provided and the patient/caregiver indicated understanding. [x]         Patient/family have participated as able in goal setting and plan of care. [x]         Patient/family agree to work toward stated goals and plan of care. []         Patient understands intent and goals of therapy, but is neutral about his/her participation. []         Patient is unable to participate in goal setting/plan of care: ongoing with therapy staff.  []         Other:     Thank you for this referral.  Faustino Cardoza, PT   Time Calculation: 27 mins      Eval Complexity: History: HIGH Complexity :3+ comorbidities / personal factors will impact the outcome/ POC Exam:MEDIUM Complexity : 3 Standardized tests and measures addressing body structure, function, activity limitation and / or participation in recreation  Presentation: LOW Complexity : Stable, uncomplicated  Clinical Decision Making:Low Complexity  Overall Complexity:LOW     Juanis Pitts AM-PAC® Basic Mobility Inpatient Short Form (6-Clicks) Version 2    How much HELP from another person does the patient currently need    (If the patient hasn't done an activity recently, how much help from another person do you think he/she would need if he/she tried?)   Total (Total A or Dep)   A Lot  (Mod to Max A)   A Little (Sup or Min A)   None (Mod I to I)   Turning from your back to your side while in a flat bed without using bedrails? [] 1 [] 2 [x] 3 [] 4   2. Moving from lying on your back to sitting on the side of a flat bed without using bedrails? [] 1 [] 2 [x] 3 [] 4   3. Moving to and from a bed to a chair (including a wheelchair)? [] 1 [] 2 [x] 3 [] 4   4. Standing up from a chair using your arms (e.g., wheelchair, or bedside chair)? [] 1 [] 2 [x] 3 [] 4   5. Walking in hospital room? [] 1 [] 2 [x] 3 [] 4   6. Climbing 3-5 steps with a railing?+   [] 1 [] 2 [x] 3 [] 4   +If stair climbing cannot be assessed, skip item #6. Sum responses from items 1-5. Based on an AM-PAC score of 18/24 and their current functional mobility deficits, it is recommended that the patient have 3-5 sessions per week of Physical Therapy at d/c to increase the patient's independence.

## 2023-01-11 NOTE — DISCHARGE INSTRUCTIONS
Keesha Warren III, MD Dorn Buttery, PA-C    Lower Extremity Surgery  Discharge Instructions      Please take the time to review the following instructions before you leave the hospital and use them as guidelines during your recovery from surgery. If you have any questions you may contact my office at [phone]. In the event of an emergency please call 911 or have someone take you to the nearest emergency room. Wound Care/Dressing Changes:    [x]   You may change your dressing as needed. Beginning the 2 days after you are discharged from the hospital you can change your dressing daily. A big, bulky dressing isn't necessary as long as there isn't any drainage from the incisions. There will be a piece of mesh tape over your incision that resembles gauze. This tape should stay on and you should not attempt to remove it. Showering/Bathing:    [x]     You may shower 2 days after surgery. Your dressing may be removed for showering. Do not soak your incision underwater. Weight Bearing Status/Braces/Activity:    You are weight bearing as tolerated on the operative extremity. Ice/Elevation:    Continue ice and elevation consistently for 48 hours after surgery. Medication:    You will be given a prescription for pain medications. This should have been given at your preop appointment. Refills of pain medication are authorized during office hours only (8AM - 5PM Mon thru Fri). You can take 1000 mg of Tylenol every 8 hours. Do not exceed 3000 mg of Tylenol per day. If you have been given Norco for post operative pain, do not take the Tylenol. You should use Aspirin 81 mg twice daily for 21 days from the date of your surgery. This will help to prevent blood clots from forming in your legs. This should be started at dinner the day of your surgery. If you are taking another medication such as Xarelto this should also be started the day after the surgery.   You may resume the medications you were taking prior to your surgery, unless otherwise specified in your discharge instructions. You are to take an oral antibiotic when you get home from the surgery until the prescription is done. This should have been provided at the pre-operative appointment. This antibiotic is generally Keflex or Clindamycin. Follow Up Appointment:    If you are unsure of your follow-up appointment date and time, please call [phone]. Please let our  know you are scheduling a post-op appointment. Most appointments should be between 7-14 days after your surgery. Physical Therapy:    [x]   You are to participate with Home Health Physical Therapy as arranged pre-operatively in the convenience of your own home. Important signs and symptoms:    If any of the following signs and symptoms occurs, you should contact Dr. Andrew Concepcion' office. Please be advised if a problem arises which you feel required immediate medical attention or your are unable to contact Dr. Andrew Concepcion' office you should seek immediate medical attention. Signs and symptoms to watch for include:  A sudden increase in swelling and/or redness or warmth at the area your surgery was performed which isn't relieved by rest, ice and elevation. Oral temperature greater than 101.5 degrees for 12 hours or more which isn't relieved by an increase in fluid intake and taking two Tylenol every 4-6 hours. Do not exceed 3000 mg of Tylenol per day. Excessive drainage from your incisions or drainage that hasn't stopped by 72 hours. Calf pain, tenderness, redness or swelling which isn't relieved with rest and elevation. Fever, chills, shortness of breath, chest pain, nausea, vomiting or other signs and symptoms which are of concern to you.          DISCHARGE SUMMARY from Nurse    PATIENT INSTRUCTIONS:    After general anesthesia or intravenous sedation, for 24 hours or while taking prescription Narcotics:  Limit your activities  Do not drive and operate hazardous machinery  Do not make important personal or business decisions  Do  not drink alcoholic beverages  If you have not urinated within 8 hours after discharge, please contact your surgeon on call. Report the following to your surgeon:  Excessive pain, swelling, redness or odor of or around the surgical area  Temperature over 100.5  Nausea and vomiting lasting longer than 4 hours or if unable to take medications  Any signs of decreased circulation or nerve impairment to extremity: change in color, persistent  numbness, tingling, coldness or increase pain  Any questions    What to do at Home:  Recommended activity: Ambulate in house and No lifting, Driving, or Strenuous exercise until advised    If you experience any of the following symptoms above, please follow up with Dr. Melchor Estrada    *  Please give a list of your current medications to your Primary Care Provider. *  Please update this list whenever your medications are discontinued, doses are      changed, or new medications (including over-the-counter products) are added. *  Please carry medication information at all times in case of emergency situations. These are general instructions for a healthy lifestyle:    No smoking/ No tobacco products/ Avoid exposure to second hand smoke  Surgeon General's Warning:  Quitting smoking now greatly reduces serious risk to your health. Obesity, smoking, and sedentary lifestyle greatly increases your risk for illness    A healthy diet, regular physical exercise & weight monitoring are important for maintaining a healthy lifestyle    You may be retaining fluid if you have a history of heart failure or if you experience any of the following symptoms:  Weight gain of 3 pounds or more overnight or 5 pounds in a week, increased swelling in our hands or feet or shortness of breath while lying flat in bed.   Please call your doctor as soon as you notice any of these symptoms; do not wait until your next office visit. Patient armband removed and shredded     The discharge information has been reviewed with the patient and caregiver. The patient and caregiver verbalized understanding. Discharge medications reviewed with the patient and caregiver and appropriate educational materials and side effects teaching were provided.   ___________________________________________________________________________________________________________________________________

## 2023-01-11 NOTE — PERIOP NOTES
TRANSFER - IN REPORT:    Verbal report received from CRNA and OR nurse(name) on Darius  being received from OR(unit) for routine post - op      Report consisted of patients Situation, Background, Assessment and   Recommendations(SBAR). Information from the following report(s) SBAR, Kardex, OR Summary, Procedure Summary, Intake/Output, and MAR was reviewed with the receiving nurse. Opportunity for questions and clarification was provided. Assessment completed upon patients arrival to unit and care assumed.

## 2023-01-11 NOTE — PERIOP NOTES
Reviewed PTA medication list with patient/caregiver and patient/caregiver denies any additional medications. Patient admits to having a responsible adult care for them at home for at least 24 hours after surgery. Patient encouraged to use gown warming system and informed that using said warming gown to regulate body temperature prior to a procedure has been shown to help reduce the risks of blood clots and infection. Patient's pharmacy of choice verified and documented in PTA medication section. Dual skin assessment & fall risk band verification completed with Betsy CURRY.

## 2023-01-11 NOTE — INTERVAL H&P NOTE
Update History & Physical    The Patient's History and Physical of January 6,   The surgery was reviewed with the patient and I examined the patient. There was no change. The surgical site was confirmed by the patient and me. Plan:  The risk, benefits, expected outcome, and alternative to the recommended procedure have been discussed with the patient. Patient understands and wants to proceed with the procedure.     Electronically signed by Tawana Piedra MD on 1/11/2023 at 9:55 AM

## 2023-01-11 NOTE — PERIOP NOTES
TRANSFER - IN REPORT:    Verbal report received from ARMINDA Lopez 19 RN(name) on Blank Edouard  being received from OR(unit) for routine progression of care      Report consisted of patients Situation, Background, Assessment and   Recommendations(SBAR). Information from the following report(s) OR Summary, Procedure Summary, Intake/Output, and MAR was reviewed with the receiving nurse. Opportunity for questions and clarification was provided. Assessment completed upon patients arrival to unit and care assumed.

## 2023-01-11 NOTE — ANESTHESIA PREPROCEDURE EVALUATION
Relevant Problems   NEUROLOGY   (+) Depression       Anesthetic History     PONV          Review of Systems / Medical History  Patient summary reviewed, nursing notes reviewed and pertinent labs reviewed    Pulmonary        Sleep apnea: No treatment      Pertinent negatives: No COPD and asthma     Neuro/Psych         Psychiatric history  Pertinent negatives: No seizures and CVA   Cardiovascular              CAD  Pertinent negatives: No hypertension, past MI and CHF       GI/Hepatic/Renal           Liver disease (Fatty liver)  Pertinent negatives: No renal disease   Endo/Other        Arthritis  Pertinent negatives: No diabetes   Other Findings              Physical Exam    Airway  Mallampati: II  TM Distance: 4 - 6 cm  Neck ROM: normal range of motion   Mouth opening: Normal     Cardiovascular               Dental    Dentition: Edentulous     Pulmonary                 Abdominal  GI exam deferred       Other Findings            Anesthetic Plan    ASA: 3  Anesthesia type: spinal            Anesthetic plan and risks discussed with: Patient

## 2023-02-02 ENCOUNTER — OFFICE VISIT (OUTPATIENT)
Dept: SURGERY | Age: 73
End: 2023-02-02
Payer: MEDICARE

## 2023-02-02 VITALS
BODY MASS INDEX: 30.55 KG/M2 | OXYGEN SATURATION: 100 % | HEIGHT: 63 IN | DIASTOLIC BLOOD PRESSURE: 66 MMHG | WEIGHT: 172.4 LBS | TEMPERATURE: 97.1 F | HEART RATE: 80 BPM | SYSTOLIC BLOOD PRESSURE: 137 MMHG

## 2023-02-02 DIAGNOSIS — K90.9 INTESTINAL MALABSORPTION, UNSPECIFIED TYPE: Primary | ICD-10-CM

## 2023-02-02 DIAGNOSIS — Z98.84 S/P LAPAROSCOPIC SLEEVE GASTRECTOMY: ICD-10-CM

## 2023-02-02 NOTE — PATIENT INSTRUCTIONS
Genepro unflavored protein powder                                                     Dr Milton Lynn, [facility]             Patient Instructions      Remember hydration goals - minimum of 64 ounces of liquids per day (dehydration is the number one reason for hospital readmission). Continue to monitor carbohydrate and protein intake you need a minimum of  Grams of protein daily- remember to keep your total carbohydrates to 50 grams or less per day for best results. Continue to work towards exercise goals - 60-90 minutes, 5 times a week minimum of deliberate, aerobic exercise is the ultimate goal with strength training 2 times each week. Refer to [employer] for  information. Remember to take vitamins as directed. Attend support group the 2nd Thursday of each month. 6.  Constipation: Milk of Magnesia is for immediate relief only. Miralax is to be used every day if constipation is a chronic problem. 7.  Diarrhea: patients will occasionally develop lactose intolerance after surgery. Check to see if your protein shake has whey in it. If it does try a protein powder or drink that does not have whey and stop all yogurts, cheeses and milks to see if the diarrhea goes away. 8.  If you have had labs drawn. We will only call you if you have abnormal results. Otherwise you can access the lab results in \"mychart\". You will only need the access code the first time you sign on. 9.  Call us at [phone] or email us through [email or web address]" with questions,     concerns or worsening of condition, we have someone on call 24 hours a day. If you are unable to reach our office, you are to go to your Primary Care Physician or the Emergency Department.      NOTE TO GASTRIC BYPASS PATIENTS:  (SAME APPLIES TO GASTRIC SLEEVE PATIENTS FOR FIRST TWO MONTHS)  Remember that for the rest of your life, you are not able to take the following:  - NSAIDs (ibuprofen, goody powder, BC powder, Motrin, Advil, Mobic, Voltaren, Excedrin, etc.)  - Steroid pills or injections  - Smoke (cigarettes or recreational drugs)  - Alcohol  Use of any of the above may cause ulcers in your stomach which may perforate causing a medical emergency and surgery. Speak to our medical staff if another medical provider requires you to take steroids or NSAIDs. Supplement Resource Guide    Importance of Protein:   Maintains lean body mass, produces antibodies to fight off infections, heals wounds, minimizes hair loss, helps to give you energy, helps with satiety, and keeping you full between meals. Importance of Calcium:  Needed for healthy bones and teeth, normal blood clotting, and nervous system functioning, higher risk of osteoporosis and bone disease with non-compliance. Importance of Multivitamins: Many functions. Supply you with extra nutrients that you may be missing from food. May lead to iron deficiency anemia, weakness, fatigue, and many other symptoms with non-compliance. Importance of B Vitamins:  Important for red blood cell formation, metabolism, energy, and helps to maintain a healthy nervous system. Protein Supplement  Find one you like now. Use immediately after surgery. Look for:  35-50g protein each day from your protein supplement once you reach the progression diet. 0-3 g fat per serving  0-3 g sugar per serving    Protein drinks should be split in separate dosages. Recommend: Lifelong  1 year + Calcium Supplement:     Start taking within a month after surgery. Look for: Calcium Citrate Plus D (1500 mg per day)  Recommend: Citracal     .            Avoid chocolate chewable calcium. Can use chewable bariatric or [employer] brand or similar chewable. The body cannot absorb more than 500-600 mg of calcium at a time. Take for Life Multi-vitamin Supplement:      Start immediately after surgery: any complete chewable, such as: Doras Complete chewables.     Avoid Dora sours or gummies. They lack iron and other important nutrients and also have added sugar. Continue with chewable vitamin or change to adult complete multivitamin one month after surgery. Menstruating women can take a prenatal vitamin. Make sure has at least 18 mg iron and 257-750 mcg folic acid   Vitamin G36, B Complex Vitamin, and Biotin  Start taking within a month after surgery. Vitamin B12:  1000 mcg of Vitamin B12 three times weekly    Must take sublingually (meaning you take it under your tongue) or in a liquid drop form for easy absorption. B Complex Vitamin: Take a pill or liquid drop form once daily. Biotin: This vitamin can help prevent hair loss. Recommend 5mg   (5000 mcg) a day  Biotin is Optional           Learning About Being Physically Active  What is physical activity? Being physically active means doing any kind of activity that gets your body moving. The types of physical activity that can help you get fit and stay healthy include:  Aerobic or \"cardio\" activities. These make your heart beat faster and make you breathe harder, such as brisk walking, riding a bike, or running. They strengthen your heart and lungs and build up your endurance. Strength training activities. These make your muscles work against, or \"resist,\" something. Examples include lifting weights or doing push-ups. These activities help tone and strengthen your muscles and bones. Stretches. These let you move your joints and muscles through their full range of motion. Stretching helps you be more flexible. Reaching a balance between these three types of physical activity is important because each one contributes to your overall fitness. What are the benefits of being active? Being active is one of the best things you can do for your health. It helps you to:  Feel stronger and have more energy to do all the things you like to do. Focus better at school or work. Feel, think, and sleep better.   Reach and stay at a healthy weight. Lose fat and build lean muscle. Lower your risk for serious health problems, including diabetes, heart attack, high blood pressure, and some cancers. Keep your heart, lungs, bones, muscles, and joints strong and healthy. How can you make being active part of your life? Start slowly. Make it your long-term goal to get at least 30 minutes of exercise on most days of the week. Walking is a good choice. You also may want to do other activities, such as running, swimming, cycling, or playing tennis or team sports. Pick activities that you like--ones that make your heart beat faster, your muscles stronger, and your muscles and joints more flexible. If you find more than one thing you like doing, do them all. You don't have to do the same thing every day. Get your heart pumping every day. Any activity that makes your heart beat faster and keeps it at that rate for a while counts. Here are some great ways to get your heart beating faster:  Go for a brisk walk, run, or bike ride. Go for a hike or swim. Go in-line skating. Play a game of touch football, basketball, or soccer. Ride a bike. Play tennis or racquetball. Climb stairs. Even some household chores can be aerobic--just do them at a faster pace. Vacuuming, raking or mowing the lawn, sweeping the garage, and washing and waxing the car all can help get your heart rate up. Strengthen your muscles during the week. You don't have to lift heavy weights or grow big, bulky muscles to get stronger. Doing a few simple activities that make your muscles work against, or \"resist,\" something can help you get stronger. For example, you can:  Do push-ups or sit-ups, which use your own body weight as resistance. Lift weights or dumbbells or use stretch bands at home or in a gym or community center. Stretch your muscles often. Stretching will help you as you become more active.  It can help you stay flexible, loosen tight muscles, and avoid injury. It can also help improve your balance and posture and can be a great way to relax. Be sure to stretch the muscles you'll be using when you work out. It's best to warm your muscles slightly before you stretch them. Walk or do some other light aerobic activity for a few minutes, and then start stretching. When you stretch your muscles:  Do it slowly. Stretching is not about going fast or making sudden movements. Don't push or bounce during a stretch. Hold each stretch for at least 15 to 30 seconds, if you can. You should feel a stretch in the muscle, but not pain. Breathe out as you do the stretch. Then breathe in as you hold the stretch. Don't hold your breath. If you're worried about how more activity might affect your health, have a checkup before you start. Follow any special advice your doctor gives you for getting a smart start. Where can you learn more? Go to http://www.gray.com/  Enter [id number] in the search box to learn more about \"Learning About Being Physically Active. \"  Current as of: January 26, 2022               Content Version: 13.4  © 5730-3211 [employer]. Care instructions adapted under license by [employer] (which disclaims liability or warranty for this information). If you have questions about a medical condition or this instruction, always ask your healthcare professional. [address] any warranty or liability for your use of this information.

## 2023-02-02 NOTE — PROGRESS NOTES
Subjective:     Gaby Murphy  is a 81 y.o. female who presents for follow-up about 22 months following laparoscopic sleeve gastrectomy. She has lost a total of 62 pounds since surgery. Body mass index is 30.54 kg/m². . EBWL is (58%). The patient presents today to assess their progress toward their goal of weight loss and to address any issues that may be present. Today the patient and I have reviewed their diet and how appropriate their food choices are. The following issues have been identified - none from a surgical standpoint. Feeling well overall, frustrated with weight gain. Has gained 14 lbs since last visit August 2022    Surgery related complication: NA       She reports no real issues and denies vomiting, abdominal pain, difficulty swallowing, nausea and reflux. The patients diet choices have been reviewed today and counseling was given. Fluid intake: good      Protein intake: no supplements; tolerating all proteins      Meals/day: 3  Less snacking on popcorn    Patients pain score:0/10    The patient's exercise level: resuming exercise. Changes in her medical history and medications have been reviewed. Dr Hi Escamilla did right total hip 1/11/23, no walking with cane, started OP PT    Comorbidities:    Hypertension: improved , off Rx  Diabetes: not applicable  Obstructive Sleep Apnea: improved   Hyperlipidemia: not applicable  Stress Urinary Incontinence: not applicable  Gastroesophageal Reflux: not applicable  Weight related arthropathy:improved     Patient Active Problem List   Diagnosis Code    Lumbar back pain M54.50    Depression F32. A    Chronic pain G89.29    Anemia D64.9    Smoking history Z87.891    Acoustic neuroma (HCC) D33.3    History of colon resection Z90.49    Intestinal malabsorption K90.9    S/P laparoscopic sleeve gastrectomy Z98.84    Low vitamin D level R79.89    Primary osteoarthritis of left knee M17.12    Left knee DJD M17.12    Hypovitaminosis D E55.9    Bipolar disorder ([facility] [place] [facility] F31.9    Primary osteoarthritis of right hip M16.11    Osteoarthritis of right hip M16.11     Past Medical History:   Diagnosis Date    Acoustic neuroma ([facility] [place] [facility] 2009    stable-right ear    Anemia     Arthritis     OA    CAD (coronary artery disease) 03/2021    cardiac cath    Cat scratch fever 1998    in lymph nodes    Chronic bronchitis (HCC)     Chronic pain     right hip and right knee    Hiatal hernia     History of colon resection 1995    Ill-defined condition     hiatal hernia    Liver disease 2021    fatty liver    Nausea & vomiting     N&V post procedures    Psychiatric disorder     bipolar & depression    Sleep apnea     uses c-pap, bring on [address]    pt was a paramedic, treated    Vertigo 2009     Past Surgical History:   Procedure Laterality Date    HX CARPAL TUNNEL RELEASE Bilateral     HX CHOLECYSTECTOMY      HX COLONOSCOPY      HX HEART CATHETERIZATION  03/2021    HX HEENT      eye lid lift    HX HIP REPLACEMENT Left 2020    HX HYSTERECTOMY  1992    HX KNEE REPLACEMENT Left 12/29/2021    HX ORTHOPAEDIC  1996    c5-6 fusion     HX ORTHOPAEDIC  01/2018    lumbar laminectomy     HX ORTHOPAEDIC Left     trigger finger surgery- all but the thumb    HX ORTHOPAEDIC      tib-fib hemant and removed    HX SHOULDER REPLACEMENT Bilateral 2020    HX TOTAL COLECTOMY  1995    bowel resection    CT LAPS GSTRC RSTRICTIV PX LONGITUDINAL GASTRECTOMY  03/30/2021    Dr Santhosh Gutierrez     Current Outpatient Medications   Medication Sig Dispense Refill    amLODIPine (NORVASC) 5 mg tablet Take 5 mg by mouth daily. nystatin (MYCOSTATIN) topical cream Apply  to affected area two (2) times a day. 30 g 2    aspirin 81 mg chewable tablet Take 1 Tablet by mouth two (2) times a day. 42 Tablet 0    GABAPENTIN PO Take 600 mg by mouth two (2) times a day. DULoxetine (CYMBALTA) 30 mg capsule Take 30 mg by mouth daily.  Indications: anxiousness associated with depression      QUEtiapine (SEROquel) 50 mg tablet Take 50 mg by mouth nightly. Indications: bipolar      cephALEXin (Keflex) 500 mg capsule Take 1,000 mg by mouth See Admin Instructions. Pt took according to MD instructions DOS      ergocalciferol (ERGOCALCIFEROL) 1,250 mcg (50,000 unit) capsule Take 50,000 Units by mouth every seven (7) days.  (Patient not taking: Reported on 2/2/2023)          Review of Symptoms:       General - No history or complaints of unexpected fever or chills  Head/Neck - No history or complaints of headache or dizziness  Cardiac - No history or complaints of chest pain, palpitations, or shortness of breath  Pulmonary - No history or complaints of shortness of breath or productive cough  Gastrointestinal - as noted above  Genitourinary - No history or complaints of hematuria/dysuria or renal lithiasis  Musculoskeletal - No history or complaints of joint  muscular weakness  Hematologic - No history of any bleeding episodes  Neurologic - No history or complaints of  migraine headaches or neurologic symptoms                     Objective:     Visit Vitals  /66   Pulse 80   Temp 97.1 °F (36.2 °C)   Ht 5' 3\" (1.6 m)   Wt 78.2 kg (172 lb 6.4 oz)   SpO2 100%   BMI 30.54 kg/m²        Physical Exam:      General appearance:  alert, cooperative, no distress, appears stated age   Mental status   alert, oriented to person, place, and time   Neck  supple, no significant adenopathy     Lymphatics  no palpable lymphadenopathy, no hepatosplenomegaly   Chest  clear to auscultation, no wheezes, rales or rhonchi, symmetric air entry   Heart  normal rate, regular rhythm, normal S1, S2, no murmurs, rubs, clicks or gallops    Abdomen: soft, nontender, nondistended, no masses or organomegaly   Incision:  Well healed, no hernias      Neurological  alert, oriented, normal speech, no focal findings or movement disorder noted   Musculoskeletal no joint tenderness, deformity or swelling   Extremities peripheral pulses normal, no pedal edema, no clubbing or cyanosis   Skin normal coloration and turgor, no rashes, no suspicious skin lesions noted          Lab Results   Component Value Date/Time    WBC 6.0 12/20/2022 01:10 PM    HGB 11.8 (L) 12/20/2022 01:10 PM    HCT 37.4 12/20/2022 01:10 PM    PLATELET 604 60/47/7059 01:10 PM    MCV 83.5 12/20/2022 01:10 PM     Lab Results   Component Value Date/Time    Sodium 143 12/20/2022 01:10 PM    Potassium 3.8 12/20/2022 01:10 PM    Chloride 109 12/20/2022 01:10 PM    CO2 30 12/20/2022 01:10 PM    Anion gap 4 12/20/2022 01:10 PM    Glucose 89 12/20/2022 01:10 PM    BUN 19 (H) 12/20/2022 01:10 PM    Creatinine 0.67 12/20/2022 01:10 PM    BUN/Creatinine ratio 28 (H) 12/20/2022 01:10 PM    GFR est AA >60 12/07/2021 08:48 AM    GFR est non-AA >60 12/07/2021 08:48 AM    Calcium 8.8 12/20/2022 01:10 PM    Bilirubin, total 0.4 12/20/2022 01:10 PM    Alk. phosphatase 196 (H) 12/20/2022 01:10 PM    Protein, total 6.6 12/20/2022 01:10 PM    Albumin 3.4 12/20/2022 01:10 PM    Globulin 3.2 12/20/2022 01:10 PM    A-G Ratio 1.1 12/20/2022 01:10 PM    ALT (SGPT) 22 12/20/2022 01:10 PM     Lab Results   Component Value Date/Time    Iron 58 10/06/2021 09:55 AM    TIBC 154 (L) 03/23/2018 03:30 PM    Iron % saturation 19 03/23/2018 03:30 PM    Ferritin 116 10/06/2021 09:55 AM     Lab Results   Component Value Date/Time    Folate >20.0 (H) 10/06/2021 09:55 AM     Lab Results   Component Value Date/Time    Vitamin D 25-Hydroxy 53.7 10/06/2021 09:56 AM         Recent Labs     10/06/21  0956   VITD3 53.7         Assessment and Plan:   Intestinal malabsorption  continue required Vitamins: B12, B complex, D, iron, calcium, multivitamin  S/p laparoscopic bariatric surgery, sleeve gastrectomy, history of morbid obesity. Has had some weight gain since hip surgery. Discussed ensuring adequate protein intake and keeping daily carbs below 50g. She like to meet with dietitian for additional reinforcement.  Would like for her to add back at least 1 protein supplement a day. Increase exercise as able. Sleep goal is 7-9 hours each night. Patient education given on the effects of sleep deprivation on weight control. Discussed patients weight loss goals and dietary choices in relation to goals. Reminded to measure portions, continue high protein, low carbohydrate diet. Reminded to eat regularly, to eat slowly & not to drink with meals. Continue cardio exercise and add resistance exercises. 60-90 minutes of aerobic activity 5 days a week and strength training 2 days each week. Encouraged to attend support group   Required fluid intake is >64oz daily of decaffeinated sugar free beverages. Labs ordered today  Follow up in 1 year or sooner if patient has questions, concerns or worsening of condition, if unable to reach our office, patient should report to the ED. Ms. Ronan Guillory has a reminder for a \"due or due soon\" health maintenance. I have asked that she contact her primary care provider for a follow-up on this health maintenance. Total time spent with the patient 30 minutes.

## 2023-11-01 ENCOUNTER — TRANSCRIBE ORDERS (OUTPATIENT)
Facility: HOSPITAL | Age: 73
End: 2023-11-01

## 2023-11-01 ENCOUNTER — HOSPITAL ENCOUNTER (OUTPATIENT)
Facility: HOSPITAL | Age: 73
Discharge: HOME | End: 2023-11-04
Payer: MEDICARE

## 2023-11-01 DIAGNOSIS — M17.11 PRIMARY OSTEOARTHRITIS OF RIGHT KNEE: ICD-10-CM

## 2023-11-01 DIAGNOSIS — M17.11 PRIMARY OSTEOARTHRITIS OF RIGHT KNEE: Primary | ICD-10-CM

## 2023-11-01 LAB
ABO + RH BLD: NORMAL
ALBUMIN SERPL-MCNC: 3.2 G/DL (ref 3.4–5)
ALBUMIN/GLOB SERPL: 0.9 (ref 0.8–1.7)
ALP SERPL-CCNC: 174 U/L (ref 45–117)
ALT SERPL-CCNC: 19 U/L (ref 13–56)
ANION GAP SERPL CALC-SCNC: 3 MMOL/L (ref 3–18)
AST SERPL-CCNC: 19 U/L (ref 10–38)
BILIRUB SERPL-MCNC: 0.5 MG/DL (ref 0.2–1)
BLOOD GROUP ANTIBODIES SERPL: NORMAL
BUN SERPL-MCNC: 17 MG/DL (ref 7–18)
BUN/CREAT SERPL: 21 (ref 12–20)
CALCIUM SERPL-MCNC: 8.4 MG/DL (ref 8.5–10.1)
CHLORIDE SERPL-SCNC: 110 MMOL/L (ref 100–111)
CO2 SERPL-SCNC: 30 MMOL/L (ref 21–32)
CREAT SERPL-MCNC: 0.81 MG/DL (ref 0.6–1.3)
EKG ATRIAL RATE: 66 BPM
EKG DIAGNOSIS: NORMAL
EKG P AXIS: 78 DEGREES
EKG P-R INTERVAL: 148 MS
EKG Q-T INTERVAL: 388 MS
EKG QRS DURATION: 68 MS
EKG QTC CALCULATION (BAZETT): 406 MS
EKG R AXIS: 67 DEGREES
EKG T AXIS: 64 DEGREES
EKG VENTRICULAR RATE: 66 BPM
ERYTHROCYTE [DISTWIDTH] IN BLOOD BY AUTOMATED COUNT: 14.9 % (ref 11.6–14.5)
GLOBULIN SER CALC-MCNC: 3.5 G/DL (ref 2–4)
GLUCOSE SERPL-MCNC: 89 MG/DL (ref 74–99)
HCT VFR BLD AUTO: 36.1 % (ref 35–45)
HGB BLD-MCNC: 11.2 G/DL (ref 12–16)
MCH RBC QN AUTO: 26 PG (ref 24–34)
MCHC RBC AUTO-ENTMCNC: 31 G/DL (ref 31–37)
MCV RBC AUTO: 83.8 FL (ref 78–100)
NRBC # BLD: 0 K/UL (ref 0–0.01)
NRBC BLD-RTO: 0 PER 100 WBC
PLATELET # BLD AUTO: 206 K/UL (ref 135–420)
PMV BLD AUTO: 9.5 FL (ref 9.2–11.8)
POTASSIUM SERPL-SCNC: 3.8 MMOL/L (ref 3.5–5.5)
PROT SERPL-MCNC: 6.7 G/DL (ref 6.4–8.2)
RBC # BLD AUTO: 4.31 M/UL (ref 4.2–5.3)
SODIUM SERPL-SCNC: 143 MMOL/L (ref 136–145)
SPECIMEN EXP DATE BLD: NORMAL
WBC # BLD AUTO: 5.4 K/UL (ref 4.6–13.2)

## 2023-11-01 PROCEDURE — 36415 COLL VENOUS BLD VENIPUNCTURE: CPT

## 2023-11-01 PROCEDURE — 86901 BLOOD TYPING SEROLOGIC RH(D): CPT

## 2023-11-01 PROCEDURE — 85027 COMPLETE CBC AUTOMATED: CPT

## 2023-11-01 PROCEDURE — 86850 RBC ANTIBODY SCREEN: CPT

## 2023-11-01 PROCEDURE — 86900 BLOOD TYPING SEROLOGIC ABO: CPT

## 2023-11-01 PROCEDURE — 93005 ELECTROCARDIOGRAM TRACING: CPT

## 2023-11-01 PROCEDURE — 80053 COMPREHEN METABOLIC PANEL: CPT

## 2023-11-03 LAB
BACTERIA SPEC CULT: NORMAL
BACTERIA SPEC CULT: NORMAL
SERVICE CMNT-IMP: NORMAL

## 2023-12-12 PROBLEM — M17.11 PRIMARY OSTEOARTHRITIS OF RIGHT KNEE: Chronic | Status: ACTIVE | Noted: 2023-12-12

## 2023-12-20 PROBLEM — M17.11 PRIMARY OSTEOARTHRITIS OF RIGHT KNEE: Chronic | Status: RESOLVED | Noted: 2023-12-12 | Resolved: 2023-12-20

## 2025-02-05 ENCOUNTER — CLINICAL DOCUMENTATION (OUTPATIENT)
Facility: HOSPITAL | Age: 75
End: 2025-02-05

## 2025-02-26 ENCOUNTER — ANESTHESIA EVENT (OUTPATIENT)
Facility: HOSPITAL | Age: 75
End: 2025-02-26
Payer: MEDICARE

## 2025-02-26 RX ORDER — SODIUM CHLORIDE 0.9 % (FLUSH) 0.9 %
5-40 SYRINGE (ML) INJECTION PRN
Status: CANCELLED | OUTPATIENT
Start: 2025-02-26

## 2025-02-26 RX ORDER — SODIUM CHLORIDE 9 MG/ML
INJECTION, SOLUTION INTRAVENOUS PRN
Status: CANCELLED | OUTPATIENT
Start: 2025-02-26

## 2025-02-26 RX ORDER — NALOXONE HYDROCHLORIDE 0.4 MG/ML
INJECTION, SOLUTION INTRAMUSCULAR; INTRAVENOUS; SUBCUTANEOUS PRN
Status: CANCELLED | OUTPATIENT
Start: 2025-02-26

## 2025-02-26 RX ORDER — SODIUM CHLORIDE 0.9 % (FLUSH) 0.9 %
5-40 SYRINGE (ML) INJECTION EVERY 12 HOURS SCHEDULED
Status: CANCELLED | OUTPATIENT
Start: 2025-02-26

## 2025-02-27 ENCOUNTER — HOSPITAL ENCOUNTER (OUTPATIENT)
Facility: HOSPITAL | Age: 75
Setting detail: OUTPATIENT SURGERY
Discharge: HOME | End: 2025-02-27
Attending: OPHTHALMOLOGY | Admitting: OPHTHALMOLOGY
Payer: MEDICARE

## 2025-02-27 ENCOUNTER — ANESTHESIA (OUTPATIENT)
Facility: HOSPITAL | Age: 75
End: 2025-02-27
Payer: MEDICARE

## 2025-02-27 VITALS
HEART RATE: 81 BPM | DIASTOLIC BLOOD PRESSURE: 58 MMHG | HEIGHT: 63 IN | BODY MASS INDEX: 32.53 KG/M2 | TEMPERATURE: 97.1 F | SYSTOLIC BLOOD PRESSURE: 137 MMHG | WEIGHT: 183.6 LBS | RESPIRATION RATE: 16 BRPM | OXYGEN SATURATION: 98 %

## 2025-02-27 PROBLEM — H53.8 BLURRED VISION: Status: RESOLVED | Noted: 2025-02-27 | Resolved: 2025-02-27

## 2025-02-27 PROBLEM — H53.8 BLURRED VISION: Status: ACTIVE | Noted: 2025-02-27

## 2025-02-27 PROCEDURE — V2632 POST CHMBR INTRAOCULAR LENS: HCPCS | Performed by: OPHTHALMOLOGY

## 2025-02-27 PROCEDURE — 6370000000 HC RX 637 (ALT 250 FOR IP): Performed by: OPHTHALMOLOGY

## 2025-02-27 PROCEDURE — 2500000003 HC RX 250 WO HCPCS: Performed by: OPHTHALMOLOGY

## 2025-02-27 PROCEDURE — 3700000000 HC ANESTHESIA ATTENDED CARE: Performed by: OPHTHALMOLOGY

## 2025-02-27 PROCEDURE — 6360000002 HC RX W HCPCS: Performed by: OPHTHALMOLOGY

## 2025-02-27 PROCEDURE — 2500000003 HC RX 250 WO HCPCS: Performed by: NURSE ANESTHETIST, CERTIFIED REGISTERED

## 2025-02-27 PROCEDURE — 3700000001 HC ADD 15 MINUTES (ANESTHESIA): Performed by: OPHTHALMOLOGY

## 2025-02-27 PROCEDURE — 3600000012 HC SURGERY LEVEL 2 ADDTL 15MIN: Performed by: OPHTHALMOLOGY

## 2025-02-27 PROCEDURE — 3600000002 HC SURGERY LEVEL 2 BASE: Performed by: OPHTHALMOLOGY

## 2025-02-27 PROCEDURE — 2580000003 HC RX 258: Performed by: OPHTHALMOLOGY

## 2025-02-27 PROCEDURE — 2709999900 HC NON-CHARGEABLE SUPPLY: Performed by: OPHTHALMOLOGY

## 2025-02-27 PROCEDURE — 7100000010 HC PHASE II RECOVERY - FIRST 15 MIN: Performed by: OPHTHALMOLOGY

## 2025-02-27 PROCEDURE — 6360000002 HC RX W HCPCS: Performed by: NURSE ANESTHETIST, CERTIFIED REGISTERED

## 2025-02-27 PROCEDURE — 7100000011 HC PHASE II RECOVERY - ADDTL 15 MIN: Performed by: OPHTHALMOLOGY

## 2025-02-27 DEVICE — LENS CLAREON IOL 25.0D: Type: IMPLANTABLE DEVICE | Status: FUNCTIONAL

## 2025-02-27 RX ORDER — SODIUM CHLORIDE, POTASSIUM CHLORIDE, CALCIUM CHLORIDE, MAGNESIUM CHLORIDE, SODIUM ACETATE, AND SODIUM CITRATE 6.4; .75; .48; .3; 3.9; 1.7 MG/ML; MG/ML; MG/ML; MG/ML; MG/ML; MG/ML
SOLUTION IRRIGATION PRN
Status: DISCONTINUED | OUTPATIENT
Start: 2025-02-27 | End: 2025-02-27 | Stop reason: ALTCHOICE

## 2025-02-27 RX ORDER — OFLOXACIN 3 MG/ML
1 SOLUTION/ DROPS OPHTHALMIC ONCE
Status: COMPLETED | OUTPATIENT
Start: 2025-02-27 | End: 2025-02-27

## 2025-02-27 RX ORDER — DEXMEDETOMIDINE HYDROCHLORIDE 100 UG/ML
INJECTION, SOLUTION INTRAVENOUS
Status: DISCONTINUED | OUTPATIENT
Start: 2025-02-27 | End: 2025-02-27 | Stop reason: SDUPTHER

## 2025-02-27 RX ORDER — SODIUM CHLORIDE 9 MG/ML
INJECTION, SOLUTION INTRAVENOUS CONTINUOUS
Status: DISCONTINUED | OUTPATIENT
Start: 2025-02-27 | End: 2025-02-27 | Stop reason: HOSPADM

## 2025-02-27 RX ORDER — BUPROPION HYDROCHLORIDE 100 MG/1
100 TABLET, EXTENDED RELEASE ORAL 2 TIMES DAILY
COMMUNITY

## 2025-02-27 RX ORDER — FENTANYL CITRATE 50 UG/ML
INJECTION, SOLUTION INTRAMUSCULAR; INTRAVENOUS
Status: DISCONTINUED | OUTPATIENT
Start: 2025-02-27 | End: 2025-02-27 | Stop reason: SDUPTHER

## 2025-02-27 RX ORDER — ONDANSETRON 2 MG/ML
INJECTION INTRAMUSCULAR; INTRAVENOUS
Status: DISCONTINUED | OUTPATIENT
Start: 2025-02-27 | End: 2025-02-27 | Stop reason: SDUPTHER

## 2025-02-27 RX ORDER — LIDOCAIN/PHENYLEPH/BSS NO.2/PF 1 %-1.5 %
SYRINGE (ML) INTRAOCULAR PRN
Status: DISCONTINUED | OUTPATIENT
Start: 2025-02-27 | End: 2025-02-27 | Stop reason: ALTCHOICE

## 2025-02-27 RX ORDER — PHENYLEPHRINE HYDROCHLORIDE 25 MG/ML
1 SOLUTION/ DROPS OPHTHALMIC
Status: COMPLETED | OUTPATIENT
Start: 2025-02-27 | End: 2025-02-27

## 2025-02-27 RX ORDER — EPINEPHRINE 1 MG/ML
INJECTION, SOLUTION, CONCENTRATE INTRAVENOUS PRN
Status: DISCONTINUED | OUTPATIENT
Start: 2025-02-27 | End: 2025-02-27 | Stop reason: ALTCHOICE

## 2025-02-27 RX ORDER — PHENYLEPHRINE HYDROCHLORIDE 100 MG/ML
1 SOLUTION/ DROPS OPHTHALMIC
Status: COMPLETED | OUTPATIENT
Start: 2025-02-27 | End: 2025-02-27

## 2025-02-27 RX ORDER — TROPICAMIDE 10 MG/ML
1 SOLUTION/ DROPS OPHTHALMIC
Status: COMPLETED | OUTPATIENT
Start: 2025-02-27 | End: 2025-02-27

## 2025-02-27 RX ORDER — BALANCED SALT SOLUTION 6.4; .75; .48; .3; 3.9; 1.7 MG/ML; MG/ML; MG/ML; MG/ML; MG/ML; MG/ML
SOLUTION OPHTHALMIC PRN
Status: DISCONTINUED | OUTPATIENT
Start: 2025-02-27 | End: 2025-02-27 | Stop reason: ALTCHOICE

## 2025-02-27 RX ORDER — MIDAZOLAM HYDROCHLORIDE 1 MG/ML
INJECTION, SOLUTION INTRAMUSCULAR; INTRAVENOUS
Status: DISCONTINUED | OUTPATIENT
Start: 2025-02-27 | End: 2025-02-27 | Stop reason: SDUPTHER

## 2025-02-27 RX ADMIN — MIDAZOLAM 1 MG: 1 INJECTION INTRAMUSCULAR; INTRAVENOUS at 07:53

## 2025-02-27 RX ADMIN — PHENYLEPHRINE HYDROCHLORIDE 1 DROP: 25 SOLUTION OPHTHALMIC at 06:21

## 2025-02-27 RX ADMIN — PHENYLEPHRINE HYDROCHLORIDE 1 DROP: 100 SOLUTION OPHTHALMIC at 06:56

## 2025-02-27 RX ADMIN — PHENYLEPHRINE HYDROCHLORIDE 1 DROP: 100 SOLUTION OPHTHALMIC at 06:51

## 2025-02-27 RX ADMIN — TROPICAMIDE 1 DROP: 10 SOLUTION/ DROPS OPHTHALMIC at 06:21

## 2025-02-27 RX ADMIN — LIDOCAINE HYDROCHLORIDE ANHYDROUS: 35 GEL OPHTHALMIC at 06:21

## 2025-02-27 RX ADMIN — MIDAZOLAM 2 MG: 1 INJECTION INTRAMUSCULAR; INTRAVENOUS at 07:40

## 2025-02-27 RX ADMIN — PHENYLEPHRINE HYDROCHLORIDE 1 DROP: 100 SOLUTION OPHTHALMIC at 07:02

## 2025-02-27 RX ADMIN — PHENYLEPHRINE HYDROCHLORIDE 1 DROP: 100 SOLUTION OPHTHALMIC at 07:08

## 2025-02-27 RX ADMIN — OFLOXACIN 1 DROP: 3 SOLUTION/ DROPS OPHTHALMIC at 06:02

## 2025-02-27 RX ADMIN — FENTANYL CITRATE 50 MCG: 50 INJECTION, SOLUTION INTRAMUSCULAR; INTRAVENOUS at 07:40

## 2025-02-27 RX ADMIN — PHENYLEPHRINE HYDROCHLORIDE 1 DROP: 25 SOLUTION OPHTHALMIC at 06:02

## 2025-02-27 RX ADMIN — PHENYLEPHRINE HYDROCHLORIDE 1 DROP: 25 SOLUTION OPHTHALMIC at 06:07

## 2025-02-27 RX ADMIN — TROPICAMIDE 1 DROP: 10 SOLUTION/ DROPS OPHTHALMIC at 06:07

## 2025-02-27 RX ADMIN — TROPICAMIDE 1 DROP: 10 SOLUTION/ DROPS OPHTHALMIC at 06:02

## 2025-02-27 RX ADMIN — PHENYLEPHRINE HYDROCHLORIDE 1 DROP: 25 SOLUTION OPHTHALMIC at 06:13

## 2025-02-27 RX ADMIN — FENTANYL CITRATE 50 MCG: 50 INJECTION, SOLUTION INTRAMUSCULAR; INTRAVENOUS at 07:48

## 2025-02-27 RX ADMIN — SODIUM CHLORIDE: 9 INJECTION, SOLUTION INTRAVENOUS at 06:14

## 2025-02-27 RX ADMIN — DEXMEDETOMIDINE HYDROCHLORIDE 4 MCG: 100 INJECTION, SOLUTION INTRAVENOUS at 07:45

## 2025-02-27 RX ADMIN — MIDAZOLAM 1 MG: 1 INJECTION INTRAMUSCULAR; INTRAVENOUS at 07:59

## 2025-02-27 RX ADMIN — ONDANSETRON 4 MG: 2 INJECTION INTRAMUSCULAR; INTRAVENOUS at 07:55

## 2025-02-27 RX ADMIN — TROPICAMIDE 1 DROP: 10 SOLUTION/ DROPS OPHTHALMIC at 06:13

## 2025-02-27 ASSESSMENT — PAIN SCALES - GENERAL
PAINLEVEL_OUTOF10: 0

## 2025-02-27 ASSESSMENT — PAIN - FUNCTIONAL ASSESSMENT: PAIN_FUNCTIONAL_ASSESSMENT: 0-10

## 2025-02-27 NOTE — PERIOP NOTE
TRANSFER - IN REPORT:    Verbal report received from ERICA Ron  on Ursula Poole  being received from OR for routine progression of patient care      Report consisted of patient's Situation, Background, Assessment and   Recommendations(SBAR).     Information from the following report(s) Nurse Handoff Report, Surgery Report, Intake/Output, and MAR was reviewed with the receiving nurse.    Opportunity for questions and clarification was provided.      Assessment completed upon patient's arrival to unit and care assumed.

## 2025-02-27 NOTE — ANESTHESIA POSTPROCEDURE EVALUATION
Department of Anesthesiology  Postprocedure Note    Patient: Ursula Poole  MRN: [id number]  YOB: 1950  Date of evaluation: 2/27/2025    Procedure Summary       Date: 02/27/25 Room / Location: [facility] 02 / [facility] OR    Anesthesia Start: 0738 Anesthesia Stop: 0811    Procedure: EYE CATARACT EXTRACTION WITH INTRAOCULAR LENS IMPLANT-RIGHT EYE (Right: Eye) Diagnosis:       Nuclear sclerotic cataract of right eye      Cortical age-related cataract of right eye      Posterior subcapsular polar age-related cataract of right eye      (Nuclear sclerotic cataract of right eye [H25.11])      (Cortical age-related cataract of right eye [H25.011])      (Posterior subcapsular polar age-related cataract of right eye [H25.041])    Surgeons: Artemio Gonzalez MD Responsible Provider: Bronwyn Mobley MD    Anesthesia Type: MAC ASA Status: 3            Anesthesia Type: MAC    Yolanda Phase I:      Yolanda Phase II: Yolanda Score: 10    Anesthesia Post Evaluation    Patient location: OR.  Patient participation: complete - patient participated  Level of consciousness: awake and alert  Pain score: 0  Airway patency: patent  Nausea & Vomiting: no nausea and no vomiting  Cardiovascular status: hemodynamically stable  Respiratory status: acceptable, room air and spontaneous ventilation  Hydration status: stable  Pain management: satisfactory to patient    No notable events documented.

## 2025-02-27 NOTE — DISCHARGE INSTRUCTIONS
FOLLOW ALL DISCHARGE INSTRUCTIONS FROM DR. HASKINS  FOLLOW DROP SCHEDULE FROM DR. HASKINS'S OFFICE  FOLLOW UP WITH DR. HASKINS TOMORROW AS SCHEDULED - BRING ALL EYE DROPS TO FOLLOW UP APPOINTMENT  NOTIFY OFFICE IF INCREASING PRESSURE TO OPERATIVE EYE NOT RELIVED BY TYLENOL, NAUSEA LASTING MORE THAN 8 HOURS AND IF AN VOMITING OCCURS.      Cataract Surgery: What to Expect at Home  Your Recovery     You had cataract surgery. It replaced your cloudy natural lens with a clear artificial one.  After surgery, your eye may feel scratchy, sticky, or uncomfortable. It may also water more than usual.  Most people see better 1 to 3 days after surgery. But it could take 3 to 10 weeks to get the full benefits of surgery and to see as clearly as possible.  Your doctor may send you home with a bandage, patch, or clear shield on your eye. This will keep you from rubbing your eye. Your doctor will also give you eyedrops to help your eye heal. Use them exactly as directed.  You can watch TV , but things may look blurry. Most people are able to return to work or their normal routine in 1 to 3 days. After your eye heals, you may still need to wear glasses, especially for reading.  This care sheet gives you a general idea about how long it will take for you to recover. But each person recovers at a different pace. Follow the steps below to get better as quickly as possible.  How can you care for yourself at home?  Activity    Rest when you feel tired. Getting enough sleep will help you recover.     You may have trouble judging distances for a few days. Move slowly, and be careful going up and down stairs and pouring hot liquids. Ask for help if you need it.     Ask your doctor when it is okay to drive.     Wear your eye bandage, patch, or shield for as long as your doctor recommends. You may only need to wear it when you sleep.     You can shower or wash your hair the day after surgery. Keep water, soap, shampoo, hair spray, and shaving lotion

## 2025-02-27 NOTE — ANESTHESIA PRE PROCEDURE
[facility] 12/20/2023       Drug/Infectious Status (If Applicable):  No results found for: \"HIV\", \"HEPCAB\"    COVID-19 Screening (If Applicable):   Lab Results   Component Value Date/Time    COVID19 Not Detected 03/10/2021 03:00 PM           Anesthesia Evaluation  Patient summary reviewed and Nursing notes reviewed   history of anesthetic complications: PONV.  Airway: Mallampati: II  TM distance: >3 FB   Neck ROM: full  Mouth opening: > = 3 FB   Dental: normal exam   (+) upper dentures and lower dentures      Pulmonary: breath sounds clear to auscultation  (+)     sleep apnea:                                  Cardiovascular:  Exercise tolerance: good (>4 METS)  (+) hypertension:, CAD:        Rhythm: regular  Rate: normal                    Neuro/Psych:   (+) neuromuscular disease:, psychiatric history:            GI/Hepatic/Renal:   (+) hiatal hernia, liver disease:          Endo/Other: Negative Endo/Other ROS                    Abdominal:             Vascular:          Other Findings:       Anesthesia Plan      MAC     ASA 3       Induction: intravenous.      Anesthetic plan and risks discussed with patient.      Plan discussed with CRNA.                WILLIAM MEYERS MD   2/27/2025             Duration Of Freeze Thaw-Cycle (Seconds): 0 Render Post-Care Instructions In Note?: no Post-Care Instructions: I reviewed with the patient in detail post-care instructions. Patient is to wear sunprotection, and avoid picking at any of the treated lesions. Pt may apply Vaseline to crusted or scabbing areas. Consent: The patient's consent was obtained including but not limited to risks of crusting, scabbing, blistering, scarring, darker or lighter pigmentary change, recurrence, incomplete removal and infection. Detail Level: Detailed

## 2025-02-27 NOTE — OP NOTE
Cataract Operative Note      Patient: Ursula Poole               Sex: female          DOA: 2/27/2025         YOB: 1950      Age:  74 y.o.        LOS:  LOS: 0 days     Preoperative Diagnosis: Cataract right eye    Postoperative Diagnosis:  Cataract  right eye  Surgeon: JOSE J HASKINS MD, M.D.  Anesthesia:  Topical anesthesia  Procedure:  Phacoemulsification of posterior chamber for intraocular lens implantation right    Fluids:  0    Procedure in Detail: The operative eye was prepped and draped in the usual fashion.  A lid speculum was placed in the operative eye.  A clear cornea approach was utilized.  A paracentesis incision(s) was constructed with a 1 mm slit knife.  One percent preservative-free lidocaine followed by viscoelastic was instilled into the anterior chamber.  A clear corneal incision was made with a slit knife.  A continuous curvilinear capsulorrhexis was constructed followed by hydrodissection.  A phacoemulsification tip was placed into the eye, and the lens nucleus was emulsified.  The irrigation/aspiration device was then used to remove any remaining cortical material.  Polishing of the capsule was performed as needed.  The intraocular lens was then placed into the capsular bag after it was re-inflated with viscoelastic.  The remaining viscoelastic was then removed using the irrigation/aspiration device.  BSS on a cannula was then used to hydrate the wound edges.  At the end of the procedure the wound was found to be watertight, the anterior chamber was deep and the pupil round.  No blood loss during surgery.  An antibiotic and anti-inflammatroy was placed into the operative eye.  The lid speculum was removed.  Protective sunglasses were then placed onto the patient.  The patient was taken to the Post Anesthesia Care Unit (PACU) in good condition having tolerated the procedure well.    Estimated Blood Loss: 0                 Implants:   Implant Name Type Inv. Item Serial No.

## 2025-02-27 NOTE — INTERVAL H&P NOTE
Update History & Physical    The patient's History and Physical of February 27, 2025 was reviewed with the patient and I examined the patient. There was no change. The surgical site was confirmed by the patient and me.     Plan: The risks, benefits, expected outcome, and alternative to the recommended procedure have been discussed with the patient. Patient understands and wants to proceed with the procedure.     Electronically signed by JOSE J HASKINS MD on 2/27/2025 at 7:14 AM

## 2025-04-08 ENCOUNTER — ANESTHESIA (OUTPATIENT)
Facility: HOSPITAL | Age: 75
End: 2025-04-08
Payer: MEDICARE

## 2025-04-08 ENCOUNTER — HOSPITAL ENCOUNTER (OUTPATIENT)
Facility: HOSPITAL | Age: 75
Setting detail: OUTPATIENT SURGERY
Discharge: HOME | End: 2025-04-08
Attending: OPHTHALMOLOGY | Admitting: OPHTHALMOLOGY
Payer: MEDICARE

## 2025-04-08 ENCOUNTER — ANESTHESIA EVENT (OUTPATIENT)
Facility: HOSPITAL | Age: 75
End: 2025-04-08
Payer: MEDICARE

## 2025-04-08 VITALS
DIASTOLIC BLOOD PRESSURE: 67 MMHG | RESPIRATION RATE: 15 BRPM | HEIGHT: 63 IN | BODY MASS INDEX: 32.76 KG/M2 | HEART RATE: 70 BPM | SYSTOLIC BLOOD PRESSURE: 132 MMHG | WEIGHT: 184.9 LBS | TEMPERATURE: 97.9 F | OXYGEN SATURATION: 94 %

## 2025-04-08 PROBLEM — H53.8 BLURRED VISION: Status: RESOLVED | Noted: 2025-04-08 | Resolved: 2025-04-08

## 2025-04-08 PROBLEM — H53.8 BLURRED VISION: Status: ACTIVE | Noted: 2025-04-08

## 2025-04-08 PROCEDURE — 2500000003 HC RX 250 WO HCPCS: Performed by: NURSE ANESTHETIST, CERTIFIED REGISTERED

## 2025-04-08 PROCEDURE — 3700000000 HC ANESTHESIA ATTENDED CARE: Performed by: OPHTHALMOLOGY

## 2025-04-08 PROCEDURE — 7100000010 HC PHASE II RECOVERY - FIRST 15 MIN: Performed by: OPHTHALMOLOGY

## 2025-04-08 PROCEDURE — V2632 POST CHMBR INTRAOCULAR LENS: HCPCS | Performed by: OPHTHALMOLOGY

## 2025-04-08 PROCEDURE — 7100000011 HC PHASE II RECOVERY - ADDTL 15 MIN: Performed by: OPHTHALMOLOGY

## 2025-04-08 PROCEDURE — 6370000000 HC RX 637 (ALT 250 FOR IP): Performed by: OPHTHALMOLOGY

## 2025-04-08 PROCEDURE — 3700000001 HC ADD 15 MINUTES (ANESTHESIA): Performed by: OPHTHALMOLOGY

## 2025-04-08 PROCEDURE — 3600000002 HC SURGERY LEVEL 2 BASE: Performed by: OPHTHALMOLOGY

## 2025-04-08 PROCEDURE — 6360000002 HC RX W HCPCS: Performed by: OPHTHALMOLOGY

## 2025-04-08 PROCEDURE — 2709999900 HC NON-CHARGEABLE SUPPLY: Performed by: OPHTHALMOLOGY

## 2025-04-08 PROCEDURE — 2500000003 HC RX 250 WO HCPCS: Performed by: OPHTHALMOLOGY

## 2025-04-08 PROCEDURE — 3600000012 HC SURGERY LEVEL 2 ADDTL 15MIN: Performed by: OPHTHALMOLOGY

## 2025-04-08 PROCEDURE — 6360000002 HC RX W HCPCS: Performed by: NURSE ANESTHETIST, CERTIFIED REGISTERED

## 2025-04-08 PROCEDURE — 2580000003 HC RX 258: Performed by: OPHTHALMOLOGY

## 2025-04-08 DEVICE — STERILE UV AND BLUE LIGHT FILTERING ACRYLIC FOLDABLE ASPHERIC POSTERIOR CHAMBER INTRAOCULAR LENS
Type: IMPLANTABLE DEVICE | Site: EYE | Status: FUNCTIONAL
Brand: CLAREON

## 2025-04-08 RX ORDER — LIDOCAIN/PHENYLEPH/BSS NO.2/PF 1 %-1.5 %
SYRINGE (ML) INTRAOCULAR PRN
Status: DISCONTINUED | OUTPATIENT
Start: 2025-04-08 | End: 2025-04-08 | Stop reason: ALTCHOICE

## 2025-04-08 RX ORDER — OFLOXACIN 3 MG/ML
1 SOLUTION/ DROPS OPHTHALMIC
Status: COMPLETED | OUTPATIENT
Start: 2025-04-08 | End: 2025-04-08

## 2025-04-08 RX ORDER — PHENYLEPHRINE HYDROCHLORIDE 25 MG/ML
1 SOLUTION/ DROPS OPHTHALMIC
Status: COMPLETED | OUTPATIENT
Start: 2025-04-08 | End: 2025-04-08

## 2025-04-08 RX ORDER — OFLOXACIN 3 MG/ML
1 SOLUTION/ DROPS OPHTHALMIC 4 TIMES DAILY
Status: DISCONTINUED | OUTPATIENT
Start: 2025-04-08 | End: 2025-04-08 | Stop reason: HOSPADM

## 2025-04-08 RX ORDER — MIDAZOLAM HYDROCHLORIDE 1 MG/ML
INJECTION, SOLUTION INTRAMUSCULAR; INTRAVENOUS
Status: DISCONTINUED | OUTPATIENT
Start: 2025-04-08 | End: 2025-04-08 | Stop reason: SDUPTHER

## 2025-04-08 RX ORDER — DEXMEDETOMIDINE HYDROCHLORIDE 100 UG/ML
INJECTION, SOLUTION INTRAVENOUS
Status: DISCONTINUED | OUTPATIENT
Start: 2025-04-08 | End: 2025-04-08 | Stop reason: SDUPTHER

## 2025-04-08 RX ORDER — TROPICAMIDE 10 MG/ML
1 SOLUTION/ DROPS OPHTHALMIC
Status: COMPLETED | OUTPATIENT
Start: 2025-04-08 | End: 2025-04-08

## 2025-04-08 RX ORDER — SODIUM CHLORIDE, POTASSIUM CHLORIDE, CALCIUM CHLORIDE, MAGNESIUM CHLORIDE, SODIUM ACETATE, AND SODIUM CITRATE 6.4; .75; .48; .3; 3.9; 1.7 MG/ML; MG/ML; MG/ML; MG/ML; MG/ML; MG/ML
SOLUTION IRRIGATION PRN
Status: DISCONTINUED | OUTPATIENT
Start: 2025-04-08 | End: 2025-04-08 | Stop reason: ALTCHOICE

## 2025-04-08 RX ORDER — EPINEPHRINE 1 MG/ML
INJECTION, SOLUTION, CONCENTRATE INTRAVENOUS PRN
Status: DISCONTINUED | OUTPATIENT
Start: 2025-04-08 | End: 2025-04-08 | Stop reason: ALTCHOICE

## 2025-04-08 RX ORDER — BALANCED SALT SOLUTION 6.4; .75; .48; .3; 3.9; 1.7 MG/ML; MG/ML; MG/ML; MG/ML; MG/ML; MG/ML
SOLUTION OPHTHALMIC PRN
Status: DISCONTINUED | OUTPATIENT
Start: 2025-04-08 | End: 2025-04-08 | Stop reason: ALTCHOICE

## 2025-04-08 RX ORDER — FENTANYL CITRATE 50 UG/ML
INJECTION, SOLUTION INTRAMUSCULAR; INTRAVENOUS
Status: DISCONTINUED | OUTPATIENT
Start: 2025-04-08 | End: 2025-04-08 | Stop reason: SDUPTHER

## 2025-04-08 RX ORDER — PHENYLEPHRINE HYDROCHLORIDE 100 MG/ML
1 SOLUTION/ DROPS OPHTHALMIC
Status: DISCONTINUED | OUTPATIENT
Start: 2025-04-08 | End: 2025-04-08

## 2025-04-08 RX ORDER — PHENYLEPHRINE HYDROCHLORIDE 100 MG/ML
1 SOLUTION/ DROPS OPHTHALMIC SEE ADMIN INSTRUCTIONS
Status: COMPLETED | OUTPATIENT
Start: 2025-04-08 | End: 2025-04-08

## 2025-04-08 RX ORDER — SODIUM CHLORIDE 9 MG/ML
INJECTION, SOLUTION INTRAVENOUS CONTINUOUS
Status: DISCONTINUED | OUTPATIENT
Start: 2025-04-08 | End: 2025-04-08 | Stop reason: HOSPADM

## 2025-04-08 RX ADMIN — TROPICAMIDE 1 DROP: 10 SOLUTION/ DROPS OPHTHALMIC at 07:27

## 2025-04-08 RX ADMIN — PHENYLEPHRINE HYDROCHLORIDE 1 DROP: 100 SOLUTION/ DROPS OPHTHALMIC at 08:05

## 2025-04-08 RX ADMIN — FENTANYL CITRATE 50 MCG: 50 INJECTION INTRAMUSCULAR; INTRAVENOUS at 09:26

## 2025-04-08 RX ADMIN — MIDAZOLAM 1 MG: 1 INJECTION INTRAMUSCULAR; INTRAVENOUS at 09:50

## 2025-04-08 RX ADMIN — PHENYLEPHRINE HYDROCHLORIDE 1 DROP: 100 SOLUTION/ DROPS OPHTHALMIC at 07:52

## 2025-04-08 RX ADMIN — PHENYLEPHRINE HYDROCHLORIDE 1 DROP: 25 SOLUTION/ DROPS OPHTHALMIC at 07:27

## 2025-04-08 RX ADMIN — PHENYLEPHRINE HYDROCHLORIDE 1 DROP: 100 SOLUTION/ DROPS OPHTHALMIC at 07:57

## 2025-04-08 RX ADMIN — MIDAZOLAM 1 MG: 1 INJECTION INTRAMUSCULAR; INTRAVENOUS at 09:42

## 2025-04-08 RX ADMIN — TROPICAMIDE 1 DROP: 10 SOLUTION/ DROPS OPHTHALMIC at 07:23

## 2025-04-08 RX ADMIN — MIDAZOLAM 1 MG: 1 INJECTION INTRAMUSCULAR; INTRAVENOUS at 09:25

## 2025-04-08 RX ADMIN — FENTANYL CITRATE 25 MCG: 50 INJECTION INTRAMUSCULAR; INTRAVENOUS at 09:34

## 2025-04-08 RX ADMIN — DEXMEDETOMIDINE 2 MCG: 100 INJECTION, SOLUTION, CONCENTRATE INTRAVENOUS at 09:36

## 2025-04-08 RX ADMIN — PHENYLEPHRINE HYDROCHLORIDE 1 DROP: 25 SOLUTION/ DROPS OPHTHALMIC at 07:37

## 2025-04-08 RX ADMIN — OFLOXACIN 1 DROP: 3 SOLUTION/ DROPS OPHTHALMIC at 07:22

## 2025-04-08 RX ADMIN — TROPICAMIDE 1 DROP: 10 SOLUTION/ DROPS OPHTHALMIC at 07:32

## 2025-04-08 RX ADMIN — SODIUM CHLORIDE: 0.9 INJECTION, SOLUTION INTRAVENOUS at 07:26

## 2025-04-08 RX ADMIN — LIDOCAINE HYDROCHLORIDE ANHYDROUS: 35 GEL OPHTHALMIC at 07:37

## 2025-04-08 RX ADMIN — FENTANYL CITRATE 25 MCG: 50 INJECTION INTRAMUSCULAR; INTRAVENOUS at 09:35

## 2025-04-08 RX ADMIN — PHENYLEPHRINE HYDROCHLORIDE 1 DROP: 25 SOLUTION/ DROPS OPHTHALMIC at 07:32

## 2025-04-08 RX ADMIN — DEXMEDETOMIDINE 2 MCG: 100 INJECTION, SOLUTION, CONCENTRATE INTRAVENOUS at 09:42

## 2025-04-08 RX ADMIN — TROPICAMIDE 1 DROP: 10 SOLUTION/ DROPS OPHTHALMIC at 07:37

## 2025-04-08 RX ADMIN — MIDAZOLAM 1 MG: 1 INJECTION INTRAMUSCULAR; INTRAVENOUS at 09:26

## 2025-04-08 RX ADMIN — PHENYLEPHRINE HYDROCHLORIDE 1 DROP: 25 SOLUTION/ DROPS OPHTHALMIC at 07:22

## 2025-04-08 RX ADMIN — PHENYLEPHRINE HYDROCHLORIDE 1 DROP: 100 SOLUTION/ DROPS OPHTHALMIC at 08:12

## 2025-04-08 ASSESSMENT — PAIN - FUNCTIONAL ASSESSMENT: PAIN_FUNCTIONAL_ASSESSMENT: 0-10

## 2025-04-08 NOTE — PERIOP NOTE
Pt. Used restroom in pre-op area with assistance.   Patient placed on [employer]aws for a minimum of 30 min in  Preop.     Pt denies having an active cough and confirms being able to lie still for 20 minutes.

## 2025-04-08 NOTE — OP NOTE
Cataract Operative Note      Patient: Ursula Poole               Sex: female          DOA: 4/8/2025         YOB: 1950      Age:  75 y.o.        LOS:  LOS: 0 days     Preoperative Diagnosis: Cataract left eye    Postoperative Diagnosis:  Cataract  left eye  Surgeon: JOSE J HASKINS MD, M.D.  Anesthesia:  Topical anesthesia  Procedure:  Phacoemulsification of posterior chamber for intraocular lens implantation left    Fluids:  0    Procedure in Detail: The operative eye was prepped and draped in the usual fashion.  A lid speculum was placed in the operative eye.  A clear cornea approach was utilized.  A paracentesis incision(s) was constructed with a 1 mm slit knife.  One percent preservative-free lidocaine followed by viscoelastic was instilled into the anterior chamber.  A clear corneal incision was made with a slit knife.  A continuous curvilinear capsulorrhexis was constructed followed by hydrodissection.  A phacoemulsification tip was placed into the eye, and the lens nucleus was emulsified.  The irrigation/aspiration device was then used to remove any remaining cortical material.  Polishing of the capsule was performed as needed.  The intraocular lens was then placed into the capsular bag after it was re-inflated with viscoelastic.  The remaining viscoelastic was then removed using the irrigation/aspiration device.  BSS on a cannula was then used to hydrate the wound edges.  At the end of the procedure the wound was found to be watertight, the anterior chamber was deep and the pupil round.  No blood loss during surgery.  An antibiotic and anti-inflammatroy was placed into the operative eye.  The lid speculum was removed.  Protective sunglasses were then placed onto the patient.  The patient was taken to the Post Anesthesia Care Unit (PACU) in good condition having tolerated the procedure well.    Estimated Blood Loss: 0                 Implants:   Implant Name Type Inv. Item Serial No.

## 2025-04-08 NOTE — PERIOP NOTE
TRANSFER - IN REPORT:    Verbal report received from ERICA Ron on Ursula Poole  being received from OR for routine progression of patient care      Report consisted of patient's Situation, Background, Assessment and   Recommendations(SBAR).     Information from the following report(s) Nurse Handoff Report, Intake/Output, and MAR was reviewed with the receiving nurse.    Opportunity for questions and clarification was provided.      Assessment completed upon patient's arrival to unit and care assumed.

## 2025-04-08 NOTE — ANESTHESIA POSTPROCEDURE EVALUATION
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      (Nuclear sclerotic cataract of left eye [H25.12])      (Cortical age-related cataract of left eye [H25.012])      (Posterior subcapsular polar age-related cataract of left eye [H25.042])    Surgeons: Artemio Gonzalez MD Responsible Provider: Adrian Bhat MD    Anesthesia Type: MAC ASA Status: 3            Anesthesia Type: MAC    Yolanda Phase I:      Yolanda Phase II:      Anesthesia Post Evaluation    Patient location during evaluation: PACU  Patient participation: complete - patient participated  Level of consciousness: awake and alert  Pain score: 0  Airway patency: patent  Nausea & Vomiting: no nausea and no vomiting  Cardiovascular status: blood pressure returned to baseline  Respiratory status: acceptable  Hydration status: euvolemic  Multimodal analgesia pain management approach  Pain management: adequate    No notable events documented.

## 2025-04-08 NOTE — ANESTHESIA PRE PROCEDURE
BP Readings from Last 3 Encounters:   04/08/25 (!) 143/65   02/27/25 (!) 137/58   12/20/23 127/65       NPO Status: Time of last liquid consumption: 2130                        Time of last solid consumption: 2130                        Date of last liquid consumption: 04/07/25                        Date of last solid food consumption: 04/07/25    BMI:   Wt Readings from Last 3 Encounters:   04/08/25 83.9 kg (184 lb 14.4 oz)   03/31/25 82.1 kg (181 lb)   02/27/25 83.3 kg (183 lb 9.6 oz)     Body mass index is 32.76 kg/m².    CBC:   Lab Results   Component Value Date/Time    WBC 5.4 11/01/2023 02:59 PM    RBC 4.31 11/01/2023 02:59 PM    HGB 11.2 11/01/2023 02:59 PM    HCT 36.1 11/01/2023 02:59 PM    MCV 83.8 11/01/2023 02:59 PM    RDW 14.9 11/01/2023 02:59 PM     11/01/2023 02:59 PM       CMP:   Lab Results   Component Value Date/Time     11/01/2023 02:59 PM    K 3.8 11/01/2023 02:59 PM     11/01/2023 02:59 PM    CO2 30 11/01/2023 02:59 PM    BUN 17 11/01/2023 02:59 PM    CREATININE 0.81 11/01/2023 02:59 PM    GFRAA >60 12/07/2021 08:48 AM    AGRATIO 1.1 12/20/2022 01:10 PM    LABGLOM >60 11/01/2023 02:59 PM    LABGLOM >60 12/20/2022 01:10 PM    GLUCOSE 89 11/01/2023 02:59 PM    CALCIUM 8.4 11/01/2023 02:59 PM    BILITOT 0.5 11/01/2023 02:59 PM    ALKPHOS 174 11/01/2023 02:59 PM    ALKPHOS 196 12/20/2022 01:10 PM    AST 19 11/01/2023 02:59 PM    ALT 19 11/01/2023 02:59 PM       POC Tests: No results for input(s): \"POCGLU\", \"POCNA\", \"POCK\", \"POCCL\", \"POCBUN\", \"POCHEMO\", \"POCHCT\" in the last 72 hours.    Coags:   Lab Results   Component Value Date/Time    PROTIME 13.4 03/12/2021 09:08 AM    INR 1.0 03/12/2021 09:08 AM       HCG (If Applicable): No results found for: \"PREGTESTUR\", \"PREGSERUM\", \"HCG\", \"HCGQUANT\"     ABGs: No results found for: \"PHART\", \"PO2ART\", \"RIF0IXJ\", \"VJK7TDM\", \"BEART\", \"[id number]\"     Type & Screen (If Applicable):  Lab Results   Component Value Date    ABORH O POSITIVE

## 2025-04-08 NOTE — DISCHARGE INSTRUCTIONS
Follow-up care is a key part of your treatment and safety. Be sure to make and go to all appointments, and call your doctor if you are having problems. It's also a good idea to know your test results and keep a list of the medicines you take.  When should you call for help?   Call 911 anytime you think you may need emergency care. For example, call if:    You passed out (lost consciousness).     You have a sudden loss of vision.     You have sudden chest pain, are short of breath, or cough up blood.   Call your doctor now or seek immediate medical care if:    You have signs of an eye infection, such as:  Pus or thick discharge coming from the eye.  Redness or swelling around the eye.  A fever.     You have new or worse eye pain.     You have vision changes.     You have symptoms of a blood clot in your leg (called a deep vein thrombosis), such as:  Pain in the calf, back of the knee, thigh, or groin.  Redness and swelling in your leg or groin.   Watch closely for changes in your health, and be sure to contact your doctor if:    You do not get better as expected.   Where can you learn more?  Go to https://[email or web address].net/patientEd and enter R255 to learn more about \"Cataract Surgery: What to Expect at Home.\"  Current as of: October 12, 2022               Content Version: 13.7  © 3729-8901 [employer].   Care instructions adapted under license by [employer]. If you have questions about a medical condition or this instruction, always ask your healthcare professional. [employer] disclaims any warranty or liability for your use of this information.

## (undated) DEVICE — DEVICE INFL 60ML 12ATM CONVENIENT LOK REL HNDL HI PRSS FLX

## (undated) DEVICE — 3M(TM) TEGADERM(TM) TRANSPARENT FILM DRESSING FRAME STYLE 9505W: Brand: 3M™ TEGADERM™

## (undated) DEVICE — SUTURE STRATAFIX SYMMETRIC PDS + 1 SGL ARMED CT 18 IN LEN SXPP1A405

## (undated) DEVICE — SUTURE ETHLN SZ 3-0 L30IN NONABSORBABLE BLK FSL L30MM 3/8 1671H

## (undated) DEVICE — NEEDLE SYR 18GA L1.5IN RED PLAS HUB S STL BLNT FILL W/O

## (undated) DEVICE — GARMENT,MEDLINE,DVT,INT,CALF,MED, GEN2: Brand: MEDLINE

## (undated) DEVICE — HANDPIECE SET WITH HIGH FLOW TIP AND SUCTION TUBE: Brand: INTERPULSE

## (undated) DEVICE — CATHETER ANGIO 4FR L100CM S STL NYL AL1 3 SEG BRAID SFT

## (undated) DEVICE — SOLUTION IV 1000ML LAC RINGERS PH 6.5 INJ USP VIAFLX PLAS

## (undated) DEVICE — RELOAD STPL L60MM H1-2.6MM MESENTERY THN TISS WHT 6 ROW

## (undated) DEVICE — ANGIOGRAPHIC CATHETER: Brand: EXPO™

## (undated) DEVICE — SOL IRRIGATION INJ NACL 0.9% 500ML BTL

## (undated) DEVICE — Device

## (undated) DEVICE — STERILE POLYISOPRENE POWDER-FREE SURGICAL GLOVES: Brand: PROTEXIS

## (undated) DEVICE — COVER LT HNDL PLAS RIG 2 PER PK

## (undated) DEVICE — DRAPE,ANGIO,BRACH,STERILE,38X44: Brand: MEDLINE

## (undated) DEVICE — SOLUTION IRRIG 1000ML H2O STRL BLT

## (undated) DEVICE — SYRINGE 20ML LL S/C 50

## (undated) DEVICE — SURGIFOAM SPNG SZ 100

## (undated) DEVICE — SUT VCRL + 2-0 36IN CT1 UD --

## (undated) DEVICE — STAPLER SKIN L440MM 32MM LNG 12 FIRING B FRM PWR + GRIPPING

## (undated) DEVICE — BLADE SAW 1.19X20X90 MM FOR LG BNE

## (undated) DEVICE — 3M™ TEGADERM™ TRANSPARENT FILM DRESSING FRAME STYLE, 1624W, 2-3/8 IN X 2-3/4 IN (6 CM X 7 CM), 100/CT 4CT/CASE: Brand: 3M™ TEGADERM™

## (undated) DEVICE — SOL INJ SOD CL 0.9% 500ML BG --

## (undated) DEVICE — YANKAUER,FLEXIBLE HANDLE,REGLR CAPACITY: Brand: MEDLINE INDUSTRIES, INC.

## (undated) DEVICE — NDL SPNE QNCKE 18GX3.5IN LF --

## (undated) DEVICE — GOWN,SIRUS,NONRNF,SETINSLV,XL,20/CS: Brand: MEDLINE

## (undated) DEVICE — GLOVE SURG SZ 8 L12IN THK75MIL DK GRN LTX FREE

## (undated) DEVICE — SHEAR HARMONIC 5MMX45CM -- ACE 7+

## (undated) DEVICE — DRESSING ALG W4XL8IN AG FOAM SUPERABSORBENT SIL ANTIMIC

## (undated) DEVICE — CUTTER ENDOSCP L340MM LIN ARTC SGL STROKE FIRING ENDOPATH

## (undated) DEVICE — SYSTEM SKIN CLSR 22CM DERMBND PRINEO

## (undated) DEVICE — GOWN ISOLATN REG BLU POLY UNISX W/ THMB LOOP

## (undated) DEVICE — DERMABOND SKIN ADH 0.7ML -- DERMABOND ADVANCED 12/BX

## (undated) DEVICE — DERMABOND SKIN ADH 0.7ML --

## (undated) DEVICE — PACK PROCEDURE SURG ANTR HIP

## (undated) DEVICE — OPTIFOAM GENTLE LITE, BORDERED, 4X4: Brand: MEDLINE

## (undated) DEVICE — INTENDED FOR TISSUE SEPARATION, AND OTHER PROCEDURES THAT REQUIRE A SHARP SURGICAL BLADE TO PUNCTURE OR CUT.: Brand: BARD-PARKER ® CARBON RIB-BACK BLADES

## (undated) DEVICE — SOLUTION IV 250ML 0.9% SOD CHL CLR INJ FLX BG CONT PRT CLSR

## (undated) DEVICE — [HIGH FLOW INSUFFLATOR,  DO NOT USE IF PACKAGE IS DAMAGED,  KEEP DRY,  KEEP AWAY FROM SUNLIGHT,  PROTECT FROM HEAT AND RADIOACTIVE SOURCES.]: Brand: PNEUMOSURE

## (undated) DEVICE — NEEDLE INSUF L150MM DIA2MM DISP FOR PNEUMOPERI ENDOPATH

## (undated) DEVICE — TROCAR ENDOSCP BLDELSS 12X100 MM W/ HNDL STBL SL OPT TIP

## (undated) DEVICE — SLEEVE TRCR L100MM DIA5MM UNIV STBL FOR BLDELSS DIL TIP

## (undated) DEVICE — STAPLER INT L37CM STPL 21MM CIR ENDOSCP CRV INTLUMN B FRM

## (undated) DEVICE — SENSOR PLSE OXMTR AD CBL L36IN ADH FRM FIT SPO2 DISP

## (undated) DEVICE — RELOAD STPL H1.8-3.8MM REG THCK TISS G 6 ROW GRIPPING SURF

## (undated) DEVICE — SUTURE VCRL + SZ 1 L36IN ABSRB UD L36MM CT-1 1/2 CIR VCP947H

## (undated) DEVICE — DRAIN KT WND 10FR RND 400ML --

## (undated) DEVICE — GLIDESHEATH SLENDER STAINLESS STEEL KIT: Brand: GLIDESHEATH SLENDER

## (undated) DEVICE — SUT VCRL + 1 36IN CT1 VIO --

## (undated) DEVICE — SUT ETHLN 3-0 18IN PS2 BLK --

## (undated) DEVICE — VISUALIZATION SYSTEM: Brand: CLEARIFY

## (undated) DEVICE — SOLUTION IRRIGATION BAL SALT SOLUTION 500 ML STRL BSS

## (undated) DEVICE — 3 BONE CEMENT MIXER: Brand: MIXEVAC

## (undated) DEVICE — SATINSLIT® KNIFE 2.75MM ANGLED: Brand: SATINSLIT®

## (undated) DEVICE — MAJ-1414 SINGLE USE ADPATER BIOPSY VALV: Brand: SINGLE USE ADAPTOR BIOPSY VALVE

## (undated) DEVICE — DEVON™ KNEE AND BODY STRAP 60" X 3" (1.5 M X 7.6 CM): Brand: DEVON

## (undated) DEVICE — SUTURE VCRL + SZ 2-0 L36IN ABSRB UD L36MM CT-1 1/2 CIR VCP945H

## (undated) DEVICE — RELOAD STPL H4.1X2MM DIA60MM THCK TISS GRN 6 ROW PWR GST B

## (undated) DEVICE — FORCEPS BX OVL CUP SERR DISP CAP L 240CM RAD JAW 4

## (undated) DEVICE — SYR 20ML LL STRL LF --

## (undated) DEVICE — GUIDEWIRE VASC L260CM DIA0.035IN RAD 3MM J TIP L7CM PTFE

## (undated) DEVICE — PACK PROCEDURE SURG TOT KNEE CUST

## (undated) DEVICE — CANNULA FRM 27GA 7 8IN

## (undated) DEVICE — GLOVE SURG SZ 75 L12IN FNGR THK79MIL GRN LTX FREE

## (undated) DEVICE — TROCAR ENDOSCP L100MM DIA12MM STBL SL BLDELSS ENDOPATH XCEL

## (undated) DEVICE — PACK PROCEDURE SURG CATH CUST

## (undated) DEVICE — SYR LR LCK 1ML GRAD NSAF 30ML --

## (undated) DEVICE — IMMOBILIZER KNEE UNIV L19IN FOR 12-24IN THGH FOAM T BAR

## (undated) DEVICE — RELOAD STPL L60MM H1.5-3.6MM REG TISS BLU GRIPPING SURF B

## (undated) DEVICE — (D)PREP SKN CHLRAPRP APPL 26ML -- CONVERT TO ITEM 371833

## (undated) DEVICE — SHIELD RAD 14X16 IN W/ SCOOP ABSORBER

## (undated) DEVICE — SUTURE VCRL SZ 0 L18IN ABSRB UD L36MM CT-1 1/2 CIR J840D

## (undated) DEVICE — SUTURE ABSORBABLE BRAIDED 1-0 OS-8 CR 3X18 IN UD VICRYL J757T

## (undated) DEVICE — PIN DRL QUIK HI PERF FOR SIG SYS

## (undated) DEVICE — DISPOSABLE SUCTION/IRRIGATOR TUBE SET WITH TIP: Brand: AHTO

## (undated) DEVICE — APPLICATOR LAP 35 CM 2 RIGID VISTASEAL

## (undated) DEVICE — SUT MONOCRYL PLUS UD 3-0 --

## (undated) DEVICE — GLOVE ORANGE PI 8   MSG9080

## (undated) DEVICE — 3M™ IOBAN™ 2 ANTIMICROBIAL INCISE DRAPE 6650EZ: Brand: IOBAN™ 2

## (undated) DEVICE — KERLIX BANDAGE ROLL: Brand: KERLIX

## (undated) DEVICE — (D)GLOVE SURG ORTH 8 PWD LTX -- DISC BY MFR USE ITEM 278015

## (undated) DEVICE — THE CANADY HYBRID PLASMA SCALPEL IS AN ELECTROSURGICAL PLASMA SCALPEL THAT USES AN 85MM BENDABLE PADDLE BLADE TIP. THE ELECTROSURGICAL PLASMA SCALPEL IS USED TO SIMULTANEOUSLY CUT AND COAGULATE BIOLOGICAL TISSUE.: Brand: CANADY HYBRID PLASMA PADDLE BLADE

## (undated) DEVICE — DRSG MEPILES BORDER AG 4X12 -- 5/BX

## (undated) DEVICE — SUT PROL 2-0 30IN CT2 BLU --

## (undated) DEVICE — APPLIER CLP L SHFT DIA12MM 20 ROT MULT LIGACLP

## (undated) DEVICE — PROCEDURE KIT FLUID MGMT 10 FR CUST MAINFOLD

## (undated) DEVICE — SOLUTION SURG PREP 26 CC PURPREP

## (undated) DEVICE — AGENT HEMSTAT W6XL9IN OXIDIZED REGENERATED CELOS ABSRB FOR

## (undated) DEVICE — NDL PRT INJ NSAF BLNT 18GX1.5 --

## (undated) DEVICE — STRYKER PERFORMANCE SERIES SAGITTAL BLADE: Brand: STRYKER PERFORMANCE SERIES

## (undated) DEVICE — CLIP SUT ENDOSCP F/2-0/3-0/4-0 -- LAPRA-TY

## (undated) DEVICE — GELFOAM SZ 100 SPNG

## (undated) DEVICE — RESERVOIR,SUCTION,100CC,SILICONE: Brand: MEDLINE

## (undated) DEVICE — CATH DIAG F5 AL I 100CM -- INFINITI - ORDER AS EA

## (undated) DEVICE — TROCAR LAP L100MM DIA5MM BLDELSS W/ STBL SL ENDOPATH XCEL

## (undated) DEVICE — SUTURE PDS II SZ 0 L27IN ABSRB VLT L26MM CT-2 1/2 CIR Z334H

## (undated) DEVICE — BLADE SAW W13XL90MM 1.19MM PARA

## (undated) DEVICE — SUT ETHLN 3-0 18IN PS1 BLK --

## (undated) DEVICE — PRESSURE MONITORING SET: Brand: TRUWAVE

## (undated) DEVICE — SUT MCRYL + 3-0 27IN PS1 UD --

## (undated) DEVICE — BANDAGE,GAUZE,BULKEE II,4.5"X4.1YD,STRL: Brand: MEDLINE

## (undated) DEVICE — SYRINGE MED 30ML STD CLR PLAS LUERLOCK TIP N CTRL DISP

## (undated) DEVICE — CATHETER DIAG AD 5FR L100CM COR GRY HYDRPHLC NYL MPA 2 W/ 2

## (undated) DEVICE — TUBING PRSS MON L24IN PVC RIG NONEXPANDING M TO FEM CONN

## (undated) DEVICE — X-RAY SPONGES,12 PLY: Brand: DERMACEA

## (undated) DEVICE — STRAP,POSITIONING,KNEE/BODY,FOAM,4X60": Brand: MEDLINE

## (undated) DEVICE — APPLICATOR MEDICATED 26 CC SOLUTION HI LT ORNG CHLORAPREP

## (undated) DEVICE — EVACUATOR SURG 400CC PVC 3 SPR BLB FOR WND DRNGE

## (undated) DEVICE — TISSUE RETRIEVAL SYSTEM: Brand: INZII RETRIEVAL SYSTEM

## (undated) DEVICE — MICROSURGICAL INSTRUMENT HYDRODISSECTION CANNULA 27GA, 1.57MM BEND (AKAHOSHI STYLE): Brand: ALCON

## (undated) DEVICE — TUBING, SUCTION, 1/4" X 12', STRAIGHT: Brand: MEDLINE

## (undated) DEVICE — DRAPE MICSCP W54XL150IN STD OCU CVR CLEARLENS TECHNOLOGY FOR

## (undated) DEVICE — ENDOCUT SCISSOR TIP, DISPOSABLE: Brand: RENEW

## (undated) DEVICE — SUT MONOCRYL PLUS UD 4-0 --

## (undated) DEVICE — TROCAR ENDOSCP L100MM DIA15MM BLDELSS STBL SL ENDOPATH XCEL

## (undated) DEVICE — NEEDLE HYPO 22GA L1.5IN BLK S STL HUB POLYPR SHLD REG BVL

## (undated) DEVICE — BARIATRIC: Brand: MEDLINE INDUSTRIES, INC.

## (undated) DEVICE — SEALANT TISS 10 CC FOR HUM FIBRIN VISTASEAL

## (undated) DEVICE — SOLUTION LACTATED RINGERS INJECTION USP

## (undated) DEVICE — GARMENT COMPR M FOR 13IN FT INTMIT SGL BLDR HEM FORC II

## (undated) DEVICE — PREP SKN CHLRAPRP APL 26ML STR --

## (undated) DEVICE — STOPCOCK TRNSDUC 500PSI 3 W ROT M LUER LT BLU OFF HNDL R

## (undated) DEVICE — REM POLYHESIVE ADULT PATIENT RETURN ELECTRODE: Brand: VALLEYLAB

## (undated) DEVICE — TOTAL TRAY, 16FR 10ML SIL FOLEY, URN: Brand: MEDLINE

## (undated) DEVICE — MOUTHPIECE ENDOSCP 20X27MM --

## (undated) DEVICE — SINGLE PORT MANIFOLD: Brand: NEPTUNE 2

## (undated) DEVICE — SUTURE VCRL SZ 1 L18IN ABSRB UD L36MM CT-1 1/2 CIR J841D

## (undated) DEVICE — SUTURE ETHIB EXCL BR GRN TAPR PT 2-0 30 X563H X563H

## (undated) DEVICE — CATH DIAG F5 AR I MOD 100CM -- INFINITI - ORDER AS EA

## (undated) DEVICE — BAND RADIAL COMPR ARTERY 24CM -- REG BX/10

## (undated) DEVICE — RELOAD STPL H1-2.5X45MM VASC THN TISS WHT 6 ROW B FRM SGL